# Patient Record
Sex: MALE | Race: WHITE | NOT HISPANIC OR LATINO
[De-identification: names, ages, dates, MRNs, and addresses within clinical notes are randomized per-mention and may not be internally consistent; named-entity substitution may affect disease eponyms.]

---

## 2017-01-05 ENCOUNTER — TRANSCRIPTION ENCOUNTER (OUTPATIENT)
Age: 57
End: 2017-01-05

## 2017-01-06 ENCOUNTER — TRANSCRIPTION ENCOUNTER (OUTPATIENT)
Age: 57
End: 2017-01-06

## 2017-01-16 ENCOUNTER — TRANSCRIPTION ENCOUNTER (OUTPATIENT)
Age: 57
End: 2017-01-16

## 2017-01-18 ENCOUNTER — INPATIENT (INPATIENT)
Facility: HOSPITAL | Age: 57
LOS: 2 days | Discharge: HOME | DRG: 603 | End: 2017-01-21
Attending: HOSPITALIST | Admitting: HOSPITALIST
Payer: COMMERCIAL

## 2017-01-18 VITALS
SYSTOLIC BLOOD PRESSURE: 118 MMHG | DIASTOLIC BLOOD PRESSURE: 87 MMHG | WEIGHT: 235.01 LBS | RESPIRATION RATE: 14 BRPM | OXYGEN SATURATION: 97 % | TEMPERATURE: 98 F | HEIGHT: 73 IN | HEART RATE: 110 BPM

## 2017-01-18 DIAGNOSIS — Z29.9 ENCOUNTER FOR PROPHYLACTIC MEASURES, UNSPECIFIED: ICD-10-CM

## 2017-01-18 DIAGNOSIS — Z98.89 OTHER SPECIFIED POSTPROCEDURAL STATES: Chronic | ICD-10-CM

## 2017-01-18 DIAGNOSIS — K21.9 GASTRO-ESOPHAGEAL REFLUX DISEASE WITHOUT ESOPHAGITIS: ICD-10-CM

## 2017-01-18 DIAGNOSIS — Z98.890 OTHER SPECIFIED POSTPROCEDURAL STATES: Chronic | ICD-10-CM

## 2017-01-18 DIAGNOSIS — L03.115 CELLULITIS OF RIGHT LOWER LIMB: ICD-10-CM

## 2017-01-18 DIAGNOSIS — M48.02 SPINAL STENOSIS, CERVICAL REGION: ICD-10-CM

## 2017-01-18 LAB
ALBUMIN SERPL ELPH-MCNC: 3.9 G/DL — SIGNIFICANT CHANGE UP (ref 3.3–5)
ALP SERPL-CCNC: 97 U/L — SIGNIFICANT CHANGE UP (ref 40–120)
ALT FLD-CCNC: 49 U/L — SIGNIFICANT CHANGE UP (ref 12–78)
ANION GAP SERPL CALC-SCNC: 9 MMOL/L — SIGNIFICANT CHANGE UP (ref 5–17)
APTT BLD: 28.1 SEC — SIGNIFICANT CHANGE UP (ref 27.5–37.4)
AST SERPL-CCNC: 38 U/L — HIGH (ref 15–37)
BASOPHILS # BLD AUTO: 0.1 K/UL — SIGNIFICANT CHANGE UP (ref 0–0.2)
BASOPHILS NFR BLD AUTO: 1.6 % — SIGNIFICANT CHANGE UP (ref 0–2)
BILIRUB SERPL-MCNC: 0.5 MG/DL — SIGNIFICANT CHANGE UP (ref 0.2–1.2)
BUN SERPL-MCNC: 15 MG/DL — SIGNIFICANT CHANGE UP (ref 7–23)
CALCIUM SERPL-MCNC: 9.4 MG/DL — SIGNIFICANT CHANGE UP (ref 8.5–10.1)
CHLORIDE SERPL-SCNC: 104 MMOL/L — SIGNIFICANT CHANGE UP (ref 96–108)
CO2 SERPL-SCNC: 27 MMOL/L — SIGNIFICANT CHANGE UP (ref 22–31)
CREAT SERPL-MCNC: 1.2 MG/DL — SIGNIFICANT CHANGE UP (ref 0.5–1.3)
EOSINOPHIL # BLD AUTO: 0 K/UL — SIGNIFICANT CHANGE UP (ref 0–0.5)
EOSINOPHIL NFR BLD AUTO: 0.6 % — SIGNIFICANT CHANGE UP (ref 0–6)
GLUCOSE SERPL-MCNC: 89 MG/DL — SIGNIFICANT CHANGE UP (ref 70–99)
HCT VFR BLD CALC: 42.9 % — SIGNIFICANT CHANGE UP (ref 39–50)
HGB BLD-MCNC: 14 G/DL — SIGNIFICANT CHANGE UP (ref 13–17)
INR BLD: 1.03 RATIO — SIGNIFICANT CHANGE UP (ref 0.88–1.16)
LACTATE SERPL-SCNC: 0.8 MMOL/L — SIGNIFICANT CHANGE UP (ref 0.7–2)
LYMPHOCYTES # BLD AUTO: 2.6 K/UL — SIGNIFICANT CHANGE UP (ref 1–3.3)
LYMPHOCYTES # BLD AUTO: 38 % — SIGNIFICANT CHANGE UP (ref 13–44)
MCHC RBC-ENTMCNC: 27.5 PG — SIGNIFICANT CHANGE UP (ref 27–34)
MCHC RBC-ENTMCNC: 32.6 GM/DL — SIGNIFICANT CHANGE UP (ref 32–36)
MCV RBC AUTO: 84.3 FL — SIGNIFICANT CHANGE UP (ref 80–100)
MONOCYTES # BLD AUTO: 0.7 K/UL — SIGNIFICANT CHANGE UP (ref 0–0.9)
MONOCYTES NFR BLD AUTO: 10.1 % — HIGH (ref 1–9)
NEUTROPHILS # BLD AUTO: 3.4 K/UL — SIGNIFICANT CHANGE UP (ref 1.8–7.4)
NEUTROPHILS NFR BLD AUTO: 49.7 % — SIGNIFICANT CHANGE UP (ref 43–77)
PLATELET # BLD AUTO: 266 K/UL — SIGNIFICANT CHANGE UP (ref 150–400)
POTASSIUM SERPL-MCNC: 3.9 MMOL/L — SIGNIFICANT CHANGE UP (ref 3.5–5.3)
POTASSIUM SERPL-SCNC: 3.9 MMOL/L — SIGNIFICANT CHANGE UP (ref 3.5–5.3)
PROT SERPL-MCNC: 7 G/DL — SIGNIFICANT CHANGE UP (ref 6–8.3)
PROTHROM AB SERPL-ACNC: 11.4 SEC — SIGNIFICANT CHANGE UP (ref 10–13.1)
RBC # BLD: 5.09 M/UL — SIGNIFICANT CHANGE UP (ref 4.2–5.8)
RBC # FLD: 13.9 % — SIGNIFICANT CHANGE UP (ref 10.3–14.5)
SODIUM SERPL-SCNC: 140 MMOL/L — SIGNIFICANT CHANGE UP (ref 135–145)
WBC # BLD: 6.9 K/UL — SIGNIFICANT CHANGE UP (ref 3.8–10.5)
WBC # FLD AUTO: 6.9 K/UL — SIGNIFICANT CHANGE UP (ref 3.8–10.5)

## 2017-01-18 PROCEDURE — 99223 1ST HOSP IP/OBS HIGH 75: CPT | Mod: AI,GC

## 2017-01-18 PROCEDURE — 99285 EMERGENCY DEPT VISIT HI MDM: CPT

## 2017-01-18 PROCEDURE — 93010 ELECTROCARDIOGRAM REPORT: CPT

## 2017-01-18 PROCEDURE — 71010: CPT | Mod: 26

## 2017-01-18 PROCEDURE — 93971 EXTREMITY STUDY: CPT | Mod: 26,RT

## 2017-01-18 RX ORDER — CYCLOBENZAPRINE HYDROCHLORIDE 10 MG/1
10 TABLET, FILM COATED ORAL AT BEDTIME
Qty: 0 | Refills: 0 | Status: DISCONTINUED | OUTPATIENT
Start: 2017-01-18 | End: 2017-01-21

## 2017-01-18 RX ORDER — VANCOMYCIN HCL 1 G
1000 VIAL (EA) INTRAVENOUS EVERY 12 HOURS
Qty: 0 | Refills: 0 | Status: DISCONTINUED | OUTPATIENT
Start: 2017-01-18 | End: 2017-01-18

## 2017-01-18 RX ORDER — ACETAMINOPHEN 500 MG
650 TABLET ORAL EVERY 6 HOURS
Qty: 0 | Refills: 0 | Status: DISCONTINUED | OUTPATIENT
Start: 2017-01-18 | End: 2017-01-21

## 2017-01-18 RX ORDER — VANCOMYCIN HCL 1 G
1250 VIAL (EA) INTRAVENOUS EVERY 12 HOURS
Qty: 0 | Refills: 0 | Status: DISCONTINUED | OUTPATIENT
Start: 2017-01-19 | End: 2017-01-20

## 2017-01-18 RX ORDER — ENOXAPARIN SODIUM 100 MG/ML
40 INJECTION SUBCUTANEOUS EVERY 24 HOURS
Qty: 0 | Refills: 0 | Status: DISCONTINUED | OUTPATIENT
Start: 2017-01-18 | End: 2017-01-21

## 2017-01-18 RX ORDER — GABAPENTIN 400 MG/1
600 CAPSULE ORAL THREE TIMES A DAY
Qty: 0 | Refills: 0 | Status: DISCONTINUED | OUTPATIENT
Start: 2017-01-18 | End: 2017-01-21

## 2017-01-18 RX ORDER — SODIUM CHLORIDE 9 MG/ML
1000 INJECTION INTRAMUSCULAR; INTRAVENOUS; SUBCUTANEOUS ONCE
Qty: 0 | Refills: 0 | Status: COMPLETED | OUTPATIENT
Start: 2017-01-18 | End: 2017-01-18

## 2017-01-18 RX ORDER — NORTRIPTYLINE HYDROCHLORIDE 10 MG/1
25 CAPSULE ORAL DAILY
Qty: 0 | Refills: 0 | Status: DISCONTINUED | OUTPATIENT
Start: 2017-01-18 | End: 2017-01-21

## 2017-01-18 RX ORDER — PANTOPRAZOLE SODIUM 20 MG/1
40 TABLET, DELAYED RELEASE ORAL
Qty: 0 | Refills: 0 | Status: DISCONTINUED | OUTPATIENT
Start: 2017-01-18 | End: 2017-01-21

## 2017-01-18 RX ORDER — VANCOMYCIN HCL 1 G
1000 VIAL (EA) INTRAVENOUS ONCE
Qty: 0 | Refills: 0 | Status: COMPLETED | OUTPATIENT
Start: 2017-01-18 | End: 2017-01-18

## 2017-01-18 RX ADMIN — Medication 250 MILLIGRAM(S): at 20:56

## 2017-01-18 RX ADMIN — GABAPENTIN 600 MILLIGRAM(S): 400 CAPSULE ORAL at 23:19

## 2017-01-18 RX ADMIN — NORTRIPTYLINE HYDROCHLORIDE 25 MILLIGRAM(S): 10 CAPSULE ORAL at 23:19

## 2017-01-18 RX ADMIN — SODIUM CHLORIDE 1000 MILLILITER(S): 9 INJECTION INTRAMUSCULAR; INTRAVENOUS; SUBCUTANEOUS at 20:25

## 2017-01-18 RX ADMIN — ENOXAPARIN SODIUM 40 MILLIGRAM(S): 100 INJECTION SUBCUTANEOUS at 23:20

## 2017-01-18 RX ADMIN — CYCLOBENZAPRINE HYDROCHLORIDE 10 MILLIGRAM(S): 10 TABLET, FILM COATED ORAL at 23:19

## 2017-01-18 NOTE — H&P ADULT. - HISTORY OF PRESENT ILLNESS
57 yo male s/p right lower calf tattoo 3 weeks ago, got infected/cellulitis,  was on 1 week on clindamycin, no fever/chills, still not getting any better.  PMD Dr. Louis.    In ED, VS: 56y M with PMHx GERD presents with cellulitis of RLE. On 12/23 pt got tatoo on right lower calf in [place] tatoo [facility] with his family. Pt states leg was initially days after, however pt attributed this to tatoo procedure. Over following days pt had increased erythema and pain over tatoo area. Pt went to [facility] urgent care on 1/02, was given 7-day course of clindamycin and topical antibiotic ointment. Pt states he noticed mild improvement, with slight decrease in erythema however erythema never cleared. Pt came into ED today on recommendation of neighbor who is a nurse. Currently denies fever, chills, chest pain, sob, abd pain, n/v/d/c, numbness/tingling.    In ED, VS: T97.3, HR 81, /81, RR 19 SpO2 96% RA  ED labs wnl  US Duplex (-)  CXR: Linear density at the right lung base likely disc atelectasis

## 2017-01-18 NOTE — ED ADULT NURSE NOTE - PSH
History of renal stent  prior insertion in Oct 2014 and exchanged in   right side - Jan 17th , 2015  S/P cystoscopy  Oct 2014  Status post spinal disc removal  C-5 C-6 removal

## 2017-01-18 NOTE — ED ADULT NURSE NOTE - OBJECTIVE STATEMENT
Pt to ED c/c infection to tattoo on right lower leg. Pt states he completed course of clindamycin and redness improved bur persists. Notd with erythema inferior to tattoo site, skin temp is normal Pt s/o soreness at site

## 2017-01-18 NOTE — ED PROVIDER NOTE - SKIN, MLM
+erythema, +warmth on right calf over [place] [employer] Tattoo with streaking down to the ankle, no fluctuance

## 2017-01-18 NOTE — H&P ADULT. - PROBLEM SELECTOR PLAN 1
IV abx: vancomycin 1250mg q12  wound care consult  f/u AM labs IV abx: vancomycin 1250mg q12  wound care consult for possible wound culture  f/u AM labs

## 2017-01-18 NOTE — ED ADULT NURSE NOTE - PMH
GERD (gastroesophageal reflux disease)    Kidney stone  multiple  Marijuana abuse    Spinal stenosis in cervical region

## 2017-01-19 ENCOUNTER — TRANSCRIPTION ENCOUNTER (OUTPATIENT)
Age: 57
End: 2017-01-19

## 2017-01-19 LAB
ANION GAP SERPL CALC-SCNC: 8 MMOL/L — SIGNIFICANT CHANGE UP (ref 5–17)
APPEARANCE UR: CLEAR — SIGNIFICANT CHANGE UP
BASOPHILS # BLD AUTO: 0.1 K/UL — SIGNIFICANT CHANGE UP (ref 0–0.2)
BASOPHILS NFR BLD AUTO: 1.5 % — SIGNIFICANT CHANGE UP (ref 0–2)
BILIRUB UR-MCNC: NEGATIVE — SIGNIFICANT CHANGE UP
BUN SERPL-MCNC: 16 MG/DL — SIGNIFICANT CHANGE UP (ref 7–23)
CALCIUM SERPL-MCNC: 8.6 MG/DL — SIGNIFICANT CHANGE UP (ref 8.5–10.1)
CHLORIDE SERPL-SCNC: 106 MMOL/L — SIGNIFICANT CHANGE UP (ref 96–108)
CO2 SERPL-SCNC: 26 MMOL/L — SIGNIFICANT CHANGE UP (ref 22–31)
COLOR SPEC: YELLOW — SIGNIFICANT CHANGE UP
CREAT SERPL-MCNC: 1.2 MG/DL — SIGNIFICANT CHANGE UP (ref 0.5–1.3)
DIFF PNL FLD: NEGATIVE — SIGNIFICANT CHANGE UP
EOSINOPHIL # BLD AUTO: 0.1 K/UL — SIGNIFICANT CHANGE UP (ref 0–0.5)
EOSINOPHIL NFR BLD AUTO: 1.4 % — SIGNIFICANT CHANGE UP (ref 0–6)
GLUCOSE SERPL-MCNC: 106 MG/DL — HIGH (ref 70–99)
GLUCOSE UR QL: NEGATIVE — SIGNIFICANT CHANGE UP
HAV IGM SER-ACNC: SIGNIFICANT CHANGE UP
HBV CORE IGM SER-ACNC: SIGNIFICANT CHANGE UP
HBV SURFACE AG SER-ACNC: SIGNIFICANT CHANGE UP
HCT VFR BLD CALC: 43.4 % — SIGNIFICANT CHANGE UP (ref 39–50)
HCV AB S/CO SERPL IA: 0.08 S/CO — SIGNIFICANT CHANGE UP
HCV AB SERPL-IMP: SIGNIFICANT CHANGE UP
HGB BLD-MCNC: 13.9 G/DL — SIGNIFICANT CHANGE UP (ref 13–17)
HIV 1+2 AB+HIV1 P24 AG SERPL QL IA: SIGNIFICANT CHANGE UP
KETONES UR-MCNC: NEGATIVE — SIGNIFICANT CHANGE UP
LEUKOCYTE ESTERASE UR-ACNC: NEGATIVE — SIGNIFICANT CHANGE UP
LYMPHOCYTES # BLD AUTO: 1.9 K/UL — SIGNIFICANT CHANGE UP (ref 1–3.3)
LYMPHOCYTES # BLD AUTO: 32.4 % — SIGNIFICANT CHANGE UP (ref 13–44)
MCHC RBC-ENTMCNC: 27.1 PG — SIGNIFICANT CHANGE UP (ref 27–34)
MCHC RBC-ENTMCNC: 32.1 GM/DL — SIGNIFICANT CHANGE UP (ref 32–36)
MCV RBC AUTO: 84.6 FL — SIGNIFICANT CHANGE UP (ref 80–100)
MONOCYTES # BLD AUTO: 0.5 K/UL — SIGNIFICANT CHANGE UP (ref 0–0.9)
MONOCYTES NFR BLD AUTO: 8.8 % — SIGNIFICANT CHANGE UP (ref 1–9)
NEUTROPHILS # BLD AUTO: 3.3 K/UL — SIGNIFICANT CHANGE UP (ref 1.8–7.4)
NEUTROPHILS NFR BLD AUTO: 55.8 % — SIGNIFICANT CHANGE UP (ref 43–77)
NITRITE UR-MCNC: NEGATIVE — SIGNIFICANT CHANGE UP
PH UR: 6.5 — SIGNIFICANT CHANGE UP (ref 4.8–8)
PLATELET # BLD AUTO: 245 K/UL — SIGNIFICANT CHANGE UP (ref 150–400)
POTASSIUM SERPL-MCNC: 4 MMOL/L — SIGNIFICANT CHANGE UP (ref 3.5–5.3)
POTASSIUM SERPL-SCNC: 4 MMOL/L — SIGNIFICANT CHANGE UP (ref 3.5–5.3)
PROT UR-MCNC: NEGATIVE — SIGNIFICANT CHANGE UP
RBC # BLD: 5.13 M/UL — SIGNIFICANT CHANGE UP (ref 4.2–5.8)
RBC # FLD: 14 % — SIGNIFICANT CHANGE UP (ref 10.3–14.5)
SODIUM SERPL-SCNC: 140 MMOL/L — SIGNIFICANT CHANGE UP (ref 135–145)
SP GR SPEC: 1.01 — SIGNIFICANT CHANGE UP (ref 1.01–1.02)
UROBILINOGEN FLD QL: NEGATIVE — SIGNIFICANT CHANGE UP
WBC # BLD: 5.9 K/UL — SIGNIFICANT CHANGE UP (ref 3.8–10.5)
WBC # FLD AUTO: 5.9 K/UL — SIGNIFICANT CHANGE UP (ref 3.8–10.5)

## 2017-01-19 PROCEDURE — 99233 SBSQ HOSP IP/OBS HIGH 50: CPT

## 2017-01-19 RX ADMIN — Medication 166.67 MILLIGRAM(S): at 21:03

## 2017-01-19 RX ADMIN — Medication 166.67 MILLIGRAM(S): at 08:22

## 2017-01-19 RX ADMIN — PANTOPRAZOLE SODIUM 40 MILLIGRAM(S): 20 TABLET, DELAYED RELEASE ORAL at 06:31

## 2017-01-19 RX ADMIN — ENOXAPARIN SODIUM 40 MILLIGRAM(S): 100 INJECTION SUBCUTANEOUS at 22:43

## 2017-01-19 RX ADMIN — GABAPENTIN 600 MILLIGRAM(S): 400 CAPSULE ORAL at 22:43

## 2017-01-19 RX ADMIN — CYCLOBENZAPRINE HYDROCHLORIDE 10 MILLIGRAM(S): 10 TABLET, FILM COATED ORAL at 22:43

## 2017-01-19 RX ADMIN — NORTRIPTYLINE HYDROCHLORIDE 25 MILLIGRAM(S): 10 CAPSULE ORAL at 13:19

## 2017-01-19 RX ADMIN — GABAPENTIN 600 MILLIGRAM(S): 400 CAPSULE ORAL at 06:31

## 2017-01-19 RX ADMIN — GABAPENTIN 600 MILLIGRAM(S): 400 CAPSULE ORAL at 13:18

## 2017-01-19 NOTE — DISCHARGE NOTE ADULT - PATIENT PORTAL LINK FT
“You can access the [facility] Patient Portal, offered by [facility], by registering with the following website: [email or web address]”

## 2017-01-19 NOTE — DISCHARGE NOTE ADULT - MEDICATION SUMMARY - MEDICATIONS TO TAKE
I will START or STAY ON the medications listed below when I get home from the hospital:    acetaminophen 325 mg oral tablet  -- 2 tab(s) by mouth every 6 hours, As needed, Mild Pain (1 - 3)  -- Indication: For Cellulitis of right leg    aluminum hydroxide-magnesium hydroxide 200 mg-200 mg/5 mL oral suspension  -- 30 milliliter(s) by mouth every 6 hours, As needed, Dyspepsia  -- Indication: For GERD (gastroesophageal reflux disease)    gabapentin 600 mg oral tablet  -- 1 tab(s) by mouth 3 times a day  -- Indication: For Spinal stenosis in cervical region    nortriptyline 25 mg oral capsule  --  by mouth once a day  -- Indication: For Spinal stenosis in cervical region    clindamycin 300 mg oral capsule  -- 1 cap(s) by mouth 3 times a day  -- Indication: For Cellulitis of right lower extremity    cyclobenzaprine 10 mg oral tablet  -- 1 tab(s) by mouth once a day (at bedtime)  -- Indication: For Spinal stenosis in cervical region    Acidophilus oral capsule  -- 1 cap(s) by mouth 3 times a day  -- Indication: For Cellulitis of right lower extremity    Prevacid 15 mg oral delayed release capsule  -- 1 cap(s) by mouth once a day  -- Indication: For GERD (gastroesophageal reflux disease)

## 2017-01-19 NOTE — DISCHARGE NOTE ADULT - ADDITIONAL INSTRUCTIONS
Follow-up with primary care provider upon discharge. Follow-up with primary care provider upon discharge.  F/U with renal functions

## 2017-01-19 NOTE — DISCHARGE NOTE ADULT - CARE PROVIDER_API CALL
Chris Louis (DO), Family Medicine  [address]  [place] [address]  Phone: [phone]  Fax: [phone]    Nick Walker), Infectious Disease; Internal Medicine  [address] [place] [id number]  Phone: [phone]  Fax: [phone]

## 2017-01-19 NOTE — DISCHARGE NOTE ADULT - HOSPITAL COURSE
56M PMHx GERD presented with cellulitis of RLE. On 12/23 pt [facility] [place] [facility]. Pt states leg was initially days after, however pt attributed this to tatoo procedure. Over following days pt had increased erythema and pain over tatoo area. Pt went to [facility] urgent care on 1/02, was given 7-day course of clindamycin and topical antibiotic ointment. Pt states he noticed mild improvement, with slight decrease in erythema however erythema never cleared. Pt came into ED today on recommendation of neighbor who is a nurse. Currently denies fever, chills, chest pain, sob, abd pain, n/v/d/c, numbness/tingling.    In ED, VS: T97.3, HR 81, /81, RR 19 SpO2 96% RA. ED labs wnl. US Duplex (-). CXR: Linear density at the right lung base likely disc atelectasis    Patient given IV Vanco, ID consulted (Dr. Walker), wound care consulted to obtain wound culture. 56M PMHx GERD presented with cellulitis of RLE. On 12/23 pt [place] [facility] [place] [facility] [place] [facility] Pt states leg was initially days after, however pt attributed this to tatoo procedure. Over following days pt had increased erythema and pain over tatoo area. Pt went to [facility] urgent care on 1/02, was given 7-day course of clindamycin and topical antibiotic ointment. Pt states he noticed mild improvement, with slight decrease in erythema however erythema never cleared. Pt came into ED today on recommendation of neighbor who is a nurse. Currently denies fever, chills, chest pain, sob, abd pain, n/v/d/c, numbness/tingling.    In ED, VS: T97.3, HR 81, /81, RR 19 SpO2 96% RA. ED labs wnl. US Duplex (-). CXR: Linear density at the right lung base likely disc atelectasis    Patient given IV Vanco, ID consulted (Dr. Walker), wound care consulted, recommended antibiotics and warm compresses. Wound clinically improved, switched to oral Clinda. Developed acute kidney injury with elevated creatinine, switched off Vanco, encouraged increased fluid intake. 56M PMHx GERD presented with cellulitis of RLE. On 12/23 pt [facility] [place] [facility] Pt states leg was initially days after, however pt attributed this to tatoo procedure. Over following days pt had increased erythema and pain over tatoo area. Pt went to [facility] urgent care on 1/02, was given 7-day course of clindamycin and topical antibiotic ointment. Pt states he noticed mild improvement, with slight decrease in erythema however erythema never cleared. Pt came into ED today on recommendation of neighbor who is a nurse. Currently denies fever, chills, chest pain, sob, abd pain, n/v/d/c, numbness/tingling.    In ED, VS: T97.3, HR 81, /81, RR 19 SpO2 96% RA. ED labs wnl. US Duplex (-). CXR: Linear density at the right lung base likely disc atelectasis    Patient given IV Vanco, ID consulted (Dr. Walker), wound care consulted, recommended antibiotics and warm compresses. Wound clinically improved, switched to oral Clinda. Developed acute kidney injury with elevated creatinine, switched off Vanco, encouraged increased fluid intake. With IVF Cr is trending down.

## 2017-01-19 NOTE — DISCHARGE NOTE ADULT - CARE PLAN
Principal Discharge DX:	Cellulitis of right leg Principal Discharge DX:	Cellulitis of right leg  Goal:	Resolution of infection  Instructions for follow-up, activity and diet:	Continue oral Clinda, start Bacid. Follow-up with PCP.  Secondary Diagnosis:	ROGELIO (acute kidney injury)  Instructions for follow-up, activity and diet:	Encourage increased fluid intake  Secondary Diagnosis:	Spinal stenosis in cervical region  Instructions for follow-up, activity and diet:	Continue Neurontin, flexeril and nortriptyline Principal Discharge DX:	Cellulitis of right leg  Goal:	Resolution of infection  Instructions for follow-up, activity and diet:	Continue oral Clinda, and Bacid for total 7 days. Follow-up with PCP.  Secondary Diagnosis:	ROGELIO (acute kidney injury)  Instructions for follow-up, activity and diet:	Encourage increased fluid intake  Secondary Diagnosis:	Spinal stenosis in cervical region  Instructions for follow-up, activity and diet:	Continue Neurontin, flexeril and nortriptyline

## 2017-01-19 NOTE — DISCHARGE NOTE ADULT - PLAN OF CARE
Resolution of infection Continue oral Clinda, start Bacid. Follow-up with PCP. Encourage increased fluid intake Continue Neurontin, flexeril and nortriptyline Continue oral Clinda, and Bacid for total 7 days. Follow-up with PCP.

## 2017-01-20 LAB
ANION GAP SERPL CALC-SCNC: 7 MMOL/L — SIGNIFICANT CHANGE UP (ref 5–17)
BUN SERPL-MCNC: 12 MG/DL — SIGNIFICANT CHANGE UP (ref 7–23)
CALCIUM SERPL-MCNC: 8.7 MG/DL — SIGNIFICANT CHANGE UP (ref 8.5–10.1)
CHLORIDE SERPL-SCNC: 104 MMOL/L — SIGNIFICANT CHANGE UP (ref 96–108)
CO2 SERPL-SCNC: 29 MMOL/L — SIGNIFICANT CHANGE UP (ref 22–31)
CREAT SERPL-MCNC: 1.4 MG/DL — HIGH (ref 0.5–1.3)
CULTURE RESULTS: NO GROWTH — SIGNIFICANT CHANGE UP
EOSINOPHIL NFR URNS MANUAL: NEGATIVE — SIGNIFICANT CHANGE UP
GLUCOSE SERPL-MCNC: 105 MG/DL — HIGH (ref 70–99)
HCT VFR BLD CALC: 43.3 % — SIGNIFICANT CHANGE UP (ref 39–50)
HGB BLD-MCNC: 14.1 G/DL — SIGNIFICANT CHANGE UP (ref 13–17)
MCHC RBC-ENTMCNC: 27.7 PG — SIGNIFICANT CHANGE UP (ref 27–34)
MCHC RBC-ENTMCNC: 32.6 GM/DL — SIGNIFICANT CHANGE UP (ref 32–36)
MCV RBC AUTO: 85.1 FL — SIGNIFICANT CHANGE UP (ref 80–100)
PLATELET # BLD AUTO: 237 K/UL — SIGNIFICANT CHANGE UP (ref 150–400)
POTASSIUM SERPL-MCNC: 4.3 MMOL/L — SIGNIFICANT CHANGE UP (ref 3.5–5.3)
POTASSIUM SERPL-SCNC: 4.3 MMOL/L — SIGNIFICANT CHANGE UP (ref 3.5–5.3)
RBC # BLD: 5.09 M/UL — SIGNIFICANT CHANGE UP (ref 4.2–5.8)
RBC # FLD: 14.2 % — SIGNIFICANT CHANGE UP (ref 10.3–14.5)
SODIUM SERPL-SCNC: 140 MMOL/L — SIGNIFICANT CHANGE UP (ref 135–145)
SPECIMEN SOURCE: SIGNIFICANT CHANGE UP
VANCOMYCIN TROUGH SERPL-MCNC: 11.5 UG/ML — SIGNIFICANT CHANGE UP (ref 10–20)
WBC # BLD: 6.4 K/UL — SIGNIFICANT CHANGE UP (ref 3.8–10.5)
WBC # FLD AUTO: 6.4 K/UL — SIGNIFICANT CHANGE UP (ref 3.8–10.5)

## 2017-01-20 PROCEDURE — 99233 SBSQ HOSP IP/OBS HIGH 50: CPT

## 2017-01-20 RX ORDER — LACTOBACILLUS ACIDOPHILUS 100MM CELL
1 CAPSULE ORAL
Qty: 0 | Refills: 0 | Status: DISCONTINUED | OUTPATIENT
Start: 2017-01-20 | End: 2017-01-21

## 2017-01-20 RX ADMIN — GABAPENTIN 600 MILLIGRAM(S): 400 CAPSULE ORAL at 06:20

## 2017-01-20 RX ADMIN — Medication 1 TABLET(S): at 17:23

## 2017-01-20 RX ADMIN — GABAPENTIN 600 MILLIGRAM(S): 400 CAPSULE ORAL at 22:40

## 2017-01-20 RX ADMIN — PANTOPRAZOLE SODIUM 40 MILLIGRAM(S): 20 TABLET, DELAYED RELEASE ORAL at 06:20

## 2017-01-20 RX ADMIN — Medication 1 TABLET(S): at 11:03

## 2017-01-20 RX ADMIN — CYCLOBENZAPRINE HYDROCHLORIDE 10 MILLIGRAM(S): 10 TABLET, FILM COATED ORAL at 22:40

## 2017-01-20 RX ADMIN — Medication 300 MILLIGRAM(S): at 13:26

## 2017-01-20 RX ADMIN — ENOXAPARIN SODIUM 40 MILLIGRAM(S): 100 INJECTION SUBCUTANEOUS at 22:39

## 2017-01-20 RX ADMIN — Medication 300 MILLIGRAM(S): at 22:40

## 2017-01-20 RX ADMIN — NORTRIPTYLINE HYDROCHLORIDE 25 MILLIGRAM(S): 10 CAPSULE ORAL at 13:26

## 2017-01-20 RX ADMIN — GABAPENTIN 600 MILLIGRAM(S): 400 CAPSULE ORAL at 13:26

## 2017-01-21 VITALS
OXYGEN SATURATION: 94 % | SYSTOLIC BLOOD PRESSURE: 114 MMHG | RESPIRATION RATE: 18 BRPM | HEART RATE: 81 BPM | TEMPERATURE: 97 F | DIASTOLIC BLOOD PRESSURE: 80 MMHG

## 2017-01-21 LAB
ANION GAP SERPL CALC-SCNC: 6 MMOL/L — SIGNIFICANT CHANGE UP (ref 5–17)
BUN SERPL-MCNC: 10 MG/DL — SIGNIFICANT CHANGE UP (ref 7–23)
CALCIUM SERPL-MCNC: 8.8 MG/DL — SIGNIFICANT CHANGE UP (ref 8.5–10.1)
CHLORIDE SERPL-SCNC: 103 MMOL/L — SIGNIFICANT CHANGE UP (ref 96–108)
CO2 SERPL-SCNC: 30 MMOL/L — SIGNIFICANT CHANGE UP (ref 22–31)
CREAT ?TM UR-MCNC: 25 MG/DL — SIGNIFICANT CHANGE UP
CREAT SERPL-MCNC: 1.4 MG/DL — HIGH (ref 0.5–1.3)
GLUCOSE SERPL-MCNC: 98 MG/DL — SIGNIFICANT CHANGE UP (ref 70–99)
HCT VFR BLD CALC: 44.2 % — SIGNIFICANT CHANGE UP (ref 39–50)
HGB BLD-MCNC: 14.5 G/DL — SIGNIFICANT CHANGE UP (ref 13–17)
MCHC RBC-ENTMCNC: 27.3 PG — SIGNIFICANT CHANGE UP (ref 27–34)
MCHC RBC-ENTMCNC: 32.9 GM/DL — SIGNIFICANT CHANGE UP (ref 32–36)
MCV RBC AUTO: 83 FL — SIGNIFICANT CHANGE UP (ref 80–100)
OSMOLALITY UR: 160 MOS/KG — SIGNIFICANT CHANGE UP (ref 50–1200)
PLATELET # BLD AUTO: 246 K/UL — SIGNIFICANT CHANGE UP (ref 150–400)
POTASSIUM SERPL-MCNC: 4.2 MMOL/L — SIGNIFICANT CHANGE UP (ref 3.5–5.3)
POTASSIUM SERPL-SCNC: 4.2 MMOL/L — SIGNIFICANT CHANGE UP (ref 3.5–5.3)
RBC # BLD: 5.32 M/UL — SIGNIFICANT CHANGE UP (ref 4.2–5.8)
RBC # FLD: 13.6 % — SIGNIFICANT CHANGE UP (ref 10.3–14.5)
SODIUM SERPL-SCNC: 139 MMOL/L — SIGNIFICANT CHANGE UP (ref 135–145)
SODIUM UR-SCNC: 51 MMOL/L — SIGNIFICANT CHANGE UP
WBC # BLD: 5.9 K/UL — SIGNIFICANT CHANGE UP (ref 3.8–10.5)
WBC # FLD AUTO: 5.9 K/UL — SIGNIFICANT CHANGE UP (ref 3.8–10.5)

## 2017-01-21 PROCEDURE — 85730 THROMBOPLASTIN TIME PARTIAL: CPT

## 2017-01-21 PROCEDURE — 81003 URINALYSIS AUTO W/O SCOPE: CPT

## 2017-01-21 PROCEDURE — 99285 EMERGENCY DEPT VISIT HI MDM: CPT | Mod: 25

## 2017-01-21 PROCEDURE — 93971 EXTREMITY STUDY: CPT

## 2017-01-21 PROCEDURE — 80048 BASIC METABOLIC PNL TOTAL CA: CPT

## 2017-01-21 PROCEDURE — 80074 ACUTE HEPATITIS PANEL: CPT

## 2017-01-21 PROCEDURE — G0378: CPT

## 2017-01-21 PROCEDURE — 80053 COMPREHEN METABOLIC PANEL: CPT

## 2017-01-21 PROCEDURE — 87389 HIV-1 AG W/HIV-1&-2 AB AG IA: CPT

## 2017-01-21 PROCEDURE — 96365 THER/PROPH/DIAG IV INF INIT: CPT

## 2017-01-21 PROCEDURE — 80202 ASSAY OF VANCOMYCIN: CPT

## 2017-01-21 PROCEDURE — 85027 COMPLETE CBC AUTOMATED: CPT

## 2017-01-21 PROCEDURE — 83935 ASSAY OF URINE OSMOLALITY: CPT

## 2017-01-21 PROCEDURE — 82570 ASSAY OF URINE CREATININE: CPT

## 2017-01-21 PROCEDURE — 84300 ASSAY OF URINE SODIUM: CPT

## 2017-01-21 PROCEDURE — 96367 TX/PROPH/DG ADDL SEQ IV INF: CPT

## 2017-01-21 PROCEDURE — 85610 PROTHROMBIN TIME: CPT

## 2017-01-21 PROCEDURE — 83605 ASSAY OF LACTIC ACID: CPT

## 2017-01-21 PROCEDURE — 99239 HOSP IP/OBS DSCHRG MGMT >30: CPT

## 2017-01-21 PROCEDURE — 87205 SMEAR GRAM STAIN: CPT

## 2017-01-21 PROCEDURE — 71045 X-RAY EXAM CHEST 1 VIEW: CPT

## 2017-01-21 PROCEDURE — 93005 ELECTROCARDIOGRAM TRACING: CPT

## 2017-01-21 PROCEDURE — 87040 BLOOD CULTURE FOR BACTERIA: CPT

## 2017-01-21 PROCEDURE — 87086 URINE CULTURE/COLONY COUNT: CPT

## 2017-01-21 RX ORDER — ACETAMINOPHEN 500 MG
2 TABLET ORAL
Qty: 0 | Refills: 0 | COMMUNITY
Start: 2017-01-21

## 2017-01-21 RX ORDER — LACTOBACILLUS ACIDOPHILUS 100MM CELL
1 CAPSULE ORAL
Qty: 21 | Refills: 0 | OUTPATIENT
Start: 2017-01-21 | End: 2017-01-28

## 2017-01-21 RX ADMIN — Medication 300 MILLIGRAM(S): at 13:08

## 2017-01-21 RX ADMIN — PANTOPRAZOLE SODIUM 40 MILLIGRAM(S): 20 TABLET, DELAYED RELEASE ORAL at 06:56

## 2017-01-21 RX ADMIN — Medication 1 TABLET(S): at 07:51

## 2017-01-21 RX ADMIN — Medication 1 TABLET(S): at 11:18

## 2017-01-21 RX ADMIN — NORTRIPTYLINE HYDROCHLORIDE 25 MILLIGRAM(S): 10 CAPSULE ORAL at 11:18

## 2017-01-21 RX ADMIN — Medication 300 MILLIGRAM(S): at 06:56

## 2017-01-21 RX ADMIN — GABAPENTIN 600 MILLIGRAM(S): 400 CAPSULE ORAL at 13:08

## 2017-01-21 RX ADMIN — GABAPENTIN 600 MILLIGRAM(S): 400 CAPSULE ORAL at 06:56

## 2017-01-21 RX ADMIN — Medication 30 MILLILITER(S): at 09:34

## 2017-01-24 DIAGNOSIS — K21.9 GASTRO-ESOPHAGEAL REFLUX DISEASE WITHOUT ESOPHAGITIS: ICD-10-CM

## 2017-01-24 DIAGNOSIS — Z87.442 PERSONAL HISTORY OF URINARY CALCULI: ICD-10-CM

## 2017-01-24 DIAGNOSIS — Z28.21 IMMUNIZATION NOT CARRIED OUT BECAUSE OF PATIENT REFUSAL: ICD-10-CM

## 2017-01-24 DIAGNOSIS — M48.02 SPINAL STENOSIS, CERVICAL REGION: ICD-10-CM

## 2017-01-24 DIAGNOSIS — N17.9 ACUTE KIDNEY FAILURE, UNSPECIFIED: ICD-10-CM

## 2017-01-24 DIAGNOSIS — J45.909 UNSPECIFIED ASTHMA, UNCOMPLICATED: ICD-10-CM

## 2017-01-24 DIAGNOSIS — L03.115 CELLULITIS OF RIGHT LOWER LIMB: ICD-10-CM

## 2017-01-24 DIAGNOSIS — Z91.02 FOOD ADDITIVES ALLERGY STATUS: ICD-10-CM

## 2017-01-24 DIAGNOSIS — F12.10 CANNABIS ABUSE, UNCOMPLICATED: ICD-10-CM

## 2017-01-24 DIAGNOSIS — J98.11 ATELECTASIS: ICD-10-CM

## 2017-01-24 LAB
CULTURE RESULTS: SIGNIFICANT CHANGE UP
CULTURE RESULTS: SIGNIFICANT CHANGE UP
SPECIMEN SOURCE: SIGNIFICANT CHANGE UP
SPECIMEN SOURCE: SIGNIFICANT CHANGE UP

## 2017-03-06 ENCOUNTER — APPOINTMENT (OUTPATIENT)
Dept: ORTHOPEDIC SURGERY | Facility: CLINIC | Age: 57
End: 2017-03-06

## 2017-03-06 RX ORDER — MUPIROCIN 20 MG/G
2 OINTMENT TOPICAL
Qty: 22 | Refills: 0 | Status: ACTIVE | COMMUNITY
Start: 2017-01-05

## 2017-05-20 ENCOUNTER — EMERGENCY (EMERGENCY)
Facility: HOSPITAL | Age: 57
LOS: 1 days | Discharge: HOME | End: 2017-05-20
Attending: EMERGENCY MEDICINE | Admitting: EMERGENCY MEDICINE
Payer: COMMERCIAL

## 2017-05-20 VITALS — WEIGHT: 229.94 LBS | HEIGHT: 73 IN

## 2017-05-20 VITALS
HEART RATE: 67 BPM | TEMPERATURE: 98 F | SYSTOLIC BLOOD PRESSURE: 135 MMHG | RESPIRATION RATE: 16 BRPM | DIASTOLIC BLOOD PRESSURE: 76 MMHG | OXYGEN SATURATION: 100 %

## 2017-05-20 DIAGNOSIS — R68.84 JAW PAIN: ICD-10-CM

## 2017-05-20 DIAGNOSIS — R06.00 DYSPNEA, UNSPECIFIED: ICD-10-CM

## 2017-05-20 DIAGNOSIS — Z91.018 ALLERGY TO OTHER FOODS: ICD-10-CM

## 2017-05-20 DIAGNOSIS — Z98.89 OTHER SPECIFIED POSTPROCEDURAL STATES: Chronic | ICD-10-CM

## 2017-05-20 DIAGNOSIS — Z98.890 OTHER SPECIFIED POSTPROCEDURAL STATES: Chronic | ICD-10-CM

## 2017-05-20 DIAGNOSIS — Z98.890 OTHER SPECIFIED POSTPROCEDURAL STATES: ICD-10-CM

## 2017-05-20 LAB
ALBUMIN SERPL ELPH-MCNC: 4.3 G/DL — SIGNIFICANT CHANGE UP (ref 3.3–5)
ALP SERPL-CCNC: 103 U/L — SIGNIFICANT CHANGE UP (ref 40–120)
ALT FLD-CCNC: 47 U/L — SIGNIFICANT CHANGE UP (ref 12–78)
ANION GAP SERPL CALC-SCNC: 13 MMOL/L — SIGNIFICANT CHANGE UP (ref 5–17)
AST SERPL-CCNC: 42 U/L — HIGH (ref 15–37)
BASOPHILS # BLD AUTO: 0.1 K/UL — SIGNIFICANT CHANGE UP (ref 0–0.2)
BASOPHILS NFR BLD AUTO: 0.9 % — SIGNIFICANT CHANGE UP (ref 0–2)
BILIRUB SERPL-MCNC: 1.1 MG/DL — SIGNIFICANT CHANGE UP (ref 0.2–1.2)
BUN SERPL-MCNC: 27 MG/DL — HIGH (ref 7–23)
CALCIUM SERPL-MCNC: 10 MG/DL — SIGNIFICANT CHANGE UP (ref 8.5–10.1)
CHLORIDE SERPL-SCNC: 102 MMOL/L — SIGNIFICANT CHANGE UP (ref 96–108)
CK MB BLD-MCNC: 1.3 % — SIGNIFICANT CHANGE UP (ref 0–3.5)
CK MB CFR SERPL CALC: 4.6 NG/ML — HIGH (ref 0–3.6)
CK SERPL-CCNC: 360 U/L — HIGH (ref 26–308)
CO2 SERPL-SCNC: 23 MMOL/L — SIGNIFICANT CHANGE UP (ref 22–31)
CREAT SERPL-MCNC: 1.7 MG/DL — HIGH (ref 0.5–1.3)
D DIMER BLD IA.RAPID-MCNC: <150 NG/ML DDU — SIGNIFICANT CHANGE UP
EOSINOPHIL # BLD AUTO: 0 K/UL — SIGNIFICANT CHANGE UP (ref 0–0.5)
EOSINOPHIL NFR BLD AUTO: 0.3 % — SIGNIFICANT CHANGE UP (ref 0–6)
GLUCOSE SERPL-MCNC: 81 MG/DL — SIGNIFICANT CHANGE UP (ref 70–99)
HCT VFR BLD CALC: 50.6 % — HIGH (ref 39–50)
HGB BLD-MCNC: 16.8 G/DL — SIGNIFICANT CHANGE UP (ref 13–17)
LYMPHOCYTES # BLD AUTO: 2.5 K/UL — SIGNIFICANT CHANGE UP (ref 1–3.3)
LYMPHOCYTES # BLD AUTO: 20 % — SIGNIFICANT CHANGE UP (ref 13–44)
MCHC RBC-ENTMCNC: 27.4 PG — SIGNIFICANT CHANGE UP (ref 27–34)
MCHC RBC-ENTMCNC: 33.2 GM/DL — SIGNIFICANT CHANGE UP (ref 32–36)
MCV RBC AUTO: 82.7 FL — SIGNIFICANT CHANGE UP (ref 80–100)
MONOCYTES # BLD AUTO: 1.1 K/UL — HIGH (ref 0–0.9)
MONOCYTES NFR BLD AUTO: 8.4 % — SIGNIFICANT CHANGE UP (ref 1–9)
NEUTROPHILS # BLD AUTO: 8.8 K/UL — HIGH (ref 1.8–7.4)
NEUTROPHILS NFR BLD AUTO: 70.3 % — SIGNIFICANT CHANGE UP (ref 43–77)
NT-PROBNP SERPL-SCNC: 8 PG/ML — SIGNIFICANT CHANGE UP (ref 0–125)
PLATELET # BLD AUTO: 332 K/UL — SIGNIFICANT CHANGE UP (ref 150–400)
POTASSIUM SERPL-MCNC: 4.8 MMOL/L — SIGNIFICANT CHANGE UP (ref 3.5–5.3)
POTASSIUM SERPL-SCNC: 4.8 MMOL/L — SIGNIFICANT CHANGE UP (ref 3.5–5.3)
PROT SERPL-MCNC: 7.6 G/DL — SIGNIFICANT CHANGE UP (ref 6–8.3)
RBC # BLD: 6.12 M/UL — HIGH (ref 4.2–5.8)
RBC # FLD: 14 % — SIGNIFICANT CHANGE UP (ref 10.3–14.5)
SODIUM SERPL-SCNC: 138 MMOL/L — SIGNIFICANT CHANGE UP (ref 135–145)
TROPONIN I SERPL-MCNC: <.015 NG/ML — SIGNIFICANT CHANGE UP (ref 0.01–0.04)
TROPONIN I SERPL-MCNC: <.015 NG/ML — SIGNIFICANT CHANGE UP (ref 0.01–0.04)
WBC # BLD: 12.5 K/UL — HIGH (ref 3.8–10.5)
WBC # FLD AUTO: 12.5 K/UL — HIGH (ref 3.8–10.5)

## 2017-05-20 PROCEDURE — 96360 HYDRATION IV INFUSION INIT: CPT

## 2017-05-20 PROCEDURE — 85379 FIBRIN DEGRADATION QUANT: CPT

## 2017-05-20 PROCEDURE — 80053 COMPREHEN METABOLIC PANEL: CPT

## 2017-05-20 PROCEDURE — 99284 EMERGENCY DEPT VISIT MOD MDM: CPT | Mod: 25

## 2017-05-20 PROCEDURE — 71010: CPT | Mod: 26

## 2017-05-20 PROCEDURE — 99284 EMERGENCY DEPT VISIT MOD MDM: CPT

## 2017-05-20 PROCEDURE — 84484 ASSAY OF TROPONIN QUANT: CPT

## 2017-05-20 PROCEDURE — 85027 COMPLETE CBC AUTOMATED: CPT

## 2017-05-20 PROCEDURE — 82550 ASSAY OF CK (CPK): CPT

## 2017-05-20 PROCEDURE — 83880 ASSAY OF NATRIURETIC PEPTIDE: CPT

## 2017-05-20 PROCEDURE — 93005 ELECTROCARDIOGRAM TRACING: CPT

## 2017-05-20 PROCEDURE — 71045 X-RAY EXAM CHEST 1 VIEW: CPT

## 2017-05-20 PROCEDURE — 82553 CREATINE MB FRACTION: CPT

## 2017-05-20 PROCEDURE — 94640 AIRWAY INHALATION TREATMENT: CPT

## 2017-05-20 RX ORDER — IPRATROPIUM/ALBUTEROL SULFATE 18-103MCG
3 AEROSOL WITH ADAPTER (GRAM) INHALATION ONCE
Qty: 0 | Refills: 0 | Status: COMPLETED | OUTPATIENT
Start: 2017-05-20 | End: 2017-05-20

## 2017-05-20 RX ORDER — ALPRAZOLAM 0.25 MG
0.25 TABLET ORAL ONCE
Qty: 0 | Refills: 0 | Status: DISCONTINUED | OUTPATIENT
Start: 2017-05-20 | End: 2017-05-20

## 2017-05-20 RX ORDER — SODIUM CHLORIDE 9 MG/ML
1000 INJECTION INTRAMUSCULAR; INTRAVENOUS; SUBCUTANEOUS ONCE
Qty: 0 | Refills: 0 | Status: COMPLETED | OUTPATIENT
Start: 2017-05-20 | End: 2017-05-20

## 2017-05-20 RX ORDER — SODIUM CHLORIDE 9 MG/ML
3 INJECTION INTRAMUSCULAR; INTRAVENOUS; SUBCUTANEOUS ONCE
Qty: 0 | Refills: 0 | Status: COMPLETED | OUTPATIENT
Start: 2017-05-20 | End: 2017-05-20

## 2017-05-20 RX ADMIN — Medication 0.25 MILLIGRAM(S): at 14:41

## 2017-05-20 RX ADMIN — Medication 3 MILLILITER(S): at 14:41

## 2017-05-20 RX ADMIN — SODIUM CHLORIDE 2000 MILLILITER(S): 9 INJECTION INTRAMUSCULAR; INTRAVENOUS; SUBCUTANEOUS at 14:55

## 2017-05-20 RX ADMIN — SODIUM CHLORIDE 3 MILLILITER(S): 9 INJECTION INTRAMUSCULAR; INTRAVENOUS; SUBCUTANEOUS at 14:23

## 2017-05-20 NOTE — ED ADULT NURSE NOTE - OBJECTIVE STATEMENT
Pt arrived c/o SOB and jaw pain after moving furniture today. Pt denies chest pain at this time. Pt tachypnic. Pt placed on cardiac monitor, EKG done, ST. O2 100% on room air, pt placed on 2lnc for comfort. Dr Francois at bedside. VSS at this time. IV started labs drawn and sent pt tolerated well. Xray at bedside. Close monitoring ongoing.

## 2017-05-20 NOTE — ED ADULT NURSE REASSESSMENT NOTE - NS ED NURSE REASSESS COMMENT FT1
Pt resting on stretcher, remains on cardiac monitor, care cont.
Pt tolerating meal well, 99% O2 on room air pt breathing with ease, no distress.
Pt updated on care awaiting MD frias-DEJON bejarano at this time.
57 year old male received from [facility] Perera. Pt presented to the ED with c/o sob and jaw pain. Pt was moving furniture when he suddenly became SOB. Pt A+O x 4. At this time Pt denies headache, chest pain, sob, back pain, jaw pain, or abdominal pain. S1/S2. NSR on the monitor. Respirations even and unlabored. Lung sounds clear bilaterally. No edema/ swelling noted. skin warm, dry, intact and free of pallor/ cyanosis. Cap refill < 2 secs. Awaiting CE # 2 draw and results. VSS.

## 2017-05-20 NOTE — ED ADULT NURSE REASSESSMENT NOTE - COMFORT CARE
plan of care explained/meal provided/po fluids offered/wait time explained/side rails up/warm blanket provided/darkened lights/repositioned

## 2017-06-15 ENCOUNTER — APPOINTMENT (OUTPATIENT)
Dept: RHEUMATOLOGY | Facility: CLINIC | Age: 57
End: 2017-06-15

## 2017-06-15 VITALS
DIASTOLIC BLOOD PRESSURE: 84 MMHG | TEMPERATURE: 98.3 F | WEIGHT: 223 LBS | BODY MASS INDEX: 30.2 KG/M2 | SYSTOLIC BLOOD PRESSURE: 124 MMHG | HEART RATE: 111 BPM | OXYGEN SATURATION: 97 % | HEIGHT: 72 IN

## 2017-06-15 DIAGNOSIS — Z86.59 PERSONAL HISTORY OF OTHER MENTAL AND BEHAVIORAL DISORDERS: ICD-10-CM

## 2017-06-15 DIAGNOSIS — M06.4 INFLAMMATORY POLYARTHROPATHY: ICD-10-CM

## 2017-06-15 DIAGNOSIS — E83.50 UNSPECIFIED DISORDER OF CALCIUM METABOLISM: ICD-10-CM

## 2017-06-15 RX ORDER — CLINDAMYCIN HYDROCHLORIDE 300 MG/1
300 CAPSULE ORAL
Qty: 28 | Refills: 0 | Status: DISCONTINUED | COMMUNITY
Start: 2017-01-02 | End: 2017-06-15

## 2017-06-17 PROBLEM — Z86.59 HISTORY OF HEROIN USE: Status: ACTIVE | Noted: 2017-06-17

## 2017-06-20 LAB
25(OH)D3 SERPL-MCNC: 21.1 NG/ML
ALBUMIN MFR SERPL ELPH: 61.5 %
ALBUMIN SERPL ELPH-MCNC: 4.4 G/DL
ALBUMIN SERPL-MCNC: 4.2 G/DL
ALBUMIN/GLOB SERPL: 1.6 RATIO
ALP BLD-CCNC: 76 U/L
ALPHA1 GLOB MFR SERPL ELPH: 4.1 %
ALPHA1 GLOB SERPL ELPH-MCNC: 0.3 G/DL
ALPHA2 GLOB MFR SERPL ELPH: 9.6 %
ALPHA2 GLOB SERPL ELPH-MCNC: 0.7 G/DL
ALT SERPL-CCNC: 26 U/L
ANION GAP SERPL CALC-SCNC: 14 MMOL/L
APPEARANCE: CLEAR
AST SERPL-CCNC: 20 U/L
B-GLOBULIN MFR SERPL ELPH: 12.3 %
B-GLOBULIN SERPL ELPH-MCNC: 0.8 G/DL
BASOPHILS # BLD AUTO: 0.06 K/UL
BASOPHILS NFR BLD AUTO: 0.9 %
BILIRUB SERPL-MCNC: 0.3 MG/DL
BILIRUBIN URINE: NEGATIVE
BLOOD URINE: NEGATIVE
BUN SERPL-MCNC: 17 MG/DL
CALCIUM SERPL-MCNC: 10.4 MG/DL
CALCIUM SERPL-MCNC: 10.4 MG/DL
CHLORIDE SERPL-SCNC: 102 MMOL/L
CK SERPL-CCNC: 301 U/L
CO2 SERPL-SCNC: 26 MMOL/L
COLOR: YELLOW
CREAT SERPL-MCNC: 1.42 MG/DL
CRP SERPL-MCNC: 0.2 MG/DL
EOSINOPHIL # BLD AUTO: 0.2 K/UL
EOSINOPHIL NFR BLD AUTO: 3.1 %
ERYTHROCYTE [SEDIMENTATION RATE] IN BLOOD BY WESTERGREN METHOD: 7 MM/HR
GAMMA GLOB FLD ELPH-MCNC: 0.9 G/DL
GAMMA GLOB MFR SERPL ELPH: 12.5 %
GLUCOSE QUALITATIVE U: NORMAL MG/DL
GLUCOSE SERPL-MCNC: 77 MG/DL
HBV CORE IGG+IGM SER QL: NONREACTIVE
HBV SURFACE AB SER QL: NONREACTIVE
HBV SURFACE AG SER QL: NONREACTIVE
HCT VFR BLD CALC: 41.3 %
HCV AB SER QL: NONREACTIVE
HCV S/CO RATIO: 0.07 S/CO
HGB BLD-MCNC: 12.8 G/DL
HIV1+2 AB SPEC QL IA.RAPID: NONREACTIVE
IMM GRANULOCYTES NFR BLD AUTO: 0.2 %
INTERPRETATION SERPL IEP-IMP: NORMAL
KETONES URINE: NEGATIVE
LEUKOCYTE ESTERASE URINE: NEGATIVE
LYMPHOCYTES # BLD AUTO: 2.06 K/UL
LYMPHOCYTES NFR BLD AUTO: 31.9 %
MAN DIFF?: NORMAL
MCHC RBC-ENTMCNC: 26.7 PG
MCHC RBC-ENTMCNC: 31 GM/DL
MCV RBC AUTO: 86 FL
MONOCYTES # BLD AUTO: 0.46 K/UL
MONOCYTES NFR BLD AUTO: 7.1 %
NEUTROPHILS # BLD AUTO: 3.67 K/UL
NEUTROPHILS NFR BLD AUTO: 56.8 %
NITRITE URINE: NEGATIVE
PARATHYROID HORMONE INTACT: 19 PG/ML
PH URINE: 7.5
PLATELET # BLD AUTO: 285 K/UL
POTASSIUM SERPL-SCNC: 4.4 MMOL/L
PROT SERPL-MCNC: 6.9 G/DL
PROTEIN URINE: NEGATIVE MG/DL
RBC # BLD: 4.8 M/UL
RBC # FLD: 15.8 %
RHEUMATOID FACT SER QL: 11 IU/ML
SODIUM SERPL-SCNC: 142 MMOL/L
SPECIFIC GRAVITY URINE: 1.02
THYROGLOB AB SERPL-ACNC: <20 IU/ML
THYROPEROXIDASE AB SERPL IA-ACNC: <10 IU/ML
TSH SERPL-ACNC: 1.46 UIU/ML
UROBILINOGEN URINE: NORMAL MG/DL
WBC # FLD AUTO: 6.46 K/UL

## 2017-06-30 LAB
ADJUSTED MITOGEN: >10 IU/ML
ADJUSTED TB AG: 0.01 IU/ML
M TB IFN-G BLD-IMP: NEGATIVE
QUANTIFERON GOLD NIL: 0.02 IU/ML

## 2017-07-06 ENCOUNTER — RX RENEWAL (OUTPATIENT)
Age: 57
End: 2017-07-06

## 2017-07-10 ENCOUNTER — APPOINTMENT (OUTPATIENT)
Dept: ORTHOPEDIC SURGERY | Facility: CLINIC | Age: 57
End: 2017-07-10

## 2017-07-10 DIAGNOSIS — S46.812D STRAIN OF OTHER MUSCLES, FASCIA AND TENDONS AT SHOULDER AND UPPER ARM LEVEL, LEFT ARM, SUBSEQUENT ENCOUNTER: ICD-10-CM

## 2017-07-24 ENCOUNTER — APPOINTMENT (OUTPATIENT)
Dept: ORTHOPEDIC SURGERY | Facility: CLINIC | Age: 57
End: 2017-07-24

## 2017-07-24 DIAGNOSIS — M25.511 PAIN IN RIGHT SHOULDER: ICD-10-CM

## 2017-07-24 DIAGNOSIS — M25.512 PAIN IN RIGHT SHOULDER: ICD-10-CM

## 2017-07-31 ENCOUNTER — APPOINTMENT (OUTPATIENT)
Dept: ORTHOPEDIC SURGERY | Facility: CLINIC | Age: 57
End: 2017-07-31
Payer: MEDICAID

## 2017-07-31 DIAGNOSIS — M17.0 BILATERAL PRIMARY OSTEOARTHRITIS OF KNEE: ICD-10-CM

## 2017-07-31 PROCEDURE — 99213 OFFICE O/P EST LOW 20 MIN: CPT

## 2017-07-31 PROCEDURE — 73562 X-RAY EXAM OF KNEE 3: CPT | Mod: 50

## 2017-07-31 RX ORDER — TERBINAFINE HYDROCHLORIDE 250 MG/1
250 TABLET ORAL
Qty: 30 | Refills: 0 | Status: ACTIVE | COMMUNITY
Start: 2017-07-13

## 2017-08-01 ENCOUNTER — APPOINTMENT (OUTPATIENT)
Dept: RHEUMATOLOGY | Facility: CLINIC | Age: 57
End: 2017-08-01
Payer: MEDICAID

## 2017-08-01 VITALS
TEMPERATURE: 97.6 F | HEART RATE: 81 BPM | SYSTOLIC BLOOD PRESSURE: 144 MMHG | WEIGHT: 228 LBS | HEIGHT: 72 IN | BODY MASS INDEX: 30.88 KG/M2 | DIASTOLIC BLOOD PRESSURE: 94 MMHG | OXYGEN SATURATION: 98 %

## 2017-08-01 DIAGNOSIS — M25.50 PAIN IN UNSPECIFIED JOINT: ICD-10-CM

## 2017-08-01 DIAGNOSIS — N20.0 CALCULUS OF KIDNEY: ICD-10-CM

## 2017-08-01 DIAGNOSIS — R53.81 OTHER MALAISE: ICD-10-CM

## 2017-08-01 DIAGNOSIS — M79.7 FIBROMYALGIA: ICD-10-CM

## 2017-08-01 DIAGNOSIS — M54.2 CERVICALGIA: ICD-10-CM

## 2017-08-01 PROCEDURE — 99214 OFFICE O/P EST MOD 30 MIN: CPT

## 2017-08-13 PROBLEM — M79.7 FIBROMYALGIA, SECONDARY: Status: ACTIVE | Noted: 2017-08-13

## 2017-08-13 PROBLEM — R53.81 PHYSICAL DECONDITIONING: Status: ACTIVE | Noted: 2017-08-13

## 2017-08-13 PROBLEM — M25.50 POLYARTHRALGIA: Status: ACTIVE | Noted: 2017-06-15

## 2017-08-21 ENCOUNTER — APPOINTMENT (OUTPATIENT)
Dept: ORTHOPEDIC SURGERY | Facility: CLINIC | Age: 57
End: 2017-08-21
Payer: MEDICAID

## 2017-08-21 DIAGNOSIS — M67.912 UNSPECIFIED DISORDER OF SYNOVIUM AND TENDON, LEFT SHOULDER: ICD-10-CM

## 2017-08-21 PROCEDURE — 99213 OFFICE O/P EST LOW 20 MIN: CPT

## 2017-10-16 ENCOUNTER — APPOINTMENT (OUTPATIENT)
Dept: ORTHOPEDIC SURGERY | Facility: CLINIC | Age: 57
End: 2017-10-16

## 2017-10-30 ENCOUNTER — RX RENEWAL (OUTPATIENT)
Age: 57
End: 2017-10-30

## 2017-12-18 ENCOUNTER — APPOINTMENT (OUTPATIENT)
Dept: ORTHOPEDIC SURGERY | Facility: CLINIC | Age: 57
End: 2017-12-18
Payer: MEDICAID

## 2017-12-18 PROCEDURE — 72040 X-RAY EXAM NECK SPINE 2-3 VW: CPT

## 2017-12-18 PROCEDURE — 99214 OFFICE O/P EST MOD 30 MIN: CPT

## 2017-12-18 PROCEDURE — 72100 X-RAY EXAM L-S SPINE 2/3 VWS: CPT

## 2018-01-16 ENCOUNTER — RX RENEWAL (OUTPATIENT)
Age: 58
End: 2018-01-16

## 2018-02-06 ENCOUNTER — APPOINTMENT (OUTPATIENT)
Dept: ORTHOPEDIC SURGERY | Facility: CLINIC | Age: 58
End: 2018-02-06
Payer: MEDICAID

## 2018-02-06 PROCEDURE — 20526 THER INJECTION CARP TUNNEL: CPT | Mod: LT

## 2018-02-06 PROCEDURE — 99214 OFFICE O/P EST MOD 30 MIN: CPT | Mod: 25

## 2018-04-06 ENCOUNTER — RX RENEWAL (OUTPATIENT)
Age: 58
End: 2018-04-06

## 2018-04-12 ENCOUNTER — RX RENEWAL (OUTPATIENT)
Age: 58
End: 2018-04-12

## 2018-05-01 ENCOUNTER — APPOINTMENT (OUTPATIENT)
Dept: ORTHOPEDIC SURGERY | Facility: CLINIC | Age: 58
End: 2018-05-01
Payer: MEDICAID

## 2018-05-01 PROCEDURE — 99214 OFFICE O/P EST MOD 30 MIN: CPT

## 2018-05-11 ENCOUNTER — OUTPATIENT (OUTPATIENT)
Dept: OUTPATIENT SERVICES | Facility: HOSPITAL | Age: 58
LOS: 1 days | End: 2018-05-11
Payer: MEDICAID

## 2018-05-11 VITALS
WEIGHT: 242.07 LBS | TEMPERATURE: 98 F | DIASTOLIC BLOOD PRESSURE: 92 MMHG | OXYGEN SATURATION: 96 % | RESPIRATION RATE: 16 BRPM | SYSTOLIC BLOOD PRESSURE: 133 MMHG | HEART RATE: 95 BPM | HEIGHT: 71 IN

## 2018-05-11 DIAGNOSIS — Z98.89 OTHER SPECIFIED POSTPROCEDURAL STATES: Chronic | ICD-10-CM

## 2018-05-11 DIAGNOSIS — Z01.818 ENCOUNTER FOR OTHER PREPROCEDURAL EXAMINATION: ICD-10-CM

## 2018-05-11 DIAGNOSIS — Z98.890 OTHER SPECIFIED POSTPROCEDURAL STATES: Chronic | ICD-10-CM

## 2018-05-11 DIAGNOSIS — G56.02 CARPAL TUNNEL SYNDROME, LEFT UPPER LIMB: ICD-10-CM

## 2018-05-11 DIAGNOSIS — Z98.1 ARTHRODESIS STATUS: Chronic | ICD-10-CM

## 2018-05-11 LAB
ANION GAP SERPL CALC-SCNC: 12 MMOL/L — SIGNIFICANT CHANGE UP (ref 5–17)
BUN SERPL-MCNC: 14 MG/DL — SIGNIFICANT CHANGE UP (ref 7–23)
CALCIUM SERPL-MCNC: 10.8 MG/DL — HIGH (ref 8.4–10.5)
CHLORIDE SERPL-SCNC: 99 MMOL/L — SIGNIFICANT CHANGE UP (ref 96–108)
CO2 SERPL-SCNC: 27 MMOL/L — SIGNIFICANT CHANGE UP (ref 22–31)
CREAT SERPL-MCNC: 1.17 MG/DL — SIGNIFICANT CHANGE UP (ref 0.5–1.3)
GLUCOSE SERPL-MCNC: 84 MG/DL — SIGNIFICANT CHANGE UP (ref 70–99)
HCT VFR BLD CALC: 41.9 % — SIGNIFICANT CHANGE UP (ref 39–50)
HGB BLD-MCNC: 13.2 G/DL — SIGNIFICANT CHANGE UP (ref 13–17)
MCHC RBC-ENTMCNC: 25.8 PG — LOW (ref 27–34)
MCHC RBC-ENTMCNC: 31.5 GM/DL — LOW (ref 32–36)
MCV RBC AUTO: 81.8 FL — SIGNIFICANT CHANGE UP (ref 80–100)
PLATELET # BLD AUTO: 279 K/UL — SIGNIFICANT CHANGE UP (ref 150–400)
POTASSIUM SERPL-MCNC: 4.4 MMOL/L — SIGNIFICANT CHANGE UP (ref 3.5–5.3)
POTASSIUM SERPL-SCNC: 4.4 MMOL/L — SIGNIFICANT CHANGE UP (ref 3.5–5.3)
RBC # BLD: 5.12 M/UL — SIGNIFICANT CHANGE UP (ref 4.2–5.8)
RBC # FLD: 16 % — HIGH (ref 10.3–14.5)
SODIUM SERPL-SCNC: 138 MMOL/L — SIGNIFICANT CHANGE UP (ref 135–145)
WBC # BLD: 7.55 K/UL — SIGNIFICANT CHANGE UP (ref 3.8–10.5)
WBC # FLD AUTO: 7.55 K/UL — SIGNIFICANT CHANGE UP (ref 3.8–10.5)

## 2018-05-11 PROCEDURE — 80048 BASIC METABOLIC PNL TOTAL CA: CPT

## 2018-05-11 PROCEDURE — 85027 COMPLETE CBC AUTOMATED: CPT

## 2018-05-11 PROCEDURE — G0463: CPT

## 2018-05-11 RX ORDER — LIDOCAINE HCL 20 MG/ML
0.2 VIAL (ML) INJECTION ONCE
Qty: 0 | Refills: 0 | Status: DISCONTINUED | OUTPATIENT
Start: 2018-05-21 | End: 2018-06-05

## 2018-05-11 RX ORDER — CELECOXIB 200 MG/1
200 CAPSULE ORAL ONCE
Qty: 0 | Refills: 0 | Status: COMPLETED | OUTPATIENT
Start: 2018-05-21 | End: 2018-05-21

## 2018-05-11 RX ORDER — LANSOPRAZOLE 15 MG/1
1 CAPSULE, DELAYED RELEASE ORAL
Qty: 0 | Refills: 0 | COMMUNITY

## 2018-05-11 RX ORDER — ACETAMINOPHEN 500 MG
1000 TABLET ORAL ONCE
Qty: 0 | Refills: 0 | Status: COMPLETED | OUTPATIENT
Start: 2018-05-21 | End: 2018-05-21

## 2018-05-11 RX ORDER — SODIUM CHLORIDE 9 MG/ML
3 INJECTION INTRAMUSCULAR; INTRAVENOUS; SUBCUTANEOUS EVERY 8 HOURS
Qty: 0 | Refills: 0 | Status: DISCONTINUED | OUTPATIENT
Start: 2018-05-21 | End: 2018-06-05

## 2018-05-11 NOTE — H&P PST ADULT - PROBLEM SELECTOR PLAN 1
left carpal tunnel release   PST instructions provided, patient verbalized understanding.   CBC, BMP collected and send.

## 2018-05-11 NOTE — H&P PST ADULT - NSANTHOSAYNRD_GEN_A_CORE
No. SOHAIL screening performed.  STOP BANG Legend: 0-2 = LOW Risk; 3-4 = INTERMEDIATE Risk; 5-8 = HIGH Risk/18.5 inches

## 2018-05-11 NOTE — H&P PST ADULT - PSH
H/O spinal fusion  8/16/2017  anterior approach cervical spine  C5-C6  History of lithotripsy  multiple, most recent 2015  S/P cystoscopy  Oct 2014, h/o renal stent

## 2018-05-11 NOTE — H&P PST ADULT - HISTORY OF PRESENT ILLNESS
58 yr old male 58 yr old male with h/o GERD, kidney stones, spinal stenosis, s/p spinal fusion 8/2017, reports left wrist pain and presents to PST for scheduled left carpal tunnel release on  5/21/18.  Denies fever, chills, no acute complaints.

## 2018-05-11 NOTE — H&P PST ADULT - PMH
GERD (gastroesophageal reflux disease)    History of drug abuse  19 years in remission  Kidney stone  multiple  Spinal stenosis in cervical region Carpal tunnel syndrome of left wrist    GERD (gastroesophageal reflux disease)    History of drug abuse  19 years in remission ( heroin )  History of kidney stones  multiple episodes, last in 2015  Spinal stenosis in cervical region  s/p fusion 2017

## 2018-05-20 ENCOUNTER — TRANSCRIPTION ENCOUNTER (OUTPATIENT)
Age: 58
End: 2018-05-20

## 2018-05-21 ENCOUNTER — APPOINTMENT (OUTPATIENT)
Dept: ORTHOPEDIC SURGERY | Facility: HOSPITAL | Age: 58
End: 2018-05-21

## 2018-05-21 ENCOUNTER — OUTPATIENT (OUTPATIENT)
Dept: OUTPATIENT SERVICES | Facility: HOSPITAL | Age: 58
LOS: 1 days | End: 2018-05-21
Payer: MEDICAID

## 2018-05-21 VITALS
DIASTOLIC BLOOD PRESSURE: 87 MMHG | HEIGHT: 71 IN | WEIGHT: 242.07 LBS | TEMPERATURE: 97 F | HEART RATE: 79 BPM | OXYGEN SATURATION: 99 % | RESPIRATION RATE: 16 BRPM | SYSTOLIC BLOOD PRESSURE: 127 MMHG

## 2018-05-21 VITALS
SYSTOLIC BLOOD PRESSURE: 128 MMHG | OXYGEN SATURATION: 99 % | HEART RATE: 74 BPM | RESPIRATION RATE: 20 BRPM | DIASTOLIC BLOOD PRESSURE: 81 MMHG

## 2018-05-21 DIAGNOSIS — G56.02 CARPAL TUNNEL SYNDROME, LEFT UPPER LIMB: ICD-10-CM

## 2018-05-21 DIAGNOSIS — Z98.1 ARTHRODESIS STATUS: Chronic | ICD-10-CM

## 2018-05-21 DIAGNOSIS — Z01.818 ENCOUNTER FOR OTHER PREPROCEDURAL EXAMINATION: ICD-10-CM

## 2018-05-21 DIAGNOSIS — Z98.89 OTHER SPECIFIED POSTPROCEDURAL STATES: Chronic | ICD-10-CM

## 2018-05-21 DIAGNOSIS — Z98.890 OTHER SPECIFIED POSTPROCEDURAL STATES: Chronic | ICD-10-CM

## 2018-05-21 PROCEDURE — 64721 CARPAL TUNNEL SURGERY: CPT | Mod: LT

## 2018-05-21 RX ORDER — ONDANSETRON 8 MG/1
4 TABLET, FILM COATED ORAL ONCE
Qty: 0 | Refills: 0 | Status: DISCONTINUED | OUTPATIENT
Start: 2018-05-21 | End: 2018-06-05

## 2018-05-21 RX ORDER — SODIUM CHLORIDE 9 MG/ML
1000 INJECTION, SOLUTION INTRAVENOUS
Qty: 0 | Refills: 0 | Status: DISCONTINUED | OUTPATIENT
Start: 2018-05-21 | End: 2018-06-05

## 2018-05-21 RX ORDER — OXYCODONE HYDROCHLORIDE 5 MG/1
5 TABLET ORAL ONCE
Qty: 0 | Refills: 0 | Status: DISCONTINUED | OUTPATIENT
Start: 2018-05-21 | End: 2018-05-21

## 2018-05-21 RX ORDER — CELECOXIB 200 MG/1
200 CAPSULE ORAL ONCE
Qty: 0 | Refills: 0 | Status: DISCONTINUED | OUTPATIENT
Start: 2018-05-21 | End: 2018-06-05

## 2018-05-21 RX ADMIN — CELECOXIB 200 MILLIGRAM(S): 200 CAPSULE ORAL at 07:01

## 2018-05-21 RX ADMIN — Medication 1000 MILLIGRAM(S): at 07:01

## 2018-05-21 NOTE — ASU DISCHARGE PLAN (ADULT/PEDIATRIC). - NOTIFY
Fever greater than 101/see instruction sheet Fever greater than 101/Swelling that continues/see instruction sheet

## 2018-05-21 NOTE — ASU DISCHARGE PLAN (ADULT/PEDIATRIC). - MEDICATION SUMMARY - MEDICATIONS TO TAKE
I will START or STAY ON the medications listed below when I get home from the hospital:    meloxicam 15 mg oral tablet  -- 1 tab(s) by mouth once a day  -- Indication: For home med    naproxen 500 mg oral tablet  -- 1 tab(s) by mouth 2 times a day  -- Indication: For home med    gabapentin 600 mg oral tablet  -- 1 tab(s) by mouth 3 times a day  -- Indication: For home med    nortriptyline 25 mg oral capsule  -- orally once a day (at bedtime)  -- Indication: For home med    cyclobenzaprine 10 mg oral tablet  -- 1 tab(s) by mouth once a day (at bedtime)  -- Indication: For home med    Prevacid 15 mg oral delayed release capsule  -- 1 cap(s) by mouth 2 times a day  -- Indication: For home medd

## 2018-05-21 NOTE — BRIEF OPERATIVE NOTE - PROCEDURE
<<-----Click on this checkbox to enter Procedure Carpal tunnel release, left  05/21/2018    Active  PGOLD2

## 2018-05-21 NOTE — PRE-ANESTHESIA EVALUATION ADULT - NSANTHPMHFT_GEN_ALL_CORE
GERD well controlled  PULIDO while walking up stairs, the patient had a cardiology work up and he attributes the SOB to his body habitus  no CP, palpitations or syncope

## 2018-05-21 NOTE — PRE-ANESTHESIA EVALUATION ADULT - NSANTHOSAYNRD_GEN_A_CORE
18.5 inches/No. SOHAIL screening performed.  STOP BANG Legend: 0-2 = LOW Risk; 3-4 = INTERMEDIATE Risk; 5-8 = HIGH Risk

## 2018-05-21 NOTE — ASU PATIENT PROFILE, ADULT - PMH
Carpal tunnel syndrome of left wrist    GERD (gastroesophageal reflux disease)    History of drug abuse  19 years in remission ( heroin )  History of kidney stones  multiple episodes, last in 2015  Spinal stenosis in cervical region  s/p fusion 2017

## 2018-06-05 ENCOUNTER — APPOINTMENT (OUTPATIENT)
Dept: ORTHOPEDIC SURGERY | Facility: CLINIC | Age: 58
End: 2018-06-05
Payer: MEDICAID

## 2018-06-05 PROCEDURE — 99024 POSTOP FOLLOW-UP VISIT: CPT

## 2018-06-11 ENCOUNTER — MEDICATION RENEWAL (OUTPATIENT)
Age: 58
End: 2018-06-11

## 2018-09-11 PROBLEM — Z87.442 PERSONAL HISTORY OF URINARY CALCULI: Chronic | Status: ACTIVE | Noted: 2018-05-11

## 2018-10-08 ENCOUNTER — APPOINTMENT (OUTPATIENT)
Dept: ORTHOPEDIC SURGERY | Facility: CLINIC | Age: 58
End: 2018-10-08
Payer: MEDICAID

## 2018-10-08 PROCEDURE — 99214 OFFICE O/P EST MOD 30 MIN: CPT

## 2018-10-08 PROCEDURE — 72100 X-RAY EXAM L-S SPINE 2/3 VWS: CPT

## 2018-10-08 PROCEDURE — 72040 X-RAY EXAM NECK SPINE 2-3 VW: CPT

## 2018-11-19 ENCOUNTER — APPOINTMENT (OUTPATIENT)
Dept: ORTHOPEDIC SURGERY | Facility: CLINIC | Age: 58
End: 2018-11-19

## 2018-12-26 ENCOUNTER — RX RENEWAL (OUTPATIENT)
Age: 58
End: 2018-12-26

## 2019-01-07 ENCOUNTER — APPOINTMENT (OUTPATIENT)
Dept: ORTHOPEDIC SURGERY | Facility: CLINIC | Age: 59
End: 2019-01-07

## 2019-01-28 ENCOUNTER — APPOINTMENT (OUTPATIENT)
Dept: ORTHOPEDIC SURGERY | Facility: CLINIC | Age: 59
End: 2019-01-28
Payer: MEDICAID

## 2019-01-28 PROCEDURE — 73080 X-RAY EXAM OF ELBOW: CPT | Mod: RT

## 2019-01-28 PROCEDURE — 20551 NJX 1 TENDON ORIGIN/INSJ: CPT | Mod: RT

## 2019-01-28 PROCEDURE — 99214 OFFICE O/P EST MOD 30 MIN: CPT | Mod: 25

## 2019-02-21 ENCOUNTER — RX RENEWAL (OUTPATIENT)
Age: 59
End: 2019-02-21

## 2019-03-11 ENCOUNTER — APPOINTMENT (OUTPATIENT)
Dept: ORTHOPEDIC SURGERY | Facility: CLINIC | Age: 59
End: 2019-03-11

## 2019-05-09 ENCOUNTER — RX RENEWAL (OUTPATIENT)
Age: 59
End: 2019-05-09

## 2019-07-08 ENCOUNTER — APPOINTMENT (OUTPATIENT)
Dept: ORTHOPEDIC SURGERY | Facility: CLINIC | Age: 59
End: 2019-07-08
Payer: MEDICAID

## 2019-07-08 DIAGNOSIS — M77.11 LATERAL EPICONDYLITIS, RIGHT ELBOW: ICD-10-CM

## 2019-07-08 PROCEDURE — 99213 OFFICE O/P EST LOW 20 MIN: CPT | Mod: 25

## 2019-07-08 PROCEDURE — 20551 NJX 1 TENDON ORIGIN/INSJ: CPT

## 2019-07-09 ENCOUNTER — RX RENEWAL (OUTPATIENT)
Age: 59
End: 2019-07-09

## 2019-07-12 ENCOUNTER — RX RENEWAL (OUTPATIENT)
Age: 59
End: 2019-07-12

## 2019-07-15 ENCOUNTER — APPOINTMENT (OUTPATIENT)
Dept: ORTHOPEDIC SURGERY | Facility: CLINIC | Age: 59
End: 2019-07-15

## 2019-08-25 ENCOUNTER — TRANSCRIPTION ENCOUNTER (OUTPATIENT)
Age: 59
End: 2019-08-25

## 2019-09-10 ENCOUNTER — RX RENEWAL (OUTPATIENT)
Age: 59
End: 2019-09-10

## 2019-09-16 ENCOUNTER — APPOINTMENT (OUTPATIENT)
Dept: ORTHOPEDIC SURGERY | Facility: CLINIC | Age: 59
End: 2019-09-16
Payer: MEDICAID

## 2019-09-16 VITALS
SYSTOLIC BLOOD PRESSURE: 126 MMHG | WEIGHT: 248 LBS | HEIGHT: 72 IN | HEART RATE: 98 BPM | DIASTOLIC BLOOD PRESSURE: 84 MMHG | BODY MASS INDEX: 33.59 KG/M2

## 2019-09-16 DIAGNOSIS — G56.21 LESION OF ULNAR NERVE, RIGHT UPPER LIMB: ICD-10-CM

## 2019-09-16 DIAGNOSIS — M77.9 ENTHESOPATHY, UNSPECIFIED: ICD-10-CM

## 2019-09-16 PROCEDURE — 99213 OFFICE O/P EST LOW 20 MIN: CPT

## 2019-09-30 ENCOUNTER — APPOINTMENT (OUTPATIENT)
Dept: ORTHOPEDIC SURGERY | Facility: CLINIC | Age: 59
End: 2019-09-30

## 2019-10-02 ENCOUNTER — RX RENEWAL (OUTPATIENT)
Age: 59
End: 2019-10-02

## 2019-10-07 ENCOUNTER — APPOINTMENT (OUTPATIENT)
Dept: ORTHOPEDIC SURGERY | Facility: CLINIC | Age: 59
End: 2019-10-07
Payer: MEDICAID

## 2019-10-07 PROCEDURE — 99214 OFFICE O/P EST MOD 30 MIN: CPT

## 2019-10-07 PROCEDURE — 72040 X-RAY EXAM NECK SPINE 2-3 VW: CPT

## 2019-10-07 PROCEDURE — 72100 X-RAY EXAM L-S SPINE 2/3 VWS: CPT

## 2019-10-23 ENCOUNTER — CHART COPY (OUTPATIENT)
Age: 59
End: 2019-10-23

## 2019-10-29 ENCOUNTER — APPOINTMENT (OUTPATIENT)
Dept: ORTHOPEDIC SURGERY | Facility: CLINIC | Age: 59
End: 2019-10-29

## 2019-11-11 ENCOUNTER — APPOINTMENT (OUTPATIENT)
Dept: ORTHOPEDIC SURGERY | Facility: CLINIC | Age: 59
End: 2019-11-11

## 2019-11-26 ENCOUNTER — APPOINTMENT (OUTPATIENT)
Dept: ORTHOPEDIC SURGERY | Facility: CLINIC | Age: 59
End: 2019-11-26

## 2019-12-19 NOTE — ED ADULT NURSE NOTE - ISOLATION TYPE:
[facility], [place]    [place] Dr. Blaze Saez and Dr. Susan Hernández, and Attending Dr. Keegan Aguilar.     Maral Monique DO  Jackson [facility] [address] [place] [id number] [phone]  None

## 2019-12-30 ENCOUNTER — EMERGENCY (EMERGENCY)
Facility: HOSPITAL | Age: 59
LOS: 1 days | Discharge: HOME | End: 2019-12-30
Attending: EMERGENCY MEDICINE | Admitting: EMERGENCY MEDICINE
Payer: MEDICAID

## 2019-12-30 VITALS
SYSTOLIC BLOOD PRESSURE: 125 MMHG | OXYGEN SATURATION: 100 % | TEMPERATURE: 98 F | RESPIRATION RATE: 18 BRPM | HEART RATE: 98 BPM | DIASTOLIC BLOOD PRESSURE: 75 MMHG

## 2019-12-30 VITALS
DIASTOLIC BLOOD PRESSURE: 84 MMHG | HEIGHT: 73 IN | TEMPERATURE: 98 F | OXYGEN SATURATION: 97 % | SYSTOLIC BLOOD PRESSURE: 111 MMHG | WEIGHT: 240.08 LBS | RESPIRATION RATE: 15 BRPM | HEART RATE: 112 BPM

## 2019-12-30 DIAGNOSIS — Z98.89 OTHER SPECIFIED POSTPROCEDURAL STATES: Chronic | ICD-10-CM

## 2019-12-30 DIAGNOSIS — Z98.1 ARTHRODESIS STATUS: Chronic | ICD-10-CM

## 2019-12-30 DIAGNOSIS — Z98.890 OTHER SPECIFIED POSTPROCEDURAL STATES: Chronic | ICD-10-CM

## 2019-12-30 LAB
ALBUMIN SERPL ELPH-MCNC: 4.2 G/DL — SIGNIFICANT CHANGE UP (ref 3.3–5)
ALP SERPL-CCNC: 126 U/L — HIGH (ref 40–120)
ALT FLD-CCNC: 58 U/L — SIGNIFICANT CHANGE UP (ref 12–78)
ANION GAP SERPL CALC-SCNC: 7 MMOL/L — SIGNIFICANT CHANGE UP (ref 5–17)
AST SERPL-CCNC: 65 U/L — HIGH (ref 15–37)
BASOPHILS # BLD AUTO: 0.08 K/UL — SIGNIFICANT CHANGE UP (ref 0–0.2)
BASOPHILS NFR BLD AUTO: 0.6 % — SIGNIFICANT CHANGE UP (ref 0–2)
BILIRUB SERPL-MCNC: 1 MG/DL — SIGNIFICANT CHANGE UP (ref 0.2–1.2)
BUN SERPL-MCNC: 22 MG/DL — SIGNIFICANT CHANGE UP (ref 7–23)
CALCIUM SERPL-MCNC: 9.7 MG/DL — SIGNIFICANT CHANGE UP (ref 8.5–10.1)
CHLORIDE SERPL-SCNC: 105 MMOL/L — SIGNIFICANT CHANGE UP (ref 96–108)
CO2 SERPL-SCNC: 28 MMOL/L — SIGNIFICANT CHANGE UP (ref 22–31)
CREAT SERPL-MCNC: 1.2 MG/DL — SIGNIFICANT CHANGE UP (ref 0.5–1.3)
EOSINOPHIL # BLD AUTO: 0.1 K/UL — SIGNIFICANT CHANGE UP (ref 0–0.5)
EOSINOPHIL NFR BLD AUTO: 0.7 % — SIGNIFICANT CHANGE UP (ref 0–6)
GLUCOSE SERPL-MCNC: 102 MG/DL — HIGH (ref 70–99)
HCT VFR BLD CALC: 48.3 % — SIGNIFICANT CHANGE UP (ref 39–50)
HGB BLD-MCNC: 15.9 G/DL — SIGNIFICANT CHANGE UP (ref 13–17)
IMM GRANULOCYTES NFR BLD AUTO: 0.4 % — SIGNIFICANT CHANGE UP (ref 0–1.5)
LYMPHOCYTES # BLD AUTO: 1.35 K/UL — SIGNIFICANT CHANGE UP (ref 1–3.3)
LYMPHOCYTES # BLD AUTO: 9.8 % — LOW (ref 13–44)
MCHC RBC-ENTMCNC: 27.5 PG — SIGNIFICANT CHANGE UP (ref 27–34)
MCHC RBC-ENTMCNC: 32.9 GM/DL — SIGNIFICANT CHANGE UP (ref 32–36)
MCV RBC AUTO: 83.6 FL — SIGNIFICANT CHANGE UP (ref 80–100)
MONOCYTES # BLD AUTO: 1.12 K/UL — HIGH (ref 0–0.9)
MONOCYTES NFR BLD AUTO: 8.1 % — SIGNIFICANT CHANGE UP (ref 2–14)
NEUTROPHILS # BLD AUTO: 11.07 K/UL — HIGH (ref 1.8–7.4)
NEUTROPHILS NFR BLD AUTO: 80.4 % — HIGH (ref 43–77)
NRBC # BLD: 0 /100 WBCS — SIGNIFICANT CHANGE UP (ref 0–0)
PLATELET # BLD AUTO: 231 K/UL — SIGNIFICANT CHANGE UP (ref 150–400)
POTASSIUM SERPL-MCNC: 4.7 MMOL/L — SIGNIFICANT CHANGE UP (ref 3.5–5.3)
POTASSIUM SERPL-SCNC: 4.7 MMOL/L — SIGNIFICANT CHANGE UP (ref 3.5–5.3)
PROT SERPL-MCNC: 8.2 G/DL — SIGNIFICANT CHANGE UP (ref 6–8.3)
RBC # BLD: 5.78 M/UL — SIGNIFICANT CHANGE UP (ref 4.2–5.8)
RBC # FLD: 14.7 % — HIGH (ref 10.3–14.5)
SODIUM SERPL-SCNC: 140 MMOL/L — SIGNIFICANT CHANGE UP (ref 135–145)
WBC # BLD: 13.78 K/UL — HIGH (ref 3.8–10.5)
WBC # FLD AUTO: 13.78 K/UL — HIGH (ref 3.8–10.5)

## 2019-12-30 PROCEDURE — 85027 COMPLETE CBC AUTOMATED: CPT

## 2019-12-30 PROCEDURE — 36415 COLL VENOUS BLD VENIPUNCTURE: CPT

## 2019-12-30 PROCEDURE — 99284 EMERGENCY DEPT VISIT MOD MDM: CPT

## 2019-12-30 PROCEDURE — 83690 ASSAY OF LIPASE: CPT

## 2019-12-30 PROCEDURE — 74177 CT ABD & PELVIS W/CONTRAST: CPT

## 2019-12-30 PROCEDURE — 76700 US EXAM ABDOM COMPLETE: CPT | Mod: 26

## 2019-12-30 PROCEDURE — 74177 CT ABD & PELVIS W/CONTRAST: CPT | Mod: 26

## 2019-12-30 PROCEDURE — 96360 HYDRATION IV INFUSION INIT: CPT

## 2019-12-30 PROCEDURE — 99284 EMERGENCY DEPT VISIT MOD MDM: CPT | Mod: 25

## 2019-12-30 PROCEDURE — 80053 COMPREHEN METABOLIC PANEL: CPT

## 2019-12-30 PROCEDURE — 76700 US EXAM ABDOM COMPLETE: CPT

## 2019-12-30 RX ORDER — LIDOCAINE 4 G/100G
1 CREAM TOPICAL ONCE
Refills: 0 | Status: COMPLETED | OUTPATIENT
Start: 2019-12-30 | End: 2019-12-30

## 2019-12-30 RX ORDER — IBUPROFEN 200 MG
400 TABLET ORAL ONCE
Refills: 0 | Status: COMPLETED | OUTPATIENT
Start: 2019-12-30 | End: 2019-12-30

## 2019-12-30 RX ORDER — SODIUM CHLORIDE 9 MG/ML
1000 INJECTION INTRAMUSCULAR; INTRAVENOUS; SUBCUTANEOUS ONCE
Refills: 0 | Status: COMPLETED | OUTPATIENT
Start: 2019-12-30 | End: 2019-12-30

## 2019-12-30 RX ORDER — MELOXICAM 15 MG/1
1 TABLET ORAL
Qty: 0 | Refills: 0 | DISCHARGE

## 2019-12-30 RX ORDER — ACETAMINOPHEN 500 MG
650 TABLET ORAL ONCE
Refills: 0 | Status: DISCONTINUED | OUTPATIENT
Start: 2019-12-30 | End: 2019-12-30

## 2019-12-30 RX ADMIN — SODIUM CHLORIDE 1000 MILLILITER(S): 9 INJECTION INTRAMUSCULAR; INTRAVENOUS; SUBCUTANEOUS at 13:20

## 2019-12-30 RX ADMIN — SODIUM CHLORIDE 1000 MILLILITER(S): 9 INJECTION INTRAMUSCULAR; INTRAVENOUS; SUBCUTANEOUS at 14:50

## 2019-12-30 RX ADMIN — LIDOCAINE 1 PATCH: 4 CREAM TOPICAL at 17:48

## 2019-12-30 NOTE — ED PROVIDER NOTE - OBJECTIVE STATEMENT
Pt's wife has bronchitis.  pt went to  and c rlq abd tenderness and was referred here.  pt is complaining ruq abd pain x 3 days.  no other complaints.  no prior episodes. pmd- N[facility] Louis. Pt's wife has bronchitis.  pt went to  and c rlq abd tenderness and was referred here.  pt is complaining ruq abd pain x 3 days.  non productive cough. no other complaints.  no prior episodes. pmkeyona Louis.

## 2019-12-30 NOTE — ED PROVIDER NOTE - PATIENT PORTAL LINK FT
You can access the FollowMyHealth Patient Portal offered by [facility] by registering at the following website: [email or web address]. By joining [employer]’s FollowMyHealth portal, you will also be able to view your health information using other applications (apps) compatible with our system.

## 2019-12-30 NOTE — ED ADULT TRIAGE NOTE - CHIEF COMPLAINT QUOTE
sent by urgent care for complains of cough, body aches and chills x 3 days. MD at urgent care found patient to be tender right lower quadrant abdomen and referred to ED for evaluation

## 2019-12-30 NOTE — ED ADULT NURSE NOTE - OBJECTIVE STATEMENT
Present to ER with c/o of flu-like symptoms since thursday. Pt was seen in Urgent care today with c/o of right lower abdominal pain. Pt also c/o of bump on the right upper pain. Denies any chest pain

## 2019-12-30 NOTE — ED PROVIDER NOTE - NSFOLLOWUPINSTRUCTIONS_ED_ALL_ED_FT
1.  Take Motrin 600mg every 6 hours for 5 days with meal.  2.  Take Tylenol 650mg every 5 hours for 5 days.  1.  Apply Lidocaine patch to the affected area as prescribed over the counter for 5 days.    Viral Respiratory Infection  A respiratory infection is an illness that affects part of the respiratory system, such as the lungs, nose, or throat. A respiratory infection that is caused by a virus is called a viral respiratory infection.  Common types of viral respiratory infections include:  A cold.The flu (influenza).A respiratory syncytial virus (RSV) infection.What are the causes?  This condition is caused by a virus.  What are the signs or symptoms?  Symptoms of this condition include:  A stuffy or runny nose.Yellow or green nasal discharge.A cough.Sneezing.Fatigue.Achy muscles.A sore throat.Sweating or chills.A fever.A headache.How is this diagnosed?  This condition may be diagnosed based on:  Your symptoms.A physical exam.Testing of nasal swabs.How is this treated?  This condition may be treated with medicines, such as:  Antiviral medicine. This may shorten the length of time a person has symptoms.Expectorants. These make it easier to cough up mucus.Decongestant nasal sprays.Acetaminophen or NSAIDs to relieve fever and pain.Antibiotic medicines are not prescribed for viral infections. This is because antibiotics are designed to kill bacteria. They are not effective against viruses.  Follow these instructions at home:     Managing pain and congestion     Take over-the-counter and prescription medicines only as told by your health care provider.If you have a sore throat, gargle with a salt-water mixture 3–4 times a day or as needed. To make a salt-water mixture, completely dissolve ½–1 tsp of salt in 1 cup of warm water.Use nose drops made from salt water to ease congestion and soften raw skin around your nose.Drink enough fluid to keep your urine pale yellow. This helps prevent dehydration and helps loosen up mucus.General instructions     Rest as much as possible.Do not drink alcohol.Do not use any products that contain nicotine or tobacco, such as cigarettes and e-cigarettes. If you need help quitting, ask your health care provider.Keep all follow-up visits as told by your health care provider. This is important.How is this prevented?     Get an annual flu shot. You may get the flu shot in late summer, fall, or winter. Ask your health care provider when you should get your flu shot.Avoid exposing others to your respiratory infection.  Stay home from work or school as told by your health care provider.Wash your hands with soap and water often, especially after you cough or sneeze. If soap and water are not available, use alcohol-based hand .Avoid contact with people who are sick during cold and flu season. This is generally fall and winter.Contact a health care provider if:  Your symptoms last for 10 days or longer.Your symptoms get worse over time.You have a fever.You have severe sinus pain in your face or forehead.The glands in your jaw or neck become very swollen.Get help right away if you:  Feel pain or pressure in your chest.Have shortness of breath.Faint or feel like you will faint.Have severe and persistent vomiting.Feel confused or disoriented.Summary  A respiratory infection is an illness that affects part of the respiratory system, such as the lungs, nose, or throat. A respiratory infection that is caused by a virus is called a viral respiratory infection.Common types of viral respiratory infections are a cold, influenza, and respiratory syncytial virus (RSV) infection.Symptoms of this condition include a stuffy or runny nose, cough, sneezing, fatigue, achy muscles, sore throat, and fevers or chills.Antibiotic medicines are not prescribed for viral infections. This is because antibiotics are designed to kill bacteria. They are not effective against viruses.This information is not intended to replace advice given to you by your health care provider. Make sure you discuss any questions you have with your health care provider.    Document Released: 09/27/2006 Document Revised: 01/28/2019 Document Reviewed: 01/28/2019  [employer] Patient Education © 2019 [employer]

## 2020-01-06 ENCOUNTER — APPOINTMENT (OUTPATIENT)
Dept: ORTHOPEDIC SURGERY | Facility: CLINIC | Age: 60
End: 2020-01-06

## 2020-01-14 ENCOUNTER — APPOINTMENT (OUTPATIENT)
Dept: ORTHOPEDIC SURGERY | Facility: CLINIC | Age: 60
End: 2020-01-14

## 2020-01-27 ENCOUNTER — APPOINTMENT (OUTPATIENT)
Dept: ORTHOPEDIC SURGERY | Facility: CLINIC | Age: 60
End: 2020-01-27
Payer: MEDICAID

## 2020-01-27 PROCEDURE — 99214 OFFICE O/P EST MOD 30 MIN: CPT

## 2020-10-05 ENCOUNTER — APPOINTMENT (OUTPATIENT)
Dept: ORTHOPEDIC SURGERY | Facility: CLINIC | Age: 60
End: 2020-10-05
Payer: MEDICAID

## 2020-10-05 DIAGNOSIS — S46.911A STRAIN OF UNSPECIFIED MUSCLE, FASCIA AND TENDON AT SHOULDER AND UPPER ARM LEVEL, RIGHT ARM, INITIAL ENCOUNTER: ICD-10-CM

## 2020-10-05 PROCEDURE — 73030 X-RAY EXAM OF SHOULDER: CPT | Mod: RT

## 2020-10-05 PROCEDURE — 99213 OFFICE O/P EST LOW 20 MIN: CPT

## 2020-11-12 NOTE — H&P PST ADULT - AS BP NONINV METHOD
Stated she developed mid-upper abd discomfort throughout the night.  No n/v or diarrhea.  No chest pain or sob.   
electronic

## 2020-11-16 ENCOUNTER — APPOINTMENT (OUTPATIENT)
Dept: ORTHOPEDIC SURGERY | Facility: CLINIC | Age: 60
End: 2020-11-16
Payer: MEDICAID

## 2020-11-16 PROCEDURE — 99214 OFFICE O/P EST MOD 30 MIN: CPT

## 2020-11-16 PROCEDURE — 72100 X-RAY EXAM L-S SPINE 2/3 VWS: CPT

## 2020-11-16 PROCEDURE — 99072 ADDL SUPL MATRL&STAF TM PHE: CPT

## 2020-11-16 PROCEDURE — 72040 X-RAY EXAM NECK SPINE 2-3 VW: CPT

## 2021-03-01 ENCOUNTER — APPOINTMENT (OUTPATIENT)
Dept: ORTHOPEDIC SURGERY | Facility: CLINIC | Age: 61
End: 2021-03-01
Payer: MEDICAID

## 2021-03-01 PROCEDURE — 72100 X-RAY EXAM L-S SPINE 2/3 VWS: CPT

## 2021-03-01 PROCEDURE — 99072 ADDL SUPL MATRL&STAF TM PHE: CPT

## 2021-03-01 PROCEDURE — 72070 X-RAY EXAM THORAC SPINE 2VWS: CPT

## 2021-03-01 PROCEDURE — 99214 OFFICE O/P EST MOD 30 MIN: CPT

## 2021-03-15 ENCOUNTER — APPOINTMENT (OUTPATIENT)
Dept: ORTHOPEDIC SURGERY | Facility: CLINIC | Age: 61
End: 2021-03-15
Payer: MEDICAID

## 2021-03-15 DIAGNOSIS — M48.07 SPINAL STENOSIS, LUMBOSACRAL REGION: ICD-10-CM

## 2021-03-15 PROCEDURE — 99214 OFFICE O/P EST MOD 30 MIN: CPT | Mod: 95

## 2021-03-27 ENCOUNTER — OUTPATIENT (OUTPATIENT)
Dept: OUTPATIENT SERVICES | Facility: HOSPITAL | Age: 61
LOS: 1 days | End: 2021-03-27
Payer: MEDICAID

## 2021-03-27 DIAGNOSIS — Z98.1 ARTHRODESIS STATUS: Chronic | ICD-10-CM

## 2021-03-27 DIAGNOSIS — Z20.828 CONTACT WITH AND (SUSPECTED) EXPOSURE TO OTHER VIRAL COMMUNICABLE DISEASES: ICD-10-CM

## 2021-03-27 DIAGNOSIS — Z98.89 OTHER SPECIFIED POSTPROCEDURAL STATES: Chronic | ICD-10-CM

## 2021-03-27 DIAGNOSIS — Z98.890 OTHER SPECIFIED POSTPROCEDURAL STATES: Chronic | ICD-10-CM

## 2021-03-27 LAB — SARS-COV-2 RNA SPEC QL NAA+PROBE: SIGNIFICANT CHANGE UP

## 2021-03-27 PROCEDURE — U0003: CPT

## 2021-03-27 PROCEDURE — U0005: CPT

## 2021-03-29 ENCOUNTER — OUTPATIENT (OUTPATIENT)
Dept: OUTPATIENT SERVICES | Facility: HOSPITAL | Age: 61
LOS: 1 days | Discharge: HOME | End: 2021-03-29
Payer: MEDICAID

## 2021-03-29 DIAGNOSIS — Z98.89 OTHER SPECIFIED POSTPROCEDURAL STATES: Chronic | ICD-10-CM

## 2021-03-29 DIAGNOSIS — Z98.1 ARTHRODESIS STATUS: Chronic | ICD-10-CM

## 2021-03-29 DIAGNOSIS — M54.16 RADICULOPATHY, LUMBAR REGION: ICD-10-CM

## 2021-03-29 DIAGNOSIS — Z98.890 OTHER SPECIFIED POSTPROCEDURAL STATES: Chronic | ICD-10-CM

## 2021-03-30 PROCEDURE — 62323 NJX INTERLAMINAR LMBR/SAC: CPT

## 2021-04-10 ENCOUNTER — OUTPATIENT (OUTPATIENT)
Dept: OUTPATIENT SERVICES | Facility: HOSPITAL | Age: 61
LOS: 1 days | End: 2021-04-10
Payer: MEDICAID

## 2021-04-10 DIAGNOSIS — Z98.890 OTHER SPECIFIED POSTPROCEDURAL STATES: Chronic | ICD-10-CM

## 2021-04-10 DIAGNOSIS — Z98.1 ARTHRODESIS STATUS: Chronic | ICD-10-CM

## 2021-04-10 DIAGNOSIS — Z98.89 OTHER SPECIFIED POSTPROCEDURAL STATES: Chronic | ICD-10-CM

## 2021-04-10 DIAGNOSIS — Z20.828 CONTACT WITH AND (SUSPECTED) EXPOSURE TO OTHER VIRAL COMMUNICABLE DISEASES: ICD-10-CM

## 2021-04-10 LAB — SARS-COV-2 RNA SPEC QL NAA+PROBE: SIGNIFICANT CHANGE UP

## 2021-04-10 PROCEDURE — U0005: CPT

## 2021-04-10 PROCEDURE — U0003: CPT

## 2021-04-12 ENCOUNTER — OUTPATIENT (OUTPATIENT)
Dept: OUTPATIENT SERVICES | Facility: HOSPITAL | Age: 61
LOS: 1 days | End: 2021-04-12
Payer: MEDICAID

## 2021-04-12 DIAGNOSIS — Z98.89 OTHER SPECIFIED POSTPROCEDURAL STATES: Chronic | ICD-10-CM

## 2021-04-12 DIAGNOSIS — Z98.890 OTHER SPECIFIED POSTPROCEDURAL STATES: Chronic | ICD-10-CM

## 2021-04-12 DIAGNOSIS — M54.16 RADICULOPATHY, LUMBAR REGION: ICD-10-CM

## 2021-04-12 DIAGNOSIS — Z98.1 ARTHRODESIS STATUS: Chronic | ICD-10-CM

## 2021-04-12 PROCEDURE — 62323 NJX INTERLAMINAR LMBR/SAC: CPT

## 2021-05-19 ENCOUNTER — RX RENEWAL (OUTPATIENT)
Age: 61
End: 2021-05-19

## 2021-06-21 NOTE — H&P PST ADULT - SOURCE OF INFORMATION, PROFILE
Letter by Nikki Stein MD at      Author: Nikki Stein MD Service: -- Author Type: --    Filed:  Encounter Date: 3/1/2021 Status: (Other)         Harryjae INTERIANO Epifanio  [address] [place] [id number]             March 1, 2021         Dear Mr. Godfrey,    Below are the results from your recent visit:    Resulted Orders   Lipid Cascade   Result Value Ref Range    Cholesterol 192 <=199 mg/dL    Triglycerides 104 <=149 mg/dL    HDL Cholesterol 58 >=40 mg/dL    LDL Calculated 113 <=129 mg/dL    Patient Fasting > 8hrs? No    Glycosylated Hemoglobin A1c   Result Value Ref Range    Hemoglobin A1c 5.3 <=5.6 %   Thyroid Stimulating Hormone (TSH)   Result Value Ref Range    TSH 0.94 0.30 - 5.00 uIU/mL   Comprehensive Metabolic Panel   Result Value Ref Range    Sodium 143 136 - 145 mmol/L    Potassium 4.5 3.5 - 5.0 mmol/L    Chloride 106 98 - 107 mmol/L    CO2 28 22 - 31 mmol/L    Anion Gap, Calculation 9 5 - 18 mmol/L    Glucose 66 (L) 70 - 125 mg/dL    BUN 16 8 - 22 mg/dL    Creatinine 1.10 0.70 - 1.30 mg/dL    GFR MDRD Af Amer >60 >60 mL/min/1.73m2    GFR MDRD Non Af Amer >60 >60 mL/min/1.73m2    Bilirubin, Total 0.7 0.0 - 1.0 mg/dL    Calcium 9.8 8.5 - 10.5 mg/dL    Protein, Total 7.3 6.0 - 8.0 g/dL    Albumin 4.7 3.5 - 5.0 g/dL    Alkaline Phosphatase 82 45 - 120 U/L    AST 25 0 - 40 U/L    ALT 31 0 - 45 U/L    Narrative    Fasting Glucose reference range is 70-99 mg/dL per  American Diabetes Association (ADA) guidelines.       All of the lab results came back within normal limits, no major concerns.  Blood sugar was slightly low but could be due to not eating the day of the appointment.     Please call with questions or contact us using [employer].    Sincerely,        Electronically signed by Nikki Stein MD       
cell# [phone]/patient

## 2021-07-07 NOTE — ASU DISCHARGE PLAN (ADULT/PEDIATRIC). - YOU WERE IN THE HOSPITAL FOR:
Final Anesthesia Post-op Assessment    Patient: Abi Gimenez  Procedure(s) Performed: COLONOSCOPY  Anesthesia type: MAC    Vitals Value Taken Time   Temp 36 °C (96.8 °F) 07/07/21 1035   Pulse 66 07/07/21 1050   Resp 12 07/07/21 1050   SpO2 100 % 07/07/21 1050   /83 07/07/21 1050         Patient Location: Phase II  Post-op Vital Signs:stable  Level of Consciousness: awake, alert and participates in exam  Respiratory Status: spontaneous ventilation  Cardiovascular stable and blood pressure returned to baseline  Hydration: euvolemic  Pain Management: adequately controlled and well controlled  Handoff: Handoff to receiving nurse was performed and questions were answered  Vomiting: none  Nausea: None  Airway Patency:patent  Post-op Assessment: awake, alert, appropriately conversant, or baseline, no complications and patient tolerated procedure well with no complications      There were no known complications for this encounter.   
carpal tunnel

## 2021-07-19 ENCOUNTER — APPOINTMENT (OUTPATIENT)
Dept: ORTHOPEDIC SURGERY | Facility: CLINIC | Age: 61
End: 2021-07-19
Payer: MEDICAID

## 2021-08-10 ENCOUNTER — OUTPATIENT (OUTPATIENT)
Dept: OUTPATIENT SERVICES | Facility: HOSPITAL | Age: 61
LOS: 1 days | End: 2021-08-10
Payer: MEDICAID

## 2021-08-10 DIAGNOSIS — Z98.890 OTHER SPECIFIED POSTPROCEDURAL STATES: Chronic | ICD-10-CM

## 2021-08-10 DIAGNOSIS — Z98.89 OTHER SPECIFIED POSTPROCEDURAL STATES: Chronic | ICD-10-CM

## 2021-08-10 DIAGNOSIS — Z20.828 CONTACT WITH AND (SUSPECTED) EXPOSURE TO OTHER VIRAL COMMUNICABLE DISEASES: ICD-10-CM

## 2021-08-10 DIAGNOSIS — Z98.1 ARTHRODESIS STATUS: Chronic | ICD-10-CM

## 2021-08-10 LAB — SARS-COV-2 RNA SPEC QL NAA+PROBE: SIGNIFICANT CHANGE UP

## 2021-08-10 PROCEDURE — U0003: CPT

## 2021-08-10 PROCEDURE — U0005: CPT

## 2021-08-12 ENCOUNTER — OUTPATIENT (OUTPATIENT)
Dept: OUTPATIENT SERVICES | Facility: HOSPITAL | Age: 61
LOS: 1 days | End: 2021-08-12
Payer: MEDICAID

## 2021-08-12 DIAGNOSIS — Z98.1 ARTHRODESIS STATUS: Chronic | ICD-10-CM

## 2021-08-12 DIAGNOSIS — Z98.89 OTHER SPECIFIED POSTPROCEDURAL STATES: Chronic | ICD-10-CM

## 2021-08-12 DIAGNOSIS — Z98.890 OTHER SPECIFIED POSTPROCEDURAL STATES: Chronic | ICD-10-CM

## 2021-08-12 DIAGNOSIS — M54.16 RADICULOPATHY, LUMBAR REGION: ICD-10-CM

## 2021-08-12 PROCEDURE — 62323 NJX INTERLAMINAR LMBR/SAC: CPT

## 2021-08-16 ENCOUNTER — APPOINTMENT (OUTPATIENT)
Dept: ORTHOPEDIC SURGERY | Facility: CLINIC | Age: 61
End: 2021-08-16
Payer: MEDICAID

## 2021-08-16 PROCEDURE — 99214 OFFICE O/P EST MOD 30 MIN: CPT

## 2021-08-16 PROCEDURE — 72040 X-RAY EXAM NECK SPINE 2-3 VW: CPT

## 2021-08-30 ENCOUNTER — APPOINTMENT (OUTPATIENT)
Dept: ORTHOPEDIC SURGERY | Facility: CLINIC | Age: 61
End: 2021-08-30
Payer: MEDICAID

## 2021-08-30 PROCEDURE — 99213 OFFICE O/P EST LOW 20 MIN: CPT

## 2021-09-22 ENCOUNTER — RX RENEWAL (OUTPATIENT)
Age: 61
End: 2021-09-22

## 2021-12-06 ENCOUNTER — APPOINTMENT (OUTPATIENT)
Dept: ORTHOPEDIC SURGERY | Facility: CLINIC | Age: 61
End: 2021-12-06

## 2021-12-23 ENCOUNTER — APPOINTMENT (OUTPATIENT)
Dept: ORTHOPEDIC SURGERY | Facility: CLINIC | Age: 61
End: 2021-12-23
Payer: MEDICAID

## 2021-12-23 VITALS
WEIGHT: 198 LBS | DIASTOLIC BLOOD PRESSURE: 84 MMHG | BODY MASS INDEX: 26.82 KG/M2 | HEART RATE: 93 BPM | SYSTOLIC BLOOD PRESSURE: 144 MMHG | HEIGHT: 72 IN

## 2021-12-23 DIAGNOSIS — S42.254D NONDISPLACED FRACTURE OF GREATER TUBEROSITY OF RIGHT HUMERUS, SUBSEQUENT ENCOUNTER FOR FRACTURE WITH ROUTINE HEALING: ICD-10-CM

## 2021-12-23 DIAGNOSIS — S42.254G: ICD-10-CM

## 2021-12-23 PROCEDURE — 99213 OFFICE O/P EST LOW 20 MIN: CPT

## 2022-02-07 ENCOUNTER — APPOINTMENT (OUTPATIENT)
Dept: ORTHOPEDIC SURGERY | Facility: CLINIC | Age: 62
End: 2022-02-07

## 2022-04-14 NOTE — ASU PATIENT PROFILE, ADULT - HARM RISK FACTORS
Orthopaedic Clinic Note: Knee New Patient    Chief Complaint   Patient presents with   • Left Knee - Pain        HPI    Lupillo Deleon is a 68 y.o. female who presents with left knee pain for 5 month(s). Onset mechanical fall. Pain is localized to the entire knee (globally) and is a 6/10 on the pain scale. Pain is described as aching. Associated symptoms include pain, popping and stiffness. The pain is worse with walking, sitting, climbing stairs and rising from seated position; resting make it better. Previous treatments have included: NSAIDS since symptom onset. Although some transient relief was reported with these interventions, these conservative measures have failed and symptoms have persisted. The patient is limited in daily activities and has had a significant decrease in quality of life as a result. She denies fevers, chills, or constitutional symptoms.    I have reviewed the following portions of the patient's history:History of Present Illness    Past Medical History:   Diagnosis Date   • Abnormal weight gain    • Cellulitis of ankle    • Fracture of ankle    • Hypokalemia    • Rash       Past Surgical History:   Procedure Laterality Date   • HYSTERECTOMY     • OOPHORECTOMY     • SHOULDER SURGERY        History reviewed. No pertinent family history.  Social History     Socioeconomic History   • Marital status:    Tobacco Use   • Smoking status: Current Every Day Smoker     Packs/day: 1.50     Years: 2.00     Pack years: 3.00     Types: Cigarettes   • Smokeless tobacco: Never Used   Vaping Use   • Vaping Use: Never used   Substance and Sexual Activity   • Alcohol use: Yes     Comment: very rarely   • Drug use: Never   • Sexual activity: Defer      Current Outpatient Medications on File Prior to Visit   Medication Sig Dispense Refill   • DULoxetine (CYMBALTA) 60 MG capsule Take 1 capsule by mouth Daily. 90 capsule 0   • famotidine (Pepcid) 40 MG tablet Take 1 tablet by mouth Daily Before Supper. 30  "tablet 2   • pantoprazole (Protonix) 40 MG EC tablet Take 1 tablet by mouth Daily. 30 tablet 2   • rOPINIRole (Requip) 4 MG tablet Take 1 tablet by mouth Every Night. 90 tablet 1   • sucralfate (Carafate) 1 g tablet 1 Po QAC And  tablet 1   • vitamin D (ERGOCALCIFEROL) 1.25 MG (79135 UT) capsule capsule Take 1 capsule by mouth 1 (One) Time Per Week. 15 capsule 3     No current facility-administered medications on file prior to visit.      No Known Allergies     Review of Systems   Constitutional: Positive for fatigue.   Eyes: Negative.    Respiratory: Negative.    Cardiovascular: Negative.    Gastrointestinal: Positive for abdominal pain.   Endocrine: Negative.    Genitourinary: Negative.    Musculoskeletal: Positive for arthralgias and myalgias.   Skin: Negative.    Allergic/Immunologic: Negative.    Neurological: Positive for headaches.   Hematological: Negative.    Psychiatric/Behavioral: Positive for sleep disturbance.        The patient's Review of Systems was personally reviewed and confirmed as accurate.    The following portions of the patient's history were reviewed and updated as appropriate: allergies, current medications, past family history, past medical history, past social history, past surgical history and problem list.    Physical Exam  Blood pressure (!) 160/104, height 165.1 cm (65\"), weight 97.5 kg (214 lb 15.2 oz), not currently breastfeeding.    Body mass index is 35.77 kg/m².    GENERAL APPEARANCE: awake, alert & oriented x 3, in no acute distress and well developed, well nourished  PSYCH: normal affect  LUNGS:  breathing nonlabored  EYES: sclera anicteric  CARDIOVASCULAR: palpable dorsalis pedis, palpable posterior tibial bilaterally. Capillary refill less than 2 seconds  EXTREMITIES: no clubbing, cyanosis  GAIT:  Antalgic            Right Lower Extremity Exam:   ----------  Hip Exam  ----------  FLEXION CONTRACTURE: None  FLEXION: 110 degrees  INTERNAL ROTATION: 20 degrees at 90 " degrees of flexion   EXTERNAL ROTATION: 40 degrees at 90 degrees of flexion    PAIN WITH HIP MOTION: no  ----------  Knee Exam  ----------  ALIGNMENT: neutral, no varus or valgus deformity     RANGE OF MOTION:  Normal (0-120 degrees) with no extensor lag or flexion contracture  LIGAMENTOUS STABILITY:   stable to varus and valgus stress at terminal extension and 30 degrees without any evidence of laxity     STRENGTH:  5/5 knee flexion, extension. 5/5 ankle dorsiflexion and plantarflexion.     PAIN WITH PALPATION: denies tenderness to palpation about the knee, denies medial or lateral joint line pain  KNEE EFFUSION: no  PAIN WITH KNEE ROM: no  PATELLAR CREPITUS: no  SPECIAL EXAM FINDINGS:  Negative patellar compression    REFLEXES:  PATELLAR 2+/4  ACHILLES 2+/4    CLONUS: negative  STRAIGHT LEG TEST:   negative    SENSATION TO LIGHT TOUCH:  DEEP PERONEAL/SUPERFICIAL PERONEAL/SURAL/SAPHENOUS/TIBIAL:   intact    EDEMA:  no  ERYTHEMA:  no  WOUNDS/INCISIONS: no overlying skin problems.      Left Lower Extremity Exam:   ----------  Hip Exam  ----------  FLEXION CONTRACTURE: None  FLEXION: 110 degrees  INTERNAL ROTATION: 20 degrees at 90 degrees of flexion   EXTERNAL ROTATION: 40 degrees at 90 degrees of flexion    PAIN WITH HIP MOTION: no  ----------  Knee Exam  ----------  ALIGNMENT: moderate varus, correctible to neutral    RANGE OF MOTION:  Decreased (5 - 115 degrees) with no extensor lag  LIGAMENTOUS STABILITY:   stable to varus and valgus stress at terminal extension and 30 degrees; retensioning of the MCL is appreciated with valgus stress at 30 degrees consistent with medial compartment degeneration     STRENGTH:  4/5 knee flexion, extension. 5/5 ankle dorsiflexion and plantarflexion.     PAIN WITH PALPATION: global  KNEE EFFUSION: yes, mild effusion  PAIN WITH KNEE ROM: yes, global  PATELLAR CREPITUS: yes, painful and symptomatic  SPECIAL EXAM FINDINGS:  none    REFLEXES:  PATELLAR 2+/4  ACHILLES 2+/4    CLONUS:  no  STRAIGHT LEG TEST:   negative    SENSATION TO LIGHT TOUCH:  DEEP PERONEAL/SUPERFICIAL PERONEAL/SURAL/SAPHENOUS/TIBIAL:   intact    EDEMA:  no  ERYTHEMA:  no  WOUNDS/INCISIONS:  no      ______________________________________________________________________  ______________________________________________________________________    RADIOGRAPHIC FINDINGS:   Left knee radiographs from 2/18/2020 reversely interpreted.  Radiographs demonstrate moderate severe tricompartmental osteoarthritis with genu varum alignment and significant narrowing of the medial compartment.  No acute bony injury or fracture.    Assessment/Plan:   Diagnosis Plan   1. Primary osteoarthritis of left knee  meloxicam (MOBIC) 15 MG tablet     Patient is experiencing an arthritic flareup of the left knee.  I discussed treatment options.  She is interested in pursuing prescription anti-inflammatory.  Meloxicam was prescribed.  She will follow-up in 3 weeks for repeat assessment.    Jamil Meredith MD  04/14/22  13:29 EDT         no

## 2022-05-04 NOTE — ED PROVIDER NOTE - TIMING
CANCEL SURGERY-    Surgery Date:   May 3, 2022    Location:  [facility])    Surgical Procedure:  RIGHT Total Hip Arthroplasty - Direct Anterior    Reason: not cleared     [facility] apt cancelled:  Yes    Take off of [place]  Yes    Message to PCP - pre op apt - No      Surgery contacted?:       Pre auth Referral messaged:  Yes  5.3.22 cancel - [facility] - [facility]  Received: Today  Eufemia [facility]ADDEN [facility] Preauth-Spine/Ortho/Podiatry; Eufemia Jones  Cc: P Ortho Preauth Sched [facility]  Surgery/Procedure CANCELLATION:     Orig Date:  5.3.22     Reason for Cancellation:  Not cleared     Thanks!     KINEX notified :   No    Staffed messaged PA/ NP - Notifying of cancellation: No      Staff message Pre hab/ PT apts : Yes  Called to cancel apt s                       intermittent

## 2022-11-13 NOTE — ASU PATIENT PROFILE, ADULT - FALLEN IN THE PAST
BLE dressings changed. Pt tolerated fairly well.  Electronically signed by Cecilio Galvez RN on 11/13/2022 at 1:50 PM no

## 2023-01-23 ENCOUNTER — APPOINTMENT (OUTPATIENT)
Dept: ORTHOPEDIC SURGERY | Facility: CLINIC | Age: 63
End: 2023-01-23
Payer: MEDICAID

## 2023-01-23 VITALS — WEIGHT: 198 LBS | HEIGHT: 72 IN | BODY MASS INDEX: 26.82 KG/M2

## 2023-01-23 DIAGNOSIS — G56.02 CARPAL TUNNEL SYNDROME, LEFT UPPER LIMB: ICD-10-CM

## 2023-01-23 PROCEDURE — 72100 X-RAY EXAM L-S SPINE 2/3 VWS: CPT

## 2023-01-23 PROCEDURE — 99215 OFFICE O/P EST HI 40 MIN: CPT

## 2023-01-23 PROCEDURE — 72040 X-RAY EXAM NECK SPINE 2-3 VW: CPT

## 2023-01-27 ENCOUNTER — NON-APPOINTMENT (OUTPATIENT)
Age: 63
End: 2023-01-27

## 2023-01-27 DIAGNOSIS — M25.50 PAIN IN UNSPECIFIED JOINT: ICD-10-CM

## 2023-01-27 DIAGNOSIS — G62.9 POLYNEUROPATHY, UNSPECIFIED: ICD-10-CM

## 2023-01-27 DIAGNOSIS — G89.4 CHRONIC PAIN SYNDROME: ICD-10-CM

## 2023-01-27 DIAGNOSIS — R52 PAIN, UNSPECIFIED: ICD-10-CM

## 2023-01-27 DIAGNOSIS — M54.50 LOW BACK PAIN, UNSPECIFIED: ICD-10-CM

## 2023-01-27 DIAGNOSIS — Z86.69 PERSONAL HISTORY OF OTHER DISEASES OF THE NERVOUS SYSTEM AND SENSE ORGANS: ICD-10-CM

## 2023-01-27 DIAGNOSIS — M47.817 SPONDYLOSIS W/OUT MYELOPATHY OR RADICULOPATHY, LUMBOSACRAL REGION: ICD-10-CM

## 2023-01-27 DIAGNOSIS — Z86.59 PERSONAL HISTORY OF OTHER MENTAL AND BEHAVIORAL DISORDERS: ICD-10-CM

## 2023-01-27 DIAGNOSIS — Z87.39 PERSONAL HISTORY OF OTHER DISEASES OF THE MUSCULOSKELETAL SYSTEM AND CONNECTIVE TISSUE: ICD-10-CM

## 2023-02-01 ENCOUNTER — APPOINTMENT (OUTPATIENT)
Dept: PAIN MANAGEMENT | Facility: CLINIC | Age: 63
End: 2023-02-01
Payer: MEDICAID

## 2023-02-01 VITALS — HEIGHT: 73 IN | BODY MASS INDEX: 28.1 KG/M2 | WEIGHT: 212 LBS

## 2023-02-01 PROCEDURE — 99213 OFFICE O/P EST LOW 20 MIN: CPT | Mod: 95

## 2023-02-01 RX ORDER — NAPROXEN 500 MG/1
500 TABLET ORAL
Qty: 20 | Refills: 0 | Status: DISCONTINUED | COMMUNITY
Start: 2018-06-11 | End: 2023-02-01

## 2023-02-01 RX ORDER — CYCLOBENZAPRINE HYDROCHLORIDE 5 MG/1
5 TABLET, FILM COATED ORAL 3 TIMES DAILY
Qty: 90 | Refills: 3 | Status: DISCONTINUED | COMMUNITY
Start: 2017-12-18 | End: 2023-02-01

## 2023-02-01 NOTE — HISTORY OF PRESENT ILLNESS
[Lower back] : lower back [9] : 9 [Burning] : burning [Sharp] : sharp [Shooting] : shooting [Constant] : constant [Rest] : rest [Sitting] : sitting [Home] : at home, [unfilled] , at the time of the visit. [Medical Office: ([facility])___] : at the medical office located in  [Verbal consent obtained from patient] : the patient, [unfilled] [] : no [de-identified] : LIFTING

## 2023-02-01 NOTE — ASSESSMENT
[FreeTextEntry1] : Interim history\par he patient returns with pain that has been treated adequately in the past with epidural steroid injections.  The patient's complaints are consistent with radiculopathy. Patient's ADL's increase with prior BENNY.   The last injection gave greater than 60% reduction of the pain but now there is a return of symptoms. The patient is requesting a subsequent epidural steroid injection to alleviate pain and improve functional ability and quality of life.  Patient is a candidate.\par Objective findings\par Since the last visit, there have been no new imaging studies. THe patient has no new symptoms except the return of the their pain complaints. Motor and sensory function is unchanged.\par Plan\par Prior to his next injection patient obtain MRI lumbar spine.  Patient has not had an MRI of the lumbar spine or 3 years.  Spoke to patient about epidural steroid injections. Explained risks, benefits and alternatives including but not limited to the risk of infection, bleeding, headache, syncopal episode, failure to resolve issues, allergic reaction, symptom recurrence, allergic reaction, nerve injury, and increased pain. The patient understands the risks. All questions were answered. The patient is willing to proceed.\par

## 2023-02-07 ENCOUNTER — APPOINTMENT (OUTPATIENT)
Dept: ORTHOPEDIC SURGERY | Facility: CLINIC | Age: 63
End: 2023-02-07
Payer: MEDICAID

## 2023-02-07 VITALS — BODY MASS INDEX: 28.1 KG/M2 | HEIGHT: 73 IN | WEIGHT: 212 LBS

## 2023-02-07 PROCEDURE — 99213 OFFICE O/P EST LOW 20 MIN: CPT

## 2023-02-07 PROCEDURE — 99203 OFFICE O/P NEW LOW 30 MIN: CPT

## 2023-02-22 NOTE — ASSESSMENT
[FreeTextEntry1] : 62 year-old M previously s/p OCTR on the L also with symptoms of R CTR\par \par Plan:\par EMG to assess for residual L carpal tunnel s/p release and R carpal tunnel syndrome\par F/u after EMG

## 2023-02-22 NOTE — HISTORY OF PRESENT ILLNESS
[FreeTextEntry1] : He reports intermittent numbness and tingling in the radial digits. The right hand is equal to the left hand but the L hand is previously s/p open release . The pain awakens him at night. Denies any clicking, locking, or triggering of digits. Denies any weakness. Denies any recent trauma. He has worn braces at night in the past, but these do not help the current symptoms. A recent EMG has not been performed and the patient has been referred by his neurologist for further discussion.\par

## 2023-02-22 NOTE — PHYSICAL EXAM
[de-identified] : Patient is alert, oriented and in no acute distress. Affect and general appearance are normal and patient is able to answer questions appropriately. A focused exam of the bilateral upper extremities reveals full motion of the elbow, wrist and fingers. Patient is able to make a tight fist today.  strength is excellent.   Today he has bilateral positive Phalen’s test, positive Durkan carpal compression test and negative Tinel’s test over the median over at the carpal tunnel. Thenar muscular strength and bulk normal. 5 mm two-point discrimination in all fingertips.    Neurologic: Median, ulnar, and radial motor and sensory are intact.  Skin: No cyanosis, clubbing, edema or rashes. Vascular: Radial pulses intact. Lymphatic: No streaking or epitrochlear adenopathy.\par

## 2023-02-24 ENCOUNTER — APPOINTMENT (OUTPATIENT)
Dept: ULTRASOUND IMAGING | Facility: CLINIC | Age: 63
End: 2023-02-24
Payer: MEDICAID

## 2023-02-24 PROCEDURE — 76882 US LMTD JT/FCL EVL NVASC XTR: CPT | Mod: LT

## 2023-02-24 RX ORDER — METHYLPREDNISOLONE 4 MG/1
4 TABLET ORAL
Qty: 1 | Refills: 0 | Status: ACTIVE | COMMUNITY
Start: 2023-02-24 | End: 1900-01-01

## 2023-02-27 ENCOUNTER — APPOINTMENT (OUTPATIENT)
Dept: ORTHOPEDIC SURGERY | Facility: CLINIC | Age: 63
End: 2023-02-27
Payer: MEDICAID

## 2023-02-27 VITALS — HEIGHT: 73 IN | BODY MASS INDEX: 28.1 KG/M2 | WEIGHT: 212 LBS

## 2023-02-27 DIAGNOSIS — M51.36 OTHER INTERVERTEBRAL DISC DEGENERATION, LUMBAR REGION: ICD-10-CM

## 2023-02-27 PROCEDURE — 99214 OFFICE O/P EST MOD 30 MIN: CPT

## 2023-03-07 ENCOUNTER — APPOINTMENT (OUTPATIENT)
Dept: ORTHOPEDIC SURGERY | Facility: CLINIC | Age: 63
End: 2023-03-07
Payer: MEDICAID

## 2023-03-07 PROCEDURE — 99213 OFFICE O/P EST LOW 20 MIN: CPT

## 2023-03-08 NOTE — HISTORY OF PRESENT ILLNESS
[FreeTextEntry1] : Patient returns for review of his U/S and EMG.  Discussed that U/S shows no residual compression.  He is concerned about his bilateral symptoms.  Discussed that this may be coming from his neck- at which point he mentioned that his neurologist wants to get an MRI of his brain to assess for MS.  His EMG was not available for review today.

## 2023-03-08 NOTE — PHYSICAL EXAM
[de-identified] : Patient is alert, oriented and in no acute distress. Affect and general appearance are normal and patient is able to answer questions appropriately. A focused exam of the bilateral upper extremities reveals full motion of the elbow, wrist and fingers. Patient is able to make a tight fist today.  strength is excellent.   Today he has bilateral positive Phalen’s test, positive Durkan carpal compression test and negative Tinel’s test over the median over at the carpal tunnel. Thenar muscular strength and bulk normal. 5 mm two-point discrimination in all fingertips.    Neurologic: Median, ulnar, and radial motor and sensory are intact.  Skin: No cyanosis, clubbing, edema or rashes. Vascular: Radial pulses intact. Lymphatic: No streaking or epitrochlear adenopathy.\par

## 2023-03-08 NOTE — ASSESSMENT
[FreeTextEntry1] : 62 year-old M previously s/p OCTR on the L also with symptoms of R CTR w/ U/S negative for residual compression\par \par Plan:\par Will call patient when EMG results available\par F/u after EMG

## 2023-03-14 ENCOUNTER — APPOINTMENT (OUTPATIENT)
Dept: ORTHOPEDIC SURGERY | Facility: CLINIC | Age: 63
End: 2023-03-14
Payer: MEDICAID

## 2023-03-14 PROCEDURE — G2012 BRIEF CHECK IN BY MD/QHP: CPT

## 2023-03-17 ENCOUNTER — APPOINTMENT (OUTPATIENT)
Dept: ORTHOPEDIC SURGERY | Facility: CLINIC | Age: 63
End: 2023-03-17
Payer: MEDICAID

## 2023-03-17 PROCEDURE — 20526 THER INJECTION CARP TUNNEL: CPT | Mod: 50

## 2023-03-17 PROCEDURE — 99214 OFFICE O/P EST MOD 30 MIN: CPT | Mod: 25

## 2023-03-18 NOTE — HISTORY OF PRESENT ILLNESS
[FreeTextEntry1] : Patient returns after EMG review over telephone with me previously.  He would like to trial a CSI at the R carpal tunnel today to determine how much resolution this will provide his pain.  He understands the diagnostic role for this in determining percentage of pain emanating from the carpal tunnel as opposed to the neck.  We reviewed that the left-sided symptoms are emanating from the neck per recent EMG.

## 2023-03-18 NOTE — ASSESSMENT
[FreeTextEntry1] : 62 year-old male with L sided C6 radiculopathy s/p L OCTR with another provider and R cervical radiculopathy and moderate carpal tunnel syndrome now s/p CSI\par \par Plan:\par AAT\par F/u in 2 weeks to review change in symptoms from injection

## 2023-03-18 NOTE — PHYSICAL EXAM
[de-identified] : Patient is alert, oriented and in no acute distress. Affect and general appearance are normal and patient is able to answer questions appropriately. A focused exam of the bilateral upper extremities reveals full motion of the elbow, wrist and fingers. Patient is able to make a tight fist today.  strength is excellent.   Today he has right positive Phalen’s test, positive Durkan carpal compression test and negative Tinel’s test over the median over at the carpal tunnel. Thenar muscular strength and bulk normal. 5 mm two-point discrimination in all fingertips.    Neurologic: Median, ulnar, and radial motor and sensory are intact.  Skin: No cyanosis, clubbing, edema or rashes. Vascular: Radial pulses intact. Lymphatic: No streaking or epitrochlear adenopathy.\par

## 2023-03-27 ENCOUNTER — APPOINTMENT (OUTPATIENT)
Dept: ORTHOPEDIC SURGERY | Facility: CLINIC | Age: 63
End: 2023-03-27
Payer: MEDICAID

## 2023-03-27 DIAGNOSIS — M47.816 SPONDYLOSIS W/OUT MYELOPATHY OR RADICULOPATHY, CERVICAL REGION: ICD-10-CM

## 2023-03-27 DIAGNOSIS — M47.812 SPONDYLOSIS W/OUT MYELOPATHY OR RADICULOPATHY, CERVICAL REGION: ICD-10-CM

## 2023-03-27 PROCEDURE — 99442: CPT

## 2023-04-04 ENCOUNTER — APPOINTMENT (OUTPATIENT)
Dept: PAIN MANAGEMENT | Facility: CLINIC | Age: 63
End: 2023-04-04
Payer: MEDICAID

## 2023-04-04 VITALS — WEIGHT: 212 LBS | BODY MASS INDEX: 28.1 KG/M2 | HEIGHT: 73 IN

## 2023-04-04 PROCEDURE — 99213 OFFICE O/P EST LOW 20 MIN: CPT

## 2023-04-04 NOTE — ASSESSMENT
[FreeTextEntry1] : Interim history\par he patient returns with pain that has been treated adequately in the past with epidural steroid injections.  The patient's complaints are consistent with radiculopathy. Patient's ADL's increase with prior BENNY.   The last injection gave greater than 60% reduction of the pain but now there is a return of symptoms. The patient is requesting a subsequent epidural steroid injection to alleviate pain and improve functional ability and quality of life.  Patient is a candidate.\par Objective findings\par MRI of the lumbar spine is consistent with the patient's pain complaint. THe patient has no new symptoms except the return of the their pain complaints. Motor and sensory function is unchanged.\par Plan\par Spoke to patient about epidural steroid injections. Explained risks, benefits and alternatives including but not limited to the risk of infection, bleeding, headache, syncopal episode, failure to resolve issues, allergic reaction, symptom recurrence, allergic reaction, nerve injury, and increased pain. The patient understands the risks. All questions were answered. The patient is willing to proceed.\par The patient is also actively under the care of a surgeon for new and worsening cervical radiculopathy.  In discussion with the patient I stated that we will hold off on any injection until we have clearance from the surgeon who is working with the cervical radiculopathy.\par

## 2023-04-04 NOTE — HISTORY OF PRESENT ILLNESS
[Lower back] : lower back [9] : 9 [Burning] : burning [Sharp] : sharp [Shooting] : shooting [Constant] : constant [Rest] : rest [Sitting] : sitting [Household chores] : household chores [Sleep] : sleep [de-identified] : 04/04/2023- PATIENT STATES CURRENTLY IS DOING  PHYSICAL THERAPY  3X A WEEK  AND REPORT'S MILD  IMPROVEMENT WITH PAIN.\par PT STATES CURRENTLY DO HOME STRETCHING PROGRAM AT HOME 4X A WEEK  AND REPORT'S MILD IMPROVEMENT WITH PAIN.  \par  [] : no [FreeTextEntry7] : RT HAND TO FINGER  [de-identified] : LIFTING  [de-identified] : 03/08/2023

## 2023-04-04 NOTE — REASON FOR VISIT
[FreeTextEntry2] : PT IS BEING SEEN FOR A FOLLOW-UP IN OFFICE  PAIN MANAGEMENT VISIT FOR MED REFILL AND EVALUATION FOR BENNY

## 2023-04-10 ENCOUNTER — APPOINTMENT (OUTPATIENT)
Dept: ORTHOPEDIC SURGERY | Facility: CLINIC | Age: 63
End: 2023-04-10
Payer: MEDICAID

## 2023-04-10 VITALS — HEIGHT: 73 IN | BODY MASS INDEX: 28.1 KG/M2 | WEIGHT: 212 LBS

## 2023-04-10 PROCEDURE — 99214 OFFICE O/P EST MOD 30 MIN: CPT

## 2023-04-13 ENCOUNTER — APPOINTMENT (OUTPATIENT)
Dept: ORTHOPEDIC SURGERY | Facility: CLINIC | Age: 63
End: 2023-04-13

## 2023-04-14 ENCOUNTER — TRANSCRIPTION ENCOUNTER (OUTPATIENT)
Age: 63
End: 2023-04-14

## 2023-04-14 NOTE — ASU PATIENT PROFILE, ADULT - NSALCOHOLAMT_GEN_A_CORE_SD
CLINICAL NUTRITION SERVICES - REASSESSMENT NOTE     Nutrition Prescription    RECOMMENDATIONS FOR MDs/PROVIDERS TO ORDER:  None at this time.     Malnutrition Status:    Patient does not meet two of the established criteria necessary for diagnosing malnutrition    Recommendations already ordered by Registered Dietitian (RD):  None at this time.  Continuing Ensure Max Protein PRN.     Future/Additional Recommendations:  Continue to monitor weights, labs, and PO intake.      EVALUATION OF THE PROGRESS TOWARD GOALS   Diet: Regular  Nutrition Support: Ensure Max Protein PRN.   Intake: Good        NEW FINDINGS   Spoke with pt during rounds today - no nutritional concerns from RD or patient at this time. PO intake is good, weights are steady, and no nutrition related labs of concern. Pt states she is eating from outside of the hospital with a little less frequency at this time d/t visitor restrictions, which may lead to decreased intake over time d/t menu fatigue.     MALNUTRITION  % Intake: No decreased intake noted  % Weight Loss: None noted  Subcutaneous Fat Loss: None observed  Muscle Loss: None observed  Fluid Accumulation/Edema: Does not meet criteria  Malnutrition Diagnosis: Patient does not meet two of the established criteria necessary for diagnosing malnutrition    Previous Goals   Patient to consume % of nutritionally adequate meal trays TID, or the equivalent with supplements/snacks  Evaluation: Met    Previous Nutrition Diagnosis  Predicted inadequate nutrient intake (protein-energy) related to LOS/menu fatigue and decreased appetite   Evaluation: Resolved    CURRENT NUTRITION DIAGNOSIS  Predicted inadequate nutrient intake (protein-energy) related to LOS/menu fatigue and decreased appetite     INTERVENTIONS  Implementation  Medical food supplement therapy - Ensure Max Protein PRN.     Goals  Patient to consume % of nutritionally adequate meal trays TID, or the equivalent with  supplements/snacks.    Monitoring/Evaluation  Progress toward goals will be monitored and evaluated per protocol.    Owen Alarcon RD, LD     1-2 drinks

## 2023-04-14 NOTE — ASU PATIENT PROFILE, ADULT - NSICDXPASTSURGICALHX_GEN_ALL_CORE_FT
PAST SURGICAL HISTORY:  H/O spinal fusion 8/16/2017  anterior approach cervical spine  C5-C6    History of lithotripsy multiple, most recent 2015    S/P cystoscopy Oct 2014, h/o renal stent     PAST SURGICAL HISTORY:  H/O spinal fusion 8/16/2017  anterior approach cervical spine  C5-C6    History of lithotripsy multiple, most recent 2015, 2022    S/P cystoscopy Oct 2014, h/o renal stent

## 2023-04-14 NOTE — ASU PATIENT PROFILE, ADULT - NSICDXPASTMEDICALHX_GEN_ALL_CORE_FT
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

## 2023-04-17 ENCOUNTER — APPOINTMENT (OUTPATIENT)
Dept: ORTHOPEDIC SURGERY | Facility: HOSPITAL | Age: 63
End: 2023-04-17
Payer: MEDICAID

## 2023-04-17 ENCOUNTER — OUTPATIENT (OUTPATIENT)
Dept: OUTPATIENT SERVICES | Facility: HOSPITAL | Age: 63
LOS: 1 days | End: 2023-04-17
Payer: MEDICAID

## 2023-04-17 VITALS
DIASTOLIC BLOOD PRESSURE: 69 MMHG | SYSTOLIC BLOOD PRESSURE: 112 MMHG | OXYGEN SATURATION: 96 % | RESPIRATION RATE: 19 BRPM | HEART RATE: 87 BPM

## 2023-04-17 VITALS
WEIGHT: 214.95 LBS | SYSTOLIC BLOOD PRESSURE: 120 MMHG | TEMPERATURE: 98 F | RESPIRATION RATE: 13 BRPM | HEIGHT: 73 IN | OXYGEN SATURATION: 98 % | DIASTOLIC BLOOD PRESSURE: 85 MMHG | HEART RATE: 78 BPM

## 2023-04-17 DIAGNOSIS — Z98.1 ARTHRODESIS STATUS: Chronic | ICD-10-CM

## 2023-04-17 DIAGNOSIS — Z98.89 OTHER SPECIFIED POSTPROCEDURAL STATES: Chronic | ICD-10-CM

## 2023-04-17 DIAGNOSIS — M54.16 RADICULOPATHY, LUMBAR REGION: ICD-10-CM

## 2023-04-17 DIAGNOSIS — Z98.890 OTHER SPECIFIED POSTPROCEDURAL STATES: Chronic | ICD-10-CM

## 2023-04-17 PROCEDURE — 62323 NJX INTERLAMINAR LMBR/SAC: CPT

## 2023-04-17 RX ORDER — SUMATRIPTAN SUCCINATE 4 MG/.5ML
1 INJECTION, SOLUTION SUBCUTANEOUS
Refills: 0 | DISCHARGE

## 2023-04-17 RX ORDER — MELOXICAM 15 MG/1
1 TABLET ORAL
Refills: 0 | DISCHARGE

## 2023-04-17 RX ORDER — LANSOPRAZOLE 15 MG/1
1 CAPSULE, DELAYED RELEASE ORAL
Qty: 0 | Refills: 0 | DISCHARGE

## 2023-04-17 RX ORDER — TERBINAFINE HCL 250 MG
1 TABLET ORAL
Refills: 0 | DISCHARGE

## 2023-04-17 RX ORDER — OXYCODONE HYDROCHLORIDE 5 MG/1
1 TABLET ORAL
Refills: 0 | DISCHARGE

## 2023-04-24 PROBLEM — M81.0 AGE-RELATED OSTEOPOROSIS WITHOUT CURRENT PATHOLOGICAL FRACTURE: Chronic | Status: ACTIVE | Noted: 2023-04-14

## 2023-04-24 PROBLEM — M48.02 SPINAL STENOSIS, CERVICAL REGION: Chronic | Status: ACTIVE | Noted: 2023-04-14

## 2023-04-24 PROBLEM — M19.90 UNSPECIFIED OSTEOARTHRITIS, UNSPECIFIED SITE: Chronic | Status: ACTIVE | Noted: 2023-04-14

## 2023-04-25 ENCOUNTER — APPOINTMENT (OUTPATIENT)
Dept: PAIN MANAGEMENT | Facility: CLINIC | Age: 63
End: 2023-04-25
Payer: MEDICAID

## 2023-04-25 VITALS — WEIGHT: 215 LBS | BODY MASS INDEX: 28.49 KG/M2 | HEIGHT: 73 IN

## 2023-04-25 PROCEDURE — 99213 OFFICE O/P EST LOW 20 MIN: CPT

## 2023-04-25 NOTE — HISTORY OF PRESENT ILLNESS
[9] : 9 [Burning] : burning [Sharp] : sharp [Shooting] : shooting [Constant] : constant [Household chores] : household chores [Sleep] : sleep [Rest] : rest [Sitting] : sitting [Neck] : neck [de-identified] : 04/04/2023- PATIENT STATES 2MNTHAGO -  CURRENTLY IS DOING  PHYSICAL THERAPY  3X A WEEK  AND REPORT'S MILD  IMPROVEMENT WITH PAIN.\par PT STATES CURRENTLY DO HOME STRETCHING PROGRAM AT HOME 4X A WEEK  AND REPORT'S MILD IMPROVEMENT WITH PAIN.  \par  [] : Post Surgical Visit: no [de-identified] : LIFTING  [de-identified] : 04/04/2023 CS

## 2023-04-25 NOTE — ASSESSMENT
[FreeTextEntry1] : Interim history\par he patient returns with pain that has been treated adequately in the past with epidural steroid injections.  The patient's complaints are consistent with radiculopathy. Patient's ADL's increase with prior BENNY.   The last injection gave greater than 60% reduction of the pain but now there is a return of symptoms. The patient is requesting a subsequent epidural steroid injection to alleviate pain and improve functional ability and quality of life.  Patient is a candidate.\par Objective findings\par Since the last visit, there have been no new imaging studies. THe patient has no new symptoms except the return of the their pain complaints. Motor and sensory function is unchanged.\par Plan\par Spoke to patient about epidural steroid injections. Explained risks, benefits and alternatives including but not limited to the risk of infection, bleeding, headache, syncopal episode, failure to resolve issues, allergic reaction, symptom recurrence, allergic reaction, nerve injury, and increased pain. The patient understands the risks. All questions were answered. The patient is willing to proceed.\par

## 2023-04-25 NOTE — REASON FOR VISIT
[FreeTextEntry2] : PT IS BEING SEEN FOR A FOLLOW-UP IN OFFICE  PAIN MANAGEMENT VISIT FOR POST OP DOS 04/17/2023 YVES  50%IMPROVMENT AND DISS BENNY FOR CS THAT IS EXPRIENCE ON CS WITH LIMITNG MOBILITY

## 2023-05-09 ENCOUNTER — TRANSCRIPTION ENCOUNTER (OUTPATIENT)
Age: 63
End: 2023-05-09

## 2023-05-09 NOTE — ASU PATIENT PROFILE, ADULT - NSICDXPASTSURGICALHX_GEN_ALL_CORE_FT
PAST SURGICAL HISTORY:  H/O spinal fusion 8/16/2017  anterior approach cervical spine  C5-C6    History of lithotripsy multiple, most recent 2015, 2022    S/P cystoscopy Oct 2014, h/o renal stent

## 2023-05-09 NOTE — ASU DISCHARGE PLAN (ADULT/PEDIATRIC) - NS MD DC FALL RISK RISK
For information on Fall & Injury Prevention, visit: [email or web address] OR  [email or web address] OR  https://www.cdc.gov/steadi/patient.html

## 2023-05-11 ENCOUNTER — APPOINTMENT (OUTPATIENT)
Dept: ORTHOPEDIC SURGERY | Facility: HOSPITAL | Age: 63
End: 2023-05-11
Payer: MEDICAID

## 2023-05-11 ENCOUNTER — OUTPATIENT (OUTPATIENT)
Dept: OUTPATIENT SERVICES | Facility: HOSPITAL | Age: 63
LOS: 1 days | End: 2023-05-11
Payer: MEDICAID

## 2023-05-11 VITALS
HEART RATE: 88 BPM | OXYGEN SATURATION: 96 % | HEIGHT: 73 IN | RESPIRATION RATE: 19 BRPM | WEIGHT: 212.97 LBS | DIASTOLIC BLOOD PRESSURE: 89 MMHG | SYSTOLIC BLOOD PRESSURE: 128 MMHG | TEMPERATURE: 98 F

## 2023-05-11 VITALS
OXYGEN SATURATION: 95 % | HEART RATE: 86 BPM | RESPIRATION RATE: 17 BRPM | SYSTOLIC BLOOD PRESSURE: 117 MMHG | DIASTOLIC BLOOD PRESSURE: 78 MMHG

## 2023-05-11 DIAGNOSIS — Z98.1 ARTHRODESIS STATUS: Chronic | ICD-10-CM

## 2023-05-11 DIAGNOSIS — Z98.890 OTHER SPECIFIED POSTPROCEDURAL STATES: Chronic | ICD-10-CM

## 2023-05-11 DIAGNOSIS — Z98.89 OTHER SPECIFIED POSTPROCEDURAL STATES: Chronic | ICD-10-CM

## 2023-05-11 DIAGNOSIS — M54.12 RADICULOPATHY, CERVICAL REGION: ICD-10-CM

## 2023-05-11 PROCEDURE — 62321 NJX INTERLAMINAR CRV/THRC: CPT

## 2023-05-15 ENCOUNTER — APPOINTMENT (OUTPATIENT)
Dept: NEUROLOGY | Facility: CLINIC | Age: 63
End: 2023-05-15

## 2023-05-26 ENCOUNTER — APPOINTMENT (OUTPATIENT)
Dept: ORTHOPEDIC SURGERY | Facility: CLINIC | Age: 63
End: 2023-05-26
Payer: MEDICAID

## 2023-05-26 PROCEDURE — 99442: CPT

## 2023-06-07 ENCOUNTER — APPOINTMENT (OUTPATIENT)
Dept: ORTHOPEDIC SURGERY | Facility: CLINIC | Age: 63
End: 2023-06-07
Payer: MEDICAID

## 2023-06-07 PROCEDURE — 99212 OFFICE O/P EST SF 10 MIN: CPT | Mod: 25

## 2023-06-12 ENCOUNTER — APPOINTMENT (OUTPATIENT)
Dept: ORTHOPEDIC SURGERY | Facility: CLINIC | Age: 63
End: 2023-06-12
Payer: MEDICAID

## 2023-06-12 VITALS — WEIGHT: 215 LBS | HEIGHT: 73 IN | BODY MASS INDEX: 28.49 KG/M2

## 2023-06-12 PROCEDURE — 99214 OFFICE O/P EST MOD 30 MIN: CPT

## 2023-06-12 PROCEDURE — 72040 X-RAY EXAM NECK SPINE 2-3 VW: CPT

## 2023-06-13 ENCOUNTER — APPOINTMENT (OUTPATIENT)
Dept: CT IMAGING | Facility: CLINIC | Age: 63
End: 2023-06-13

## 2023-06-13 ENCOUNTER — APPOINTMENT (OUTPATIENT)
Dept: RADIOLOGY | Facility: CLINIC | Age: 63
End: 2023-06-13

## 2023-06-13 LAB
ANION GAP SERPL CALC-SCNC: 15 MMOL/L
APTT BLD: 29.4 SEC
BUN SERPL-MCNC: 17 MG/DL
CALCIUM SERPL-MCNC: 9.9 MG/DL
CHLORIDE SERPL-SCNC: 104 MMOL/L
CO2 SERPL-SCNC: 23 MMOL/L
CREAT SERPL-MCNC: 1.11 MG/DL
EGFR: 76 ML/MIN/1.73M2
GLUCOSE SERPL-MCNC: 122 MG/DL
INR PPP: 0.89 RATIO
POTASSIUM SERPL-SCNC: 4.2 MMOL/L
PT BLD: 10.3 SEC
SODIUM SERPL-SCNC: 142 MMOL/L

## 2023-06-26 ENCOUNTER — APPOINTMENT (OUTPATIENT)
Dept: ORTHOPEDIC SURGERY | Facility: CLINIC | Age: 63
End: 2023-06-26

## 2023-06-28 ENCOUNTER — OUTPATIENT (OUTPATIENT)
Dept: OUTPATIENT SERVICES | Facility: HOSPITAL | Age: 63
LOS: 1 days | End: 2023-06-28
Payer: MEDICAID

## 2023-06-28 ENCOUNTER — APPOINTMENT (OUTPATIENT)
Dept: RADIOLOGY | Facility: HOSPITAL | Age: 63
End: 2023-06-28
Payer: MEDICAID

## 2023-06-28 ENCOUNTER — RESULT REVIEW (OUTPATIENT)
Age: 63
End: 2023-06-28

## 2023-06-28 DIAGNOSIS — Z98.890 OTHER SPECIFIED POSTPROCEDURAL STATES: Chronic | ICD-10-CM

## 2023-06-28 DIAGNOSIS — Z98.1 ARTHRODESIS STATUS: Chronic | ICD-10-CM

## 2023-06-28 DIAGNOSIS — M48.02 SPINAL STENOSIS, CERVICAL REGION: ICD-10-CM

## 2023-06-28 DIAGNOSIS — Z98.89 OTHER SPECIFIED POSTPROCEDURAL STATES: Chronic | ICD-10-CM

## 2023-06-28 PROCEDURE — 72125 CT NECK SPINE W/O DYE: CPT

## 2023-06-28 PROCEDURE — 72125 CT NECK SPINE W/O DYE: CPT | Mod: 26

## 2023-06-28 PROCEDURE — 62302 MYELOGRAPHY LUMBAR INJECTION: CPT

## 2023-06-30 ENCOUNTER — APPOINTMENT (OUTPATIENT)
Dept: ORTHOPEDIC SURGERY | Facility: CLINIC | Age: 63
End: 2023-06-30
Payer: MEDICAID

## 2023-06-30 VITALS — WEIGHT: 215 LBS | BODY MASS INDEX: 28.49 KG/M2 | HEIGHT: 73 IN

## 2023-06-30 DIAGNOSIS — G56.03 CARPAL TUNNEL SYNDROM,BILATERAL UPPER LIMBS: ICD-10-CM

## 2023-06-30 PROCEDURE — 99214 OFFICE O/P EST MOD 30 MIN: CPT

## 2023-07-06 ENCOUNTER — OUTPATIENT (OUTPATIENT)
Dept: OUTPATIENT SERVICES | Facility: HOSPITAL | Age: 63
LOS: 1 days | End: 2023-07-06
Payer: MEDICAID

## 2023-07-06 VITALS
DIASTOLIC BLOOD PRESSURE: 88 MMHG | OXYGEN SATURATION: 96 % | RESPIRATION RATE: 18 BRPM | HEIGHT: 73 IN | TEMPERATURE: 98 F | SYSTOLIC BLOOD PRESSURE: 130 MMHG | HEART RATE: 86 BPM | WEIGHT: 214.73 LBS

## 2023-07-06 DIAGNOSIS — M54.12 RADICULOPATHY, CERVICAL REGION: ICD-10-CM

## 2023-07-06 DIAGNOSIS — Z98.890 OTHER SPECIFIED POSTPROCEDURAL STATES: Chronic | ICD-10-CM

## 2023-07-06 DIAGNOSIS — Z98.1 ARTHRODESIS STATUS: Chronic | ICD-10-CM

## 2023-07-06 DIAGNOSIS — Z29.9 ENCOUNTER FOR PROPHYLACTIC MEASURES, UNSPECIFIED: ICD-10-CM

## 2023-07-06 DIAGNOSIS — M48.02 SPINAL STENOSIS, CERVICAL REGION: ICD-10-CM

## 2023-07-06 DIAGNOSIS — Z98.89 OTHER SPECIFIED POSTPROCEDURAL STATES: Chronic | ICD-10-CM

## 2023-07-06 DIAGNOSIS — M47.812 SPONDYLOSIS WITHOUT MYELOPATHY OR RADICULOPATHY, CERVICAL REGION: ICD-10-CM

## 2023-07-06 DIAGNOSIS — Z01.818 ENCOUNTER FOR OTHER PREPROCEDURAL EXAMINATION: ICD-10-CM

## 2023-07-06 DIAGNOSIS — G56.03 CARPAL TUNNEL SYNDROME, BILATERAL UPPER LIMBS: ICD-10-CM

## 2023-07-06 LAB
ANION GAP SERPL CALC-SCNC: 14 MMOL/L — SIGNIFICANT CHANGE UP (ref 5–17)
BLD GP AB SCN SERPL QL: NEGATIVE — SIGNIFICANT CHANGE UP
BUN SERPL-MCNC: 12 MG/DL — SIGNIFICANT CHANGE UP (ref 7–23)
CALCIUM SERPL-MCNC: 9.7 MG/DL — SIGNIFICANT CHANGE UP (ref 8.4–10.5)
CHLORIDE SERPL-SCNC: 101 MMOL/L — SIGNIFICANT CHANGE UP (ref 96–108)
CO2 SERPL-SCNC: 24 MMOL/L — SIGNIFICANT CHANGE UP (ref 22–31)
CREAT SERPL-MCNC: 0.9 MG/DL — SIGNIFICANT CHANGE UP (ref 0.5–1.3)
EGFR: 97 ML/MIN/1.73M2 — SIGNIFICANT CHANGE UP
GLUCOSE SERPL-MCNC: 108 MG/DL — HIGH (ref 70–99)
HCT VFR BLD CALC: 46.6 % — SIGNIFICANT CHANGE UP (ref 39–50)
HGB BLD-MCNC: 15.8 G/DL — SIGNIFICANT CHANGE UP (ref 13–17)
MCHC RBC-ENTMCNC: 29.4 PG — SIGNIFICANT CHANGE UP (ref 27–34)
MCHC RBC-ENTMCNC: 33.9 GM/DL — SIGNIFICANT CHANGE UP (ref 32–36)
MCV RBC AUTO: 86.8 FL — SIGNIFICANT CHANGE UP (ref 80–100)
MRSA PCR RESULT.: SIGNIFICANT CHANGE UP
NRBC # BLD: 0 /100 WBCS — SIGNIFICANT CHANGE UP (ref 0–0)
PLATELET # BLD AUTO: 233 K/UL — SIGNIFICANT CHANGE UP (ref 150–400)
POTASSIUM SERPL-MCNC: 4 MMOL/L — SIGNIFICANT CHANGE UP (ref 3.5–5.3)
POTASSIUM SERPL-SCNC: 4 MMOL/L — SIGNIFICANT CHANGE UP (ref 3.5–5.3)
RBC # BLD: 5.37 M/UL — SIGNIFICANT CHANGE UP (ref 4.2–5.8)
RBC # FLD: 12.9 % — SIGNIFICANT CHANGE UP (ref 10.3–14.5)
RH IG SCN BLD-IMP: NEGATIVE — SIGNIFICANT CHANGE UP
S AUREUS DNA NOSE QL NAA+PROBE: SIGNIFICANT CHANGE UP
SODIUM SERPL-SCNC: 139 MMOL/L — SIGNIFICANT CHANGE UP (ref 135–145)
WBC # BLD: 7.47 K/UL — SIGNIFICANT CHANGE UP (ref 3.8–10.5)
WBC # FLD AUTO: 7.47 K/UL — SIGNIFICANT CHANGE UP (ref 3.8–10.5)

## 2023-07-06 PROCEDURE — 80048 BASIC METABOLIC PNL TOTAL CA: CPT

## 2023-07-06 PROCEDURE — 86900 BLOOD TYPING SEROLOGIC ABO: CPT

## 2023-07-06 PROCEDURE — G0463: CPT

## 2023-07-06 PROCEDURE — 86850 RBC ANTIBODY SCREEN: CPT

## 2023-07-06 PROCEDURE — 87641 MR-STAPH DNA AMP PROBE: CPT

## 2023-07-06 PROCEDURE — 87640 STAPH A DNA AMP PROBE: CPT

## 2023-07-06 PROCEDURE — 86901 BLOOD TYPING SEROLOGIC RH(D): CPT

## 2023-07-06 PROCEDURE — 85027 COMPLETE CBC AUTOMATED: CPT

## 2023-07-06 NOTE — H&P PST ADULT - MUSCULOSKELETAL
no joint swelling/no joint erythema/no joint warmth/decreased ROM due to pain/strength 5/5 bilateral lower extremities/decreased strength details…

## 2023-07-06 NOTE — H&P PST ADULT - PROBLEM SELECTOR PLAN 1
Scheduled for sx on 7/12/23. Surgical and chlorhexidine instructions reviewed w/ pt. PCP to be seen on 7/10/23 for eval. Pt states will avoid marijuana for at least 24hrs prior to sx.

## 2023-07-06 NOTE — H&P PST ADULT - NSICDXPASTSURGICALHX_GEN_ALL_CORE_FT
PAST SURGICAL HISTORY:  History of carpal tunnel surgery of left wrist     History of colonoscopy     History of endoscopy     History of fusion of cervical spine     History of lithotripsy multiple, most recent 2015, 2022    S/P cystoscopy Oct 2014, h/o renal stent

## 2023-07-06 NOTE — H&P PST ADULT - ASSESSMENT
CAPRINI SCORE [CLOT updated 18]    AGE RELATED RISK FACTORS                                                       MOBILITY RELATED FACTORS  [ ] Age 41-60 years                                            (1 Point)                    [ ] Bed rest                                                        (1 Point)  [ ] Age: 61-74 years                                           (2 Points)                  [ ] Plaster cast                                                   (2 Points)  [ ] Age= 75 years                                              (3 Points)                    [ ] Bed bound for more than 72 hours                 (2 Points)    DISEASE RELATED RISK FACTORS                                               GENDER SPECIFIC FACTORS  [ ] Edema in the lower extremities                       (1 Point)              [ ] Pregnancy                                                     (1 Point)  [ ] Varicose veins                                               (1 Point)                     [ ] Post-partum < 6 weeks                                   (1 Point)             [ ] BMI > 25 Kg/m2                                            (1 Point)                     [ ] Hormonal therapy  or oral contraception          (1 Point)                 [ ] Sepsis (in the previous month)                        (1 Point)               [ ] History of pregnancy complications                 (1 point)  [ ] Pneumonia or serious lung disease                                               [ ] Unexplained or recurrent                     (1 Point)           (in the previous month)                               (1 Point)  [ ] Abnormal pulmonary function test                     (1 Point)                 SURGERY RELATED RISK FACTORS  [ ] Acute myocardial infarction                              (1 Point)               [ ]  Section                                             (1 Point)  [ ] Congestive heart failure (in the previous month)  (1 Point)      [ ] Minor surgery                                                  (1 Point)   [ ] Inflammatory bowel disease                             (1 Point)               [ ] Arthroscopic surgery                                        (2 Points)  [ ] Central venous access                                      (2 Points)                [ ] General surgery lasting more than 45 minutes (2 points)  [ ] Present or previous malignancy                     (2 Points)                [ ] Elective arthroplasty                                         (5 points)    [ ] Stroke (in the previous month)                          (5 Points)                                                                                                                                                           HEMATOLOGY RELATED FACTORS                                                 TRAUMA RELATED RISK FACTORS  [ ] Prior episodes of VTE                                     (3 Points)                [ ] Fracture of the hip, pelvis, or leg                       (5 Points)  [ ] Positive family history for VTE                         (3 Points)             [ ] Acute spinal cord injury (in the previous month)  (5 Points)  [ ] Prothrombin 05589 A                                     (3 Points)               [ ] Paralysis  (less than 1 month)                             (5 Points)  [ ] Factor V Leiden                                             (3 Points)                  [ ] Multiple Trauma within 1 month                        (5 Points)  [ ] Lupus anticoagulants                                     (3 Points)                                                           [ ] Anticardiolipin antibodies                               (3 Points)                                                       [ ] High homocysteine in the blood                      (3 Points)                                             [ ] Other congenital or acquired thrombophilia      (3 Points)                                                [ ] Heparin induced thrombocytopenia                  (3 Points)                                     Total Score [  5  ]    Denies loose/cracked/removable teeth  Mallampati II

## 2023-07-06 NOTE — H&P PST ADULT - NSICDXPASTMEDICALHX_GEN_ALL_CORE_FT
PAST MEDICAL HISTORY:  2019 novel coronavirus disease (COVID-19)     Arthritis     Carpal tunnel syndrome, bilateral upper limbs     Cervical stenosis of spine     GERD (gastroesophageal reflux disease)     History of depression     History of heroin use     History of kidney stones multiple episodes, last in 2015    Medical marijuana use     Migraines     Osteoporosis     Radiculopathy, cervical region     Spinal stenosis in cervical region s/p fusion 2017    Spondylosis without myelopathy or radiculopathy, cervical region

## 2023-07-06 NOTE — H&P PST ADULT - HISTORY OF PRESENT ILLNESS
64 yo male s/p ACDF C5-C7 on 8/2/2016 now p/w acute/chronic cervical radiculopathy from C34, C45 DDD, and foraminal stenosis C3-C7 as well as B/L carpal tunnel syndrome w/ weakness & numbness to hands R>L. Pt is followed by pain mgmt Dr Bourne and is on daily medical marijuana. Has had LESI X3 on 08/13/21 w/ 65%-70% pain relief and YVES X1 on 05/2023 w/ no pain relief. Pt also had PT for C spine, last session 04/2023 but his insurance didn’t approve new sessions and he now presents to PST for a scheduled C4-C5 ACDF on 7/12/2023. PMH of GERD and Kidney stones. Denies recent fevers, chills, cough or chest pain and feels well otherwise. PCP to be seen on 7/10/23 for eval.

## 2023-07-06 NOTE — H&P PST ADULT - HAVE YOU RECEIVED AT LEAST TWO PFIZER AND/OR MODERNA VACCINATIONS (IN ANY COMBINATION) AND/OR ONE JOHNSON & JOHNSON VACCINATION?
Evaluated by Advanced Practice Provider    [facility]  ED    CHIEF COMPLAINT  Emesis (pt seen at PCP, CT results and blood work fine however pt googled some of her results and now she thinks she is in ketoacidosis but is not diabetic)    HISTORY OF PRESENT ILLNESS  Jim Gross is a 29 y.o. female who presents to the ED complaining of vomiting and abdominal pain/bloating. Reports stomach swelling and bloating that started about 3 weeks ago. 5 days ago she started having nausea and diarrhea, no vomiting. She did have some SOB that was slight. She saw her PCP today and had blood work and CT of the abdomen and pelvis. CT was normal, blood work also normal except for a couple things. She has a history of gestational diabetes and family history of diabetes. She reports increased frequency of urination. She is reporting nausea and not eating or drinking due to this. She has had a couple of episodes of blurry vision today. Reports cold chills and sweats but has not checked her temperature. She recently had a chemical pregnancy, test was + on 7/19 and negative by 7/22. She has had this happen 6 times in the past 8 months, she is followed by OB, Dr. Hayley Goins. She did report some abdominal pain earlier but reports it has improved since. The patient is currently rating their pain as 0/10. The patient arrived to the ED via private car. Nursing notes reviewed. History reviewed. No pertinent past medical history. History reviewed. No pertinent surgical history.   Family History   Problem Relation Age of Onset    Cancer Maternal Grandmother     Cancer Maternal Grandfather     Diabetes Paternal Grandmother     Heart Disease Paternal Grandfather      Social History     Socioeconomic History    Marital status:      Spouse name: Not on file    Number of children: Not on file    Years of education: Not on file    Highest education level: Not on file   Occupational History    Not on file   Tobacco Use    Smoking status: Never Smoker    Smokeless tobacco: Never Used   Substance and Sexual Activity    Alcohol use: Not Currently     Comment: occasionally    Drug use: No    Sexual activity: Yes     Partners: Male     Birth control/protection: Other-see comments   Other Topics Concern    Not on file   Social History Narrative    Not on file     Social Determinants of Health     Financial Resource Strain:     Difficulty of Paying Living Expenses:    Food Insecurity:     Worried About Running Out of Food in the Last Year:     [address] in the Last Year:    Transportation Needs:     Lack of Transportation (Medical):  Lack of Transportation (Non-Medical):    Physical Activity:     Days of Exercise per Week:     Minutes of Exercise per Session:    Stress:     Feeling of Stress :    Social Connections:     Frequency of Communication with Friends and Family:     Frequency of Social Gatherings with Friends and Family:     Attends Anabaptism Services:     Active Member of Clubs or Organizations:     Attends Club or Organization Meetings:     Marital Status:    Intimate Partner Violence:     Fear of Current or Ex-Partner:     Emotionally Abused:     Physically Abused:     Sexually Abused:      No current facility-administered medications for this encounter. Current Outpatient Medications   Medication Sig Dispense Refill    triamcinolone (NASACORT) 55 MCG/ACT nasal inhaler 2 sprays by Each Nostril route daily 1 Inhaler 1     No Known Allergies    REVIEW OF SYSTEMS  10 systems reviewed, pertinent positives per HPI otherwise noted to be negative    PHYSICAL EXAM  /83   Pulse 100   Temp 97.9 °F (36.6 °C) (Temporal)   Resp 16   Ht 5' 1\" (1.549 m)   Wt 115 lb (52.2 kg)   LMP 07/01/2021   SpO2 99%   BMI 21.73 kg/m²     GENERAL APPEARANCE: Well developed, well nourished. Awake and alert. Cooperative. Observed resting in bed. No acute distress. HEAD: Normocephalic. Atraumatic. EYES: Sclera is non-icteric. Conjunctiva normal.  ENT: External ears are normal. Mucous membranes are moist.   NECK: Supple. Normal ROM. Trachea mid-line. Cardiac: Regular rate and rhythm. LUNGS: Breathing is unlabored. Speaking comfortably in full sentences. Equal and symmetric chest rise. Abdomen: Non-distended. Musculoskeletal: Normal ROM. No gross deformities or trauma noted. Moving all extremities equally and appropriately. NEUROLOGICAL: Alert and oriented x3. Strength is normal. No focal motor or sensory deficits. SKIN: Warm and dry. Skin is with good color. Skin is intact. Psychiatric: Mood and affect appropriate. Speech is clear and articulate. PROCEDURES  None    RADIOLOGY  None      LABS  No results found for this visit on 08/07/21. ED COURSE/MDM  Patient seen and evaluated. Old records reviewed. I have evaluated this patient. My supervising physician was available for consultation. Олег Parr presented to the ED today with above noted complaints. She is presenting due to concerns about the possibility of diabetic ketoacidosis. She apparently googled her symptoms and this was what she felt she had. I did explain to the patient that based on the blood work that was obtained earlier today she is not a concern for diabetic ketoacidosis, patient does not have diabetes and her blood sugar is 84. I reviewed her blood work and her CT scan through care everywhere as this was done at a different facility today. She does have ketones in her urine but I explained to her that ketones can be present because of dehydration which sounds like this is a strong possibility as she is not been drinking a lot of fluids due to her nausea. She has also been reporting some diarrhea which could contribute. Patient's blood work was unremarkable except for her CO2 was slightly elevated at 20 and her anion gap was slightly elevated at 17.   I explained that yes these are elevated but they are only elevated by one-point and it is not unreasonable to expect some slight variations such as this. I advised the patient that dehydration can cause her to not feel well, can cause her to be nauseated. I told her that it was quite possible that she had may be of viral GI bug that caused her symptoms but became dehydrated and now is having more of the symptoms from the dehydration but is over the GI bug.  I do not feel the patient needs to have blood work or imaging done at this time. I did offer to start an IV and give her some IV fluids and IV medication to help with the nausea. Through shared decision making the patient decided that she could do some oral rehydration at home. I did prescribe Zofran for her at home however as there are no 24-hour pharmacies open I had the nursing staff give her a dose of Zofran prior to her leaving. I strongly advised her to follow-up with her primary care provider on Monday to go over her continued symptoms and the results of her testing. She has not been able to talk to her primary care since the testing resulted. I did give her strict ED return precautions. Patient was in agreement with this plan. At this point I do not feel the patient requires further work up and it is reasonable to discharge the patient. Please refer to AVS for further details regarding discharge instructions. A discussion was had with the patient regarding diagnosis, treatment/ plan of care, and follow up. All questions were answered. Patient will follow up as directed for further evaluation/treatment. The patient was given strict return precautions as we discussed symptoms that would necessitate return to the ED. Patient will return to ED for new/worsening symptoms. The patient verbalized their understanding and agreement with the above plan. No results found for this visit on 08/07/21.       I estimate there is LOW risk for ACUTE APPENDICITIS, BOWEL OBSTRUCTION, CHOLECYSTITIS, DIVERTICULITIS, INCARCERATED HERNIA, PANCREATITIS, or PERFORATED BOWEL or ULCER, thus I consider the discharge disposition reasonable. Also, there is no evidence or peritonitis, sepsis, or toxicity. Cathryn Zavala and I have discussed the diagnosis and risks, and we agree with discharging home to follow-up with their primary doctor. We also discussed returning to the Emergency Department immediately if new or worsening symptoms occur. We have discussed the symptoms which are most concerning (e.g., bloody stool, fever, changing or worsening pain, vomiting) that necessitate immediate return. FINAL Impression    1. Abdominal bloating    2. Nausea    3. Diarrhea, unspecified type        Blood pressure 125/83, pulse 100, temperature 97.9 °F (36.6 °C), temperature source Temporal, resp. rate 16, height 5' 1\" (1.549 m), weight 115 lb (52.2 kg), last menstrual period 07/01/2021, SpO2 99 %, not currently breastfeeding. Patient was sent home with a prescription for below medication/s. I did [place] patient on appropriate use of these medication. Discharge Medication List as of 8/7/2021  3:50 AM          FOLLOW UP  ABEBE Mejia CNP  [facility] [address]  [phone]    Schedule an appointment as soon as possible for a visit   As needed      DISPOSITION  Patient was discharged to home in good condition. Comment: Please note this report has been produced using speech recognition software and may contain errors related to that system including errors in grammar, punctuation, and spelling, as well as words and phrases that may be inappropriate. If there are any questions or concerns please feel free to contact the dictating provider for clarification.         ABEBE Lawton CNP  08/08/21 1730 Yes

## 2023-07-06 NOTE — H&P PST ADULT - PROBLEM SELECTOR PROBLEM 1
POST-OP DIAGNOSIS:  Lipoma of torso 03-Sep-2019 09:49:11  Elie Retana  Incarcerated ventral hernia 03-Sep-2019 09:44:50  Elie Retana  Inguinal hernia 03-Sep-2019 09:44:25  Elie Retana
Radiculopathy, cervical region

## 2023-07-11 ENCOUNTER — TRANSCRIPTION ENCOUNTER (OUTPATIENT)
Age: 63
End: 2023-07-11

## 2023-07-12 ENCOUNTER — TRANSCRIPTION ENCOUNTER (OUTPATIENT)
Age: 63
End: 2023-07-12

## 2023-07-12 ENCOUNTER — APPOINTMENT (OUTPATIENT)
Dept: ORTHOPEDIC SURGERY | Facility: HOSPITAL | Age: 63
End: 2023-07-12

## 2023-07-12 ENCOUNTER — RESULT REVIEW (OUTPATIENT)
Age: 63
End: 2023-07-12

## 2023-07-12 ENCOUNTER — INPATIENT (INPATIENT)
Facility: HOSPITAL | Age: 63
LOS: 1 days | Discharge: HOME HEALTH | DRG: 472 | End: 2023-07-14
Attending: ORTHOPAEDIC SURGERY | Admitting: ORTHOPAEDIC SURGERY
Payer: MEDICAID

## 2023-07-12 VITALS
HEART RATE: 87 BPM | DIASTOLIC BLOOD PRESSURE: 82 MMHG | HEIGHT: 73 IN | OXYGEN SATURATION: 97 % | SYSTOLIC BLOOD PRESSURE: 124 MMHG | WEIGHT: 214.73 LBS | TEMPERATURE: 98 F | RESPIRATION RATE: 18 BRPM

## 2023-07-12 DIAGNOSIS — Z98.890 OTHER SPECIFIED POSTPROCEDURAL STATES: Chronic | ICD-10-CM

## 2023-07-12 DIAGNOSIS — Z98.89 OTHER SPECIFIED POSTPROCEDURAL STATES: Chronic | ICD-10-CM

## 2023-07-12 DIAGNOSIS — Z98.1 ARTHRODESIS STATUS: Chronic | ICD-10-CM

## 2023-07-12 DIAGNOSIS — M47.812 SPONDYLOSIS WITHOUT MYELOPATHY OR RADICULOPATHY, CERVICAL REGION: ICD-10-CM

## 2023-07-12 DIAGNOSIS — M54.12 RADICULOPATHY, CERVICAL REGION: ICD-10-CM

## 2023-07-12 LAB
ANION GAP SERPL CALC-SCNC: 11 MMOL/L — SIGNIFICANT CHANGE UP (ref 5–17)
BUN SERPL-MCNC: 20 MG/DL — SIGNIFICANT CHANGE UP (ref 7–23)
CALCIUM SERPL-MCNC: 9.5 MG/DL — SIGNIFICANT CHANGE UP (ref 8.4–10.5)
CHLORIDE SERPL-SCNC: 107 MMOL/L — SIGNIFICANT CHANGE UP (ref 96–108)
CO2 SERPL-SCNC: 22 MMOL/L — SIGNIFICANT CHANGE UP (ref 22–31)
CREAT SERPL-MCNC: 1.07 MG/DL — SIGNIFICANT CHANGE UP (ref 0.5–1.3)
EGFR: 79 ML/MIN/1.73M2 — SIGNIFICANT CHANGE UP
GLUCOSE SERPL-MCNC: 160 MG/DL — HIGH (ref 70–99)
HCT VFR BLD CALC: 41.3 % — SIGNIFICANT CHANGE UP (ref 39–50)
HGB BLD-MCNC: 13.9 G/DL — SIGNIFICANT CHANGE UP (ref 13–17)
MCHC RBC-ENTMCNC: 29.6 PG — SIGNIFICANT CHANGE UP (ref 27–34)
MCHC RBC-ENTMCNC: 33.7 GM/DL — SIGNIFICANT CHANGE UP (ref 32–36)
MCV RBC AUTO: 88.1 FL — SIGNIFICANT CHANGE UP (ref 80–100)
NRBC # BLD: 0 /100 WBCS — SIGNIFICANT CHANGE UP (ref 0–0)
PLATELET # BLD AUTO: 198 K/UL — SIGNIFICANT CHANGE UP (ref 150–400)
POTASSIUM SERPL-MCNC: 4.4 MMOL/L — SIGNIFICANT CHANGE UP (ref 3.5–5.3)
POTASSIUM SERPL-SCNC: 4.4 MMOL/L — SIGNIFICANT CHANGE UP (ref 3.5–5.3)
RBC # BLD: 4.69 M/UL — SIGNIFICANT CHANGE UP (ref 4.2–5.8)
RBC # FLD: 12.8 % — SIGNIFICANT CHANGE UP (ref 10.3–14.5)
SODIUM SERPL-SCNC: 140 MMOL/L — SIGNIFICANT CHANGE UP (ref 135–145)
WBC # BLD: 8.03 K/UL — SIGNIFICANT CHANGE UP (ref 3.8–10.5)
WBC # FLD AUTO: 8.03 K/UL — SIGNIFICANT CHANGE UP (ref 3.8–10.5)

## 2023-07-12 PROCEDURE — 88304 TISSUE EXAM BY PATHOLOGIST: CPT | Mod: 26

## 2023-07-12 PROCEDURE — 22853 INSJ BIOMECHANICAL DEVICE: CPT

## 2023-07-12 PROCEDURE — 22551 ARTHRD ANT NTRBDY CERVICAL: CPT

## 2023-07-12 PROCEDURE — 22845 INSERT SPINE FIXATION DEVICE: CPT | Mod: 59

## 2023-07-12 DEVICE — KIT A-LINE 1LUM 20G X 12CM SAFE KIT: Type: IMPLANTABLE DEVICE | Status: FUNCTIONAL

## 2023-07-12 DEVICE — SURGIFLO MATRIX WITH THROMBIN KIT: Type: IMPLANTABLE DEVICE | Status: FUNCTIONAL

## 2023-07-12 DEVICE — SCREW CERV STAND ALONE VAR ANG 3.5X14MM: Type: IMPLANTABLE DEVICE | Status: FUNCTIONAL

## 2023-07-12 DEVICE — DISTRACTION PIN 14MM (YELLOW): Type: IMPLANTABLE DEVICE | Status: FUNCTIONAL

## 2023-07-12 DEVICE — BONE WAX 2.5GM: Type: IMPLANTABLE DEVICE | Status: FUNCTIONAL

## 2023-07-12 DEVICE — SURGIFOAM PAD 8CM X 12.5CM X 10MM (100): Type: IMPLANTABLE DEVICE | Status: FUNCTIONAL

## 2023-07-12 DEVICE — CAGE CERV IFD 7 DEG 15X12X6MM: Type: IMPLANTABLE DEVICE | Status: FUNCTIONAL

## 2023-07-12 RX ORDER — SODIUM CHLORIDE 9 MG/ML
500 INJECTION INTRAMUSCULAR; INTRAVENOUS; SUBCUTANEOUS ONCE
Refills: 0 | Status: COMPLETED | OUTPATIENT
Start: 2023-07-13 | End: 2023-07-13

## 2023-07-12 RX ORDER — OXYCODONE HYDROCHLORIDE 5 MG/1
1 TABLET ORAL
Qty: 42 | Refills: 0
Start: 2023-07-12 | End: 2023-07-18

## 2023-07-12 RX ORDER — CYCLOBENZAPRINE HYDROCHLORIDE 10 MG/1
5 TABLET, FILM COATED ORAL THREE TIMES A DAY
Refills: 0 | Status: DISCONTINUED | OUTPATIENT
Start: 2023-07-12 | End: 2023-07-14

## 2023-07-12 RX ORDER — ACETAMINOPHEN 500 MG
3 TABLET ORAL
Qty: 0 | Refills: 0 | DISCHARGE
Start: 2023-07-12

## 2023-07-12 RX ORDER — SODIUM CHLORIDE 9 MG/ML
1000 INJECTION, SOLUTION INTRAVENOUS
Refills: 0 | Status: DISCONTINUED | OUTPATIENT
Start: 2023-07-12 | End: 2023-07-14

## 2023-07-12 RX ORDER — DEXAMETHASONE 0.5 MG/5ML
4 ELIXIR ORAL EVERY 6 HOURS
Refills: 0 | Status: DISCONTINUED | OUTPATIENT
Start: 2023-07-12 | End: 2023-07-12

## 2023-07-12 RX ORDER — ONDANSETRON 8 MG/1
4 TABLET, FILM COATED ORAL ONCE
Refills: 0 | Status: DISCONTINUED | OUTPATIENT
Start: 2023-07-12 | End: 2023-07-12

## 2023-07-12 RX ORDER — OXYCODONE HYDROCHLORIDE 5 MG/1
5 TABLET ORAL EVERY 6 HOURS
Refills: 0 | Status: DISCONTINUED | OUTPATIENT
Start: 2023-07-12 | End: 2023-07-14

## 2023-07-12 RX ORDER — CEFAZOLIN SODIUM 1 G
2000 VIAL (EA) INJECTION EVERY 8 HOURS
Refills: 0 | Status: COMPLETED | OUTPATIENT
Start: 2023-07-12 | End: 2023-07-13

## 2023-07-12 RX ORDER — CHLORHEXIDINE GLUCONATE 213 G/1000ML
1 SOLUTION TOPICAL ONCE
Refills: 0 | Status: DISCONTINUED | OUTPATIENT
Start: 2023-07-12 | End: 2023-07-12

## 2023-07-12 RX ORDER — ACETAMINOPHEN 500 MG
975 TABLET ORAL EVERY 8 HOURS
Refills: 0 | Status: DISCONTINUED | OUTPATIENT
Start: 2023-07-13 | End: 2023-07-14

## 2023-07-12 RX ORDER — SODIUM CHLORIDE 9 MG/ML
1000 INJECTION, SOLUTION INTRAVENOUS
Refills: 0 | Status: DISCONTINUED | OUTPATIENT
Start: 2023-07-12 | End: 2023-07-12

## 2023-07-12 RX ORDER — IBUPROFEN 200 MG
2 TABLET ORAL
Refills: 0 | DISCHARGE

## 2023-07-12 RX ORDER — SODIUM CHLORIDE 9 MG/ML
3 INJECTION INTRAMUSCULAR; INTRAVENOUS; SUBCUTANEOUS EVERY 8 HOURS
Refills: 0 | Status: DISCONTINUED | OUTPATIENT
Start: 2023-07-12 | End: 2023-07-12

## 2023-07-12 RX ORDER — ONDANSETRON 8 MG/1
4 TABLET, FILM COATED ORAL EVERY 6 HOURS
Refills: 0 | Status: DISCONTINUED | OUTPATIENT
Start: 2023-07-12 | End: 2023-07-14

## 2023-07-12 RX ORDER — DEXAMETHASONE 0.5 MG/5ML
5 ELIXIR ORAL EVERY 6 HOURS
Refills: 0 | Status: COMPLETED | OUTPATIENT
Start: 2023-07-12 | End: 2023-07-13

## 2023-07-12 RX ORDER — NORTRIPTYLINE HYDROCHLORIDE 10 MG/1
25 CAPSULE ORAL AT BEDTIME
Refills: 0 | Status: DISCONTINUED | OUTPATIENT
Start: 2023-07-12 | End: 2023-07-14

## 2023-07-12 RX ORDER — ACETAMINOPHEN 500 MG
1000 TABLET ORAL ONCE
Refills: 0 | Status: COMPLETED | OUTPATIENT
Start: 2023-07-12 | End: 2023-07-12

## 2023-07-12 RX ORDER — SENNA PLUS 8.6 MG/1
2 TABLET ORAL AT BEDTIME
Refills: 0 | Status: DISCONTINUED | OUTPATIENT
Start: 2023-07-12 | End: 2023-07-14

## 2023-07-12 RX ORDER — HYDROMORPHONE HYDROCHLORIDE 2 MG/ML
0.5 INJECTION INTRAMUSCULAR; INTRAVENOUS; SUBCUTANEOUS
Refills: 0 | Status: DISCONTINUED | OUTPATIENT
Start: 2023-07-12 | End: 2023-07-12

## 2023-07-12 RX ORDER — ASCORBIC ACID 60 MG
500 TABLET,CHEWABLE ORAL
Refills: 0 | Status: DISCONTINUED | OUTPATIENT
Start: 2023-07-12 | End: 2023-07-14

## 2023-07-12 RX ORDER — CEFAZOLIN SODIUM 1 G
2000 VIAL (EA) INJECTION ONCE
Refills: 0 | Status: COMPLETED | OUTPATIENT
Start: 2023-07-12 | End: 2023-07-12

## 2023-07-12 RX ORDER — LIDOCAINE HCL 20 MG/ML
0.2 VIAL (ML) INJECTION ONCE
Refills: 0 | Status: DISCONTINUED | OUTPATIENT
Start: 2023-07-12 | End: 2023-07-12

## 2023-07-12 RX ORDER — GABAPENTIN 400 MG/1
600 CAPSULE ORAL THREE TIMES A DAY
Refills: 0 | Status: DISCONTINUED | OUTPATIENT
Start: 2023-07-12 | End: 2023-07-14

## 2023-07-12 RX ORDER — ACETAMINOPHEN 500 MG
975 TABLET ORAL EVERY 8 HOURS
Refills: 0 | Status: DISCONTINUED | OUTPATIENT
Start: 2023-07-12 | End: 2023-07-12

## 2023-07-12 RX ORDER — MAGNESIUM HYDROXIDE 400 MG/1
30 TABLET, CHEWABLE ORAL EVERY 12 HOURS
Refills: 0 | Status: DISCONTINUED | OUTPATIENT
Start: 2023-07-12 | End: 2023-07-14

## 2023-07-12 RX ORDER — OXYCODONE HYDROCHLORIDE 5 MG/1
10 TABLET ORAL EVERY 4 HOURS
Refills: 0 | Status: DISCONTINUED | OUTPATIENT
Start: 2023-07-12 | End: 2023-07-14

## 2023-07-12 RX ORDER — POLYETHYLENE GLYCOL 3350 17 G/17G
17 POWDER, FOR SOLUTION ORAL DAILY
Refills: 0 | Status: DISCONTINUED | OUTPATIENT
Start: 2023-07-12 | End: 2023-07-14

## 2023-07-12 RX ORDER — FENTANYL CITRATE 50 UG/ML
50 INJECTION INTRAVENOUS
Refills: 0 | Status: DISCONTINUED | OUTPATIENT
Start: 2023-07-12 | End: 2023-07-12

## 2023-07-12 RX ORDER — PANTOPRAZOLE SODIUM 20 MG/1
40 TABLET, DELAYED RELEASE ORAL
Refills: 0 | Status: DISCONTINUED | OUTPATIENT
Start: 2023-07-12 | End: 2023-07-14

## 2023-07-12 RX ADMIN — OXYCODONE HYDROCHLORIDE 5 MILLIGRAM(S): 5 TABLET ORAL at 20:52

## 2023-07-12 RX ADMIN — Medication 100 MILLIGRAM(S): at 15:40

## 2023-07-12 RX ADMIN — Medication 1 TABLET(S): at 22:03

## 2023-07-12 RX ADMIN — Medication 975 MILLIGRAM(S): at 15:40

## 2023-07-12 RX ADMIN — GABAPENTIN 600 MILLIGRAM(S): 400 CAPSULE ORAL at 14:16

## 2023-07-12 RX ADMIN — SENNA PLUS 2 TABLET(S): 8.6 TABLET ORAL at 22:01

## 2023-07-12 RX ADMIN — NORTRIPTYLINE HYDROCHLORIDE 25 MILLIGRAM(S): 10 CAPSULE ORAL at 22:02

## 2023-07-12 RX ADMIN — Medication 5 MILLIGRAM(S): at 19:50

## 2023-07-12 RX ADMIN — OXYCODONE HYDROCHLORIDE 5 MILLIGRAM(S): 5 TABLET ORAL at 21:52

## 2023-07-12 RX ADMIN — Medication 5 MILLIGRAM(S): at 14:15

## 2023-07-12 RX ADMIN — GABAPENTIN 600 MILLIGRAM(S): 400 CAPSULE ORAL at 22:01

## 2023-07-12 RX ADMIN — HYDROMORPHONE HYDROCHLORIDE 0.5 MILLIGRAM(S): 2 INJECTION INTRAMUSCULAR; INTRAVENOUS; SUBCUTANEOUS at 11:38

## 2023-07-12 RX ADMIN — HYDROMORPHONE HYDROCHLORIDE 0.5 MILLIGRAM(S): 2 INJECTION INTRAMUSCULAR; INTRAVENOUS; SUBCUTANEOUS at 14:30

## 2023-07-12 RX ADMIN — Medication 500 MILLIGRAM(S): at 22:03

## 2023-07-12 RX ADMIN — Medication 975 MILLIGRAM(S): at 16:00

## 2023-07-12 RX ADMIN — HYDROMORPHONE HYDROCHLORIDE 0.5 MILLIGRAM(S): 2 INJECTION INTRAMUSCULAR; INTRAVENOUS; SUBCUTANEOUS at 14:16

## 2023-07-12 RX ADMIN — HYDROMORPHONE HYDROCHLORIDE 0.5 MILLIGRAM(S): 2 INJECTION INTRAMUSCULAR; INTRAVENOUS; SUBCUTANEOUS at 12:00

## 2023-07-12 NOTE — PHYSICAL THERAPY INITIAL EVALUATION ADULT - ADDITIONAL COMMENTS
Pt lives in a house with wife. Has 5 stairs to enter (-HR) and first floor setup. Reports being independent with functional mobility without AD. Owns RW, cane, shower chair. Reports he mainly has difficulty using BUE 2/2 weakness/impaired motor control.

## 2023-07-12 NOTE — DISCHARGE NOTE PROVIDER - NSDCFUSCHEDAPPT_GEN_ALL_CORE_FT
Mj Whalen  [facility] Physician [facility]  ORTHOSURG [address]  Scheduled Appointment: 07/31/2023

## 2023-07-12 NOTE — DISCHARGE NOTE PROVIDER - HOSPITAL COURSE
Reason for Admission	Radiculopathy cervical region  GOC:  "to have less pain and strength back to my hands"     History of Present Illness:  History of Present Illness	  62 yo male s/p ACDF C5-C7 on 8/2/2016 now p/w acute/chronic cervical radiculopathy from C34, C45 DDD, and foraminal stenosis C3-C7 as well as B/L carpal tunnel syndrome w/ weakness & numbness to hands R>L. Pt is followed by pain mgmt Dr Bourne and is on daily medical marijuana. Has had LESI X3 on 08/13/21 w/ 65%-70% pain relief and YVES X1 on 05/2023 w/ no pain relief. Pt also had PT for C spine, last session 04/2023 but his insurance didn’t approve new sessions and he now presents to PST for a scheduled C4-C5 ACDF on 7/12/2023. PMH of GERD and Kidney stones. Denies recent fevers, chills, cough or chest pain and feels well otherwise. PCP to be seen on 7/10/23 for eval.       PAST MEDICAL HISTORY:  2019 novel coronavirus disease (COVID-19)   Arthritis   Carpal tunnel syndrome, bilateral upper limbs   Cervical stenosis of spine   GERD (gastroesophageal reflux disease)   History of depression   History of heroin use   History of kidney stones multiple episodes, last in 2015  Medical marijuana use   Migraines   Osteoporosis   Radiculopathy, cervical region   Spinal stenosis in cervical region s/p fusion 2017  Spondylosis without myelopathy or radiculopathy, cervical region.     PAST SURGICAL HISTORY:  History of carpal tunnel surgery of left wrist   History of colonoscopy   History of endoscopy   History of fusion of cervical spine   History of lithotripsy multiple, most recent 2015, 2022  S/P cystoscopy Oct 2014, h/o renal stent.        Home Medications:   * Patient Currently Takes Medications as of 06-Jul-2023 07:31 documented in Structured Notes  · 	omeprazole 20 mg oral delayed release capsule: Last Dose Taken:  , 1 cap(s) orally once a day (at bedtime)  · 	medical Marijuana: Last Dose Taken:    · 	nortriptyline 25 mg oral capsule: Last Dose Taken:  , orally once a day (at bedtime)  · 	gabapentin 600 mg oral tablet: Last Dose Taken:  , 1 tab(s) orally 3 times a day  · 	cyclobenzaprine 10 mg oral tablet: Last Dose Taken:  , 1 tab(s) orally once a day (at bedtime)    Hospital Course:   7/12: Patient admitted and underwent  ACDF C4-5 w/ Dr. Quinones. Post-operatively. a SNEHAL drain was inserted and steroid taper initiated. Eval by PT: DHIRAJ who recommended the following:  [id number]  7/13:          Reason for Admission: Radiculopathy cervical region  GOC:  "to have less pain and strength back to my hands"    History of Present Illness	  64 yo male s/p ACDF C5-C7 on 8/2/2016 now p/w acute/chronic cervical radiculopathy from C34, C45 DDD, and foraminal stenosis C3-C7 as well as B/L carpal tunnel syndrome w/ weakness & numbness to hands R>L. Pt is followed by pain mgmt Dr Bourne and is on daily medical marijuana. Has had LESI X3 on 08/13/21 w/ 65%-70% pain relief and YVES X1 on 05/2023 w/ no pain relief. Pt also had PT for C spine, last session 04/2023 but his insurance didn’t approve new sessions and he now presents to PST for a scheduled C4-C5 ACDF on 7/12/2023. PMH of GERD and Kidney stones. Denies recent fevers, chills, cough or chest pain and feels well otherwise. PCP to be seen on 7/10/23 for eval.       PAST MEDICAL HISTORY:  2019 novel coronavirus disease (COVID-19)   Arthritis   Carpal tunnel syndrome, bilateral upper limbs   Cervical stenosis of spine   GERD (gastroesophageal reflux disease)   History of depression   History of heroin use   History of kidney stones multiple episodes, last in 2015  Medical marijuana use   Migraines   Osteoporosis   Radiculopathy, cervical region   Spinal stenosis in cervical region s/p fusion 2017  Spondylosis without myelopathy or radiculopathy, cervical region.     PAST SURGICAL HISTORY:  History of carpal tunnel surgery of left wrist   History of colonoscopy   History of endoscopy   History of fusion of cervical spine   History of lithotripsy multiple, most recent 2015, 2022  S/P cystoscopy Oct 2014, h/o renal stent.        Home Medications:   * Patient Currently Takes Medications as of 06-Jul-2023 07:31 documented in Structured Notes  · 	omeprazole 20 mg oral delayed release capsule: Last Dose Taken:  , 1 cap(s) orally once a day (at bedtime)  · 	medical Marijuana: Last Dose Taken:    · 	nortriptyline 25 mg oral capsule: Last Dose Taken:  , orally once a day (at bedtime)  · 	gabapentin 600 mg oral tablet: Last Dose Taken:  , 1 tab(s) orally 3 times a day  · 	cyclobenzaprine 10 mg oral tablet: Last Dose Taken:  , 1 tab(s) orally once a day (at bedtime)    Hospital Course:   7/12: Patient admitted and underwent ACDF C4-5 w/ Dr. Quinones. Patient tolerated the procedure well and was transferred to the recovery room in stable condition, with a stable neuro / vascular exam of the operated extremity. SNEHAL drain was placed intraop, and outputs monitored. SNEHAL drain removed successfully on 7/13/23.     Patient was made weight bearing as tolerated in a C Collar.     Patient evaluated by PT/OT and recommended for disposition for home with home PT.     Chronic pain recommendations made during admission:   Discharge recs: discharge with neurontin, pamelor, flexeril and oxy IR 5 mg Q 4 hours PRN x 5 days.  Discharge with a narcan rescue kit (naloxone 4 mg/ 0.1 mg nasal spray- 1 spray Q 2-3 minutes alternating between nostrils).  Follow up with Dr Vijay Bourne for continued pain management after discharge.    Discharge and Orthopedic Care instructions were delineated in the Discharge Plan and reviewed with the patient. All medications were delineated in the medication reconciliation tool and key points were reviewed with the patient. They were deemed stable from an Orthopedic & medical standpoint for discharge on 7/14/23. Patient discharged from [facility] to home with home PT, with no new neuro deficits.

## 2023-07-12 NOTE — DISCHARGE NOTE PROVIDER - CARE PROVIDER_API CALL
Eliud Quinones  Orthopaedic Surgery  [address], [address]  [place] [address]  Phone: [phone]  Fax: [phone]  Follow Up Time:    Eliud Quinones  Orthopaedic Surgery  [address], [address]  [place] [address]  Phone: [phone]  Fax: [phone]  Follow Up Time: 1 week

## 2023-07-12 NOTE — PHYSICAL THERAPY INITIAL EVALUATION ADULT - GENERAL OBSERVATIONS, REHAB EVAL
Pt rec'd semisupine in bed, in NAD, +IV, +a-line, +cervical collar, SNEHAL drain, +PACU monitoring. Agreeable to PT.

## 2023-07-12 NOTE — PHYSICAL THERAPY INITIAL EVALUATION ADULT - PERTINENT HX OF CURRENT PROBLEM, REHAB EVAL
62 yo male s/p ACDF C5-C7 on 8/2/2016 now p/w acute/chronic cervical radiculopathy from C34, C45 DDD, and foraminal stenosis C3-C7 as well as B/L carpal tunnel syndrome w/ weakness & numbness to hands R>L. Pt is followed by pain mgmt Dr Bourne and is on daily medical marijuana. Has had LESI X3 on 08/13/21 w/ 65%-70% pain relief and YVES X1 on 05/2023 w/ no pain relief. Pt also had PT for C spine, last session 04/2023 but his insurance didn’t approve new sessions and he now presents to PST for a scheduled C4-C5 ACDF on 7/12/2023. PMH of GERD and Kidney stones. Denies recent fevers, chills, cough or chest pain and feels well otherwise. PCP to be seen on 7/10/23 for eval.

## 2023-07-12 NOTE — DISCHARGE NOTE PROVIDER - NSDCFUADDINST_GEN_ALL_CORE_FT
1. Collar when out of bed  2. Keep dressing/incision clean and dry   3. You may be weight bearing as tolerated  4. Continue STEROID TAPER as prescribed  5. Please see ATTACHED Instruction Sheet   6. Follow up with Dr Quinones in 10-14 days for dressing REMOVAL, wound check, and staple/suture removal (if applicable)  1. Collar when out of bed  2. Keep dressing/incision clean and dry   3. You may be weight bearing as tolerated  4. Continue STEROID TAPER as prescribed  5. Please see ATTACHED Instruction Sheet   6. Follow up with Dr Quinones in 7-10 days for dressing REMOVAL, wound check, and staple/suture removal (if applicable)

## 2023-07-12 NOTE — CHART NOTE - NSCHARTNOTEFT_GEN_A_CORE
POC  Patient Resting without complaints.  Became dizzy while working with PT x 2    No Chest Pain, SOB, N/V.    Pre op s/sx :RUE weakness & LUE paresthesias    ; Post op, patient reports: no significant improvement @ this time    Exam:   Alert/Oriented, No Acute Distress  Cards: +S1/S2, RRR  Pulm: CTAB  Neck         Dressing: [x ] clean/dry/intact  [ ] Other:           Drains: :.SNEHAL x 1 (12cc)         Sensation: [ x] decreased L hand         Motor exam: [ x ]          [ ] Upper extremity                      Bi          Tri           Delt                                                    R         3+/5      3+/5        4/5                                               L          5/5        5/5        5/5         B/L  strength: POOR ( with rhythmic oscillating tremors noted)                  [x ] Lower extremity                    PF          DF         EHL       FHL                                                                                            R        5/5        5/5        5/5       5/5                                                        L         5/5        5/5        5/5       5/5                                                                  Calves Soft/Non-tender bilaterally           [ x] warm well perfused; capillary refill <3 seconds              LABS:                        13.9   8.03  )-----------( 198      ( 12 Jul 2023 11:47 )             41.3     07-12    140  |  107  |  20  ----------------------------<  160<H>  4.4   |  22  |  1.07        Caprini [5]      A/P :  63y Male s/p Anterior cervical discectomy with fusion C4-5  -    Pain control  -    Taper  -    Con't collar   -    Con't SNEHAL  -    DVT ppx: SCDs       -    Physical Therapy  -    Weight bearing status: WBAT [x ]        PWB    [ ]     TTWB  [ ]      NWB  [ ]  -    Dispo: Home [ ]     Rehab [ ]      IRAM [ ]      To be determined [x ]      ***See Above  Rodrigo CAO  Orthopedics  B: 140/4501 POC  Patient Resting without complaints.  Became dizzy while working with PT x 2    No Chest Pain, SOB, N/V.    Pre op s/sx :RUE weakness & LUE paresthesias    ; Post op, patient reports: no significant improvement @ this time    Exam:   Alert/Oriented, No Acute Distress  Cards: +S1/S2, RRR  Pulm: CTAB  Neck         Dressing: [x ] clean/dry/intact  [ ] Other:           Drains: :.SNEHAL x 1 (12cc)         Sensation: [ x] decreased L hand         Motor exam: [ x ]          [ x] Upper extremity   {may still be limited by pain}                                                            Bi          Tri           Delt                                                    R         3+/5      3+/5        4/5                                               L          4/5        4/5          4/5         B/L  strength: POOR ( with rhythmic oscillating tremors noted)                  [x ] Lower extremity                    PF          DF         EHL       FHL                                                                                            R        5/5        5/5        5/5       5/5                                                        L         5/5        5/5        5/5       5/5                                                                  Calves Soft/Non-tender bilaterally           [ x] warm well perfused; capillary refill <3 seconds              LABS:                        13.9   8.03  )-----------( 198      ( 12 Jul 2023 11:47 )             41.3     07-12    140  |  107  |  20  ----------------------------<  160<H>  4.4   |  22  |  1.07        Caprini [5]      A/P :  63y Male s/p Anterior cervical discectomy with fusion C4-5  -    Pain control  -    Taper  -    Con't collar   -    Con't SNEHAL  -    DVT ppx: SCDs       -    Physical Therapy  -    Weight bearing status: WBAT [x ]        PWB    [ ]     TTWB  [ ]      NWB  [ ]  -    Dispo: Home [ ]     Rehab [ ]      IRAM [ ]      To be determined [x ]      ***See Above  Rodrigo CAO  Orthopedics  B: 0410/6138

## 2023-07-12 NOTE — OCCUPATIONAL THERAPY INITIAL EVALUATION ADULT - PERTINENT HX OF CURRENT PROBLEM, REHAB EVAL
62 yo male s/p ACDF C5-C7 on 8/2/2016 now p/w acute/chronic cervical radiculopathy from C34, C45 DDD, and foraminal stenosis C3-C7 as well as B/L carpal tunnel syndrome w/ weakness & numbness to hands R>L. Pt is followed by pain mgmt Dr Bourne and is on daily medical marijuana. Has had LESI X3 on 08/13/21 w/ 65%-70% pain relief and YVES X1 on 05/2023 w/ no pain relief. Pt also had PT for C spine, last session 04/2023 but his insurance didn’t approve new sessions and he is now s/p scheduled C4-C5 ACDF on 7/12/2023. PMH of GERD and Kidney stones. Denies recent fevers, chills, cough or chest pain and feels well otherwise.

## 2023-07-12 NOTE — PRE-ANESTHESIA EVALUATION ADULT - SPO2 (%)
97

## 2023-07-12 NOTE — DISCHARGE NOTE PROVIDER - NSDCMRMEDTOKEN_GEN_ALL_CORE_FT
acetaminophen 325 mg oral tablet: 3 tab(s) orally every 8 hours x ONE (1) WEEK, then as needed  cyclobenzaprine 10 mg oral tablet: 1 tab(s) orally once a day (at bedtime)  gabapentin 600 mg oral tablet: 1 tab(s) orally 3 times a day  nortriptyline 25 mg oral capsule: orally once a day (at bedtime)  omeprazole 20 mg oral delayed release capsule: 1 cap(s) orally once a day (at bedtime)  oxyCODONE 5 mg oral tablet: 1 tab(s) orally every 4 hours MDD: 6 Tabs   cyclobenzaprine 10 mg oral tablet: 1 tab(s) orally once a day (at bedtime) - (continue home medication)  gabapentin 600 mg oral tablet: 1 tab(s) orally 3 times a day continue on home medication  nortriptyline 25 mg oral capsule: orally once a day (at bedtime)  omeprazole 20 mg oral delayed release capsule: 1 cap(s) orally once a day (at bedtime)  oxyCODONE 5 mg oral tablet: 1 tab(s) orally every 4 hours MDD: 6 Tabs  predniSONE 10 mg oral tablet: 4 tab(s) orally once a day FOLLOW TAPER AS BELOW:  4 tabs orally once daily from 7/15 - 7/16  3 tabs orally once daily from 7/17 - 7/19  2 tabs orally once daily from 7/20 - 7/22  1 tab orally once daily from 7/23 - 7/25. Then stop  Senna 8.6 mg oral tablet: 2 tab(s) orally once a day (at bedtime) as needed for  constipation

## 2023-07-12 NOTE — CHART NOTE - NSCHARTNOTEFT_GEN_A_CORE
Orthopaedics to evaluate patient post operatively, to determine if patient able to be discharged home this evening. If deemed OK for home, will remove SNEHAL drain and discharge. Will update care team

## 2023-07-12 NOTE — DISCHARGE NOTE PROVIDER - NSDCFUADDAPPT_GEN_ALL_CORE_FT
Follow up with your private internist / PMD in 4-6 weeks re: general checkup (and possible medication adjustment)   Please call for an appointment

## 2023-07-12 NOTE — ASU DISCHARGE PLAN (ADULT/PEDIATRIC) - CARE PROVIDER_API CALL
Eliud Quinones  Orthopaedic Surgery  [address], [address]  [place] [address]  Phone: [phone]  Fax: [phone]  Follow Up Time:

## 2023-07-12 NOTE — DISCHARGE NOTE PROVIDER - NSDCCPCAREPLAN_GEN_ALL_CORE_FT
PRINCIPAL DISCHARGE DIAGNOSIS  Diagnosis: Radiculopathy, cervical region  Assessment and Plan of Treatment:

## 2023-07-12 NOTE — ASU DISCHARGE PLAN (ADULT/PEDIATRIC) - ASU DC SPECIAL INSTRUCTIONSFT
DISCHARGE INSTRUCTIONS: C4-5 ACDF    A. Pain Expectations:  After cervical (neck) surgery, you will have muscle pain for up to 6 weeks  post-operatively. A sore throat often occurs after intubation for  anesthesia. Throat discomfort and swallowing discomfort is normal after  cervical fusions and should get better in 2-4 weeks. We recommend  liquids and/or soft foods.  -If you develop worsening ability to breath or increasing trouble swallowing, please see medical attention as this can be a sign of something more serious   B. ACTIVITY LEVEL  1. Fatigue is common following surgery. Listen to your body and rest if you feel  tired.  2. You should get up and walk around every hour during the daytime to keep  your blood circulating.  3. No bending, lifting or twisting for the first 4-8 weeks, but walking is  recommended. Do not lift anything heavy over 5 lbs with your arms. No physical  therapy without discussing with Dr. Quinones's office first.  4. Wear your collar as directed at all times except for hygiene and eating.  5. No driving on pain medications.  6. Take pain medications as directed, they are sent to your pharmacy.  7. Bandage may be changed every other day with gauze and tape as needed. Can shower from the waist down.  8. We recommend not smoking, as this can interfere with bone healing. Please discuss with Dr. Quinones at your follow up appointment.

## 2023-07-12 NOTE — BRIEF OPERATIVE NOTE - NSICDXBRIEFPOSTOP_GEN_ALL_CORE_FT
POST-OP DIAGNOSIS:  Cervical spinal stenosis due to adjacent segment disease after fusion procedure 12-Jul-2023 11:27:34  Kris Bedolla

## 2023-07-12 NOTE — OCCUPATIONAL THERAPY INITIAL EVALUATION ADULT - GENERAL OBSERVATIONS, REHAB EVAL
Pt received semi-supine, +PACU monitoring, +a-line, +SNEHAL Pt received semi-supine, +c-collar, +PACU monitoring, +a-line, +SNEHAL

## 2023-07-12 NOTE — BRIEF OPERATIVE NOTE - NSICDXBRIEFPREOP_GEN_ALL_CORE_FT
PRE-OP DIAGNOSIS:  Cervical spinal stenosis due to adjacent segment disease after fusion procedure 12-Jul-2023 11:27:29  Kris Bedolla

## 2023-07-12 NOTE — PHYSICAL THERAPY INITIAL EVALUATION ADULT - ACTIVE RANGE OF MOTION EXAMINATION, REHAB EVAL
limited b/l  strength (AAROM WFL)/bilateral upper extremity Active ROM was WFL (within functional limits)/bilateral  lower extremity Active ROM was WFL (within functional limits)

## 2023-07-12 NOTE — ASU DISCHARGE PLAN (ADULT/PEDIATRIC) - NS MD DC FALL RISK RISK
For information on Fall & Injury Prevention, visit: [email or web address] OR  [email or web address] OR  https://www.cdc.gov/steadi/patient.html

## 2023-07-13 LAB
ANION GAP SERPL CALC-SCNC: 13 MMOL/L — SIGNIFICANT CHANGE UP (ref 5–17)
BASOPHILS # BLD AUTO: 0.01 K/UL — SIGNIFICANT CHANGE UP (ref 0–0.2)
BASOPHILS NFR BLD AUTO: 0.1 % — SIGNIFICANT CHANGE UP (ref 0–2)
BUN SERPL-MCNC: 17 MG/DL — SIGNIFICANT CHANGE UP (ref 7–23)
CALCIUM SERPL-MCNC: 9.8 MG/DL — SIGNIFICANT CHANGE UP (ref 8.4–10.5)
CHLORIDE SERPL-SCNC: 105 MMOL/L — SIGNIFICANT CHANGE UP (ref 96–108)
CO2 SERPL-SCNC: 22 MMOL/L — SIGNIFICANT CHANGE UP (ref 22–31)
CREAT SERPL-MCNC: 0.87 MG/DL — SIGNIFICANT CHANGE UP (ref 0.5–1.3)
EGFR: 98 ML/MIN/1.73M2 — SIGNIFICANT CHANGE UP
EOSINOPHIL # BLD AUTO: 0 K/UL — SIGNIFICANT CHANGE UP (ref 0–0.5)
EOSINOPHIL NFR BLD AUTO: 0 % — SIGNIFICANT CHANGE UP (ref 0–6)
GLUCOSE SERPL-MCNC: 139 MG/DL — HIGH (ref 70–99)
HCT VFR BLD CALC: 41.5 % — SIGNIFICANT CHANGE UP (ref 39–50)
HGB BLD-MCNC: 14.2 G/DL — SIGNIFICANT CHANGE UP (ref 13–17)
IMM GRANULOCYTES NFR BLD AUTO: 0.7 % — SIGNIFICANT CHANGE UP (ref 0–0.9)
LYMPHOCYTES # BLD AUTO: 1.01 K/UL — SIGNIFICANT CHANGE UP (ref 1–3.3)
LYMPHOCYTES # BLD AUTO: 7.8 % — LOW (ref 13–44)
MCHC RBC-ENTMCNC: 29.9 PG — SIGNIFICANT CHANGE UP (ref 27–34)
MCHC RBC-ENTMCNC: 34.2 GM/DL — SIGNIFICANT CHANGE UP (ref 32–36)
MCV RBC AUTO: 87.4 FL — SIGNIFICANT CHANGE UP (ref 80–100)
MONOCYTES # BLD AUTO: 0.59 K/UL — SIGNIFICANT CHANGE UP (ref 0–0.9)
MONOCYTES NFR BLD AUTO: 4.5 % — SIGNIFICANT CHANGE UP (ref 2–14)
NEUTROPHILS # BLD AUTO: 11.3 K/UL — HIGH (ref 1.8–7.4)
NEUTROPHILS NFR BLD AUTO: 86.9 % — HIGH (ref 43–77)
NRBC # BLD: 0 /100 WBCS — SIGNIFICANT CHANGE UP (ref 0–0)
PLATELET # BLD AUTO: 227 K/UL — SIGNIFICANT CHANGE UP (ref 150–400)
POTASSIUM SERPL-MCNC: 3.9 MMOL/L — SIGNIFICANT CHANGE UP (ref 3.5–5.3)
POTASSIUM SERPL-SCNC: 3.9 MMOL/L — SIGNIFICANT CHANGE UP (ref 3.5–5.3)
RBC # BLD: 4.75 M/UL — SIGNIFICANT CHANGE UP (ref 4.2–5.8)
RBC # FLD: 12.9 % — SIGNIFICANT CHANGE UP (ref 10.3–14.5)
SODIUM SERPL-SCNC: 140 MMOL/L — SIGNIFICANT CHANGE UP (ref 135–145)
WBC # BLD: 13 K/UL — HIGH (ref 3.8–10.5)
WBC # FLD AUTO: 13 K/UL — HIGH (ref 3.8–10.5)

## 2023-07-13 RX ORDER — TRAMADOL HYDROCHLORIDE 50 MG/1
50 TABLET ORAL ONCE
Refills: 0 | Status: DISCONTINUED | OUTPATIENT
Start: 2023-07-13 | End: 2023-07-13

## 2023-07-13 RX ORDER — TRAMADOL HYDROCHLORIDE 50 MG/1
50 TABLET ORAL EVERY 6 HOURS
Refills: 0 | Status: DISCONTINUED | OUTPATIENT
Start: 2023-07-13 | End: 2023-07-14

## 2023-07-13 RX ADMIN — SENNA PLUS 2 TABLET(S): 8.6 TABLET ORAL at 22:17

## 2023-07-13 RX ADMIN — Medication 5 MILLIGRAM(S): at 10:40

## 2023-07-13 RX ADMIN — GABAPENTIN 600 MILLIGRAM(S): 400 CAPSULE ORAL at 13:21

## 2023-07-13 RX ADMIN — Medication 500 MILLIGRAM(S): at 22:17

## 2023-07-13 RX ADMIN — Medication 5 MILLIGRAM(S): at 04:57

## 2023-07-13 RX ADMIN — Medication 975 MILLIGRAM(S): at 17:50

## 2023-07-13 RX ADMIN — Medication 400 MILLIGRAM(S): at 00:23

## 2023-07-13 RX ADMIN — OXYCODONE HYDROCHLORIDE 5 MILLIGRAM(S): 5 TABLET ORAL at 23:17

## 2023-07-13 RX ADMIN — Medication 100 MILLIGRAM(S): at 01:00

## 2023-07-13 RX ADMIN — GABAPENTIN 600 MILLIGRAM(S): 400 CAPSULE ORAL at 22:17

## 2023-07-13 RX ADMIN — TRAMADOL HYDROCHLORIDE 50 MILLIGRAM(S): 50 TABLET ORAL at 13:50

## 2023-07-13 RX ADMIN — Medication 975 MILLIGRAM(S): at 09:10

## 2023-07-13 RX ADMIN — GABAPENTIN 600 MILLIGRAM(S): 400 CAPSULE ORAL at 05:53

## 2023-07-13 RX ADMIN — Medication 975 MILLIGRAM(S): at 16:54

## 2023-07-13 RX ADMIN — PANTOPRAZOLE SODIUM 40 MILLIGRAM(S): 20 TABLET, DELAYED RELEASE ORAL at 05:53

## 2023-07-13 RX ADMIN — TRAMADOL HYDROCHLORIDE 50 MILLIGRAM(S): 50 TABLET ORAL at 12:53

## 2023-07-13 RX ADMIN — NORTRIPTYLINE HYDROCHLORIDE 25 MILLIGRAM(S): 10 CAPSULE ORAL at 22:17

## 2023-07-13 RX ADMIN — OXYCODONE HYDROCHLORIDE 5 MILLIGRAM(S): 5 TABLET ORAL at 22:17

## 2023-07-13 RX ADMIN — SODIUM CHLORIDE 1000 MILLILITER(S): 9 INJECTION INTRAMUSCULAR; INTRAVENOUS; SUBCUTANEOUS at 08:18

## 2023-07-13 RX ADMIN — Medication 975 MILLIGRAM(S): at 08:19

## 2023-07-13 RX ADMIN — CYCLOBENZAPRINE HYDROCHLORIDE 5 MILLIGRAM(S): 10 TABLET, FILM COATED ORAL at 10:41

## 2023-07-13 RX ADMIN — Medication 500 MILLIGRAM(S): at 10:46

## 2023-07-13 RX ADMIN — Medication 1 TABLET(S): at 13:21

## 2023-07-13 NOTE — CHART NOTE - NSCHARTNOTESELECT_GEN_ALL_CORE
Caprini Score/Event Note
Ortho POC/Event Note
Orthopaedic Surgery/Event Note
chronic pain service/Event Note
Drain Pull/Event Note

## 2023-07-13 NOTE — CHART NOTE - NSCHARTNOTEFT_GEN_A_CORE
Patient visited for Jd drain pull. Sutures d/c'd, Purse string surgical knotted,  Hemostasis achieved, patient tolerated well, tip intact. 4x4 Dsg/tegaderm placed, Aspen Collar re-applied.    Gilmar Heller PA-C  Orthopedic Surgery Team  Team Pager: [phone]

## 2023-07-13 NOTE — CHART NOTE - NSCHARTNOTEFT_GEN_A_CORE
62 yo male, PMH of GERD and Kidney stones, heroin abuse 25 years ago, s/p ACDF C5-C7 on 8/2/2016 now p/w acute/chronic cervical radiculopathy from C3-4, C4-5 DDD, and foraminal stenosis C3-C7 as well as B/L carpal tunnel syndrome w/ weakness & numbness to hands R>L. Pt is followed by pain mgmt Dr Bourne.  Has had YVES X3 on 08/13/21 w/ 65%-70% pain relief and YVES X1 on 05/2023 w/ no pain relief. Pt also had PT for C spine, last session 04/2023 but his insurance didn’t approve new sessions.  S/P C4-5 ACDF 7/12.    Outpatient chronic pain management regimen: neurontin 600 mg TID; pamelor 25 mg QHS; flexeril 10 mg QHS  Outpatient chronic pain management provider: Vijay Bourne MD  [facility] reference #[id number]    Tobacco Use: quit smoking cigarettes 1980  Alcohol Use: social  Recreational Marijuana Use: yes  Illicit Drug Use: quit heroin abuse 1998    ORT/ OUD score: high    Pt with acute on chronic cervical pain.  Pain scores 10/10 down to 0/10.  Preparing for discharge today or tomorrow.    Continue Oxy IR 5 mg Q 4 hours PRN moderate pain and 10 mg Q 4 hours PRN severe pain.  Continue neurontin 600 mg TID (home dose)- current CrCl is 122.4.  Continue pamelor 25 mg QHS (home dose).  Would increase flexeril to 10 mg Q 8 hours PRN.    Monitor for sedation and respiratory depression.  Bowel regimen,  Incentive spirometer.  PT/ OT, OOB per Orthopedics.    Would discharge with neurontin, pamelor, flexeril and oxy IR 5 mg Q 4 hours PRN x 5 days.  Discharge with a narcan rescue kit (naloxone 4 mg/ 0.1 mg nasal spray- 1 spray Q 2-3 minutes alternating between nostrils).  Follow up with Dr Vijay Bourne for continued pain management after discharge.    Spoke with Orthopedic team, will hold chronic pain consult.      Chronic Pain Service  [phone]

## 2023-07-14 ENCOUNTER — TRANSCRIPTION ENCOUNTER (OUTPATIENT)
Age: 63
End: 2023-07-14

## 2023-07-14 VITALS
TEMPERATURE: 97 F | OXYGEN SATURATION: 97 % | SYSTOLIC BLOOD PRESSURE: 138 MMHG | RESPIRATION RATE: 18 BRPM | HEART RATE: 86 BPM | DIASTOLIC BLOOD PRESSURE: 78 MMHG

## 2023-07-14 PROBLEM — G56.03 CARPAL TUNNEL SYNDROME, BILATERAL UPPER LIMBS: Chronic | Status: ACTIVE | Noted: 2023-07-06

## 2023-07-14 PROBLEM — Z79.899 OTHER LONG TERM (CURRENT) DRUG THERAPY: Chronic | Status: ACTIVE | Noted: 2023-07-06

## 2023-07-14 PROBLEM — M54.12 RADICULOPATHY, CERVICAL REGION: Chronic | Status: ACTIVE | Noted: 2023-07-06

## 2023-07-14 PROBLEM — M47.812 SPONDYLOSIS WITHOUT MYELOPATHY OR RADICULOPATHY, CERVICAL REGION: Chronic | Status: ACTIVE | Noted: 2023-07-06

## 2023-07-14 PROBLEM — U07.1 COVID-19: Chronic | Status: ACTIVE | Noted: 2023-07-06

## 2023-07-14 PROBLEM — F11.91 OPIOID USE, UNSPECIFIED, IN REMISSION: Chronic | Status: ACTIVE | Noted: 2023-07-06

## 2023-07-14 PROBLEM — Z86.59 PERSONAL HISTORY OF OTHER MENTAL AND BEHAVIORAL DISORDERS: Chronic | Status: ACTIVE | Noted: 2023-07-06

## 2023-07-14 PROCEDURE — 85027 COMPLETE CBC AUTOMATED: CPT

## 2023-07-14 PROCEDURE — 80048 BASIC METABOLIC PNL TOTAL CA: CPT

## 2023-07-14 PROCEDURE — 97116 GAIT TRAINING THERAPY: CPT

## 2023-07-14 PROCEDURE — 97110 THERAPEUTIC EXERCISES: CPT

## 2023-07-14 PROCEDURE — 36415 COLL VENOUS BLD VENIPUNCTURE: CPT

## 2023-07-14 PROCEDURE — C1769: CPT

## 2023-07-14 PROCEDURE — 76000 FLUOROSCOPY <1 HR PHYS/QHP: CPT

## 2023-07-14 PROCEDURE — 97161 PT EVAL LOW COMPLEX 20 MIN: CPT

## 2023-07-14 PROCEDURE — 88304 TISSUE EXAM BY PATHOLOGIST: CPT

## 2023-07-14 PROCEDURE — 85025 COMPLETE CBC W/AUTO DIFF WBC: CPT

## 2023-07-14 PROCEDURE — C1889: CPT

## 2023-07-14 PROCEDURE — 97165 OT EVAL LOW COMPLEX 30 MIN: CPT

## 2023-07-14 PROCEDURE — C1713: CPT

## 2023-07-14 PROCEDURE — 97530 THERAPEUTIC ACTIVITIES: CPT

## 2023-07-14 RX ORDER — ACETAMINOPHEN 500 MG
3 TABLET ORAL
Qty: 126 | Refills: 0
Start: 2023-07-14 | End: 2023-07-27

## 2023-07-14 RX ORDER — NALOXONE HYDROCHLORIDE 4 MG/.1ML
4 SPRAY NASAL
Qty: 1 | Refills: 0
Start: 2023-07-14

## 2023-07-14 RX ORDER — SENNA PLUS 8.6 MG/1
2 TABLET ORAL
Qty: 14 | Refills: 0
Start: 2023-07-14 | End: 2023-07-20

## 2023-07-14 RX ADMIN — Medication 975 MILLIGRAM(S): at 09:00

## 2023-07-14 RX ADMIN — OXYCODONE HYDROCHLORIDE 10 MILLIGRAM(S): 5 TABLET ORAL at 17:02

## 2023-07-14 RX ADMIN — Medication 500 MILLIGRAM(S): at 05:53

## 2023-07-14 RX ADMIN — OXYCODONE HYDROCHLORIDE 5 MILLIGRAM(S): 5 TABLET ORAL at 09:30

## 2023-07-14 RX ADMIN — Medication 975 MILLIGRAM(S): at 17:02

## 2023-07-14 RX ADMIN — OXYCODONE HYDROCHLORIDE 5 MILLIGRAM(S): 5 TABLET ORAL at 09:00

## 2023-07-14 RX ADMIN — Medication 40 MILLIGRAM(S): at 05:53

## 2023-07-14 RX ADMIN — GABAPENTIN 600 MILLIGRAM(S): 400 CAPSULE ORAL at 13:11

## 2023-07-14 RX ADMIN — PANTOPRAZOLE SODIUM 40 MILLIGRAM(S): 20 TABLET, DELAYED RELEASE ORAL at 05:53

## 2023-07-14 RX ADMIN — OXYCODONE HYDROCHLORIDE 10 MILLIGRAM(S): 5 TABLET ORAL at 16:32

## 2023-07-14 RX ADMIN — Medication 975 MILLIGRAM(S): at 09:30

## 2023-07-14 RX ADMIN — GABAPENTIN 600 MILLIGRAM(S): 400 CAPSULE ORAL at 05:53

## 2023-07-14 RX ADMIN — Medication 975 MILLIGRAM(S): at 16:32

## 2023-07-14 RX ADMIN — Medication 1 TABLET(S): at 11:49

## 2023-07-14 NOTE — PROGRESS NOTE ADULT - ASSESSMENT
63y Male s/p C4-5 ACDF, now POD1    - Pain control  - FU labs  - c-spine collar  - WBAT  - Monitor drain output  - PT/OT/OOB  - I/S  - SCDs  - Dispo planning
63y Male s/p C4-5 ACDF, now POD2    - Pain control  - FU labs  - c-spine collar  - WBAT  - PT/OT/OOB  - I/S  - SCDs  - Dispo planning

## 2023-07-14 NOTE — DISCHARGE NOTE NURSING/CASE MANAGEMENT/SOCIAL WORK - PATIENT PORTAL LINK FT
You can access the FollowMyHealth Patient Portal offered by [facility] by registering at the following website: [email or web address]. By joining [facility]’s FollowMyHealth portal, you will also be able to view your health information using other applications (apps) compatible with our system.

## 2023-07-14 NOTE — CONSULT NOTE ADULT - SUBJECTIVE AND OBJECTIVE BOX
Patient is a 63y old  Male who presents with a chief complaint of Cervical Radiculopathy    s/p ACDF C4-5 (12 Jul 2023 19:48)      HPI:  62 yo male s/p ACDF C5-C7 on 8/2/2016 now p/w acute/chronic cervical radiculopathy from C34, C45 DDD, and foraminal stenosis C3-C7 as well as B/L carpal tunnel syndrome w/ weakness & numbness to hands R>L. Pt is followed by pain mgmt Dr Bourne and is on daily medical marijuana. Has had LESI X3 on 08/13/21 w/ 65%-70% pain relief and YVES X1 on 05/2023 w/ no pain relief. Pt also had PT for C spine, last session 04/2023 but his insurance didn’t approve new sessions and he now presents to PST for a scheduled C4-C5 ACDF on 7/12/2023. PMH of GERD and Kidney stones. Denies recent fevers, chills, cough or chest pain and feels well otherwise. PCP to be seen on 7/10/23 for eval.  (06 Jul 2023 07:21)      PAST MEDICAL & SURGICAL HISTORY:  Spinal stenosis in cervical region  s/p fusion 2017      GERD (gastroesophageal reflux disease)      History of kidney stones  multiple episodes, last in 2015      Arthritis      Migraines      Osteoporosis      Cervical stenosis of spine      Radiculopathy, cervical region      Carpal tunnel syndrome, bilateral upper limbs      Spondylosis without myelopathy or radiculopathy, cervical region      2019 novel coronavirus disease (COVID-19)      Medical marijuana use      History of heroin use      History of depression      S/P cystoscopy  Oct 2014, h/o renal stent      History of lithotripsy  multiple, most recent 2015, 2022      History of fusion of cervical spine      History of carpal tunnel surgery of left wrist      History of colonoscopy      History of endoscopy          Review of Systems:   CONSTITUTIONAL: No fever,  or fatigue  NECK: No pain or stiffness  RESPIRATORY: No cough,  No shortness of breath  CARDIOVASCULAR: No chest pain, palpitations, dizziness, or leg swelling  GASTROINTESTINAL: No abdominal  pain. No nausea, vomiting.  NEUROLOGICAL: No headaches,           Allergies    No Known Drug Allergies  Pt has allergy to mustard (Anaphylaxis)    Intolerances        Social History:     FAMILY HISTORY:  No significant family history        MEDICATIONS  (STANDING):  acetaminophen     Tablet .. 975 milliGRAM(s) Oral every 8 hours  ascorbic acid 500 milliGRAM(s) Oral two times a day  gabapentin 600 milliGRAM(s) Oral three times a day  lactated ringers. 1000 milliLiter(s) (100 mL/Hr) IV Continuous <Continuous>  multivitamin 1 Tablet(s) Oral daily  nortriptyline 25 milliGRAM(s) Oral at bedtime  pantoprazole    Tablet 40 milliGRAM(s) Oral before breakfast  predniSONE   Tablet 40 milliGRAM(s) Oral daily  senna 2 Tablet(s) Oral at bedtime    MEDICATIONS  (PRN):  bisacodyl 5 milliGRAM(s) Oral every 12 hours PRN Constipation  cyclobenzaprine 5 milliGRAM(s) Oral three times a day PRN Muscle Spasm  magnesium hydroxide Suspension 30 milliLiter(s) Oral every 12 hours PRN Constipation  ondansetron Injectable 4 milliGRAM(s) IV Push every 6 hours PRN Nausea and/or Vomiting  oxyCODONE    IR 10 milliGRAM(s) Oral every 4 hours PRN Severe Pain (7 - 10)  oxyCODONE    IR 5 milliGRAM(s) Oral every 6 hours PRN Moderate Pain (4 - 6)  polyethylene glycol 3350 17 Gram(s) Oral daily PRN Constipation  traMADol 50 milliGRAM(s) Oral every 6 hours PRN Mild Pain (1 - 3)      CAPILLARY BLOOD GLUCOSE        I&O's Summary    13 Jul 2023 07:01  -  14 Jul 2023 07:00  --------------------------------------------------------  IN: 620 mL / OUT: 400 mL / NET: 220 mL        T(C): 36.3 (07-14-23 @ 16:25), Max: 36.8 (07-13-23 @ 20:30)  HR: 86 (07-14-23 @ 16:25) (79 - 98)  BP: 138/78 (07-14-23 @ 16:25) (109/64 - 138/88)  RR: 18 (07-14-23 @ 16:25) (18 - 18)  SpO2: 97% (07-14-23 @ 16:25) (94% - 97%)    PHYSICAL EXAM:    GENERAL: NAD  NECK: Supple, No JVD  CHEST/LUNG: Clear to auscultation bilaterally; No wheezing.  HEART: Regular rate and rhythm; No murmurs, rubs, or gallops  ABDOMEN: Soft, Nontender, Nondistended; Bowel sounds present  EXTREMITIES:  2+ Peripheral Pulses, No edema  NEUROLOGY: AAOx 3      LABS:                        14.2   13.00 )-----------( 227      ( 13 Jul 2023 07:04 )             41.5     07-13    140  |  105  |  17  ----------------------------<  139<H>  3.9   |  22  |  0.87    Ca    9.8      13 Jul 2023 07:15            Urinalysis Basic - ( 13 Jul 2023 07:15 )    Color: x / Appearance: x / SG: x / pH: x  Gluc: 139 mg/dL / Ketone: x  / Bili: x / Urobili: x   Blood: x / Protein: x / Nitrite: x   Leuk Esterase: x / RBC: x / WBC x   Sq Epi: x / Non Sq Epi: x / Bacteria: x      CAPILLARY BLOOD GLUCOSE            RADIOLOGY & ADDITIONAL TESTS:    Imaging Personally Reviewed:    Consultant(s) Notes Reviewed:      Care Discussed with Consultants/Other Providers:    Thanks for consult. Will follow.

## 2023-07-14 NOTE — DISCHARGE NOTE NURSING/CASE MANAGEMENT/SOCIAL WORK - NSDCPEFALRISK_GEN_ALL_CORE
For information on Fall & Injury Prevention, visit: [email or web address] OR  [email or web address] OR  https://www.cdc.gov/steadi/patient.html 74

## 2023-07-14 NOTE — CONSULT NOTE ADULT - ASSESSMENT
Patient is a 63y old  Male who presents with a chief complaint of Cervical Radiculopathy  s/p ACDF C4-5.    S/p ACDF C4-5:    Ortho spine f/up noted  Po Prednisone  Pain control with Oxy IR/Tramadol

## 2023-07-14 NOTE — PROGRESS NOTE ADULT - SUBJECTIVE AND OBJECTIVE BOX
Orthopaedic Surgery Progress Note    Subjective:   Patient seen and examined  No acute events overnight  Was able to walk with PT yesterday    Objective:  T(C): 36.7 (07-14-23 @ 04:55), Max: 36.8 (07-13-23 @ 20:30)  HR: 79 (07-14-23 @ 04:55) (79 - 100)  BP: 138/88 (07-14-23 @ 04:55) (119/71 - 151/90)  RR: 18 (07-14-23 @ 04:55) (18 - 18)  SpO2: 95% (07-14-23 @ 04:55) (93% - 98%)  Wt(kg): --    07-12 @ 07:01  -  07-13 @ 07:00  --------------------------------------------------------  IN: 300 mL / OUT: 4323 mL / NET: -4023 mL    07-13 @ 07:01  -  07-14 @ 06:34  --------------------------------------------------------  IN: 620 mL / OUT: 400 mL / NET: 220 mL        PE    NAD  Neck:   dressing C/D/I, c-spine collar in place  Neuro:  RUE Delt 4/5 Bi 3+/5 Tri 3+/5 Wrist ext 4/5  3/5  LUE Delt 3/5 Bi 3/5 Tri 3/5 Wrist ext 3/5  3/5  Sensation intact in bilateral upper extremities, with sensation decreased in LUE compared to RUE  WWP BUE                              14.2   13.00 )-----------( 227      ( 13 Jul 2023 07:04 )             41.5     07-13    140  |  105  |  17  ----------------------------<  139<H>  3.9   |  22  |  0.87    Ca    9.8      13 Jul 2023 07:15        Urinalysis Basic - ( 13 Jul 2023 07:15 )    Color: x / Appearance: x / SG: x / pH: x  Gluc: 139 mg/dL / Ketone: x  / Bili: x / Urobili: x   Blood: x / Protein: x / Nitrite: x   Leuk Esterase: x / RBC: x / WBC x   Sq Epi: x / Non Sq Epi: x / Bacteria: x      
Orthopaedic Surgery Progress Note    Subjective:   Patient seen and examined  No acute events overnight    Objective:  T(C): 36.4 (07-13-23 @ 04:26), Max: 36.6 (07-12-23 @ 07:20)  HR: 98 (07-13-23 @ 04:26) (76 - 101)  BP: 115/66 (07-13-23 @ 04:26) (106/75 - 149/88)  RR: 18 (07-13-23 @ 04:26) (16 - 18)  SpO2: 95% (07-13-23 @ 04:26) (93% - 100%)  Wt(kg): --    07-12 @ 07:01  -  07-13 @ 06:24  --------------------------------------------------------  IN: 300 mL / OUT: 3523 mL / NET: -3223 mL        PE    NAD  Neck:   dressing C/D/I, c-spine collar in place, drain intact and functioning w/ serosanguinous output  Neuro:  RUE Delt 4/5 Bi 3+/5 Tri 3+/5 Wrist ext 4/5  3/5  LUE Delt 3/5 Bi 3/5 Tri 3/5 Wrist ext 3/5  3/5  Sensation intact in bilateral upper extremities, with sensation decreased in RUE compared to LUE  WWP BUE                            13.9   8.03  )-----------( 198      ( 12 Jul 2023 11:47 )             41.3     07-12    140  |  107  |  20  ----------------------------<  160<H>  4.4   |  22  |  1.07    Ca    9.5      12 Jul 2023 11:48        Urinalysis Basic - ( 12 Jul 2023 11:48 )    Color: x / Appearance: x / SG: x / pH: x  Gluc: 160 mg/dL / Ketone: x  / Bili: x / Urobili: x   Blood: x / Protein: x / Nitrite: x   Leuk Esterase: x / RBC: x / WBC x   Sq Epi: x / Non Sq Epi: x / Bacteria: x

## 2023-07-20 LAB — SURGICAL PATHOLOGY STUDY: SIGNIFICANT CHANGE UP

## 2023-07-24 ENCOUNTER — APPOINTMENT (OUTPATIENT)
Dept: ORTHOPEDIC SURGERY | Facility: CLINIC | Age: 63
End: 2023-07-24
Payer: MEDICAID

## 2023-07-24 VITALS — HEIGHT: 73 IN | WEIGHT: 215 LBS | BODY MASS INDEX: 28.49 KG/M2

## 2023-07-24 PROCEDURE — 72040 X-RAY EXAM NECK SPINE 2-3 VW: CPT

## 2023-07-24 PROCEDURE — 99024 POSTOP FOLLOW-UP VISIT: CPT

## 2023-07-31 ENCOUNTER — APPOINTMENT (OUTPATIENT)
Dept: ORTHOPEDIC SURGERY | Facility: CLINIC | Age: 63
End: 2023-07-31
Payer: MEDICAID

## 2023-07-31 VITALS
WEIGHT: 217 LBS | SYSTOLIC BLOOD PRESSURE: 153 MMHG | HEART RATE: 116 BPM | BODY MASS INDEX: 28.76 KG/M2 | DIASTOLIC BLOOD PRESSURE: 96 MMHG | HEIGHT: 73 IN

## 2023-07-31 DIAGNOSIS — M25.511 PAIN IN RIGHT SHOULDER: ICD-10-CM

## 2023-07-31 PROCEDURE — 20610 DRAIN/INJ JOINT/BURSA W/O US: CPT | Mod: RT

## 2023-07-31 PROCEDURE — 99213 OFFICE O/P EST LOW 20 MIN: CPT | Mod: 25

## 2023-07-31 NOTE — HISTORY OF PRESENT ILLNESS
[Worsening] : worsening [10] : a maximum pain level of 10/10 [Constant] : ~He/She~ states the symptoms seem to be constant [Lifting] : worsened by lifting [de-identified] : 61-year-old right-hand-dominant male presents today with complaints of chronic worsening right shoulder pain, seen last in December 2021. Prior evaluation with MRI and treated with PT. Recently underwent additional level of cervical spine fusion with Dr Quinones after noting loss of dexterity in his hands and worsening pain. He states his shoulder pain is constant and he is unable to raise his arm up to shoulder height. In PT he is able to passively extend his arm overhead but lacks AROM of his shoulder. He reports poor sleep quality.

## 2023-07-31 NOTE — PHYSICAL EXAM
[UE] : Sensory: Intact in bilateral upper extremities [Rad] : radial 2+ and symmetric bilaterally [Normal RUE] : Right Upper Extremity: No scars, rashes, lesions, ulcers, skin intact [Normal LUE] : Left Upper Extremity: No scars, rashes, lesions, ulcers, skin intact [Normal] : Oriented to person, place, and time, insight and judgement were intact and the affect was normal [Shoulder Muscle Weakness Supraspinatus Left Only] : negative Drop Arm test [Pain Left Shoulder Active Forw Flexion Against Resistance] : negative Empty Can test [Shoulder Pain During Willett-Koby Impingement Test Left] : negative Willett test [Shoulder Motion On Internal Rotation] : negative Lift Off test [Active Abduction Left Shoulder Decreased] : negative Painful Arc [de-identified] : Right shoulder: Skin intact. No bursa warmth or redness.  Painful active motion right shoulder.   He can slowly lift the arm 90 degrees in the forward plane. Abduction 70. Internal rotation hand to lower lumbar region.  Marked weakness of the patient's right shoulder active forward elevation, abduction, external rotation and internal rotation.  Strength 3/5.  Weakness right elbow active flexion/extension strength 3/5.  Weakness right wrist extension, flexion.  Muscle wasting right hand intrinsics. [de-identified] : X-rays right shoulder AP, scapular Y and axillary views taken in the office today: Humeral head is centered.  No significant arthritic changes.  No fractures

## 2023-07-31 NOTE — PROCEDURE
[de-identified] : The right shoulder was cleansed with alcohol and ChloraPrep swabs posteriorly. The bursa was injected with 8 cc mixture of 1% lidocaine and 40 mg of Depo-Medrol. Procedure was well tolerated. Patient was instructed on ice application to this site for postinjection soreness if needed.

## 2023-07-31 NOTE — DISCUSSION/SUMMARY
[de-identified] : Diffuse weakness right upper extremity.  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  For pain relief offered a bursal injection today.  Injection given and well-tolerated.  Recommend follow-up with Dr. Quinones for further postop care and evaluation of ongoing upper extremity weakness.

## 2023-08-03 NOTE — DISCHARGE NOTE PROVIDER - NSDCACTIVITY_GEN_ALL_CORE
Problem: Adult Inpatient Plan of Care  Goal: Plan of Care Review  Outcome: Ongoing, Progressing  Flowsheets  Taken 8/3/2023 1021  Progress: no change  Taken 8/2/2023 1042  Plan of Care Reviewed With: patient  Goal: Patient-Specific Goal (Individualized)  Outcome: Ongoing, Progressing  Goal: Absence of Hospital-Acquired Illness or Injury  Outcome: Ongoing, Progressing  Intervention: Identify and Manage Fall Risk  Recent Flowsheet Documentation  Taken 8/3/2023 1000 by Betty Lemus RN  Safety Promotion/Fall Prevention:   safety round/check completed   room organization consistent  Intervention: Prevent Skin Injury  Recent Flowsheet Documentation  Taken 8/3/2023 1000 by Betty Lemus RN  Body Position: sitting up in bed  Taken 8/3/2023 0800 by Betty Lemus RN  Body Position: sitting up in bed  Intervention: Prevent and Manage VTE (Venous Thromboembolism) Risk  Recent Flowsheet Documentation  Taken 8/3/2023 1000 by Betty Lemus RN  Activity Management: activity encouraged  Goal: Optimal Comfort and Wellbeing  Outcome: Ongoing, Progressing  Goal: Readiness for Transition of Care  Outcome: Ongoing, Progressing     Problem: Fall Injury Risk  Goal: Absence of Fall and Fall-Related Injury  Outcome: Ongoing, Progressing  Intervention: Promote Injury-Free Environment  Recent Flowsheet Documentation  Taken 8/3/2023 1000 by Betty Lemus RN  Safety Promotion/Fall Prevention:   safety round/check completed   room organization consistent  Goal: Absence of Fall and Fall-Related Injury  Outcome: Ongoing, Progressing  Intervention: Promote Injury-Free Environment  Recent Flowsheet Documentation  Taken 8/3/2023 1000 by Betty Lemus RN  Safety Promotion/Fall Prevention:   safety round/check completed   room organization consistent     Problem: Confusion Acute  Goal: Optimal Cognitive Function  Outcome: Ongoing, Progressing     Problem: Skin Injury Risk Increased  Goal: Skin Health and Integrity  Outcome: Ongoing,  Progressing  Intervention: Optimize Skin Protection  Recent Flowsheet Documentation  Taken 8/3/2023 1000 by Betty Lemus, RN  Head of Bed (HOB) Positioning: HOB at 30-45 degrees  Taken 8/3/2023 0800 by Betty Lemus, RN  Head of Bed (HOB) Positioning: HOB at 30-45 degrees   Goal Outcome Evaluation:  Plan of Care Reviewed With: patient        Progress: no change             Do not drive or operate machinery/Walking - Indoors allowed/No heavy lifting/straining/Walking - Outdoors allowed

## 2023-08-25 ENCOUNTER — APPOINTMENT (OUTPATIENT)
Dept: PAIN MANAGEMENT | Facility: CLINIC | Age: 63
End: 2023-08-25
Payer: MEDICAID

## 2023-08-25 PROCEDURE — 99213 OFFICE O/P EST LOW 20 MIN: CPT | Mod: 95

## 2023-08-25 RX ORDER — CYCLOBENZAPRINE HYDROCHLORIDE 10 MG/1
10 TABLET, FILM COATED ORAL 3 TIMES DAILY
Qty: 90 | Refills: 1 | Status: DISCONTINUED | COMMUNITY
Start: 2023-01-23 | End: 2023-08-25

## 2023-08-25 RX ORDER — IBUPROFEN 800 MG/1
800 TABLET, FILM COATED ORAL 3 TIMES DAILY
Qty: 90 | Refills: 5 | Status: DISCONTINUED | COMMUNITY
Start: 2021-10-22 | End: 2023-08-25

## 2023-08-25 RX ORDER — GABAPENTIN 300 MG/1
300 CAPSULE ORAL 3 TIMES DAILY
Qty: 90 | Refills: 0 | Status: DISCONTINUED | COMMUNITY
Start: 2023-02-24 | End: 2023-08-25

## 2023-08-25 RX ORDER — MELOXICAM 15 MG/1
15 TABLET ORAL
Qty: 30 | Refills: 1 | Status: DISCONTINUED | COMMUNITY
Start: 2017-12-18 | End: 2023-08-25

## 2023-08-25 RX ORDER — HYDROCODONE BITARTRATE AND ACETAMINOPHEN 5; 325 MG/1; MG/1
5-325 TABLET ORAL
Qty: 5 | Refills: 0 | Status: DISCONTINUED | COMMUNITY
Start: 2018-05-17 | End: 2023-08-25

## 2023-08-25 RX ORDER — IBUPROFEN 800 MG/1
800 TABLET, FILM COATED ORAL
Qty: 90 | Refills: 0 | Status: DISCONTINUED | COMMUNITY
Start: 2018-10-08 | End: 2023-08-25

## 2023-08-25 NOTE — HISTORY OF PRESENT ILLNESS
[Neck] : neck [9] : 9 [Burning] : burning [Sharp] : sharp [Shooting] : shooting [Constant] : constant [Household chores] : household chores [Sleep] : sleep [Rest] : rest [Sitting] : sitting [Home] : at home, [unfilled] , at the time of the visit. [Medical Office: ([facility])___] : at the medical office located in  [Verbal consent obtained from patient] : the patient, [unfilled] [] : Post Surgical Visit: no [de-identified] : LIFTING  [de-identified] : 04/04/2023 CS

## 2023-09-07 ENCOUNTER — APPOINTMENT (OUTPATIENT)
Dept: ORTHOPEDIC SURGERY | Facility: CLINIC | Age: 63
End: 2023-09-07
Payer: MEDICAID

## 2023-09-07 VITALS — HEIGHT: 73 IN | BODY MASS INDEX: 28.76 KG/M2 | WEIGHT: 217 LBS

## 2023-09-07 PROCEDURE — 99024 POSTOP FOLLOW-UP VISIT: CPT

## 2023-09-07 PROCEDURE — 72040 X-RAY EXAM NECK SPINE 2-3 VW: CPT

## 2023-09-29 ENCOUNTER — APPOINTMENT (OUTPATIENT)
Dept: PAIN MANAGEMENT | Facility: CLINIC | Age: 63
End: 2023-09-29
Payer: MEDICAID

## 2023-09-29 VITALS — HEIGHT: 73 IN | BODY MASS INDEX: 28.76 KG/M2 | WEIGHT: 217 LBS

## 2023-09-29 VITALS — BODY MASS INDEX: 29.16 KG/M2 | WEIGHT: 220 LBS | HEIGHT: 73 IN

## 2023-09-29 PROCEDURE — 99213 OFFICE O/P EST LOW 20 MIN: CPT

## 2023-09-29 RX ORDER — OXYCODONE 5 MG/1
5 TABLET ORAL EVERY 8 HOURS
Qty: 21 | Refills: 0 | Status: DISCONTINUED | COMMUNITY
Start: 2021-03-01 | End: 2023-09-29

## 2023-09-29 RX ORDER — NALOXONE HYDROCHLORIDE 4 MG/.1ML
4 SPRAY NASAL
Qty: 1 | Refills: 0 | Status: ACTIVE | COMMUNITY
Start: 2023-09-29 | End: 1900-01-01

## 2023-10-01 PROBLEM — G56.03 CARPAL TUNNEL SYNDROME, BILATERAL UPPER LIMBS: Status: ACTIVE | Noted: 2023-02-07

## 2023-10-06 ENCOUNTER — APPOINTMENT (OUTPATIENT)
Dept: PAIN MANAGEMENT | Facility: CLINIC | Age: 63
End: 2023-10-06
Payer: MEDICAID

## 2023-10-06 PROCEDURE — 99213 OFFICE O/P EST LOW 20 MIN: CPT | Mod: 95

## 2023-10-13 ENCOUNTER — APPOINTMENT (OUTPATIENT)
Dept: ORTHOPEDIC SURGERY | Facility: CLINIC | Age: 63
End: 2023-10-13
Payer: MEDICAID

## 2023-10-13 VITALS — HEIGHT: 73 IN | WEIGHT: 220 LBS | BODY MASS INDEX: 29.16 KG/M2

## 2023-10-13 DIAGNOSIS — M47.812 SPONDYLOSIS W/OUT MYELOPATHY OR RADICULOPATHY, CERVICAL REGION: ICD-10-CM

## 2023-10-13 PROCEDURE — 72040 X-RAY EXAM NECK SPINE 2-3 VW: CPT

## 2023-10-13 PROCEDURE — 99214 OFFICE O/P EST MOD 30 MIN: CPT

## 2023-11-03 ENCOUNTER — OUTPATIENT (OUTPATIENT)
Dept: OUTPATIENT SERVICES | Facility: HOSPITAL | Age: 63
LOS: 1 days | End: 2023-11-03
Payer: MEDICAID

## 2023-11-03 ENCOUNTER — RESULT REVIEW (OUTPATIENT)
Age: 63
End: 2023-11-03

## 2023-11-03 ENCOUNTER — APPOINTMENT (OUTPATIENT)
Dept: PAIN MANAGEMENT | Facility: CLINIC | Age: 63
End: 2023-11-03
Payer: MEDICAID

## 2023-11-03 ENCOUNTER — APPOINTMENT (OUTPATIENT)
Dept: MRI IMAGING | Facility: CLINIC | Age: 63
End: 2023-11-03

## 2023-11-03 ENCOUNTER — APPOINTMENT (OUTPATIENT)
Dept: CT IMAGING | Facility: CLINIC | Age: 63
End: 2023-11-03
Payer: MEDICAID

## 2023-11-03 VITALS — WEIGHT: 220 LBS | HEIGHT: 73 IN | BODY MASS INDEX: 29.16 KG/M2

## 2023-11-03 DIAGNOSIS — Z98.890 OTHER SPECIFIED POSTPROCEDURAL STATES: Chronic | ICD-10-CM

## 2023-11-03 DIAGNOSIS — Z98.1 ARTHRODESIS STATUS: Chronic | ICD-10-CM

## 2023-11-03 DIAGNOSIS — Z00.8 ENCOUNTER FOR OTHER GENERAL EXAMINATION: ICD-10-CM

## 2023-11-03 DIAGNOSIS — M48.02 SPINAL STENOSIS, CERVICAL REGION: ICD-10-CM

## 2023-11-03 DIAGNOSIS — Z98.89 OTHER SPECIFIED POSTPROCEDURAL STATES: Chronic | ICD-10-CM

## 2023-11-03 PROCEDURE — 76376 3D RENDER W/INTRP POSTPROCES: CPT | Mod: 26

## 2023-11-03 PROCEDURE — 72141 MRI NECK SPINE W/O DYE: CPT | Mod: 26

## 2023-11-03 PROCEDURE — 99213 OFFICE O/P EST LOW 20 MIN: CPT | Mod: 95

## 2023-11-03 PROCEDURE — 72125 CT NECK SPINE W/O DYE: CPT | Mod: 26

## 2023-11-03 PROCEDURE — 72125 CT NECK SPINE W/O DYE: CPT

## 2023-11-03 PROCEDURE — 72141 MRI NECK SPINE W/O DYE: CPT

## 2023-11-03 PROCEDURE — 76376 3D RENDER W/INTRP POSTPROCES: CPT

## 2023-11-06 ENCOUNTER — APPOINTMENT (OUTPATIENT)
Dept: ORTHOPEDIC SURGERY | Facility: CLINIC | Age: 63
End: 2023-11-06
Payer: MEDICAID

## 2023-11-06 PROCEDURE — 99215 OFFICE O/P EST HI 40 MIN: CPT

## 2023-11-16 ENCOUNTER — OUTPATIENT (OUTPATIENT)
Dept: OUTPATIENT SERVICES | Facility: HOSPITAL | Age: 63
LOS: 1 days | End: 2023-11-16
Payer: MEDICAID

## 2023-11-16 VITALS
DIASTOLIC BLOOD PRESSURE: 77 MMHG | WEIGHT: 210.98 LBS | TEMPERATURE: 98 F | RESPIRATION RATE: 18 BRPM | OXYGEN SATURATION: 98 % | HEIGHT: 73 IN | HEART RATE: 101 BPM | SYSTOLIC BLOOD PRESSURE: 121 MMHG

## 2023-11-16 DIAGNOSIS — M48.02 SPINAL STENOSIS, CERVICAL REGION: ICD-10-CM

## 2023-11-16 DIAGNOSIS — M54.12 RADICULOPATHY, CERVICAL REGION: ICD-10-CM

## 2023-11-16 DIAGNOSIS — Z98.1 ARTHRODESIS STATUS: Chronic | ICD-10-CM

## 2023-11-16 DIAGNOSIS — Z98.890 OTHER SPECIFIED POSTPROCEDURAL STATES: Chronic | ICD-10-CM

## 2023-11-16 DIAGNOSIS — Z98.89 OTHER SPECIFIED POSTPROCEDURAL STATES: Chronic | ICD-10-CM

## 2023-11-16 DIAGNOSIS — G56.03 CARPAL TUNNEL SYNDROME, BILATERAL UPPER LIMBS: ICD-10-CM

## 2023-11-16 DIAGNOSIS — M47.10 OTHER SPONDYLOSIS WITH MYELOPATHY, SITE UNSPECIFIED: ICD-10-CM

## 2023-11-16 DIAGNOSIS — Z01.818 ENCOUNTER FOR OTHER PREPROCEDURAL EXAMINATION: ICD-10-CM

## 2023-11-16 DIAGNOSIS — M47.812 SPONDYLOSIS WITHOUT MYELOPATHY OR RADICULOPATHY, CERVICAL REGION: ICD-10-CM

## 2023-11-16 LAB
A1C WITH ESTIMATED AVERAGE GLUCOSE RESULT: 5.6 % — SIGNIFICANT CHANGE UP (ref 4–5.6)
A1C WITH ESTIMATED AVERAGE GLUCOSE RESULT: 5.6 % — SIGNIFICANT CHANGE UP (ref 4–5.6)
ANION GAP SERPL CALC-SCNC: 12 MMOL/L — SIGNIFICANT CHANGE UP (ref 5–17)
ANION GAP SERPL CALC-SCNC: 12 MMOL/L — SIGNIFICANT CHANGE UP (ref 5–17)
BLD GP AB SCN SERPL QL: NEGATIVE — SIGNIFICANT CHANGE UP
BLD GP AB SCN SERPL QL: NEGATIVE — SIGNIFICANT CHANGE UP
BUN SERPL-MCNC: 16 MG/DL — SIGNIFICANT CHANGE UP (ref 7–23)
BUN SERPL-MCNC: 16 MG/DL — SIGNIFICANT CHANGE UP (ref 7–23)
CALCIUM SERPL-MCNC: 10.1 MG/DL — SIGNIFICANT CHANGE UP (ref 8.4–10.5)
CALCIUM SERPL-MCNC: 10.1 MG/DL — SIGNIFICANT CHANGE UP (ref 8.4–10.5)
CHLORIDE SERPL-SCNC: 103 MMOL/L — SIGNIFICANT CHANGE UP (ref 96–108)
CHLORIDE SERPL-SCNC: 103 MMOL/L — SIGNIFICANT CHANGE UP (ref 96–108)
CO2 SERPL-SCNC: 23 MMOL/L — SIGNIFICANT CHANGE UP (ref 22–31)
CO2 SERPL-SCNC: 23 MMOL/L — SIGNIFICANT CHANGE UP (ref 22–31)
CREAT SERPL-MCNC: 0.96 MG/DL — SIGNIFICANT CHANGE UP (ref 0.5–1.3)
CREAT SERPL-MCNC: 0.96 MG/DL — SIGNIFICANT CHANGE UP (ref 0.5–1.3)
EGFR: 90 ML/MIN/1.73M2 — SIGNIFICANT CHANGE UP
EGFR: 90 ML/MIN/1.73M2 — SIGNIFICANT CHANGE UP
ESTIMATED AVERAGE GLUCOSE: 114 MG/DL — SIGNIFICANT CHANGE UP (ref 68–114)
ESTIMATED AVERAGE GLUCOSE: 114 MG/DL — SIGNIFICANT CHANGE UP (ref 68–114)
GLUCOSE SERPL-MCNC: 99 MG/DL — SIGNIFICANT CHANGE UP (ref 70–99)
GLUCOSE SERPL-MCNC: 99 MG/DL — SIGNIFICANT CHANGE UP (ref 70–99)
HCT VFR BLD CALC: 46.9 % — SIGNIFICANT CHANGE UP (ref 39–50)
HCT VFR BLD CALC: 46.9 % — SIGNIFICANT CHANGE UP (ref 39–50)
HGB BLD-MCNC: 15.9 G/DL — SIGNIFICANT CHANGE UP (ref 13–17)
HGB BLD-MCNC: 15.9 G/DL — SIGNIFICANT CHANGE UP (ref 13–17)
MCHC RBC-ENTMCNC: 29.3 PG — SIGNIFICANT CHANGE UP (ref 27–34)
MCHC RBC-ENTMCNC: 29.3 PG — SIGNIFICANT CHANGE UP (ref 27–34)
MCHC RBC-ENTMCNC: 33.9 GM/DL — SIGNIFICANT CHANGE UP (ref 32–36)
MCHC RBC-ENTMCNC: 33.9 GM/DL — SIGNIFICANT CHANGE UP (ref 32–36)
MCV RBC AUTO: 86.4 FL — SIGNIFICANT CHANGE UP (ref 80–100)
MCV RBC AUTO: 86.4 FL — SIGNIFICANT CHANGE UP (ref 80–100)
NRBC # BLD: 0 /100 WBCS — SIGNIFICANT CHANGE UP (ref 0–0)
NRBC # BLD: 0 /100 WBCS — SIGNIFICANT CHANGE UP (ref 0–0)
PLATELET # BLD AUTO: 236 K/UL — SIGNIFICANT CHANGE UP (ref 150–400)
PLATELET # BLD AUTO: 236 K/UL — SIGNIFICANT CHANGE UP (ref 150–400)
POTASSIUM SERPL-MCNC: 4.2 MMOL/L — SIGNIFICANT CHANGE UP (ref 3.5–5.3)
POTASSIUM SERPL-MCNC: 4.2 MMOL/L — SIGNIFICANT CHANGE UP (ref 3.5–5.3)
POTASSIUM SERPL-SCNC: 4.2 MMOL/L — SIGNIFICANT CHANGE UP (ref 3.5–5.3)
POTASSIUM SERPL-SCNC: 4.2 MMOL/L — SIGNIFICANT CHANGE UP (ref 3.5–5.3)
RBC # BLD: 5.43 M/UL — SIGNIFICANT CHANGE UP (ref 4.2–5.8)
RBC # BLD: 5.43 M/UL — SIGNIFICANT CHANGE UP (ref 4.2–5.8)
RBC # FLD: 12.2 % — SIGNIFICANT CHANGE UP (ref 10.3–14.5)
RBC # FLD: 12.2 % — SIGNIFICANT CHANGE UP (ref 10.3–14.5)
RH IG SCN BLD-IMP: NEGATIVE — SIGNIFICANT CHANGE UP
RH IG SCN BLD-IMP: NEGATIVE — SIGNIFICANT CHANGE UP
SODIUM SERPL-SCNC: 138 MMOL/L — SIGNIFICANT CHANGE UP (ref 135–145)
SODIUM SERPL-SCNC: 138 MMOL/L — SIGNIFICANT CHANGE UP (ref 135–145)
WBC # BLD: 6.32 K/UL — SIGNIFICANT CHANGE UP (ref 3.8–10.5)
WBC # BLD: 6.32 K/UL — SIGNIFICANT CHANGE UP (ref 3.8–10.5)
WBC # FLD AUTO: 6.32 K/UL — SIGNIFICANT CHANGE UP (ref 3.8–10.5)
WBC # FLD AUTO: 6.32 K/UL — SIGNIFICANT CHANGE UP (ref 3.8–10.5)

## 2023-11-16 PROCEDURE — 83036 HEMOGLOBIN GLYCOSYLATED A1C: CPT

## 2023-11-16 PROCEDURE — 85027 COMPLETE CBC AUTOMATED: CPT

## 2023-11-16 PROCEDURE — 86850 RBC ANTIBODY SCREEN: CPT

## 2023-11-16 PROCEDURE — 86901 BLOOD TYPING SEROLOGIC RH(D): CPT

## 2023-11-16 PROCEDURE — G0463: CPT

## 2023-11-16 PROCEDURE — 86900 BLOOD TYPING SEROLOGIC ABO: CPT

## 2023-11-16 PROCEDURE — 80048 BASIC METABOLIC PNL TOTAL CA: CPT

## 2023-11-16 PROCEDURE — 87641 MR-STAPH DNA AMP PROBE: CPT

## 2023-11-16 PROCEDURE — 87640 STAPH A DNA AMP PROBE: CPT

## 2023-11-16 RX ORDER — ACETAMINOPHEN 500 MG
1000 TABLET ORAL ONCE
Refills: 0 | Status: COMPLETED | OUTPATIENT
Start: 2023-11-29 | End: 2023-11-29

## 2023-11-16 RX ORDER — LIDOCAINE HCL 20 MG/ML
0.2 VIAL (ML) INJECTION ONCE
Refills: 0 | Status: DISCONTINUED | OUTPATIENT
Start: 2023-11-29 | End: 2023-12-05

## 2023-11-16 RX ORDER — CEFAZOLIN SODIUM 1 G
2000 VIAL (EA) INJECTION ONCE
Refills: 0 | Status: COMPLETED | OUTPATIENT
Start: 2023-11-29 | End: 2023-11-29

## 2023-11-16 RX ORDER — CHLORHEXIDINE GLUCONATE 213 G/1000ML
1 SOLUTION TOPICAL ONCE
Refills: 0 | Status: DISCONTINUED | OUTPATIENT
Start: 2023-11-29 | End: 2023-12-05

## 2023-11-16 RX ORDER — SODIUM CHLORIDE 9 MG/ML
3 INJECTION INTRAMUSCULAR; INTRAVENOUS; SUBCUTANEOUS EVERY 8 HOURS
Refills: 0 | Status: DISCONTINUED | OUTPATIENT
Start: 2023-11-29 | End: 2023-12-05

## 2023-11-16 RX ORDER — APREPITANT 80 MG/1
40 CAPSULE ORAL ONCE
Refills: 0 | Status: COMPLETED | OUTPATIENT
Start: 2023-11-29 | End: 2023-11-29

## 2023-11-16 NOTE — H&P PST ADULT - ATTENDING COMMENTS
64 yo male with C45 acdf 7/12/2023 and C5-V27DSTC 8/2016.  He has RUE has regressed in function and strength, and increased pain(2-3/5 strenght) , CT reveals pseudarthrosis  C45 with subsidence of graft, C34 HNP with stenosis, foraminal stenosis C3-C7, recommend ACDF C34 with posterior cervical laminectomy foraminotomy and spinal fusion C3-C7.     I reviewed all major risks, benefits and complications associated with Anterior Cervical Discectomy and Spinal Fusion and Posterior Cervical Laminectomy and Fusion, including but not limited to bleeding, infection, neurological injury, CSF leak, paralysis, hematoma, implant failure, implant migration, pseudarthrosis, adjacent segment degeneration, chronic pain, worsening of pain, dysphagia, dysphonia, horners syndrome, vascular injury, anesthetic risk, chronic pain and disability, medical complications (GI, , DVT, PE, cardiac, pulmonary, etc) and risk of mortality. 64 yo male with C45 acdf 7/12/2023 and C5-S01AVAA 8/2016.  He has RUE has regressed in function and strength, and increased pain(2-3/5 strenght) , CT reveals pseudarthrosis  C45 with subsidence of graft, C34 HNP with stenosis, foraminal stenosis C3-C7.  He needs surgical intervention , but I will nor proceed with surgery due to new complaints and PE findings. He presents in [facility] admission with same complaints during our last office visits, but now endorses new onset frequent falls, weakness new onset RLE (4/5) spasticity and clonus LLE.  He has bruising over knees.  Unclear of etiology of these new findings.  At this point will hold off on surgery and evaluate for any new pathology. Recommend admit to my service, MRI of C, T, and L Spine, Neurology consult , assess for necessity of CT of Head and Brain MRI. Medical consult Reyna James.

## 2023-11-16 NOTE — H&P PST ADULT - NSICDXPASTSURGICALHX_GEN_ALL_CORE_FT
PAST SURGICAL HISTORY:  History of carpal tunnel surgery of left wrist     History of colonoscopy     History of endoscopy     History of fusion of cervical spine     History of lithotripsy multiple, most recent 2015, 2022    S/P cervical discectomy     S/P cystoscopy Oct 2014, h/o renal stent

## 2023-11-16 NOTE — H&P PST ADULT - PROBLEM SELECTOR PLAN 1
C3-C4 Anterior Cervical Discectomy and Fusion C3-C7 posterior cervical laminectomy and spinal fusion on 11/29/23  -DOS take gabapentin  -Appt on 11/20/23 with Dr. Chris Louis for clearance.

## 2023-11-16 NOTE — H&P PST ADULT - ASSESSMENT
DASI score: 7.68  DASI activity: Walks around his yard and climbs stairs in his home; Does laundry and most of the housework; denies cardiopulmonary symptoms.   Loose teeth or denture:  MP: 1

## 2023-11-16 NOTE — H&P PST ADULT - HISTORY OF PRESENT ILLNESS
63 year old male with a history of cervical stenosis s/p ACDF C4-5 on 07/12/23 and C4-C6 ACDF 8/2016. Since his last surgery his RUE has regressed in function and strength, and he has increased pain. Pt had cortisone injection on right shoulder on 07/2023 this provided some pain relief for 2 months. Pt states he still has numbness on RUE and tingling on LUE and weakness on B/L hands. Pt ambulates without assistance. Pt participates with PT/OT X2 -3 /week. Pt denies fever, chills, weight changes, loss of bladder control, bowel incontinence or urinary retention or saddle anesthesia. He presents to Presurgical Testing for anesthesia evaluation prior to C3-C4 Anterior Cervical Discectomy and Fusion C3-C7 posterior cervical laminectomy and spinal fusion.

## 2023-11-17 LAB
MRSA PCR RESULT.: SIGNIFICANT CHANGE UP
MRSA PCR RESULT.: SIGNIFICANT CHANGE UP
S AUREUS DNA NOSE QL NAA+PROBE: SIGNIFICANT CHANGE UP
S AUREUS DNA NOSE QL NAA+PROBE: SIGNIFICANT CHANGE UP

## 2023-11-29 ENCOUNTER — INPATIENT (INPATIENT)
Facility: HOSPITAL | Age: 63
LOS: 5 days | Discharge: HOME | DRG: 552 | End: 2023-12-05
Attending: ORTHOPAEDIC SURGERY | Admitting: ORTHOPAEDIC SURGERY
Payer: MEDICAID

## 2023-11-29 VITALS
TEMPERATURE: 98 F | DIASTOLIC BLOOD PRESSURE: 89 MMHG | SYSTOLIC BLOOD PRESSURE: 135 MMHG | RESPIRATION RATE: 13 BRPM | WEIGHT: 210.98 LBS | HEIGHT: 73 IN | HEART RATE: 100 BPM | OXYGEN SATURATION: 96 %

## 2023-11-29 DIAGNOSIS — M48.061 SPINAL STENOSIS, LUMBAR REGION WITHOUT NEUROGENIC CLAUDICATION: ICD-10-CM

## 2023-11-29 DIAGNOSIS — Z98.89 OTHER SPECIFIED POSTPROCEDURAL STATES: Chronic | ICD-10-CM

## 2023-11-29 DIAGNOSIS — M47.10 OTHER SPONDYLOSIS WITH MYELOPATHY, SITE UNSPECIFIED: ICD-10-CM

## 2023-11-29 DIAGNOSIS — Z98.890 OTHER SPECIFIED POSTPROCEDURAL STATES: Chronic | ICD-10-CM

## 2023-11-29 DIAGNOSIS — M48.02 SPINAL STENOSIS, CERVICAL REGION: ICD-10-CM

## 2023-11-29 DIAGNOSIS — Z98.1 ARTHRODESIS STATUS: Chronic | ICD-10-CM

## 2023-11-29 DIAGNOSIS — Z01.818 ENCOUNTER FOR OTHER PREPROCEDURAL EXAMINATION: ICD-10-CM

## 2023-11-29 DIAGNOSIS — M47.812 SPONDYLOSIS WITHOUT MYELOPATHY OR RADICULOPATHY, CERVICAL REGION: ICD-10-CM

## 2023-11-29 LAB
GLUCOSE BLDC GLUCOMTR-MCNC: 129 MG/DL — HIGH (ref 70–99)
GLUCOSE BLDC GLUCOMTR-MCNC: 129 MG/DL — HIGH (ref 70–99)

## 2023-11-29 PROCEDURE — 99222 1ST HOSP IP/OBS MODERATE 55: CPT

## 2023-11-29 PROCEDURE — 72141 MRI NECK SPINE W/O DYE: CPT | Mod: 26

## 2023-11-29 PROCEDURE — 70551 MRI BRAIN STEM W/O DYE: CPT | Mod: 26

## 2023-11-29 RX ORDER — SENNA PLUS 8.6 MG/1
2 TABLET ORAL AT BEDTIME
Refills: 0 | Status: DISCONTINUED | OUTPATIENT
Start: 2023-11-29 | End: 2023-12-05

## 2023-11-29 RX ORDER — ASCORBIC ACID 60 MG
500 TABLET,CHEWABLE ORAL
Refills: 0 | Status: DISCONTINUED | OUTPATIENT
Start: 2023-11-29 | End: 2023-12-05

## 2023-11-29 RX ORDER — ACETAMINOPHEN 500 MG
1000 TABLET ORAL EVERY 8 HOURS
Refills: 0 | Status: DISCONTINUED | OUTPATIENT
Start: 2023-11-29 | End: 2023-12-05

## 2023-11-29 RX ORDER — TRAMADOL HYDROCHLORIDE 50 MG/1
50 TABLET ORAL EVERY 6 HOURS
Refills: 0 | Status: DISCONTINUED | OUTPATIENT
Start: 2023-11-29 | End: 2023-11-30

## 2023-11-29 RX ORDER — NORTRIPTYLINE HYDROCHLORIDE 10 MG/1
25 CAPSULE ORAL AT BEDTIME
Refills: 0 | Status: DISCONTINUED | OUTPATIENT
Start: 2023-11-29 | End: 2023-12-05

## 2023-11-29 RX ORDER — OXYCODONE HYDROCHLORIDE 5 MG/1
5 TABLET ORAL EVERY 4 HOURS
Refills: 0 | Status: DISCONTINUED | OUTPATIENT
Start: 2023-11-29 | End: 2023-11-30

## 2023-11-29 RX ORDER — ONDANSETRON 8 MG/1
4 TABLET, FILM COATED ORAL EVERY 6 HOURS
Refills: 0 | Status: DISCONTINUED | OUTPATIENT
Start: 2023-11-29 | End: 2023-12-05

## 2023-11-29 RX ORDER — GABAPENTIN 400 MG/1
600 CAPSULE ORAL THREE TIMES A DAY
Refills: 0 | Status: DISCONTINUED | OUTPATIENT
Start: 2023-11-29 | End: 2023-12-05

## 2023-11-29 RX ORDER — POLYETHYLENE GLYCOL 3350 17 G/17G
17 POWDER, FOR SOLUTION ORAL AT BEDTIME
Refills: 0 | Status: DISCONTINUED | OUTPATIENT
Start: 2023-11-29 | End: 2023-12-05

## 2023-11-29 RX ORDER — MAGNESIUM HYDROXIDE 400 MG/1
30 TABLET, CHEWABLE ORAL DAILY
Refills: 0 | Status: DISCONTINUED | OUTPATIENT
Start: 2023-11-29 | End: 2023-12-05

## 2023-11-29 RX ORDER — FOLIC ACID 0.8 MG
1 TABLET ORAL DAILY
Refills: 0 | Status: DISCONTINUED | OUTPATIENT
Start: 2023-11-29 | End: 2023-12-05

## 2023-11-29 RX ORDER — OXYCODONE HYDROCHLORIDE 5 MG/1
2.5 TABLET ORAL EVERY 4 HOURS
Refills: 0 | Status: DISCONTINUED | OUTPATIENT
Start: 2023-11-29 | End: 2023-11-30

## 2023-11-29 RX ORDER — PANTOPRAZOLE SODIUM 20 MG/1
40 TABLET, DELAYED RELEASE ORAL
Refills: 0 | Status: DISCONTINUED | OUTPATIENT
Start: 2023-11-29 | End: 2023-12-05

## 2023-11-29 RX ORDER — ACETAMINOPHEN 500 MG
1000 TABLET ORAL ONCE
Refills: 0 | Status: DISCONTINUED | OUTPATIENT
Start: 2023-11-29 | End: 2023-12-05

## 2023-11-29 RX ORDER — CYCLOBENZAPRINE HYDROCHLORIDE 10 MG/1
10 TABLET, FILM COATED ORAL THREE TIMES A DAY
Refills: 0 | Status: DISCONTINUED | OUTPATIENT
Start: 2023-11-29 | End: 2023-11-30

## 2023-11-29 RX ADMIN — NORTRIPTYLINE HYDROCHLORIDE 25 MILLIGRAM(S): 10 CAPSULE ORAL at 21:15

## 2023-11-29 RX ADMIN — Medication 500 MILLIGRAM(S): at 21:17

## 2023-11-29 RX ADMIN — Medication 1000 MILLIGRAM(S): at 22:13

## 2023-11-29 RX ADMIN — Medication 1 TABLET(S): at 21:14

## 2023-11-29 RX ADMIN — Medication 1000 MILLIGRAM(S): at 21:13

## 2023-11-29 RX ADMIN — SODIUM CHLORIDE 3 MILLILITER(S): 9 INJECTION INTRAMUSCULAR; INTRAVENOUS; SUBCUTANEOUS at 17:15

## 2023-11-29 RX ADMIN — Medication 1000 MILLIGRAM(S): at 11:30

## 2023-11-29 RX ADMIN — GABAPENTIN 600 MILLIGRAM(S): 400 CAPSULE ORAL at 21:14

## 2023-11-29 RX ADMIN — SODIUM CHLORIDE 3 MILLILITER(S): 9 INJECTION INTRAMUSCULAR; INTRAVENOUS; SUBCUTANEOUS at 21:53

## 2023-11-29 RX ADMIN — APREPITANT 40 MILLIGRAM(S): 80 CAPSULE ORAL at 11:30

## 2023-11-29 NOTE — CONSULT NOTE ADULT - ASSESSMENT
63-year-old male with history of bilateral carpal tunnel syndrome,  cervical stenosis status post surgery, presents for planned follow-up cervical surgery which is now deferred due to new complaint of lower extremity weakness.  He has several years history of lower back pain, less than 1 year history of bilateral upper extremity weakness worse distally with muscle wasting and fasciculations, 2 to 3 weeks of bilateral lower extremity weakness worse distally, right upper extent extremity sensory disturbance worse distal to the elbow hyperreflexia in lower extremities, absent Babinski.        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 Would also consider myelopathies given cervical history but patient is without evidence of UMN signs.     Recommendations:  [] Agree with MRI cervical thoracic and lumbar spine with and without contrast, can defer MRI of brain for now  []Might also consider muscle MRI, would consider anterior tibialis w/wo given severe progression of intrinsic hand muscles and newer foot drop  [] CBC/CMP, TSH, free T4, creatinine kinase, Myoglobin, Aldolase, ESR, CRP, PRASAD,  urinalysis, SPEP, UPEP, anti-ganglioside's, paraneoplastic panel, B12, Folate, B1, B6, Vitamin E (alpha tocopherol)  [] If abnormal CK would then pursue myositis associated antibody panel (importantly -Jo1, -PL7, -PL12, -OJ for antisynthetase syndrome; TIF1-gamma, - NXP2, -Mi2, -BA, - MDA5 for dermatomyositis, - HMG-CoA, -SRP for immune-mediated necrotizing myopathy)  [] CT CAP w/wo r/o occult malignancy  [] Consideration for EMG/nerve conduction study (might have to be outpatient), genetic testing, muscle biopsy, and potentially LP pending clinical course and results of above       Plan has not been discussed with attending. Patient to be seen and discussed on rounds 11/30. Please await attestation for final recommendations.

## 2023-11-29 NOTE — ASU PREOP CHECKLIST - PATIENT'S PERSONAL PROPERTY GIVEN TO
Physical Therapy Treatment    Patient Name:  Scarlett Reddy   MRN:  [id number]    Recommendations:     Discharge Recommendations:  home health PT, home health OT   Discharge Equipment Recommendations: walker, rolling   Barriers to discharge: Decreased caregiver support at current functional level     Assessment:     Scarlett Reddy is a 71 y.o. female admitted with a medical diagnosis of Liver transplanted.  She presents with the following impairments/functional limitations:  impaired self care skills, impaired functional mobilty, decreased coordination, edema, weakness, gait instability, impaired endurance, impaired balance, decreased lower extremity function. Noted improvement in BLE edema. Pt also with improved gait quality this date and increasing independence with transfers. However, continues to demo impaired endurance and generalized weakness, limiting further progression of mobility. Pt would continue to benefit from skilled acute PT in order to address current deficits and progress functional mobility.     Rehab Prognosis:  good; patient would benefit from acute skilled PT services to address these deficits and reach maximum level of function.      Recent Surgery: Procedure(s) (LRB):  ANGIOGRAM-HEPATIC (Left) 2 Days Post-Op    Plan:     During this hospitalization, patient to be seen 4 x/week to address the above listed problems via gait training, therapeutic activities, therapeutic exercises, neuromuscular re-education  · Plan of Care Expires:  12/15/18   Plan of Care Reviewed with: patient, spouse    Subjective     Communicated with RN prior to session.  Patient found UIC upon PT entry to room, agreeable to treatment.      Chief Complaint: none noted   Patient comments/goals: return home   Pain/Comfort:  · Pain Rating 1: 0/10    Patients cultural, spiritual, Hinduism conflicts given the current situation: none noted     Objective:     Patient found with: telemetry, pulse ox (continuous)     General  Precautions: Standard, fall, contact   Orthopedic Precautions:N/A   Braces: N/A     Functional Mobility:  · Bed Mobility:     · Sit to Supine: maximal assistance  · Transfers:     · Sit to Stand:  minimum assistance and of 2 persons with rolling walker  · Toilet Transfer: minimum assistance with BSC and rolling walker  using  Stand Pivot  · Gait: 130 ft. with RW and CGA  · Decreased giuseppe, decreased step length, impaired weight-shifting ability, decreased toe-floor clearance  · Multiple, intermittent standing rest breaks throughout gait       AM-PAC 6 CLICK MOBILITY  Turning over in bed (including adjusting bedclothes, sheets and blankets)?: 3  Sitting down on and standing up from a chair with arms (e.g., wheelchair, bedside commode, etc.): 2  Moving from lying on back to sitting on the side of the bed?: 2  Moving to and from a bed to a chair (including a wheelchair)?: 3  Need to walk in hospital room?: 3  Climbing 3-5 steps with a railing?: 2  Basic Mobility Total Score: 15       Therapeutic Activities and Exercises:  PT assisted pt to bathroom 2* pt reporting need to void. Transferred to [facility] with [place] and RW. Pt left to complete seated toileting.   Returned to room to complete gait training as described above.   Returned to bed following gait per team request.     Patient left supine with all lines intact, call button in reach, RN notified and pt's  and  present..    GOALS:   Multidisciplinary Problems     Physical Therapy Goals        Problem: Physical Therapy Goal    Goal Priority Disciplines Outcome Goal Variances Interventions   Physical Therapy Goal     PT, PT/OT Ongoing (interventions implemented as appropriate)     Description:  Goals to be met by: 2018    Patient will increase functional independence with mobility by performin. Supine to sit with Contact Guard Assistance - not met  2. Sit to stand transfer with Supervision using LRAD or no AD - not met  3. Bed to chair  transfer with Supervision using LRAD or no AD - not met  4. Gait  x 250 feet with Supervision using LRAD or no AD - not met  5. Stand for 3 minutes with Supervision to increase activity tolerance - not met   6. Lower extremity exercise program x15 reps per handout, with independence - not met                           Time Tracking:     PT Received On: 11/21/18  PT Start Time: 1035     PT Stop Time: 1055  PT Total Time (min): 20 min     Billable Minutes: Therapeutic Activity 20    Treatment Type: Treatment  PT/PTA: PT     PTA Visit Number: 0     Radha Nguyen PT, DPT   11/21/2018     on unit

## 2023-11-29 NOTE — ASU PATIENT PROFILE, ADULT - FALL HARM RISK - RISK INTERVENTIONS
Assistance OOB with selected safe patient handling equipment/Assistance with ambulation/Communicate Fall Risk and Risk Factors to all staff, patient, and family/Discuss with provider need for PT consult/Monitor gait and stability/Reinforce activity limits and safety measures with patient and family/Visual Cue: Yellow wristband/Bed in lowest position, wheels locked, appropriate side rails in place/Call bell, personal items and telephone in reach/Instruct patient to call for assistance before getting out of bed or chair/Non-slip footwear when patient is out of bed/[place] to call system/Physically safe environment - no spills, clutter or unnecessary equipment/Purposeful Proactive Rounding/Room/bathroom lighting operational, light cord in reach

## 2023-11-29 NOTE — CONSULT NOTE ADULT - ASSESSMENT
62yo M w/ PMH of ACDF C5-C7 w/ chronic cervical radiculopathy, DDD, foraminal stenosis C3-C7 and b/l carpal tunnel syndrome initially presented for planned cervical surgery but now deferred d/t new c/o LE weakness in the past 2-3 weeks.    Pt at MRI at time of encounter. Pending return for completion of assessment 62yo M w/ PMH of ACDF C5-C7 w/ chronic cervical radiculopathy, DDD, foraminal stenosis C3-C7 and b/l carpal tunnel syndrome initially presented for planned cervical surgery but now deferred d/t new c/o LE weakness in the past 2-3 weeks.

## 2023-11-29 NOTE — CONSULT NOTE ADULT - SUBJECTIVE AND OBJECTIVE BOX
Neurology - Consult Note    -  Spectra: [phone] ([facility]), [phone] ([facility])  -    HPI: Patient MARLON MADISON is a 63y (1960) man with a PMHx significant for CTS (b/l s/p CT release in 2018), Fusion of Cervical Vertebral Joint with itnerbody fusion device (7/23)    He notes LBP x several years (at least 2). In early 2023 he started developing upper extremity weakness worse distally and worse on the right.  He had issues putting on his shirts, holding things with his hands.  He can no longer make an okay sign or open his right hand. he presented to orthopedics and imaging found cervical stenosis.  In July '23 he had ACDF C4-5.  He believes he had an EMG and nerve conduction study of 6 abnormal alert at that time but is not sure the results and no documents in [facility], he had 4 days of intense right shoulder pain after the surgery which improved after injections.  Around Halloween he noticed worsening gait and had several falls including 1 where he bent over and fell on its face leading to a black eye, he does not think he had any loss of consciousness.  No subsequent nausea/vomiting.    For the last 2 to 3 weeks, prior to scheduled C3-C4 Anterior Cervical Discectomy and Fusion C3-C7 posterior cervical laminectomy and spinal fusion, he has been noticing progression of his lower extremity weakness.  He notes right is worse than the left and believes the weakness is worse distally in his feet and knee as opposed to his hip.  He feels his feet slap while walking, he notes that he can no longer run that his legs will not do what he wants them to do. He has had multiple falls and has bruising and scabs on his knees.    He denies any facial droop, ptosis, double vision, difficulty eating/coughing while eating.     He also endorses sensory changes.  He feels that when he is touched in the right upper extremity and on the right knee it is if he is wearing clothing even if he is not.    He notes 100 pound weight loss in 2020 which was intentional he has regained some of the weight. (280-->185-->110) he denies night sweats, fevers/chills..  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  He is a social drinker (single malt).  Denies family history of neurological conditions, other than stroke in grandfather.      He lives with his wife, 4 dogs, and 6 cats.    He appreciates wasting in his bilateral hands, has also noticed fasciculations there.  He has not noticed that things in his legs or feet.           Review of Systems:    CONSTITUTIONAL: No fevers or chills  EYES AND ENT: No visual changes or no throat pain   NECK: No pain or stiffness  RESPIRATORY: No hemoptysis or shortness of breath  CARDIOVASCULAR: No chest pain or palpitations  GASTROINTESTINAL: No melena or hematochezia  GENITOURINARY: No dysuria or hematuria  NEUROLOGICAL: +As stated in HPI above  SKIN: No itching, burning, rashes, or lesions   All other review of systems is negative unless indicated above.    Allergies:  No Known Drug Allergies  Pt has allergy to mustard (Anaphylaxis)      PMHx/PSHx/Family Hx: As above, otherwise see below   Kidney stone    Marijuana abuse    Spinal stenosis in cervical region    GERD (gastroesophageal reflux disease)    History of drug abuse    History of kidney stones    Carpal tunnel syndrome of left wrist    Arthritis    Major depression    Migraines    Osteoporosis    Stenosis of cervical spine    Cervical stenosis of spine    Radiculopathy, cervical region    Carpal tunnel syndrome, bilateral upper limbs    Spondylosis without myelopathy or radiculopathy, cervical region    2019 novel coronavirus disease (COVID-19)    Medical marijuana use    History of heroin use    History of depression        Social Hx:  +THC, no current tobacco (former 1 pack year) occaisonal alcohol, no current other drug use (remote hx of heroine use disorder)  Lives with wife, 6 dogs, and 4 cats    Home Medications:  cyclobenzaprine 10 mg oral tablet: 1 tab(s) orally once a day (at bedtime) - (continue home medication) (29 Nov 2023 11:19)  gabapentin 600 mg oral tablet: 1 tab(s) orally 3 times a day continue on home medication (29 Nov 2023 11:19)  ibuprofen 800 mg oral tablet: 1 tab(s) orally 3 times a day (29 Nov 2023 11:19)  nortriptyline 25 mg oral capsule: orally once a day (at bedtime) (29 Nov 2023 11:19)  omeprazole 20 mg oral delayed release capsule: 1 cap(s) orally once a day (at bedtime) (29 Nov 2023 11:19)  oxyCODONE 10 mg oral tablet: 1 tab(s) orally 1 to 2 times a day as needed for  severe pain (29 Nov 2023 11:19)      Medications:  MEDICATIONS  (STANDING):  acetaminophen     Tablet .. 1000 milliGRAM(s) Oral every 8 hours  acetaminophen   IVPB .. 1000 milliGRAM(s) IV Intermittent once  ascorbic acid 500 milliGRAM(s) Oral two times a day  calcium carbonate 1250 mG  + Vitamin D (OsCal 500 + D) 1 Tablet(s) Oral three times a day  chlorhexidine 2% Cloths 1 Application(s) Topical once  folic acid 1 milliGRAM(s) Oral daily  gabapentin 600 milliGRAM(s) Oral three times a day  lidocaine 1% Injectable 0.2 milliGRAM(s) Local Injection once  multivitamin 1 Tablet(s) Oral daily  nortriptyline 25 milliGRAM(s) Oral at bedtime  pantoprazole    Tablet 40 milliGRAM(s) Oral before breakfast  polyethylene glycol 3350 17 Gram(s) Oral at bedtime  senna 2 Tablet(s) Oral at bedtime  sodium chloride 0.9% lock flush 3 milliLiter(s) IV Push every 8 hours    MEDICATIONS  (PRN):  aluminum hydroxide/magnesium hydroxide/simethicone Suspension 30 milliLiter(s) Oral four times a day PRN Indigestion  cyclobenzaprine 10 milliGRAM(s) Oral three times a day PRN Muscle Spasm  magnesium hydroxide Suspension 30 milliLiter(s) Oral daily PRN Constipation  ondansetron Injectable 4 milliGRAM(s) IV Push every 6 hours PRN Nausea and/or Vomiting  oxyCODONE    IR 5 milliGRAM(s) Oral every 4 hours PRN Severe Pain (7 - 10)  oxyCODONE    IR 2.5 milliGRAM(s) Oral every 4 hours PRN Moderate Pain (4 - 6)  traMADol 50 milliGRAM(s) Oral every 6 hours PRN Mild Pain (1 - 3)      Vitals:  T(C): 36.6 (11-29-23 @ 20:43), Max: 36.6 (11-29-23 @ 20:43)  HR: 89 (11-29-23 @ 20:43) (89 - 100)  BP: 151/89 (11-29-23 @ 20:43) (132/88 - 156/92)  RR: 18 (11-29-23 @ 20:43) (13 - 18)  SpO2: 95% (11-29-23 @ 20:43) (94% - 96%)    Physical Examination:   General - NAD    Eyes - Fundoscopy not performed due to safety precautions in the setting of the COVID-19 pandemic    Neurologic Exam:  Mental status - Awake, Alert, Oriented to person, place, and time. Speech fluent, conversational. Follows complex commands. Good historian    Cranial nerves - PERRL, VFF, EOMI, face sensation (V1-V3) intact b/l, facial strength intact without asymmetry b/l, hearing intact b/l, palate with symmetric elevation, strength b/l, tongue midline on protrusion with full lateral movement    RIGHT SCM (left head turn) 4/5  LEFT SCM (right head turn) 5/5  Right Trap 4/5  Left trap 5/5      Motor - Normal tone throughout. Right>Left pronator drift; does not hit bed  Muscle wasting in b/l hands    Strength testing            Deltoid      Biceps      Triceps     Wrist Extension    Wrist Flexion     Interossei         R            4                 4               4+                   4                4                        3           4-  L             5                 4+               4+                   4                 4                      3            4              Hip Flexion    Hip Extension    Knee Flexion    Knee Extension    Dorsiflexion    Plantar Flexion  R              5                           5                  4                        4+                4+                        4  L              5                           5                     4+                          5            4+                         4    Limited ability to do sit up but beevor sign is absent    Sensation -   LT Decreased RUE compared to LUE, decreased in right knee  PP decreased RUE (only distal to elbow) and in right knee     LT and PP present in b/l LE (save for right knee), torso and back    DTR's -             Biceps      Triceps     Brachioradialis      Patellar    Ankle    Toes/plantar response  R             0+             2+                  2+                0+         0+                 Down  L              2+             2+                 2+                  0+        0+                 Down    Coordination - Finger to Nose intact b/l. No tremors appreciated    Gait and station - Hunched posture. Able to walk, Overall unsteady. narrow base, short steps. Able to briefly stand on toes and heels but unable to walk like so.     Labs:          CAPILLARY BLOOD GLUCOSE      POCT Blood Glucose.: 129 mg/dL (29 Nov 2023 10:47)          CSF:                  Radiology:  < from: MR Cervical Spine No Cont (11.03.23 @ 18:49) >    EXAM: [id number] - MR SPINE CERVICAL  - ORDERED BY: ABHI CHOUDHARY      PROCEDURE DATE:  11/03/2023           INTERPRETATION:  Clinical indication: Cervical radiculopathy and weakness    MRI of the cervical spine was performed sagittal T1-T2 and STIR   sequences. Axial T1 and T2-weighted sequence was performed    This exam is compared with prior CT myelogram of the cervical spine   performed on June 28, 2023.    Loss of the normal cervical lordosis is seen    Postoperative changes compatible with discectomies are seen involving the   C4-5, C5-C6 and C6-7 levels with spinal fusion seen involving the C5-C7   levels. These findings were present on the prior study.    The vertebral body height and alignment appear normal    Mild disc desiccation is seen involving C2-3, C3-4, C7-T1, T1-T2, T2-3   and T3-4 levels.    C2-3: Mild disc bulge and bilateral hypertrophic facet joint changes   seen. Moderate narrowing of the left neural foramen.    C3-4: Disc osteophyte complex is seen. Hypertrophic change involving both   facet and uncal vertebral joints. Mild to moderate narrowing of the   spinal canal. Moderate to severe narrowing of the right neural foramen   and severe narrowing of the left neural foramen    C4-5: Disc osteophyte complex is seen. Hypertrophic change involving both   facet and uncovertebral joints. Mild to moderate narrowing of the spinal   canal. Moderate narrowing of the left neural foramen and moderate   narrowing of the right neural foramen    C5-6: Disc bulge is seen. Hypertrophic change is seen involving both   facet and uncovertebral joint. Moderate to severe narrowing of the left   neural foramen and severe narrowing of the right neural foramen. Mild   narrowing of the spinal canal.    C6-7: Disc bulge is seen. Hypertrophic change involving both facet and   uncovertebral joints. Mild narrowing of the spinal canal. Moderate   narrowing of the right neural foramen and severe narrowing of the left   neural foramen    C7-T1: Normal    T1-2: Normal    The spinal cord demonstrates normal signal and caliber.    Evaluation of the paraspinal soft tissues appear normal.    IMPRESSION: Postop changes as described above.    Degenerative changes as described above.    --- End of Report ---               LYNN MEANS MD; Attending Radiologist   This document has been electronically signed. Nov 8 2023  5:16PM    < end of copied text >

## 2023-11-29 NOTE — H&P ADULT - HISTORY OF PRESENT ILLNESS
Patient is a 63y Male who presents c/o neck pain and LBP for multiple months. Patient more recently has started making note of weakness and loss of function.  Patient states that for the past few months he's had increasing issues with putting on shirts, opening water bottles, eating, fine motor movements of the hands.   Patient states he had CTS, Bilateral with carpal tunnel releases done 4-5 years ago, which did not seem to improve his weakness.  Patient was seen in Dr. Quinones's office about 4 weeks ago after one recent fall, and he was diagnosed with cervical myelopathy.  Patient was having mild balance issues at that time.    Since that time, patient has started to feel more weakness in BLLE, R>L. Also having some sensation changes to his BL knees.  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  Patient states that he has had gradually worsening weakness in his bilateral legs. Concerning for further compression at the lumbar spine.   Denies HS/LOC. Denies pain/injury elsewhere. Denies bowel/bladder incontinence. Denies saddle anesthesia. Denies fevers/chills. No other complaints at this time.    HEALTH ISSUES - PROBLEM Dx:          MEDICATIONS  (STANDING):  acetaminophen     Tablet .. 1000 milliGRAM(s) Oral every 8 hours  acetaminophen   IVPB .. 1000 milliGRAM(s) IV Intermittent once  ascorbic acid 500 milliGRAM(s) Oral two times a day  calcium carbonate 1250 mG  + Vitamin D (OsCal 500 + D) 1 Tablet(s) Oral three times a day  chlorhexidine 2% Cloths 1 Application(s) Topical once  folic acid 1 milliGRAM(s) Oral daily  lidocaine 1% Injectable 0.2 milliGRAM(s) Local Injection once  multivitamin 1 Tablet(s) Oral daily  pantoprazole    Tablet 40 milliGRAM(s) Oral before breakfast  polyethylene glycol 3350 17 Gram(s) Oral at bedtime  senna 2 Tablet(s) Oral at bedtime  sodium chloride 0.9% lock flush 3 milliLiter(s) IV Push every 8 hours      Allergies    No Known Drug Allergies  Pt has allergy to mustard (Anaphylaxis)    Intolerances        PAST MEDICAL & SURGICAL HISTORY:  Kidney stone  multiple    Marijuana abuse    Spinal stenosis in cervical region  s/p fusion 2017    GERD (gastroesophageal reflux disease)    History of drug abuse  19 years in remission ( heroin )    History of kidney stones  multiple episodes, last in 2015    Carpal tunnel syndrome of left wrist    Arthritis    Major depression    Migraines    Osteoporosis    Stenosis of cervical spine    Cervical stenosis of spine    Radiculopathy, cervical region    Carpal tunnel syndrome, bilateral upper limbs    Spondylosis without myelopathy or radiculopathy, cervical region    2019 novel coronavirus disease (COVID-19)    Medical marijuana use    History of heroin use    History of depression    No significant past surgical history    History of renal stent  prior insertion in Oct 2014 and exchanged in   right side - Jan 17th , 2015    S/P cystoscopy  Oct 2014, h/o renal stent    Status post spinal disc removal  C-5 C-6 removal    H/O spinal fusion  8/16/2017  anterior approach cervical spine  C5-C6    History of lithotripsy  multiple, most recent 2015, 2022    History of fusion of cervical spine    History of carpal tunnel surgery of left wrist    History of colonoscopy    History of endoscopy    S/P cervical discectomy                            Vital Signs Last 24 Hrs  T(C): 36.5 (11-29-23 @ 11:09), Max: 36.5 (11-29-23 @ 11:09)  T(F): 97.7 (11-29-23 @ 11:09), Max: 97.7 (11-29-23 @ 11:09)  HR: 100 (11-29-23 @ 11:09) (100 - 100)  BP: 135/89 (11-29-23 @ 11:09) (135/89 - 135/89)  BP(mean): --  RR: 13 (11-29-23 @ 11:09) (13 - 13)  SpO2: 96% (11-29-23 @ 11:09) (96% - 96%)    Physical Exam:  Gen: NAD  Spine:  Skin intact  No gross deformity  +midline TTP C/L/S spine  No bony step offs  No paraspinal muscle ttp/hypertonicity   Negative Straight leg raise  Negative clonus  Negative babinski  Negative brunson  + rectal tone, active/passive; no perianal anesthesia in 4 quadrants  No saddle anesthesia    Motor:                   C5                C6              C7               C8           T1   R            3/5                3/5            4/5             3/5          1/5  L             4/5               4/5             4/5             4/5          4/5                L2             L3             L4               L5            S1  R         4/5           4/5          5/5             3/5           4/5  L          4/5          4/5           5/5             3/5           4/5    Sensory:            C5         C6         C7      C8       T1        (0=absent, 1=impaired, 2=normal, NT=not testable)  R         1            1           1        1         1  L          1            1           1        1         1    Patient states that R arm feels numb, like he's wearing a long sleeve shirt;  Left arm has mild paresthesias present throuought, has more sensation that RUE               L2          L3         L4      L5       S1         (0=absent, 1=impaired, 2=normal, NT=not testable)  R         1            1            2        2        2  L          1            1           2        2         2    Imaging:   FU MR C/T/L Sp    A/P: 63y Male with Cervical myelopathy, now with significant weakness in BLLE   Plan for cervical procedures currently on hold  Admit for further workup of different etiologies of BLLE weakness;  Obtain new MR C/T/LSp  Neurology consult for further evaluation  Medicine consult for medical optimization  Pain control  WBAT with assistive devices as needed  FU imaging  SCDs/ ambulation

## 2023-11-29 NOTE — CONSULT NOTE ADULT - SUBJECTIVE AND OBJECTIVE BOX
INTERNAL MEDICINE CONSULT  ----------------------------------------  Citlali Salcido MD  Internal Medicine  Available over MS Teams or Pager [phone] between 8PM-8AM    SUBJECTIVE:  Pt is a 64yo M w/ PMH of ACDF C5-C7 w/ chronic cervical radiculopathy, DDD, foraminal stenosis C3-C7 and b/l carpal tunnel syndrome pw neck pain and LBP for multiple months c/b more recent weakness in upper and lower extremities.    Endorses a year of LBP and recent development of UE weakness R > L, and worse distally along w/ loss of function w/ fine motor skills including putting his shirts on or opening water bottles for which he was evaluated by ortho and dx w/ cervical stenosis s/p ACDF C4-C5 in 07/2023. More recently in the past few months he began to notice imbalance and b/l LE weakness w/ R >L. States unable to get out of truck a couple of weeks ago leading to him falling and unable to get up d/t weakness and multiple other falls.     Otherwise denies any HS/LOC, F/C, pain/injury elsewhere, bowel/bladder incontinence or saddle anesthesia.     Pt admitted to orthopedic service, medicine consulted for medical optimization.    ROS:  CONSTITUTIONAL: No fever, weight loss  EYES: No eye pain, visual disturbances, or discharge  ENMT:  No difficulty hearing, tinnitus, vertigo; No sinus or throat pain  RESPIRATORY: No SOB. No cough, wheezing, chills or hemoptysis  CARDIOVASCULAR: No chest pain, palpitations, dizziness, or leg swelling  GASTROINTESTINAL: No abdominal or epigastric pain. No nausea, vomiting, or hematemesis; No diarrhea or constipation. No melena or hematochezia.  GENITOURINARY: No dysuria, frequency, hematuria, or incontinence  NEUROLOGICAL: No headaches, memory loss, loss of strength, numbness, or tremors  SKIN: No itching, burning, rashes, or lesions   LYMPH NODES: No enlarged glands  ENDOCRINE: No heat or cold intolerance; No hair loss  MUSCULOSKELETAL: Weakness  PSYCHIATRIC: No depression, anxiety, mood swings, or difficulty sleeping  HEME/LYMPH: No easy bruising, or bleeding gums    PAST MEDICAL & SURGICAL HISTORY:  - Spinal stenosis in cervical region s/p fusion 2017  - GERD (gastroesophageal reflux disease)  - History of kidney stones multiple episodes, 2015, 2022 s/p lithotripsy  - Arthritis  - Migraines  - Osteoporosis  - Cervical stenosis of spine  - Radiculopathy, cervical region  - Carpal tunnel syndrome, bilateral upper limbs  - Spondylosis without myelopathy or radiculopathy, cervical region  - 2019 novel coronavirus disease (COVID-19)  - Medical marijuana use  - History of heroin use  - History of depression  - S/P cystoscopy  - Oct 2014, h/o renal stent  - History of carpal tunnel surgery of left wrist    FAMILY HISTORY:  - Grandfather- CVA    SOCIAL HISTORY:  - Former tobacco use 1ppd x2yrs, quit 1980s   - Social EtOH consumption  - Former heroin use, 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    ALLERGIES:  - NKDA    HOME MEDICATIONS:  cyclobenzaprine 10 mg oral tablet: 1 tab(s) orally once a day (at bedtime) - (continue home medication) (29 Nov 2023 11:19)  gabapentin 600 mg oral tablet: 1 tab(s) orally 3 times a day continue on home medication (29 Nov 2023 11:19)  ibuprofen 800 mg oral tablet: 1 tab(s) orally 3 times a day (29 Nov 2023 11:19)  nortriptyline 25 mg oral capsule: orally once a day (at bedtime) (29 Nov 2023 11:19)  omeprazole 20 mg oral delayed release capsule: 1 cap(s) orally once a day (at bedtime) (29 Nov 2023 11:19)  oxyCODONE 10 mg oral tablet: 1 tab(s) orally 1 to 2 times a day as needed for  severe pain (29 Nov 2023 11:19)    HOSPITAL MEDICATIONS  (STANDING):  acetaminophen     Tablet .. 1000 milliGRAM(s) Oral every 8 hours  acetaminophen   IVPB .. 1000 milliGRAM(s) IV Intermittent once  ascorbic acid 500 milliGRAM(s) Oral two times a day  calcium carbonate 1250 mG  + Vitamin D (OsCal 500 + D) 1 Tablet(s) Oral three times a day  chlorhexidine 2% Cloths 1 Application(s) Topical once  folic acid 1 milliGRAM(s) Oral daily  lidocaine 1% Injectable 0.2 milliGRAM(s) Local Injection once  multivitamin 1 Tablet(s) Oral daily  pantoprazole    Tablet 40 milliGRAM(s) Oral before breakfast  polyethylene glycol 3350 17 Gram(s) Oral at bedtime  senna 2 Tablet(s) Oral at bedtime  sodium chloride 0.9% lock flush 3 milliLiter(s) IV Push every 8 hours    OBJECTIVE:  Vital Signs Last 24 Hrs  T(C): 36.6 (29 Nov 2023 20:43), Max: 36.6 (29 Nov 2023 20:43)  T(F): 97.9 (29 Nov 2023 20:43), Max: 97.9 (29 Nov 2023 20:43)  HR: 89 (29 Nov 2023 20:43) (89 - 100)  BP: 151/89 (29 Nov 2023 20:43) (132/88 - 156/92)  RR: 18 (29 Nov 2023 20:43) (13 - 18)  SpO2: 95% (29 Nov 2023 20:43) (94% - 96%)    Parameters below as of 29 Nov 2023 20:43  Patient On (Oxygen Delivery Method): room air    PHYSICAL:  CONSTITUTIONAL: Well-groomed, in no apparent distress  EYES: No conjunctival or scleral injection, non-icteric;   ENMT: No external nasal lesions; MMM  NECK: Trachea midline without palpable neck mass; thyroid not enlarged and non-tender  RESPIRATORY: Breathing comfortably; no dullness to percussion; lungs CTA without wheeze/rhonchi/rales  CARDIOVASCULAR: +S1S2, RRR, no M/G/R; pedal pulses full and symmetric; no lower extremity edema  GASTROINTESTINAL: No palpable masses or tenderness, +BS throughout, no rebound/guarding; no hepatosplenomegaly; no hernia palpated  LYMPHATIC: No cervical LAD or tenderness  SKIN: No rashes or ulcers noted  MSK: +midline TTP C/L/S spine  NEUROLOGIC: CN II-XII intact; Negative clonus, babinski, brunson  PSYCHIATRIC: A&Ox3; mood and affect appropriate; appropriate insight and judgment    LABS:  - Labs from 11/16 reviewed by me- non-actionable     IMAGING:  CT 3D Reconstruct w/o Workstation (11.03.23 @ 18:39)  IMPRESSION:  *  Interval C4-C5 anterior interbody fusion. No obvious fusion at C4-C5.  *  Prior C5-C7 ACDF. Ankylosis across C5-C6 and C6-C7.  *  Moderate multilevel cervical spondylosis with multilevel spinal canal and foraminal narrowing.    Consultant(s) Notes Reviewed:  [X] YES  [ ] NO  Care Discussed with Consultants/Other Providers [X] YES  [ ] NO   INTERNAL MEDICINE CONSULT  ----------------------------------------  Citlali Salcido MD  Internal Medicine  Available over MS Teams or Pager [phone] between 8PM-8AM    SUBJECTIVE:  Pt is a 64yo M w/ PMH of ACDF C5-C7 w/ chronic cervical radiculopathy, DDD, foraminal stenosis C3-C7 and b/l carpal tunnel syndrome pw neck pain and LBP for multiple months c/b more recent weakness in upper and lower extremities.    Endorses a year of LBP and recent development of UE weakness R > L, and worse distally along w/ loss of function w/ fine motor skills including putting his shirts on or opening water bottles for which he was evaluated by ortho and dx w/ cervical stenosis s/p ACDF C4-C5 in 07/2023. More recently in the past few months he began to notice imbalance and b/l LE weakness w/ R >L. States unable to get out of truck a couple of weeks ago leading to him falling and unable to get up d/t weakness and multiple other falls.     Otherwise denies any HS/LOC, F/C, pain/injury elsewhere, bowel/bladder incontinence or saddle anesthesia.     Pt admitted to orthopedic service, medicine consulted for medical optimization.    ROS:  CONSTITUTIONAL: No fever, weight loss  EYES: No eye pain, visual disturbances, or discharge  ENMT:  No difficulty hearing, tinnitus, vertigo; No sinus or throat pain  RESPIRATORY: No SOB. No cough, wheezing, chills or hemoptysis  CARDIOVASCULAR: No chest pain, palpitations, dizziness, or leg swelling  GASTROINTESTINAL: No abdominal or epigastric pain. No nausea, vomiting, or hematemesis; No diarrhea or constipation. No melena or hematochezia.  GENITOURINARY: No dysuria, frequency, hematuria, or incontinence  NEUROLOGICAL: No headaches, memory loss, loss of strength, numbness, or tremors  SKIN: No itching, burning, rashes, or lesions   LYMPH NODES: No enlarged glands  ENDOCRINE: No heat or cold intolerance; No hair loss  MUSCULOSKELETAL: Weakness  PSYCHIATRIC: No depression, anxiety, mood swings, or difficulty sleeping  HEME/LYMPH: No easy bruising, or bleeding gums    PAST MEDICAL & SURGICAL HISTORY:  - Spinal stenosis in cervical region s/p fusion 2017  - GERD (gastroesophageal reflux disease)  - History of kidney stones multiple episodes, 2015, 2022 s/p lithotripsy  - Arthritis  - Migraines  - Osteoporosis  - Cervical stenosis of spine  - Radiculopathy, cervical region  - Carpal tunnel syndrome, bilateral upper limbs  - Spondylosis without myelopathy or radiculopathy, cervical region  - 2019 novel coronavirus disease (COVID-19)  - Medical marijuana use  - History of heroin use  - History of depression  - S/P cystoscopy  - Oct 2014, h/o renal stent  - History of carpal tunnel surgery of left wrist    FAMILY HISTORY:  - Grandfather- CVA    SOCIAL HISTORY:  - Former tobacco use 1ppd x2yrs, quit 1980s   - Social EtOH consumption  - Former heroin use, 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    ALLERGIES:  - NKDA    HOME MEDICATIONS:  cyclobenzaprine 10 mg oral tablet: 1 tab(s) orally once a day (at bedtime) - (continue home medication) (29 Nov 2023 11:19)  gabapentin 600 mg oral tablet: 1 tab(s) orally 3 times a day continue on home medication (29 Nov 2023 11:19)  ibuprofen 800 mg oral tablet: 1 tab(s) orally 3 times a day (29 Nov 2023 11:19)  nortriptyline 25 mg oral capsule: orally once a day (at bedtime) (29 Nov 2023 11:19)  omeprazole 20 mg oral delayed release capsule: 1 cap(s) orally once a day (at bedtime) (29 Nov 2023 11:19)  oxyCODONE 10 mg oral tablet: 1 tab(s) orally 1 to 2 times a day as needed for  severe pain (29 Nov 2023 11:19)    HOSPITAL MEDICATIONS  (STANDING):  acetaminophen     Tablet .. 1000 milliGRAM(s) Oral every 8 hours  acetaminophen   IVPB .. 1000 milliGRAM(s) IV Intermittent once  ascorbic acid 500 milliGRAM(s) Oral two times a day  calcium carbonate 1250 mG  + Vitamin D (OsCal 500 + D) 1 Tablet(s) Oral three times a day  chlorhexidine 2% Cloths 1 Application(s) Topical once  folic acid 1 milliGRAM(s) Oral daily  lidocaine 1% Injectable 0.2 milliGRAM(s) Local Injection once  multivitamin 1 Tablet(s) Oral daily  pantoprazole    Tablet 40 milliGRAM(s) Oral before breakfast  polyethylene glycol 3350 17 Gram(s) Oral at bedtime  senna 2 Tablet(s) Oral at bedtime  sodium chloride 0.9% lock flush 3 milliLiter(s) IV Push every 8 hours    OBJECTIVE:  Vital Signs Last 24 Hrs  T(C): 36.6 (29 Nov 2023 20:43), Max: 36.6 (29 Nov 2023 20:43)  T(F): 97.9 (29 Nov 2023 20:43), Max: 97.9 (29 Nov 2023 20:43)  HR: 89 (29 Nov 2023 20:43) (89 - 100)  BP: 151/89 (29 Nov 2023 20:43) (132/88 - 156/92)  RR: 18 (29 Nov 2023 20:43) (13 - 18)  SpO2: 95% (29 Nov 2023 20:43) (94% - 96%)    Parameters below as of 29 Nov 2023 20:43  Patient On (Oxygen Delivery Method): room air    PHYSICAL:  CONSTITUTIONAL: Well-groomed, in no apparent distress  EYES: No conjunctival or scleral injection, non-icteric;   ENMT: No external nasal lesions; MMM  NECK: Trachea midline without palpable neck mass; thyroid not enlarged and non-tender  RESPIRATORY: Breathing comfortably; no dullness to percussion; lungs CTA without wheeze/rhonchi/rales  CARDIOVASCULAR: +S1S2, RRR, no M/G/R; pedal pulses full and symmetric; no lower extremity edema  GASTROINTESTINAL: No palpable masses or tenderness, +BS throughout, no rebound/guarding; no hepatosplenomegaly; no hernia palpated  LYMPHATIC: No cervical LAD or tenderness  SKIN: No rashes or ulcers noted  MSK: +midline TTP C/L/S spine  NEUROLOGIC: CN II-XII intact; Negative clonus, babinski, brunson. 4/5 strength in b/l LE  PSYCHIATRIC: A&Ox3; mood and affect appropriate; appropriate insight and judgment    LABS:  - Labs from 11/16 reviewed by me- non-actionable     IMAGING:  CT 3D Reconstruct w/o Workstation (11.03.23 @ 18:39)  IMPRESSION:  *  Interval C4-C5 anterior interbody fusion. No obvious fusion at C4-C5.  *  Prior C5-C7 ACDF. Ankylosis across C5-C6 and C6-C7.  *  Moderate multilevel cervical spondylosis with multilevel spinal canal and foraminal narrowing.    Consultant(s) Notes Reviewed:  [X] YES  [ ] NO  Care Discussed with Consultants/Other Providers [X] YES  [ ] NO

## 2023-11-29 NOTE — CONSULT NOTE ADULT - ATTENDING COMMENTS
MRI brain reviewed myself. Please see below report from radiology.   1. Unremarkable MR of the brain. No focal brain lesion.  2. CERVICAL SPINE:   Status post anterior stabilization C4-C7 with   routine postoperative appearance. Residual degenerative foraminal   compromise without significant central canal compromise. C1-C2   osteoarthritis. Cervical cord remains intact. No significant interval   change 11/3/2023.  3. THORACIC SPINE:   Low-grade thoracic degenerative disc disease. Intact   thoracic cord.  4. LUMBAR SPINE:   Low-grade lumbar degenerative disc disease.   Intact   conus and cauda equina.    I interviewed the patient, discussed the case with the Resident and agree with the findings and plan as documented in the Resident's note except below.  Etiology of chronic above symptoms are uncertain but may be due to the multiple entrapment neuropathies. Patient will benefit from EMG and NCV to rule out treatable etiologies.      Continue medical management, neuro- check and fall precaution.  GI and DVT prophylaxis.  I discussed the diagnosis and treatment plan with the patient. Risk benefit and alternatives to the treatment were discussed. All questions and concerns were addressed. The patient demonstrated good understanding of the treatment plan.  My cumulative time taking care of this patient is 80 minutes  If you have any further questions, please do not hesitate to contact our consult service.  Thank you for allowing us to participate in this patient care.

## 2023-11-29 NOTE — ASU PREOP CHECKLIST - BOWEL PREP
[address] 
[facility] EMERGENCY DEPT 
[address] [place] [address] [facility] [id number] [phone] Work/School Note Date: 10/5/2020 To Whom It May concern: 
 
Pranav Ogden was seen and treated today in the emergency room by the following provider(s): 
Attending Provider: Bijan Hamilton MD.   
 
Pranav Ogden may return to work on 10/8/2020.  
 
Sincerely, 
 
 
 
 
Carmelo Zaldivar MD 
 
 
 
 n/a

## 2023-11-30 DIAGNOSIS — Z29.9 ENCOUNTER FOR PROPHYLACTIC MEASURES, UNSPECIFIED: ICD-10-CM

## 2023-11-30 DIAGNOSIS — M79.604 PAIN IN RIGHT LEG: ICD-10-CM

## 2023-11-30 DIAGNOSIS — M48.02 SPINAL STENOSIS, CERVICAL REGION: ICD-10-CM

## 2023-11-30 LAB
ANION GAP SERPL CALC-SCNC: 13 MMOL/L — SIGNIFICANT CHANGE UP (ref 5–17)
ANION GAP SERPL CALC-SCNC: 13 MMOL/L — SIGNIFICANT CHANGE UP (ref 5–17)
BUN SERPL-MCNC: 16 MG/DL — SIGNIFICANT CHANGE UP (ref 7–23)
BUN SERPL-MCNC: 16 MG/DL — SIGNIFICANT CHANGE UP (ref 7–23)
CALCIUM SERPL-MCNC: 10.1 MG/DL — SIGNIFICANT CHANGE UP (ref 8.4–10.5)
CALCIUM SERPL-MCNC: 10.1 MG/DL — SIGNIFICANT CHANGE UP (ref 8.4–10.5)
CHLORIDE SERPL-SCNC: 104 MMOL/L — SIGNIFICANT CHANGE UP (ref 96–108)
CHLORIDE SERPL-SCNC: 104 MMOL/L — SIGNIFICANT CHANGE UP (ref 96–108)
CO2 SERPL-SCNC: 24 MMOL/L — SIGNIFICANT CHANGE UP (ref 22–31)
CO2 SERPL-SCNC: 24 MMOL/L — SIGNIFICANT CHANGE UP (ref 22–31)
CREAT SERPL-MCNC: 0.88 MG/DL — SIGNIFICANT CHANGE UP (ref 0.5–1.3)
CREAT SERPL-MCNC: 0.88 MG/DL — SIGNIFICANT CHANGE UP (ref 0.5–1.3)
EGFR: 98 ML/MIN/1.73M2 — SIGNIFICANT CHANGE UP
EGFR: 98 ML/MIN/1.73M2 — SIGNIFICANT CHANGE UP
GLUCOSE SERPL-MCNC: 96 MG/DL — SIGNIFICANT CHANGE UP (ref 70–99)
GLUCOSE SERPL-MCNC: 96 MG/DL — SIGNIFICANT CHANGE UP (ref 70–99)
HCT VFR BLD CALC: 42.9 % — SIGNIFICANT CHANGE UP (ref 39–50)
HCT VFR BLD CALC: 42.9 % — SIGNIFICANT CHANGE UP (ref 39–50)
HCV AB S/CO SERPL IA: 0.04 S/CO — SIGNIFICANT CHANGE UP (ref 0–0.99)
HCV AB S/CO SERPL IA: 0.04 S/CO — SIGNIFICANT CHANGE UP (ref 0–0.99)
HCV AB SERPL-IMP: SIGNIFICANT CHANGE UP
HCV AB SERPL-IMP: SIGNIFICANT CHANGE UP
HGB BLD-MCNC: 14.5 G/DL — SIGNIFICANT CHANGE UP (ref 13–17)
HGB BLD-MCNC: 14.5 G/DL — SIGNIFICANT CHANGE UP (ref 13–17)
MCHC RBC-ENTMCNC: 29.2 PG — SIGNIFICANT CHANGE UP (ref 27–34)
MCHC RBC-ENTMCNC: 29.2 PG — SIGNIFICANT CHANGE UP (ref 27–34)
MCHC RBC-ENTMCNC: 33.8 GM/DL — SIGNIFICANT CHANGE UP (ref 32–36)
MCHC RBC-ENTMCNC: 33.8 GM/DL — SIGNIFICANT CHANGE UP (ref 32–36)
MCV RBC AUTO: 86.3 FL — SIGNIFICANT CHANGE UP (ref 80–100)
MCV RBC AUTO: 86.3 FL — SIGNIFICANT CHANGE UP (ref 80–100)
NRBC # BLD: 0 /100 WBCS — SIGNIFICANT CHANGE UP (ref 0–0)
NRBC # BLD: 0 /100 WBCS — SIGNIFICANT CHANGE UP (ref 0–0)
PLATELET # BLD AUTO: 212 K/UL — SIGNIFICANT CHANGE UP (ref 150–400)
PLATELET # BLD AUTO: 212 K/UL — SIGNIFICANT CHANGE UP (ref 150–400)
POTASSIUM SERPL-MCNC: 3.8 MMOL/L — SIGNIFICANT CHANGE UP (ref 3.5–5.3)
POTASSIUM SERPL-MCNC: 3.8 MMOL/L — SIGNIFICANT CHANGE UP (ref 3.5–5.3)
POTASSIUM SERPL-SCNC: 3.8 MMOL/L — SIGNIFICANT CHANGE UP (ref 3.5–5.3)
POTASSIUM SERPL-SCNC: 3.8 MMOL/L — SIGNIFICANT CHANGE UP (ref 3.5–5.3)
RBC # BLD: 4.97 M/UL — SIGNIFICANT CHANGE UP (ref 4.2–5.8)
RBC # BLD: 4.97 M/UL — SIGNIFICANT CHANGE UP (ref 4.2–5.8)
RBC # FLD: 12.4 % — SIGNIFICANT CHANGE UP (ref 10.3–14.5)
RBC # FLD: 12.4 % — SIGNIFICANT CHANGE UP (ref 10.3–14.5)
SODIUM SERPL-SCNC: 141 MMOL/L — SIGNIFICANT CHANGE UP (ref 135–145)
SODIUM SERPL-SCNC: 141 MMOL/L — SIGNIFICANT CHANGE UP (ref 135–145)
WBC # BLD: 6.49 K/UL — SIGNIFICANT CHANGE UP (ref 3.8–10.5)
WBC # BLD: 6.49 K/UL — SIGNIFICANT CHANGE UP (ref 3.8–10.5)
WBC # FLD AUTO: 6.49 K/UL — SIGNIFICANT CHANGE UP (ref 3.8–10.5)
WBC # FLD AUTO: 6.49 K/UL — SIGNIFICANT CHANGE UP (ref 3.8–10.5)

## 2023-11-30 PROCEDURE — 99223 1ST HOSP IP/OBS HIGH 75: CPT | Mod: GC

## 2023-11-30 PROCEDURE — 99222 1ST HOSP IP/OBS MODERATE 55: CPT

## 2023-11-30 PROCEDURE — 72148 MRI LUMBAR SPINE W/O DYE: CPT | Mod: 26

## 2023-11-30 PROCEDURE — 72146 MRI CHEST SPINE W/O DYE: CPT | Mod: 26

## 2023-11-30 PROCEDURE — 99232 SBSQ HOSP IP/OBS MODERATE 35: CPT

## 2023-11-30 RX ORDER — CYCLOBENZAPRINE HYDROCHLORIDE 10 MG/1
5 TABLET, FILM COATED ORAL THREE TIMES A DAY
Refills: 0 | Status: DISCONTINUED | OUTPATIENT
Start: 2023-11-30 | End: 2023-12-05

## 2023-11-30 RX ORDER — OXYCODONE HYDROCHLORIDE 5 MG/1
10 TABLET ORAL EVERY 6 HOURS
Refills: 0 | Status: DISCONTINUED | OUTPATIENT
Start: 2023-11-30 | End: 2023-12-04

## 2023-11-30 RX ORDER — OXYCODONE HYDROCHLORIDE 5 MG/1
5 TABLET ORAL EVERY 6 HOURS
Refills: 0 | Status: DISCONTINUED | OUTPATIENT
Start: 2023-11-30 | End: 2023-12-04

## 2023-11-30 RX ORDER — METHOCARBAMOL 500 MG/1
750 TABLET, FILM COATED ORAL EVERY 8 HOURS
Refills: 0 | Status: DISCONTINUED | OUTPATIENT
Start: 2023-11-30 | End: 2023-12-05

## 2023-11-30 RX ADMIN — SODIUM CHLORIDE 3 MILLILITER(S): 9 INJECTION INTRAMUSCULAR; INTRAVENOUS; SUBCUTANEOUS at 22:07

## 2023-11-30 RX ADMIN — POLYETHYLENE GLYCOL 3350 17 GRAM(S): 17 POWDER, FOR SOLUTION ORAL at 21:28

## 2023-11-30 RX ADMIN — Medication 1000 MILLIGRAM(S): at 12:05

## 2023-11-30 RX ADMIN — Medication 1000 MILLIGRAM(S): at 06:38

## 2023-11-30 RX ADMIN — Medication 1 TABLET(S): at 12:05

## 2023-11-30 RX ADMIN — CYCLOBENZAPRINE HYDROCHLORIDE 5 MILLIGRAM(S): 10 TABLET, FILM COATED ORAL at 22:55

## 2023-11-30 RX ADMIN — SODIUM CHLORIDE 3 MILLILITER(S): 9 INJECTION INTRAMUSCULAR; INTRAVENOUS; SUBCUTANEOUS at 13:26

## 2023-11-30 RX ADMIN — GABAPENTIN 600 MILLIGRAM(S): 400 CAPSULE ORAL at 12:05

## 2023-11-30 RX ADMIN — TRAMADOL HYDROCHLORIDE 50 MILLIGRAM(S): 50 TABLET ORAL at 08:17

## 2023-11-30 RX ADMIN — SENNA PLUS 2 TABLET(S): 8.6 TABLET ORAL at 21:28

## 2023-11-30 RX ADMIN — Medication 1000 MILLIGRAM(S): at 21:57

## 2023-11-30 RX ADMIN — NORTRIPTYLINE HYDROCHLORIDE 25 MILLIGRAM(S): 10 CAPSULE ORAL at 21:27

## 2023-11-30 RX ADMIN — GABAPENTIN 600 MILLIGRAM(S): 400 CAPSULE ORAL at 21:28

## 2023-11-30 RX ADMIN — SODIUM CHLORIDE 3 MILLILITER(S): 9 INJECTION INTRAMUSCULAR; INTRAVENOUS; SUBCUTANEOUS at 06:26

## 2023-11-30 RX ADMIN — GABAPENTIN 600 MILLIGRAM(S): 400 CAPSULE ORAL at 05:38

## 2023-11-30 RX ADMIN — Medication 1 TABLET(S): at 05:38

## 2023-11-30 RX ADMIN — Medication 1 MILLIGRAM(S): at 12:05

## 2023-11-30 RX ADMIN — Medication 500 MILLIGRAM(S): at 05:38

## 2023-11-30 RX ADMIN — Medication 500 MILLIGRAM(S): at 17:06

## 2023-11-30 RX ADMIN — MAGNESIUM HYDROXIDE 30 MILLILITER(S): 400 TABLET, CHEWABLE ORAL at 18:20

## 2023-11-30 RX ADMIN — Medication 1 TABLET(S): at 21:27

## 2023-11-30 RX ADMIN — Medication 1000 MILLIGRAM(S): at 12:35

## 2023-11-30 RX ADMIN — Medication 1000 MILLIGRAM(S): at 05:38

## 2023-11-30 RX ADMIN — Medication 1000 MILLIGRAM(S): at 21:27

## 2023-11-30 RX ADMIN — PANTOPRAZOLE SODIUM 40 MILLIGRAM(S): 20 TABLET, DELAYED RELEASE ORAL at 06:34

## 2023-11-30 NOTE — CONSULT NOTE ADULT - ASSESSMENT
Patient is a 63y Male who presents c/o neck pain and LBP for multiple months, more recently has started making note of weakness and loss of function.  States that for the past few months he's had increasing issues with putting on shirts, opening water bottles, eating, fine motor movements of the hands. Had CTS, Bilateral with carpal tunnel releases done 4-5 years ago, which did not seem to improve his weakness.  Was seen in Dr. Quinones's office about 4 weeks ago after one recent fall, and he was diagnosed with cervical myelopathy.  Patient was having mild balance issues at that time and has now started to feel more weakness in BL LE, R>L. Also having some sensation changes to his B/L knees.  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  States that he has had gradually worsening weakness in his bilateral legs. Concerning for further compression at the lumbar spine.  Denies HS/LOC. Denies pain/injury elsewhere. Denies bowel/bladder incontinence. Denies saddle anesthesia.      Current out- patient pain regimen: Oxy IR 10 mg BID PRN; neurontin 600 mg TID  Out Patient Pain Management provider: Angeline Saenz NP/ Vijay Bourne MD    Pt with chronic neck and back pain, with cervical myelopathy and worsening neurological symptoms.    Change Oxy IR to 5 mg Q 6 hours PRN moderate pain and 10 mg Q 6 hours PRN severe pain.  Discontinue tramadol.  Discontinue flexeril and start robaxin 750 mg Q 8 hours PRN spasm.  Continue neurontin 600 mg Q 8 hours- Cr 0.88.  Continue pamelor 25 mg QHS.    Monitor for sedation and respiratory depression.  Bowel regimen PRN.  Incentive spirometer.  OOB/ PT and OT per Orthopedics.    Will follow.      Minutes spent on total encounter:      Chronic Pain Service  [phone]

## 2023-11-30 NOTE — PROGRESS NOTE ADULT - SUBJECTIVE AND OBJECTIVE BOX
ORTHOPEDIC PROGRESS NOTE    Overnight events: None    SUBJECTIVE: Pt seen and examined at bedside. Pain is controlled with medication. Patient states lower extremity weakness is similar to yesterday.      OBJECTIVE:  Vital Signs Last 24 Hrs  T(C): 36.4 (30 Nov 2023 04:15), Max: 36.6 (29 Nov 2023 20:43)  T(F): 97.5 (30 Nov 2023 04:15), Max: 97.9 (29 Nov 2023 20:43)  HR: 79 (30 Nov 2023 04:15) (79 - 100)  BP: 136/88 (30 Nov 2023 04:15) (119/87 - 156/92)  BP(mean): --  RR: 18 (30 Nov 2023 04:15) (13 - 18)  SpO2: 96% (30 Nov 2023 04:15) (94% - 96%)    Parameters below as of 30 Nov 2023 04:15  Patient On (Oxygen Delivery Method): room air            Physical Examination:  GEN: NAD, resting quietly  PULM: symmetric chest rise bilaterally, no increased WOB  ABD: nondistended  EXTR:   Spine:  Motor:                   C5                C6              C7               C8           T1   R            3/5                3/5            4/5             3/5          1/5  L             4/5               4/5             4/5             4/5          4/5                L2             L3             L4               L5            S1  R         4/5           4/5          4/5              3/5           4/5  L          4-/5         4-/5         4-/5             3/5           4/5    Sensory:            C5         C6         C7      C8       T1        (0=absent, 1=impaired, 2=normal, NT=not testable)  R         1            1           1        1         1  L          1            1           1        1         1    Patient states that R arm feels numb, like he's wearing a long sleeve shirt;  Left arm has mild paresthesias present throuought, has more sensation that RUE               L2          L3         L4      L5       S1         (0=absent, 1=impaired, 2=normal, NT=not testable)  R         1            1            2        2        2  L          1            1           2        2         2      LABS:                 ORTHOPEDIC PROGRESS NOTE    Overnight events: None    SUBJECTIVE: Pt seen and examined at bedside. Pain is controlled with medication. Patient states lower extremity weakness is similar to yesterday.      OBJECTIVE:  Vital Signs Last 24 Hrs  T(C): 36.4 (30 Nov 2023 04:15), Max: 36.6 (29 Nov 2023 20:43)  T(F): 97.5 (30 Nov 2023 04:15), Max: 97.9 (29 Nov 2023 20:43)  HR: 79 (30 Nov 2023 04:15) (79 - 100)  BP: 136/88 (30 Nov 2023 04:15) (119/87 - 156/92)  BP(mean): --  RR: 18 (30 Nov 2023 04:15) (13 - 18)  SpO2: 96% (30 Nov 2023 04:15) (94% - 96%)    Parameters below as of 30 Nov 2023 04:15  Patient On (Oxygen Delivery Method): room air            Physical Examination:  GEN: NAD, resting quietly  PULM: symmetric chest rise bilaterally, no increased WOB  ABD: nondistended  EXTR:   Spine:  Motor:                   C5                C6              C7               C8           T1   R            3/5                3/5            4/5             3/5          1/5  L             4/5               4/5             4/5             4/5          4/5                L2             L3             L4               L5            S1  R         4-/5          4-/5          4-/5              3/5         4-/5  L          4/5           4/5           4/5             3/5            4/5    Sensory:            C5         C6         C7      C8       T1        (0=absent, 1=impaired, 2=normal, NT=not testable)  R         1            1           1        1         1  L          1            1           1        1         1    Patient states that R arm feels numb, like he's wearing a long sleeve shirt;  Left arm has mild paresthesias present throuought, has more sensation that RUE               L2          L3         L4      L5       S1         (0=absent, 1=impaired, 2=normal, NT=not testable)  R         1            1            2        2        2  L          1            1           2        2         2      LABS:

## 2023-11-30 NOTE — CONSULT NOTE ADULT - SUBJECTIVE AND OBJECTIVE BOX
Chief Complaint:  Patient is a 63y old  Male who presents with a chief complaint of Cervical Myelopathy with worsening neurologic symptoms (30 Nov 2023 06:31)    HPI:  Patient is a 63y Male who presents c/o neck pain and LBP for multiple months. Patient more recently has started making note of weakness and loss of function.  Patient states that for the past few months he's had increasing issues with putting on shirts, opening water bottles, eating, fine motor movements of the hands.   Patient states he had CTS, Bilateral with carpal tunnel releases done 4-5 years ago, which did not seem to improve his weakness.  Patient was seen in Dr. Quinones's office about 4 weeks ago after one recent fall, and he was diagnosed with cervical myelopathy.  Patient was having mild balance issues at that time.    Since that time, patient has started to feel more weakness in BLLE, R>L. Also having some sensation changes to his BL knees.  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  Patient states that he has had gradually worsening weakness in his bilateral legs. Concerning for further compression at the lumbar spine.  Denies HS/LOC. Denies pain/injury elsewhere. Denies bowel/bladder incontinence. Denies saddle anesthesia.        Current Pain Score: 5/10    Current out- patient pain regimen: Oxy IR 10 mg BID PRN; neurontin 600 mg TID    Out Patient Pain Management provider: Angeline Saenz NP/ Vijay Bourne MD    [facility] Prescription Monitoring Program: Reference #[id number]    PAST MEDICAL & SURGICAL HISTORY:  Spinal stenosis in cervical region  s/p fusion 2017      GERD (gastroesophageal reflux disease)      History of kidney stones  multiple episodes, last in 2015      Arthritis      Migraines      Osteoporosis      Cervical stenosis of spine      Radiculopathy, cervical region      Carpal tunnel syndrome, bilateral upper limbs      Spondylosis without myelopathy or radiculopathy, cervical region      2019 novel coronavirus disease (COVID-19)      Medical marijuana use      History of heroin use      History of depression      S/P cystoscopy  Oct 2014, h/o renal stent      History of lithotripsy  multiple, most recent 2015, 2022      History of fusion of cervical spine      History of carpal tunnel surgery of left wrist      History of colonoscopy      History of endoscopy      S/P cervical discectomy      SOCIAL HISTORY:  Tobacco Use: quit smoking cigarettes 1980  Alcohol Use: social  Recreational Marijuana Use: yes  Illicit Drug Use: quit heroin abuse 1998    ORT/ OUD score: high      Allergies    No Known Drug Allergies  Pt has allergy to mustard (Anaphylaxis)    Intolerances        REVIEW OF SYSTEMS:  CONSTITUTIONAL: No fever, weight loss, fatigue; +  falls  NEURO: No headaches, memory loss, tremors, dizziness or blurred vision  RESP: No shortness of breath, cough  CV: No chest pain, palpitations  GI: No abdominal pain, nausea, vomiting, diarrhea, constipation  : No urinary incontinence/retention, dysuria  MSK: No joint pain; + neck and back pain; + progressive upper or lower motor strength weakness; no saddle anesthesia   SKIN: No itching, burning, rashes   PSYCHIATRIC: No depression, anxiety, mood swings, or difficulty sleeping      MEDICATIONS  (STANDING):  acetaminophen     Tablet .. 1000 milliGRAM(s) Oral every 8 hours  acetaminophen   IVPB .. 1000 milliGRAM(s) IV Intermittent once  ascorbic acid 500 milliGRAM(s) Oral two times a day  calcium carbonate 1250 mG  + Vitamin D (OsCal 500 + D) 1 Tablet(s) Oral three times a day  chlorhexidine 2% Cloths 1 Application(s) Topical once  folic acid 1 milliGRAM(s) Oral daily  gabapentin 600 milliGRAM(s) Oral three times a day  lidocaine 1% Injectable 0.2 milliGRAM(s) Local Injection once  multivitamin 1 Tablet(s) Oral daily  nortriptyline 25 milliGRAM(s) Oral at bedtime  pantoprazole    Tablet 40 milliGRAM(s) Oral before breakfast  polyethylene glycol 3350 17 Gram(s) Oral at bedtime  senna 2 Tablet(s) Oral at bedtime  sodium chloride 0.9% lock flush 3 milliLiter(s) IV Push every 8 hours    MEDICATIONS  (PRN):  aluminum hydroxide/magnesium hydroxide/simethicone Suspension 30 milliLiter(s) Oral four times a day PRN Indigestion  magnesium hydroxide Suspension 30 milliLiter(s) Oral daily PRN Constipation  methocarbamol 750 milliGRAM(s) Oral every 8 hours PRN Muscle Spasm  ondansetron Injectable 4 milliGRAM(s) IV Push every 6 hours PRN Nausea and/or Vomiting  oxyCODONE    IR 10 milliGRAM(s) Oral every 6 hours PRN Severe Pain (7 - 10)  oxyCODONE    IR 5 milliGRAM(s) Oral every 6 hours PRN Moderate Pain (4 - 6)      PHYSICAL EXAM  GENERAL: seen at bedside; NAD; well developed, disheveled; no signs of toxicity  HEENT: head atraumatic, normocephalic; anicteric; speech clear and fluent  NEURO: A + O X 3; good concentration; Cranial Nerves II- XII intact; sensory exam altered B/L distal arms and B/L knees; proprioception not intact  GI: abdomen soft, non distended; + bowel sounds; + BM; appetite fair  : no CVA tenderness;  voiding  EXTREMITIES/ PV: FREITAS; 2+ peripheral pulses; no cyanosis, edema or clubbing; + capillary refill  MUSCULOSKELETAL: motor strength 3/5 B/L hand grasps, 4/5 B/L DF, PF and HF; + R hand fingers contracted with loss of dexterity B/L; ambulates with cane at baseline (which is relatively new)  SKIN: no rashes, lesions; + B/L knee abrasions, scabbed over, from previous falls  PSYCH: affect normal, mood anxious about condition; good eye contact; no signs of depression or anxiety  Vital Signs:  T(C): 36.3 (11-30-23 @ 12:00)  HR: 88 (11-30-23 @ 12:00)  BP: 127/88 (11-30-23 @ 12:00)  RR: 17 (11-30-23 @ 12:00)  SpO2: 94% (11-30-23 @ 12:00)    Pertinent labs/radiology:                          14.5   6.49  )-----------( 212      ( 30 Nov 2023 06:21 )             42.9   11-30    141  |  104  |  16  ----------------------------<  96  3.8   |  24  |  0.88    Ca    10.1      30 Nov 2023 06:19    < from: MR Cervical Spine No Cont (11.29.23 @ 23:21) >    IMPRESSION:    1. BRAIN:   Unremarkable MR of the brain. No focal brain lesion.    2. CERVICAL SPINE:   Status post anterior stabilization C4-C7 with   routine postoperative appearance. Residual degenerative foraminal   compromise without significant central canal compromise. C1-C2   osteoarthritis. Cervical cord remains intact. No significant interval   change 11/3/2023.    3. THORACIC SPINE:   Low-grade thoracic degenerative disc disease. Intact   thoracic cord.    4. LUMBAR SPINE:   Low-grade lumbar degenerative disc disease.   Intact   conus and cauda equina.

## 2023-12-01 ENCOUNTER — APPOINTMENT (OUTPATIENT)
Dept: PAIN MANAGEMENT | Facility: CLINIC | Age: 63
End: 2023-12-01

## 2023-12-01 LAB
ANION GAP SERPL CALC-SCNC: 10 MMOL/L — SIGNIFICANT CHANGE UP (ref 5–17)
ANION GAP SERPL CALC-SCNC: 10 MMOL/L — SIGNIFICANT CHANGE UP (ref 5–17)
BUN SERPL-MCNC: 14 MG/DL — SIGNIFICANT CHANGE UP (ref 7–23)
BUN SERPL-MCNC: 14 MG/DL — SIGNIFICANT CHANGE UP (ref 7–23)
CALCIUM SERPL-MCNC: 10.2 MG/DL — SIGNIFICANT CHANGE UP (ref 8.4–10.5)
CALCIUM SERPL-MCNC: 10.2 MG/DL — SIGNIFICANT CHANGE UP (ref 8.4–10.5)
CHLORIDE SERPL-SCNC: 102 MMOL/L — SIGNIFICANT CHANGE UP (ref 96–108)
CHLORIDE SERPL-SCNC: 102 MMOL/L — SIGNIFICANT CHANGE UP (ref 96–108)
CO2 SERPL-SCNC: 24 MMOL/L — SIGNIFICANT CHANGE UP (ref 22–31)
CO2 SERPL-SCNC: 24 MMOL/L — SIGNIFICANT CHANGE UP (ref 22–31)
CREAT SERPL-MCNC: 0.92 MG/DL — SIGNIFICANT CHANGE UP (ref 0.5–1.3)
CREAT SERPL-MCNC: 0.92 MG/DL — SIGNIFICANT CHANGE UP (ref 0.5–1.3)
EGFR: 95 ML/MIN/1.73M2 — SIGNIFICANT CHANGE UP
EGFR: 95 ML/MIN/1.73M2 — SIGNIFICANT CHANGE UP
GLUCOSE SERPL-MCNC: 99 MG/DL — SIGNIFICANT CHANGE UP (ref 70–99)
GLUCOSE SERPL-MCNC: 99 MG/DL — SIGNIFICANT CHANGE UP (ref 70–99)
HCT VFR BLD CALC: 46.9 % — SIGNIFICANT CHANGE UP (ref 39–50)
HCT VFR BLD CALC: 46.9 % — SIGNIFICANT CHANGE UP (ref 39–50)
HGB BLD-MCNC: 15.9 G/DL — SIGNIFICANT CHANGE UP (ref 13–17)
HGB BLD-MCNC: 15.9 G/DL — SIGNIFICANT CHANGE UP (ref 13–17)
MCHC RBC-ENTMCNC: 29 PG — SIGNIFICANT CHANGE UP (ref 27–34)
MCHC RBC-ENTMCNC: 29 PG — SIGNIFICANT CHANGE UP (ref 27–34)
MCHC RBC-ENTMCNC: 33.9 GM/DL — SIGNIFICANT CHANGE UP (ref 32–36)
MCHC RBC-ENTMCNC: 33.9 GM/DL — SIGNIFICANT CHANGE UP (ref 32–36)
MCV RBC AUTO: 85.6 FL — SIGNIFICANT CHANGE UP (ref 80–100)
MCV RBC AUTO: 85.6 FL — SIGNIFICANT CHANGE UP (ref 80–100)
NRBC # BLD: 0 /100 WBCS — SIGNIFICANT CHANGE UP (ref 0–0)
NRBC # BLD: 0 /100 WBCS — SIGNIFICANT CHANGE UP (ref 0–0)
PLATELET # BLD AUTO: 227 K/UL — SIGNIFICANT CHANGE UP (ref 150–400)
PLATELET # BLD AUTO: 227 K/UL — SIGNIFICANT CHANGE UP (ref 150–400)
POTASSIUM SERPL-MCNC: 4 MMOL/L — SIGNIFICANT CHANGE UP (ref 3.5–5.3)
POTASSIUM SERPL-MCNC: 4 MMOL/L — SIGNIFICANT CHANGE UP (ref 3.5–5.3)
POTASSIUM SERPL-SCNC: 4 MMOL/L — SIGNIFICANT CHANGE UP (ref 3.5–5.3)
POTASSIUM SERPL-SCNC: 4 MMOL/L — SIGNIFICANT CHANGE UP (ref 3.5–5.3)
RBC # BLD: 5.48 M/UL — SIGNIFICANT CHANGE UP (ref 4.2–5.8)
RBC # BLD: 5.48 M/UL — SIGNIFICANT CHANGE UP (ref 4.2–5.8)
RBC # FLD: 12.2 % — SIGNIFICANT CHANGE UP (ref 10.3–14.5)
RBC # FLD: 12.2 % — SIGNIFICANT CHANGE UP (ref 10.3–14.5)
SODIUM SERPL-SCNC: 136 MMOL/L — SIGNIFICANT CHANGE UP (ref 135–145)
SODIUM SERPL-SCNC: 136 MMOL/L — SIGNIFICANT CHANGE UP (ref 135–145)
WBC # BLD: 6.94 K/UL — SIGNIFICANT CHANGE UP (ref 3.8–10.5)
WBC # BLD: 6.94 K/UL — SIGNIFICANT CHANGE UP (ref 3.8–10.5)
WBC # FLD AUTO: 6.94 K/UL — SIGNIFICANT CHANGE UP (ref 3.8–10.5)
WBC # FLD AUTO: 6.94 K/UL — SIGNIFICANT CHANGE UP (ref 3.8–10.5)

## 2023-12-01 PROCEDURE — 99232 SBSQ HOSP IP/OBS MODERATE 35: CPT

## 2023-12-01 RX ADMIN — Medication 1000 MILLIGRAM(S): at 13:50

## 2023-12-01 RX ADMIN — NORTRIPTYLINE HYDROCHLORIDE 25 MILLIGRAM(S): 10 CAPSULE ORAL at 21:13

## 2023-12-01 RX ADMIN — Medication 1 TABLET(S): at 11:20

## 2023-12-01 RX ADMIN — Medication 1000 MILLIGRAM(S): at 06:02

## 2023-12-01 RX ADMIN — Medication 1000 MILLIGRAM(S): at 06:32

## 2023-12-01 RX ADMIN — Medication 1000 MILLIGRAM(S): at 21:14

## 2023-12-01 RX ADMIN — SODIUM CHLORIDE 3 MILLILITER(S): 9 INJECTION INTRAMUSCULAR; INTRAVENOUS; SUBCUTANEOUS at 12:32

## 2023-12-01 RX ADMIN — Medication 1 TABLET(S): at 12:30

## 2023-12-01 RX ADMIN — GABAPENTIN 600 MILLIGRAM(S): 400 CAPSULE ORAL at 12:29

## 2023-12-01 RX ADMIN — Medication 1 MILLIGRAM(S): at 12:28

## 2023-12-01 RX ADMIN — GABAPENTIN 600 MILLIGRAM(S): 400 CAPSULE ORAL at 06:02

## 2023-12-01 RX ADMIN — Medication 500 MILLIGRAM(S): at 16:26

## 2023-12-01 RX ADMIN — SODIUM CHLORIDE 3 MILLILITER(S): 9 INJECTION INTRAMUSCULAR; INTRAVENOUS; SUBCUTANEOUS at 20:28

## 2023-12-01 RX ADMIN — GABAPENTIN 600 MILLIGRAM(S): 400 CAPSULE ORAL at 21:13

## 2023-12-01 RX ADMIN — Medication 500 MILLIGRAM(S): at 06:02

## 2023-12-01 RX ADMIN — Medication 1000 MILLIGRAM(S): at 22:14

## 2023-12-01 RX ADMIN — Medication 1000 MILLIGRAM(S): at 12:29

## 2023-12-01 RX ADMIN — Medication 1 TABLET(S): at 21:13

## 2023-12-01 RX ADMIN — PANTOPRAZOLE SODIUM 40 MILLIGRAM(S): 20 TABLET, DELAYED RELEASE ORAL at 06:02

## 2023-12-01 RX ADMIN — Medication 1 TABLET(S): at 06:02

## 2023-12-01 RX ADMIN — SODIUM CHLORIDE 3 MILLILITER(S): 9 INJECTION INTRAMUSCULAR; INTRAVENOUS; SUBCUTANEOUS at 06:06

## 2023-12-01 NOTE — PHYSICAL THERAPY INITIAL EVALUATION ADULT - PERTINENT HX OF CURRENT PROBLEM, REHAB EVAL
64yo male who presents c/o neck pain and LBP for multiple months. Patient more recently has started making note of weakness and loss of function. Patient states that for the past few months he's had increasing issues with putting on shirts, opening water bottles, eating, fine motor movements of the hands. Patient states he had CTS, Bilateral with carpal tunnel releases done 4-5 years ago, which did not seem to improve his weakness. Patient was seen in Dr. Quinones's office about 4 weeks ago after one recent fall, and he was diagnosed with cervical myelopathy. Patient was having mild balance issues at that time. Since that time, patient has started to feel more weakness in BLLE, R>L. Also having some sensation changes to his BL knees. 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 Patient states that he has had gradually worsening weakness in his bilateral legs. Concerning for further compression at the lumbar spine. Pt a/w Cervical Myelopathy with worsening neurologic symptoms. Plan for cervical procedures currently on hold. new MR C/T/LSp no compressive lesions. Neurology following, FU recs FU EMG wrkup. Pain control. WBAT with assistive devices as needed. SCDs/ ambulation. Awaiting Neuro Consult for possible EMG studies. 62yo male who presents c/o neck pain and LBP for multiple months. Patient more recently has started making note of weakness and loss of function. Patient states that for the past few months he's had increasing issues with putting on shirts, opening water bottles, eating, fine motor movements of the hands. Patient states he had CTS, Bilateral with carpal tunnel releases done 4-5 years ago, which did not seem to improve his weakness. Patient was seen in Dr. Quinones's office about 4 weeks ago after one recent fall, and he was diagnosed with cervical myelopathy. Patient was having mild balance issues at that time. Since that time, patient has started to feel more weakness in BL LE, R>L. Also having some sensation changes to his BL knees. 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 Patient states that he has had gradually worsening weakness in his bilateral legs. Concerning for further compression at the lumbar spine. Pt a/w Cervical Myelopathy with worsening neurologic symptoms. Plan for cervical procedures currently on hold. new MR C/T/LSp no compressive lesions. Neurology following, FU recs FU EMG wrkup. Pain control. WBAT with assistive devices as needed. SCDs/ ambulation. Awaiting Neuro Consult for possible EMG studies.

## 2023-12-01 NOTE — PHYSICAL THERAPY INITIAL EVALUATION ADULT - ADDITIONAL COMMENTS
Pt states he lives in a private home with his wife and 6 dogs and 4 cats. Pt is main caregiver of his wife and pets. Pt has had multiple falls in past few weeks. Pt with limited use of R UE

## 2023-12-01 NOTE — PATIENT PROFILE ADULT - FUNCTIONAL ASSESSMENT - BASIC MOBILITY 6.
3-calculated by average/Not able to assess (calculate score using [facility] averaging method)

## 2023-12-01 NOTE — PROGRESS NOTE ADULT - SUBJECTIVE AND OBJECTIVE BOX
ORTHOPEDIC PROGRESS NOTE    Overnight events: None    SUBJECTIVE: Pt seen and examined at bedside. Pain is controlled with medication. Patient states lower extremity weakness is similar to yesterday.      OBJECTIVE:  Vital Signs Last 24 Hrs  T(C): 36.3 (01 Dec 2023 04:46), Max: 36.6 (30 Nov 2023 16:46)  T(F): 97.4 (01 Dec 2023 04:46), Max: 97.9 (30 Nov 2023 16:46)  HR: 83 (01 Dec 2023 04:46) (83 - 97)  BP: 100/66 (01 Dec 2023 04:46) (100/66 - 138/84)  BP(mean): --  RR: 18 (01 Dec 2023 04:46) (17 - 18)  SpO2: 97% (01 Dec 2023 04:46) (94% - 97%)                        14.5   6.49  )-----------( 212      ( 30 Nov 2023 06:21 )             42.9       11-30    141  |  104  |  16  ----------------------------<  96  3.8   |  24  |  0.88    Ca    10.1      30 Nov 2023 06:19                      Parameters below as of 01 Dec 2023 04:46  Patient On (Oxygen Delivery Method): room air    Physical Examination:  GEN: NAD, resting quietly  PULM: symmetric chest rise bilaterally, no increased WOB  ABD: nondistended  EXTR:   Spine:  Motor:                   C5                C6              C7               C8           T1   R            3/5                3/5            4/5             3/5          1/5  L             4/5               4/5             4/5             4/5          4/5                L2             L3             L4               L5            S1  R         4-/5          4-/5          4-/5              3/5         4-/5  L          4/5           4/5           4/5             3/5            4/5    Sensory:            C5         C6         C7      C8       T1        (0=absent, 1=impaired, 2=normal, NT=not testable)  R         1            1           1        1         1  L          1            1           1        1         1    Patient states that R arm feels numb, like he's wearing a long sleeve shirt;  Left arm has mild paresthesias present throuought, has more sensation that RUE               L2          L3         L4      L5       S1         (0=absent, 1=impaired, 2=normal, NT=not testable)  R         1            1            2        2        2  L          1            1           2        2         2      A/P:  Plan for cervical procedures currently on hold  new MR C/T/LSp no compressive lesions  Neurology following, FU recs FU EMG wrkup  Medicine recs   Pain control  WBAT with assistive devices as needed  SCDs/ ambulation  Will d/w Dr. Quinones

## 2023-12-01 NOTE — PHYSICAL THERAPY INITIAL EVALUATION ADULT - ACTIVE RANGE OF MOTION EXAMINATION, REHAB EVAL
L UE not with functional limit. Pt with approx 40 degrees  shldr ext and abd, fingers contracted in to flexion on R hand, noticeable muscle wasting in b/l hands/Right UE Active ROM was WNL (within normal limits)/bilateral lower extremity Active ROM was WNL (within normal limits)

## 2023-12-01 NOTE — PHYSICAL THERAPY INITIAL EVALUATION ADULT - STANDING BALANCE: DYNAMIC, REHAB EVAL
Detail Level: Detailed Introduction Text (Please End With A Colon): The following procedure was deferred: fair minus

## 2023-12-02 PROCEDURE — 99232 SBSQ HOSP IP/OBS MODERATE 35: CPT

## 2023-12-02 RX ADMIN — SODIUM CHLORIDE 3 MILLILITER(S): 9 INJECTION INTRAMUSCULAR; INTRAVENOUS; SUBCUTANEOUS at 21:23

## 2023-12-02 RX ADMIN — Medication 1000 MILLIGRAM(S): at 21:59

## 2023-12-02 RX ADMIN — OXYCODONE HYDROCHLORIDE 5 MILLIGRAM(S): 5 TABLET ORAL at 00:55

## 2023-12-02 RX ADMIN — Medication 500 MILLIGRAM(S): at 05:21

## 2023-12-02 RX ADMIN — PANTOPRAZOLE SODIUM 40 MILLIGRAM(S): 20 TABLET, DELAYED RELEASE ORAL at 05:21

## 2023-12-02 RX ADMIN — Medication 1000 MILLIGRAM(S): at 05:51

## 2023-12-02 RX ADMIN — SODIUM CHLORIDE 3 MILLILITER(S): 9 INJECTION INTRAMUSCULAR; INTRAVENOUS; SUBCUTANEOUS at 13:14

## 2023-12-02 RX ADMIN — OXYCODONE HYDROCHLORIDE 5 MILLIGRAM(S): 5 TABLET ORAL at 01:25

## 2023-12-02 RX ADMIN — Medication 1000 MILLIGRAM(S): at 13:08

## 2023-12-02 RX ADMIN — Medication 500 MILLIGRAM(S): at 18:21

## 2023-12-02 RX ADMIN — Medication 1 TABLET(S): at 13:08

## 2023-12-02 RX ADMIN — Medication 1000 MILLIGRAM(S): at 13:25

## 2023-12-02 RX ADMIN — OXYCODONE HYDROCHLORIDE 10 MILLIGRAM(S): 5 TABLET ORAL at 10:53

## 2023-12-02 RX ADMIN — Medication 1 TABLET(S): at 07:16

## 2023-12-02 RX ADMIN — Medication 1 MILLIGRAM(S): at 13:08

## 2023-12-02 RX ADMIN — Medication 1 TABLET(S): at 21:25

## 2023-12-02 RX ADMIN — GABAPENTIN 600 MILLIGRAM(S): 400 CAPSULE ORAL at 21:25

## 2023-12-02 RX ADMIN — SODIUM CHLORIDE 3 MILLILITER(S): 9 INJECTION INTRAMUSCULAR; INTRAVENOUS; SUBCUTANEOUS at 05:18

## 2023-12-02 RX ADMIN — Medication 1000 MILLIGRAM(S): at 05:21

## 2023-12-02 RX ADMIN — Medication 1000 MILLIGRAM(S): at 21:25

## 2023-12-02 RX ADMIN — GABAPENTIN 600 MILLIGRAM(S): 400 CAPSULE ORAL at 13:09

## 2023-12-02 RX ADMIN — NORTRIPTYLINE HYDROCHLORIDE 25 MILLIGRAM(S): 10 CAPSULE ORAL at 21:25

## 2023-12-02 RX ADMIN — OXYCODONE HYDROCHLORIDE 10 MILLIGRAM(S): 5 TABLET ORAL at 11:00

## 2023-12-02 RX ADMIN — GABAPENTIN 600 MILLIGRAM(S): 400 CAPSULE ORAL at 05:21

## 2023-12-02 NOTE — OCCUPATIONAL THERAPY INITIAL EVALUATION ADULT - MANUAL MUSCLE TESTING RESULTS, REHAB EVAL
R shoulder 2/5, R elbow 3-/5, R wrist 3-/5, R digits 2/5 extension and 3-/5 flexion. LUE 3/5. BLE 3/5 knee, 3/5 hip, 3-/5 ankle.

## 2023-12-02 NOTE — PROGRESS NOTE ADULT - SUBJECTIVE AND OBJECTIVE BOX
SUBJECTIVE: Pt seen and examined at bedside. Pain is controlled with medication. Patient states lower extremity weakness is similar to yesterday.    LABS:                        15.9   6.94  )-----------( 227      ( 01 Dec 2023 06:29 )             46.9     12-01    136  |  102  |  14  ----------------------------<  99  4.0   |  24  |  0.92    Ca    10.2      01 Dec 2023 06:29        Urinalysis Basic - ( 01 Dec 2023 06:29 )    Color: x / Appearance: x / SG: x / pH: x  Gluc: 99 mg/dL / Ketone: x  / Bili: x / Urobili: x   Blood: x / Protein: x / Nitrite: x   Leuk Esterase: x / RBC: x / WBC x   Sq Epi: x / Non Sq Epi: x / Bacteria: x        VITAL SIGNS:  T(C): 36.4 (12-02-23 @ 04:58), Max: 36.8 (12-01-23 @ 20:47)  HR: 101 (12-02-23 @ 04:58) (87 - 109)  BP: 134/82 (12-02-23 @ 04:58) (122/85 - 156/95)  RR: 18 (12-02-23 @ 04:58) (17 - 18)  SpO2: 98% (12-02-23 @ 04:58) (94% - 98%)    Parameters below as of 01 Dec 2023 04:46  Patient On (Oxygen Delivery Method): room air    Physical Examination:  GEN: NAD, resting quietly  PULM: symmetric chest rise bilaterally, no increased WOB  ABD: nondistended  EXTR:   Spine:  Motor:                   C5                C6              C7               C8           T1   R            3/5                3/5            4/5             3/5          1/5  L             4/5               4/5             4/5             4/5          4/5                L2             L3             L4               L5            S1  R         4/5          4/5          4/5              3/5         4-/5  L          4/5           4/5           4/5             3/5            4/5    Sensory:            C5         C6         C7      C8       T1        (0=absent, 1=impaired, 2=normal, NT=not testable)  R         1            1           1        1         1  L          1            1           1        1         1    Patient states that R arm feels numb, like he's wearing a long sleeve shirt;  Left arm has mild paresthesias present throuought, has more sensation that RUE               L2          L3         L4      L5       S1         (0=absent, 1=impaired, 2=normal, NT=not testable)  R         1            1            2        2        2  L          1            1           2        2         2      A/P:  Plan for cervical procedures currently on hold  Plan for surgical intervention in about 2 weeks to be set up in the outpatient setting  new MR C/T/LSp no compressive lesions  Neurology following, FU recs FU EMG wrkup  Medicine recs   Pain control  WBAT with assistive devices as needed  SCDs/ ambulation  No further workup per orthopaedic service; will inquire about patient transfer to Medicine as we have no further workup or intervention from our standpoint during this admission

## 2023-12-02 NOTE — OCCUPATIONAL THERAPY INITIAL EVALUATION ADULT - PERTINENT HX OF CURRENT PROBLEM, REHAB EVAL
63y Male who presents c/o neck pain and LBP for multiple months. Patient more recently has started making note of weakness and loss of function. Patient states that for the past few months he's had increasing issues with putting on shirts, opening water bottles, eating, fine motor movements of the hands. Patient states he had CTS, Bilateral with carpal tunnel releases done 4-5 years ago, which did not seem to improve his weakness. Patient was seen in Dr. Quinones's office about 4 weeks ago after one recent fall, and he was diagnosed with cervical myelopathy. Since that time, patient has started to feel more weakness in BLLE, R>L. Also having some sensation changes to his BL knees. 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 Patient states that he has had gradually worsening weakness in his bilateral legs. Concerning for further compression at the lumbar spine. Denies HS/LOC. Denies pain/injury elsewhere. Denies bowel/bladder incontinence. Denies saddle anesthesia. Plan for cervical procedures currently on hold  new MR C/T/LSp no compressive lesions

## 2023-12-03 LAB
ANION GAP SERPL CALC-SCNC: 8 MMOL/L — SIGNIFICANT CHANGE UP (ref 5–17)
ANION GAP SERPL CALC-SCNC: 8 MMOL/L — SIGNIFICANT CHANGE UP (ref 5–17)
BUN SERPL-MCNC: 18 MG/DL — SIGNIFICANT CHANGE UP (ref 7–23)
BUN SERPL-MCNC: 18 MG/DL — SIGNIFICANT CHANGE UP (ref 7–23)
CALCIUM SERPL-MCNC: 10 MG/DL — SIGNIFICANT CHANGE UP (ref 8.4–10.5)
CALCIUM SERPL-MCNC: 10 MG/DL — SIGNIFICANT CHANGE UP (ref 8.4–10.5)
CHLORIDE SERPL-SCNC: 102 MMOL/L — SIGNIFICANT CHANGE UP (ref 96–108)
CHLORIDE SERPL-SCNC: 102 MMOL/L — SIGNIFICANT CHANGE UP (ref 96–108)
CO2 SERPL-SCNC: 26 MMOL/L — SIGNIFICANT CHANGE UP (ref 22–31)
CO2 SERPL-SCNC: 26 MMOL/L — SIGNIFICANT CHANGE UP (ref 22–31)
CREAT SERPL-MCNC: 0.94 MG/DL — SIGNIFICANT CHANGE UP (ref 0.5–1.3)
CREAT SERPL-MCNC: 0.94 MG/DL — SIGNIFICANT CHANGE UP (ref 0.5–1.3)
EGFR: 92 ML/MIN/1.73M2 — SIGNIFICANT CHANGE UP
EGFR: 92 ML/MIN/1.73M2 — SIGNIFICANT CHANGE UP
GLUCOSE SERPL-MCNC: 100 MG/DL — HIGH (ref 70–99)
GLUCOSE SERPL-MCNC: 100 MG/DL — HIGH (ref 70–99)
HCT VFR BLD CALC: 48.7 % — SIGNIFICANT CHANGE UP (ref 39–50)
HCT VFR BLD CALC: 48.7 % — SIGNIFICANT CHANGE UP (ref 39–50)
HGB BLD-MCNC: 16.5 G/DL — SIGNIFICANT CHANGE UP (ref 13–17)
HGB BLD-MCNC: 16.5 G/DL — SIGNIFICANT CHANGE UP (ref 13–17)
MCHC RBC-ENTMCNC: 29.4 PG — SIGNIFICANT CHANGE UP (ref 27–34)
MCHC RBC-ENTMCNC: 29.4 PG — SIGNIFICANT CHANGE UP (ref 27–34)
MCHC RBC-ENTMCNC: 33.9 GM/DL — SIGNIFICANT CHANGE UP (ref 32–36)
MCHC RBC-ENTMCNC: 33.9 GM/DL — SIGNIFICANT CHANGE UP (ref 32–36)
MCV RBC AUTO: 86.7 FL — SIGNIFICANT CHANGE UP (ref 80–100)
MCV RBC AUTO: 86.7 FL — SIGNIFICANT CHANGE UP (ref 80–100)
NRBC # BLD: 0 /100 WBCS — SIGNIFICANT CHANGE UP (ref 0–0)
NRBC # BLD: 0 /100 WBCS — SIGNIFICANT CHANGE UP (ref 0–0)
PLATELET # BLD AUTO: 219 K/UL — SIGNIFICANT CHANGE UP (ref 150–400)
PLATELET # BLD AUTO: 219 K/UL — SIGNIFICANT CHANGE UP (ref 150–400)
POTASSIUM SERPL-MCNC: 4 MMOL/L — SIGNIFICANT CHANGE UP (ref 3.5–5.3)
POTASSIUM SERPL-MCNC: 4 MMOL/L — SIGNIFICANT CHANGE UP (ref 3.5–5.3)
POTASSIUM SERPL-SCNC: 4 MMOL/L — SIGNIFICANT CHANGE UP (ref 3.5–5.3)
POTASSIUM SERPL-SCNC: 4 MMOL/L — SIGNIFICANT CHANGE UP (ref 3.5–5.3)
RBC # BLD: 5.62 M/UL — SIGNIFICANT CHANGE UP (ref 4.2–5.8)
RBC # BLD: 5.62 M/UL — SIGNIFICANT CHANGE UP (ref 4.2–5.8)
RBC # FLD: 12.3 % — SIGNIFICANT CHANGE UP (ref 10.3–14.5)
RBC # FLD: 12.3 % — SIGNIFICANT CHANGE UP (ref 10.3–14.5)
SODIUM SERPL-SCNC: 136 MMOL/L — SIGNIFICANT CHANGE UP (ref 135–145)
SODIUM SERPL-SCNC: 136 MMOL/L — SIGNIFICANT CHANGE UP (ref 135–145)
WBC # BLD: 7.15 K/UL — SIGNIFICANT CHANGE UP (ref 3.8–10.5)
WBC # BLD: 7.15 K/UL — SIGNIFICANT CHANGE UP (ref 3.8–10.5)
WBC # FLD AUTO: 7.15 K/UL — SIGNIFICANT CHANGE UP (ref 3.8–10.5)
WBC # FLD AUTO: 7.15 K/UL — SIGNIFICANT CHANGE UP (ref 3.8–10.5)

## 2023-12-03 PROCEDURE — 99232 SBSQ HOSP IP/OBS MODERATE 35: CPT

## 2023-12-03 RX ADMIN — GABAPENTIN 600 MILLIGRAM(S): 400 CAPSULE ORAL at 12:07

## 2023-12-03 RX ADMIN — OXYCODONE HYDROCHLORIDE 10 MILLIGRAM(S): 5 TABLET ORAL at 01:05

## 2023-12-03 RX ADMIN — Medication 1000 MILLIGRAM(S): at 06:06

## 2023-12-03 RX ADMIN — Medication 500 MILLIGRAM(S): at 06:06

## 2023-12-03 RX ADMIN — Medication 1000 MILLIGRAM(S): at 12:07

## 2023-12-03 RX ADMIN — Medication 1 TABLET(S): at 12:07

## 2023-12-03 RX ADMIN — Medication 1 TABLET(S): at 21:35

## 2023-12-03 RX ADMIN — GABAPENTIN 600 MILLIGRAM(S): 400 CAPSULE ORAL at 06:06

## 2023-12-03 RX ADMIN — SODIUM CHLORIDE 3 MILLILITER(S): 9 INJECTION INTRAMUSCULAR; INTRAVENOUS; SUBCUTANEOUS at 05:47

## 2023-12-03 RX ADMIN — Medication 500 MILLIGRAM(S): at 17:42

## 2023-12-03 RX ADMIN — OXYCODONE HYDROCHLORIDE 10 MILLIGRAM(S): 5 TABLET ORAL at 00:05

## 2023-12-03 RX ADMIN — MAGNESIUM HYDROXIDE 30 MILLILITER(S): 400 TABLET, CHEWABLE ORAL at 12:08

## 2023-12-03 RX ADMIN — Medication 1 MILLIGRAM(S): at 12:13

## 2023-12-03 RX ADMIN — Medication 1 TABLET(S): at 06:06

## 2023-12-03 RX ADMIN — NORTRIPTYLINE HYDROCHLORIDE 25 MILLIGRAM(S): 10 CAPSULE ORAL at 21:35

## 2023-12-03 RX ADMIN — GABAPENTIN 600 MILLIGRAM(S): 400 CAPSULE ORAL at 21:36

## 2023-12-03 RX ADMIN — Medication 1000 MILLIGRAM(S): at 12:00

## 2023-12-03 RX ADMIN — Medication 1000 MILLIGRAM(S): at 21:36

## 2023-12-03 RX ADMIN — PANTOPRAZOLE SODIUM 40 MILLIGRAM(S): 20 TABLET, DELAYED RELEASE ORAL at 06:06

## 2023-12-03 RX ADMIN — SODIUM CHLORIDE 3 MILLILITER(S): 9 INJECTION INTRAMUSCULAR; INTRAVENOUS; SUBCUTANEOUS at 22:00

## 2023-12-03 RX ADMIN — Medication 1000 MILLIGRAM(S): at 06:36

## 2023-12-03 RX ADMIN — Medication 1000 MILLIGRAM(S): at 22:06

## 2023-12-03 RX ADMIN — SODIUM CHLORIDE 3 MILLILITER(S): 9 INJECTION INTRAMUSCULAR; INTRAVENOUS; SUBCUTANEOUS at 12:04

## 2023-12-03 NOTE — PROGRESS NOTE ADULT - ASSESSMENT
62yo M w/ PMH of ACDF C5-C7 w/ chronic cervical radiculopathy, DDD, foraminal stenosis C3-C7 and b/l carpal tunnel syndrome initially presented for planned cervical surgery but now deferred d/t new c/o LE weakness in the past 2-3 weeks. 64yo M w/ PMH of ACDF C5-C7 w/ chronic cervical radiculopathy, DDD, foraminal stenosis C3-C7 and b/l carpal tunnel syndrome initially presented for planned cervical surgery but now deferred d/t new c/o LE weakness in the past 2-3 weeks.

## 2023-12-03 NOTE — PROGRESS NOTE ADULT - SUBJECTIVE AND OBJECTIVE BOX
Patient is a 63y old  Male who presents with a chief complaint of Cervical Myelopathy with worsening neurologic symptoms (03 Dec 2023 08:38)      SUBJECTIVE / OVERNIGHT EVENTS: Patient seen and examined at bedside. No acute events overnight. Unchanged leg weakness. Feels well otherwise. Wants to go home (not rehab) and take care of his dog.    MEDICATIONS  (STANDING):  acetaminophen     Tablet .. 1000 milliGRAM(s) Oral every 8 hours  acetaminophen   IVPB .. 1000 milliGRAM(s) IV Intermittent once  ascorbic acid 500 milliGRAM(s) Oral two times a day  calcium carbonate 1250 mG  + Vitamin D (OsCal 500 + D) 1 Tablet(s) Oral three times a day  chlorhexidine 2% Cloths 1 Application(s) Topical once  folic acid 1 milliGRAM(s) Oral daily  gabapentin 600 milliGRAM(s) Oral three times a day  lidocaine 1% Injectable 0.2 milliGRAM(s) Local Injection once  multivitamin 1 Tablet(s) Oral daily  nortriptyline 25 milliGRAM(s) Oral at bedtime  pantoprazole    Tablet 40 milliGRAM(s) Oral before breakfast  polyethylene glycol 3350 17 Gram(s) Oral at bedtime  senna 2 Tablet(s) Oral at bedtime  sodium chloride 0.9% lock flush 3 milliLiter(s) IV Push every 8 hours    MEDICATIONS  (PRN):  aluminum hydroxide/magnesium hydroxide/simethicone Suspension 30 milliLiter(s) Oral four times a day PRN Indigestion  bisacodyl Suppository 10 milliGRAM(s) Rectal once PRN Constipation  cyclobenzaprine 5 milliGRAM(s) Oral three times a day PRN Muscle Spasm  magnesium hydroxide Suspension 30 milliLiter(s) Oral daily PRN Constipation  methocarbamol 750 milliGRAM(s) Oral every 8 hours PRN Muscle Spasm  ondansetron Injectable 4 milliGRAM(s) IV Push every 6 hours PRN Nausea and/or Vomiting  oxyCODONE    IR 10 milliGRAM(s) Oral every 6 hours PRN Severe Pain (7 - 10)  oxyCODONE    IR 5 milliGRAM(s) Oral every 6 hours PRN Moderate Pain (4 - 6)      CAPILLARY BLOOD GLUCOSE        I&O's Summary    02 Dec 2023 07:01  -  03 Dec 2023 07:00  --------------------------------------------------------  IN: 480 mL / OUT: 0 mL / NET: 480 mL        PHYSICAL EXAM:  Vital Signs Last 24 Hrs  T(C): 36.6 (03 Dec 2023 13:30), Max: 36.7 (02 Dec 2023 21:35)  T(F): 97.9 (03 Dec 2023 13:30), Max: 98 (02 Dec 2023 21:35)  HR: 98 (03 Dec 2023 13:30) (93 - 107)  BP: 120/88 (03 Dec 2023 13:30) (109/74 - 141/96)  BP(mean): --  RR: 18 (03 Dec 2023 13:30) (18 - 18)  SpO2: 96% (03 Dec 2023 13:30) (94% - 96%)    Parameters below as of 03 Dec 2023 13:30  Patient On (Oxygen Delivery Method): room air        GEN: male in NAD, appears comfortable, no diaphoresis  EYES: No scleral injection, EOMI  ENTM: neck supple & symmetric without tracheal deviation, moist membranes, no gross hearing impairment, thyroid gland not enlarged  CV: +S1/S2, no m/r/g, no abdominal bruit, no LE edema  RESP: breathing comfortably, no respiratory accessory muscle use, CTAB, no w/r/r  GI: normoactive BS, soft, NTND, no rebounding/guarding, no palpable masses    LABS:                        16.5   7.15  )-----------( 219      ( 03 Dec 2023 07:55 )             48.7     12-03    136  |  102  |  18  ----------------------------<  100<H>  4.0   |  26  |  0.94    Ca    10.0      03 Dec 2023 07:55            Urinalysis Basic - ( 03 Dec 2023 07:55 )    Color: x / Appearance: x / SG: x / pH: x  Gluc: 100 mg/dL / Ketone: x  / Bili: x / Urobili: x   Blood: x / Protein: x / Nitrite: x   Leuk Esterase: x / RBC: x / WBC x   Sq Epi: x / Non Sq Epi: x / Bacteria: x          RADIOLOGY & ADDITIONAL TESTS:  Results Reviewed:   Imaging Personally Reviewed:  Electrocardiogram Personally Reviewed:    COORDINATION OF CARE:  Care Discussed with Consultants/Other Providers [Y/N]:  Prior or Outpatient Records Reviewed [Y/N]:

## 2023-12-03 NOTE — PROGRESS NOTE ADULT - SUBJECTIVE AND OBJECTIVE BOX
SUBJECTIVE: Pt seen and examined at bedside. Pain is controlled with medication. Patient states lower extremity weakness is similar to yesterday.    Vital Signs Last 24 Hrs  T(C): 36.6 (03 Dec 2023 04:55), Max: 36.7 (02 Dec 2023 21:35)  T(F): 97.9 (03 Dec 2023 04:55), Max: 98 (02 Dec 2023 21:35)  HR: 96 (03 Dec 2023 04:55) (93 - 99)  BP: 109/74 (03 Dec 2023 04:55) (109/74 - 141/96)  BP(mean): --  RR: 18 (03 Dec 2023 04:55) (18 - 18)  SpO2: 96% (03 Dec 2023 04:55) (94% - 96%)    Parameters below as of 03 Dec 2023 04:55  Patient On (Oxygen Delivery Method): room air        Physical Examination:  GEN: NAD, resting quietly                        16.5   7.15  )-----------( 219      ( 03 Dec 2023 07:55 )             48.7       12-03    136  |  102  |  18  ----------------------------<  100<H>  4.0   |  26  |  0.94    Ca    10.0      03 Dec 2023 07:55                    PULM: symmetric chest rise bilaterally, no increased WOB  ABD: nondistended  EXTR:   Spine:  Motor:                   C5                C6              C7               C8           T1   R            3/5                3/5            4/5             3/5          1/5  L             4/5               4/5             4/5             4/5          4/5                L2             L3             L4               L5            S1  R         4/5          4/5          4/5              3/5         4-/5  L          4/5           4/5           4/5             3/5            4/5    Sensory:            C5         C6         C7      C8       T1        (0=absent, 1=impaired, 2=normal, NT=not testable)  R         1            1           1        1         1  L          1            1           1        1         1    Patient states that R arm feels numb, like he's wearing a long sleeve shirt;  Left arm has mild paresthesias present throuought, has more sensation that RUE               L2          L3         L4      L5       S1         (0=absent, 1=impaired, 2=normal, NT=not testable)  R         1            1            2        2        2  L          1            1           2        2         2      A/P:  Plan for cervical procedures currently on hold  No plan for surgical intervention during this hospital stay  Plan for surgical intervention in about 2 weeks to be set up in the outpatient setting  new MR C/T/LSp no compressive lesions  Neurology following, FU recs FU EMG wrkup  Medicine recs   Pain control  WBAT with assistive devices as needed  SCDs/ ambulation  No further workup per orthopaedic service; will inquire about patient transfer to Medicine as we have no further workup or intervention from our standpoint during this admission

## 2023-12-03 NOTE — PROGRESS NOTE ADULT - PROBLEM SELECTOR PLAN 1
Discussed extensively with patient at bedside. Neurology consult appreciated. It is unclear what tests can be done as an inpatient. Many of the blood tests don't seem to be orderable. Unclear if an EMG/NCV can be done. MRI without gross structural issues to explain below. Partial work up here would only be fragmented. Patient does have an outpatient neurologist Mallorie Martinez who he can follow up with. He is in agreement that if recommended he will further work this up as an outpatient.    I would follow up with neurology to determine what makes sense to be done here.  His leg weakness doesn't seem life threatening and should be worked up as an outpatient   He is recommended for AR, but he wants to go home

## 2023-12-04 PROBLEM — M48.061 LUMBAR CANAL STENOSIS: Status: ACTIVE | Noted: 2023-12-04

## 2023-12-04 LAB
ANION GAP SERPL CALC-SCNC: 11 MMOL/L — SIGNIFICANT CHANGE UP (ref 5–17)
ANION GAP SERPL CALC-SCNC: 11 MMOL/L — SIGNIFICANT CHANGE UP (ref 5–17)
BUN SERPL-MCNC: 18 MG/DL — SIGNIFICANT CHANGE UP (ref 7–23)
BUN SERPL-MCNC: 18 MG/DL — SIGNIFICANT CHANGE UP (ref 7–23)
CALCIUM SERPL-MCNC: 9.8 MG/DL — SIGNIFICANT CHANGE UP (ref 8.4–10.5)
CALCIUM SERPL-MCNC: 9.8 MG/DL — SIGNIFICANT CHANGE UP (ref 8.4–10.5)
CHLORIDE SERPL-SCNC: 100 MMOL/L — SIGNIFICANT CHANGE UP (ref 96–108)
CHLORIDE SERPL-SCNC: 100 MMOL/L — SIGNIFICANT CHANGE UP (ref 96–108)
CO2 SERPL-SCNC: 23 MMOL/L — SIGNIFICANT CHANGE UP (ref 22–31)
CO2 SERPL-SCNC: 23 MMOL/L — SIGNIFICANT CHANGE UP (ref 22–31)
CREAT SERPL-MCNC: 0.98 MG/DL — SIGNIFICANT CHANGE UP (ref 0.5–1.3)
CREAT SERPL-MCNC: 0.98 MG/DL — SIGNIFICANT CHANGE UP (ref 0.5–1.3)
EGFR: 88 ML/MIN/1.73M2 — SIGNIFICANT CHANGE UP
EGFR: 88 ML/MIN/1.73M2 — SIGNIFICANT CHANGE UP
GLUCOSE SERPL-MCNC: 100 MG/DL — HIGH (ref 70–99)
GLUCOSE SERPL-MCNC: 100 MG/DL — HIGH (ref 70–99)
HCT VFR BLD CALC: 46.7 % — SIGNIFICANT CHANGE UP (ref 39–50)
HCT VFR BLD CALC: 46.7 % — SIGNIFICANT CHANGE UP (ref 39–50)
HGB BLD-MCNC: 15.7 G/DL — SIGNIFICANT CHANGE UP (ref 13–17)
HGB BLD-MCNC: 15.7 G/DL — SIGNIFICANT CHANGE UP (ref 13–17)
MCHC RBC-ENTMCNC: 29.3 PG — SIGNIFICANT CHANGE UP (ref 27–34)
MCHC RBC-ENTMCNC: 29.3 PG — SIGNIFICANT CHANGE UP (ref 27–34)
MCHC RBC-ENTMCNC: 33.6 GM/DL — SIGNIFICANT CHANGE UP (ref 32–36)
MCHC RBC-ENTMCNC: 33.6 GM/DL — SIGNIFICANT CHANGE UP (ref 32–36)
MCV RBC AUTO: 87.1 FL — SIGNIFICANT CHANGE UP (ref 80–100)
MCV RBC AUTO: 87.1 FL — SIGNIFICANT CHANGE UP (ref 80–100)
NRBC # BLD: 0 /100 WBCS — SIGNIFICANT CHANGE UP (ref 0–0)
NRBC # BLD: 0 /100 WBCS — SIGNIFICANT CHANGE UP (ref 0–0)
PLATELET # BLD AUTO: 221 K/UL — SIGNIFICANT CHANGE UP (ref 150–400)
PLATELET # BLD AUTO: 221 K/UL — SIGNIFICANT CHANGE UP (ref 150–400)
POTASSIUM SERPL-MCNC: 3.9 MMOL/L — SIGNIFICANT CHANGE UP (ref 3.5–5.3)
POTASSIUM SERPL-MCNC: 3.9 MMOL/L — SIGNIFICANT CHANGE UP (ref 3.5–5.3)
POTASSIUM SERPL-SCNC: 3.9 MMOL/L — SIGNIFICANT CHANGE UP (ref 3.5–5.3)
POTASSIUM SERPL-SCNC: 3.9 MMOL/L — SIGNIFICANT CHANGE UP (ref 3.5–5.3)
RBC # BLD: 5.36 M/UL — SIGNIFICANT CHANGE UP (ref 4.2–5.8)
RBC # BLD: 5.36 M/UL — SIGNIFICANT CHANGE UP (ref 4.2–5.8)
RBC # FLD: 12.4 % — SIGNIFICANT CHANGE UP (ref 10.3–14.5)
RBC # FLD: 12.4 % — SIGNIFICANT CHANGE UP (ref 10.3–14.5)
SODIUM SERPL-SCNC: 134 MMOL/L — LOW (ref 135–145)
SODIUM SERPL-SCNC: 134 MMOL/L — LOW (ref 135–145)
WBC # BLD: 9.71 K/UL — SIGNIFICANT CHANGE UP (ref 3.8–10.5)
WBC # BLD: 9.71 K/UL — SIGNIFICANT CHANGE UP (ref 3.8–10.5)
WBC # FLD AUTO: 9.71 K/UL — SIGNIFICANT CHANGE UP (ref 3.8–10.5)
WBC # FLD AUTO: 9.71 K/UL — SIGNIFICANT CHANGE UP (ref 3.8–10.5)

## 2023-12-04 PROCEDURE — 95886 MUSC TEST DONE W/N TEST COMP: CPT | Mod: 26,59

## 2023-12-04 PROCEDURE — 95887 MUSC TST DONE W/N TST NONEXT: CPT | Mod: 26

## 2023-12-04 PROCEDURE — 99233 SBSQ HOSP IP/OBS HIGH 50: CPT

## 2023-12-04 PROCEDURE — 95885 MUSC TST DONE W/NERV TST LIM: CPT | Mod: 26,59

## 2023-12-04 PROCEDURE — 95913 NRV CNDJ TEST 13/> STUDIES: CPT | Mod: 26

## 2023-12-04 PROCEDURE — 99222 1ST HOSP IP/OBS MODERATE 55: CPT

## 2023-12-04 RX ORDER — TRAMADOL HYDROCHLORIDE 50 MG/1
50 TABLET ORAL EVERY 4 HOURS
Refills: 0 | Status: DISCONTINUED | OUTPATIENT
Start: 2023-12-04 | End: 2023-12-05

## 2023-12-04 RX ORDER — TRAMADOL HYDROCHLORIDE 50 MG/1
25 TABLET ORAL EVERY 4 HOURS
Refills: 0 | Status: DISCONTINUED | OUTPATIENT
Start: 2023-12-04 | End: 2023-12-05

## 2023-12-04 RX ADMIN — Medication 1 MILLIGRAM(S): at 13:08

## 2023-12-04 RX ADMIN — GABAPENTIN 600 MILLIGRAM(S): 400 CAPSULE ORAL at 06:14

## 2023-12-04 RX ADMIN — Medication 1000 MILLIGRAM(S): at 06:45

## 2023-12-04 RX ADMIN — Medication 1 TABLET(S): at 06:15

## 2023-12-04 RX ADMIN — TRAMADOL HYDROCHLORIDE 25 MILLIGRAM(S): 50 TABLET ORAL at 13:08

## 2023-12-04 RX ADMIN — GABAPENTIN 600 MILLIGRAM(S): 400 CAPSULE ORAL at 13:09

## 2023-12-04 RX ADMIN — GABAPENTIN 600 MILLIGRAM(S): 400 CAPSULE ORAL at 21:47

## 2023-12-04 RX ADMIN — Medication 1 TABLET(S): at 13:09

## 2023-12-04 RX ADMIN — Medication 1000 MILLIGRAM(S): at 06:15

## 2023-12-04 RX ADMIN — Medication 500 MILLIGRAM(S): at 17:25

## 2023-12-04 RX ADMIN — TRAMADOL HYDROCHLORIDE 25 MILLIGRAM(S): 50 TABLET ORAL at 13:40

## 2023-12-04 RX ADMIN — SODIUM CHLORIDE 3 MILLILITER(S): 9 INJECTION INTRAMUSCULAR; INTRAVENOUS; SUBCUTANEOUS at 13:38

## 2023-12-04 RX ADMIN — Medication 500 MILLIGRAM(S): at 06:15

## 2023-12-04 RX ADMIN — Medication 1000 MILLIGRAM(S): at 22:18

## 2023-12-04 RX ADMIN — NORTRIPTYLINE HYDROCHLORIDE 25 MILLIGRAM(S): 10 CAPSULE ORAL at 21:48

## 2023-12-04 RX ADMIN — TRAMADOL HYDROCHLORIDE 25 MILLIGRAM(S): 50 TABLET ORAL at 17:24

## 2023-12-04 RX ADMIN — TRAMADOL HYDROCHLORIDE 25 MILLIGRAM(S): 50 TABLET ORAL at 18:03

## 2023-12-04 RX ADMIN — Medication 1000 MILLIGRAM(S): at 21:48

## 2023-12-04 RX ADMIN — PANTOPRAZOLE SODIUM 40 MILLIGRAM(S): 20 TABLET, DELAYED RELEASE ORAL at 06:15

## 2023-12-04 RX ADMIN — Medication 1 TABLET(S): at 21:47

## 2023-12-04 RX ADMIN — SODIUM CHLORIDE 3 MILLILITER(S): 9 INJECTION INTRAMUSCULAR; INTRAVENOUS; SUBCUTANEOUS at 06:00

## 2023-12-04 NOTE — PROGRESS NOTE ADULT - ASSESSMENT
A/P:  63M s/p ACDF  for cervical myelopathy  in setting of new BLE weakness.     Plan:  - Plan for cervical procedures currently on hold  - Neurology following, plan for EMG today  - MR C/T/LSp no compressive lesions  - Medicine recs   - Pain control  - WBAT with assistive devices as needed  - SCDs/ ambulation    For all questions related to patient care, please reach out via the on-call pager.*     Gaye Francois PGY1  Orthopedic Surgery  [facility]: [phone]  [facility]: [id number]  [facility]: [id number] A/P:  63M s/p ACDF  for cervical myelopathy  in setting of new BLE weakness.     Plan:  - Plan for cervical procedures currently on hold  - Neurology following, plan for EMG today  - MR C/T/LSp no compressive lesions  - Medicine recs   - Pain control  - WBAT with assistive devices as needed  - SCDs/ ambulation    For all questions related to patient care, please reach out via the on-call pager.*     Gaye Francois PGY1  Orthopedic Surgery  [facility]: [phone]  [facility]: [phone]  [facility]: [phone]

## 2023-12-04 NOTE — CONSULT NOTE ADULT - SUBJECTIVE AND OBJECTIVE BOX
HPI:  Patient is a 63y Male who presents c/o neck pain and LBP for multiple months. Patient more recently has started making note of weakness and loss of function.  Patient states that for the past few months he's had increasing issues with putting on shirts, opening water bottles, eating, fine motor movements of the hands.   Patient states he had CTS, Bilateral with carpal tunnel releases done 4-5 years ago, which did not seem to improve his weakness.  Patient was seen in Dr. Quinones's office about 4 weeks ago after one recent fall, and he was diagnosed with cervical myelopathy.  Patient was having mild balance issues at that time.    Since that time, patient has started to feel more weakness in BLLE, R>L. Also having some sensation changes to his BL knees.  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  Patient states that he has had gradually worsening weakness in his bilateral legs. Concerning for further compression at the lumbar spine.   Denies HS/LOC. Denies pain/injury elsewhere. Denies bowel/bladder incontinence. Denies saddle anesthesia. Denies fevers/chills. No other complaints at this time.    Patient was admitted on 11/29, seen today, reports right arm weakness, shoulder pain, frequent falls at home.     REVIEW OF SYSTEMS  Denies chest pain, SOB, N/V, F/C, abdominal pain     VITALS  T(C): 36.6 (12-04-23 @ 08:42), Max: 36.8 (12-04-23 @ 05:56)  HR: 113 (12-04-23 @ 08:42) (98 - 113)  BP: 119/65 (12-04-23 @ 08:42) (119/65 - 128/90)  RR: 18 (12-04-23 @ 08:42) (18 - 18)  SpO2: 98% (12-04-23 @ 08:42) (93% - 98%)  Wt(kg): --    PAST MEDICAL & SURGICAL HISTORY  Kidney stone    Marijuana abuse    Spinal stenosis in cervical region    GERD (gastroesophageal reflux disease)    History of drug abuse    History of kidney stones    Carpal tunnel syndrome of left wrist    Arthritis    Major depression    Migraines    Osteoporosis    Stenosis of cervical spine    Radiculopathy, cervical region    Carpal tunnel syndrome, bilateral upper limbs    Spondylosis without myelopathy or radiculopathy, cervical region    2019 novel coronavirus disease (COVID-19)    Medical marijuana use    History of heroin use    History of depression    History of renal stent    S/P cystoscopy    Status post spinal disc removal    H/O spinal fusion    History of lithotripsy    History of fusion of cervical spine    History of carpal tunnel surgery of left wrist    History of colonoscopy    History of endoscopy    S/P cervical discectomy        FUNCTIONAL HISTORY  Lives with wife  Independent AMB and ADLs PTA, recent use of cane     CURRENT FUNCTIONAL STATUS  PT  12/1  bed mobility independent  transfers supervision  gait CG x 50 feet     OT 12/2  bed mobility supervision  transfers  CG  dressing max assist     RECENT LABS/IMAGING  CBC Full  -  ( 04 Dec 2023 06:49 )  WBC Count : 9.71 K/uL  RBC Count : 5.36 M/uL  Hemoglobin : 15.7 g/dL  Hematocrit : 46.7 %  Platelet Count - Automated : 221 K/uL  Mean Cell Volume : 87.1 fl  Mean Cell Hemoglobin : 29.3 pg  Mean Cell Hemoglobin Concentration : 33.6 gm/dL  Auto Neutrophil # : x  Auto Lymphocyte # : x  Auto Monocyte # : x  Auto Eosinophil # : x  Auto Basophil # : x  Auto Neutrophil % : x  Auto Lymphocyte % : x  Auto Monocyte % : x  Auto Eosinophil % : x  Auto Basophil % : x    12-04    134<L>  |  100  |  18  ----------------------------<  100<H>  3.9   |  23  |  0.98    Ca    9.8      04 Dec 2023 06:48      Urinalysis Basic - ( 04 Dec 2023 06:48 )    Color: x / Appearance: x / SG: x / pH: x  Gluc: 100 mg/dL / Ketone: x  / Bili: x / Urobili: x   Blood: x / Protein: x / Nitrite: x   Leuk Esterase: x / RBC: x / WBC x   Sq Epi: x / Non Sq Epi: x / Bacteria: x    < from: MR Lumbar Spine No Cont (11.30.23 @ 00:16) >    IMPRESSION:    1. BRAIN:   Unremarkable MR of the brain. No focal brain lesion.    2. CERVICAL SPINE:   Status post anterior stabilization C4-C7 with   routine postoperative appearance. Residual degenerative foraminal   compromise without significant central canal compromise. C1-C2   osteoarthritis. Cervical cord remains intact. No significant interval   change 11/3/2023.    3. THORACIC SPINE:   Low-grade thoracic degenerative disc disease. Intact   thoracic cord.    4. LUMBAR SPINE:   Low-grade lumbar degenerative disc disease.   Intact   conus and cauda equina.    5. The need for supplemental gadolinium enhanced evaluation should be   determined based on the clinical circumstances.      < end of copied text >      ALLERGIES  No Known Drug Allergies  Pt has allergy to mustard (Anaphylaxis)      MEDICATIONS   acetaminophen     Tablet .. 1000 milliGRAM(s) Oral every 8 hours  acetaminophen   IVPB .. 1000 milliGRAM(s) IV Intermittent once  aluminum hydroxide/magnesium hydroxide/simethicone Suspension 30 milliLiter(s) Oral four times a day PRN  ascorbic acid 500 milliGRAM(s) Oral two times a day  bisacodyl Suppository 10 milliGRAM(s) Rectal once PRN  calcium carbonate 1250 mG  + Vitamin D (OsCal 500 + D) 1 Tablet(s) Oral three times a day  chlorhexidine 2% Cloths 1 Application(s) Topical once  cyclobenzaprine 5 milliGRAM(s) Oral three times a day PRN  folic acid 1 milliGRAM(s) Oral daily  gabapentin 600 milliGRAM(s) Oral three times a day  lidocaine 1% Injectable 0.2 milliGRAM(s) Local Injection once  magnesium hydroxide Suspension 30 milliLiter(s) Oral daily PRN  methocarbamol 750 milliGRAM(s) Oral every 8 hours PRN  multivitamin 1 Tablet(s) Oral daily  nortriptyline 25 milliGRAM(s) Oral at bedtime  ondansetron Injectable 4 milliGRAM(s) IV Push every 6 hours PRN  oxyCODONE    IR 5 milliGRAM(s) Oral every 6 hours PRN  oxyCODONE    IR 10 milliGRAM(s) Oral every 6 hours PRN  pantoprazole    Tablet 40 milliGRAM(s) Oral before breakfast  polyethylene glycol 3350 17 Gram(s) Oral at bedtime  senna 2 Tablet(s) Oral at bedtime  sodium chloride 0.9% lock flush 3 milliLiter(s) IV Push every 8 hours      ----------------------------------------------------------------------------------------  PHYSICAL EXAM  Constitutional - NAD, Comfortable, laying in bed   Chest - Breathing comfortably  Cardiovascular - S1S2   Abdomen - Soft   Extremities - No C/C/E, No calf tenderness   Neurologic Exam -   follows commands                 Cognitive - Awake, Alert, AAO to self, place, date, year, situation     Communication - Fluent, No dysarthria        Motor -                     LEFT    UE - ShAB 5/5, EF 5/5, EE 5/5, WE 5/5,  5/5                    RIGHT UE - ShAB 3/5, EF 3/5, EE 3/5, WE 3/5,  3/5 finger extension 1/5                    LEFT    LE - HF 4/5, KE 4/5, DF 4/5, PF 5/5                    RIGHT LE - HF 3/5, KE 3/5, DF 3/5, PF 5/5        Sensory - Intact to LT     Psychiatric - Mood stable, Affect WNL  ----------------------------------------------------------------------------------------  ASSESSMENT/PLAN  63yMale h/o cervical fusion, carpal tunnel with functional deficits, right UE weakness, frequent falls  admitted for surgery, now on hold, MRI with no cord compression   awaiting EMG testing   Pain - Tylenol oxycodone, cyclobenzaprine, gabapentin methocarbamol  DVT PPX - SCDs  Rehab - Will continue to follow for ongoing rehab needs and recommendations.    continue bedside therapy   patient prefers discharge home to care for wife/pets  Rehab recommendations are dependent on functional progress and participation with bedside therapy

## 2023-12-04 NOTE — CHART NOTE - NSCHARTNOTEFT_GEN_A_CORE
Patient is a 63y Male who presents c/o neck pain and LBP for multiple months, more recently has started making note of weakness and loss of function.  States that for the past few months he's had increasing issues with putting on shirts, opening water bottles, eating, fine motor movements of the hands. Had CTS, Bilateral with carpal tunnel releases done 4-5 years ago, which did not seem to improve his weakness.  Was seen in Dr. Quinones's office about 4 weeks ago after one recent fall, and he was diagnosed with cervical myelopathy.  Patient was having mild balance issues at that time and has now started to feel more weakness in BL LE, R>L. Also having some sensation changes to his B/L knees.  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  States that he has had gradually worsening weakness in his bilateral legs. Concerning for further compression at the lumbar spine.  Denies HS/LOC. Denies pain/injury elsewhere. Denies bowel/bladder incontinence. Denies saddle anesthesia.      Current out- patient pain regimen: Oxy IR 10 mg BID PRN; neurontin 600 mg TID  Out Patient Pain Management provider: Angeline Saenz NP/ Vijay Bourne MD    Pt with chronic neck and back pain, with cervical myelopathy and worsening neurological symptoms.  EMR reviewed, pain controlled w/ current regimen, pain level decreases to 0/10, has not required any Oxy IR for BTP in >24 hrs.    Discontinue Oxy IR 10 mg   Oxy IR 5 mg Q 6 hours PRN severe pain.  Robaxin 750 mg Q 8 hours PRN spasm.  Neurontin 600 mg Q 8 hours- CrCl = 108.3  Pamelor 25 mg QHS.    Monitor for sedation and respiratory depression.  Bowel regimen PRN.  Incentive spirometer.  OOB/ PT and OT per Orthopedics.    Signing off, reconsult as needed    Chronic Pain Service  [phone] Patient is a 63y Male who presents c/o neck pain and LBP for multiple months, more recently has started making note of weakness and loss of function.  States that for the past few months he's had increasing issues with putting on shirts, opening water bottles, eating, fine motor movements of the hands. Had CTS, Bilateral with carpal tunnel releases done 4-5 years ago, which did not seem to improve his weakness.  Was seen in Dr. Quinones's office about 4 weeks ago after one recent fall, and he was diagnosed with cervical myelopathy.  Patient was having mild balance issues at that time and has now started to feel more weakness in BL LE, R>L. Also having some sensation changes to his B/L knees.  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  States that he has had gradually worsening weakness in his bilateral legs. Concerning for further compression at the lumbar spine.  Denies HS/LOC. Denies pain/injury elsewhere. Denies bowel/bladder incontinence. Denies saddle anesthesia.      Current out- patient pain regimen: Oxy IR 10 mg BID PRN; neurontin 600 mg TID  Out Patient Pain Management provider: Angeline Saenz NP/ Vijay Bourne MD    Pt with chronic neck and back pain, with cervical myelopathy and worsening neurological symptoms.  EMR reviewed, pain controlled w/ current regimen, pain level decreases to 0/10, has not required any Oxy IR for BTP in >24 hrs.    Discontinue Oxy IR 10 mg   Oxy IR 5 mg Q 6 hours PRN severe pain.  Robaxin 750 mg Q 8 hours PRN spasm.  Neurontin 600 mg Q 8 hours- CrCl = 108.3  Pamelor 25 mg QHS.    Monitor for sedation and respiratory depression.  Bowel regimen PRN.  Incentive spirometer.  OOB/ PT and OT per Orthopedics.    Signing off, reconsult as needed    Chronic Pain Service  [phone]

## 2023-12-04 NOTE — CONSULT NOTE ADULT - REASON FOR ADMISSION
Cervical Myelopathy with worsening neurologic symptoms

## 2023-12-04 NOTE — PROGRESS NOTE ADULT - SUBJECTIVE AND OBJECTIVE BOX
SUBJECTIVE:   Pt seen and examined at bedside. Pain is controlled with medication. Patient states lower extremity weakness is similar to yesterday. Reports worsening numbness over right knee.     OBJECTIVE:  Vital Signs Last 24 Hrs  T(C): 36.8 (04 Dec 2023 05:56), Max: 36.8 (04 Dec 2023 05:56)  T(F): 98.3 (04 Dec 2023 05:56), Max: 98.3 (04 Dec 2023 05:56)  HR: 106 (04 Dec 2023 05:56) (98 - 107)  BP: 124/85 (04 Dec 2023 05:56) (113/83 - 128/90)  BP(mean): --  RR: 18 (04 Dec 2023 05:56) (18 - 18)  SpO2: 96% (04 Dec 2023 05:56) (93% - 96%)    Parameters below as of 04 Dec 2023 05:56  Patient On (Oxygen Delivery Method): room air    I&O's Summary    02 Dec 2023 07:01  -  03 Dec 2023 07:00  --------------------------------------------------------  IN: 480 mL / OUT: 0 mL / NET: 480 mL    03 Dec 2023 07:01  -  04 Dec 2023 06:18  --------------------------------------------------------  IN: 0 mL / OUT: 400 mL / NET: -400 mL        Physical Examination:  GEN: NAD, resting quietly  PULM: symmetric chest rise bilaterally, no increased WOB  ABD: nondistended  EXTR:   R hand flexion contracture  Spine:  Motor:                   C5                C6              C7               C8           T1   R            3/5                3/5            4/5             3/5          1/5  L             4/5               4/5             4/5             4/5          4/5                L2             L3             L4               L5            S1  R         3/5          3/5          3/5              3/5         4-/5  L          4/5           4/5           4/5             3/5            4/5    Sensory:            C5         C6         C7      C8       T1        (0=absent, 1=impaired, 2=normal, NT=not testable)  R         1            1           1        1         1  L          1            1           1        1         1    Patient states that R arm feels numb, like he's wearing a long sleeve shirt;  Left arm has mild paresthesias present throughout with significantly more sensation than RUE               L2          L3         L4      L5       S1         (0=absent, 1=impaired, 2=normal, NT=not testable)  R         1            1            1        2        2  L          1            1           2        2         2    States worsening paresthesias and numbness in RLE with no feeling over right kneecap.     LABS:  cret                        16.5   7.15  )-----------( 219      ( 03 Dec 2023 07:55 )             48.7     12-03    136  |  102  |  18  ----------------------------<  100<H>  4.0   |  26  |  0.94    Ca    10.0      03 Dec 2023 07:55

## 2023-12-04 NOTE — PROGRESS NOTE ADULT - SUBJECTIVE AND OBJECTIVE BOX
NEUROLOGY FOLLOW-UP CONSULT NOTE    RFC: B/L LE weakness    Interval history: No acute neurologic events overnight.     Meds:  MEDICATIONS  (STANDING):  acetaminophen     Tablet .. 1000 milliGRAM(s) Oral every 8 hours  acetaminophen   IVPB .. 1000 milliGRAM(s) IV Intermittent once  ascorbic acid 500 milliGRAM(s) Oral two times a day  calcium carbonate 1250 mG  + Vitamin D (OsCal 500 + D) 1 Tablet(s) Oral three times a day  chlorhexidine 2% Cloths 1 Application(s) Topical once  folic acid 1 milliGRAM(s) Oral daily  gabapentin 600 milliGRAM(s) Oral three times a day  lidocaine 1% Injectable 0.2 milliGRAM(s) Local Injection once  multivitamin 1 Tablet(s) Oral daily  nortriptyline 25 milliGRAM(s) Oral at bedtime  pantoprazole    Tablet 40 milliGRAM(s) Oral before breakfast  polyethylene glycol 3350 17 Gram(s) Oral at bedtime  senna 2 Tablet(s) Oral at bedtime  sodium chloride 0.9% lock flush 3 milliLiter(s) IV Push every 8 hours    MEDICATIONS  (PRN):  aluminum hydroxide/magnesium hydroxide/simethicone Suspension 30 milliLiter(s) Oral four times a day PRN Indigestion  bisacodyl Suppository 10 milliGRAM(s) Rectal once PRN Constipation  cyclobenzaprine 5 milliGRAM(s) Oral three times a day PRN Muscle Spasm  magnesium hydroxide Suspension 30 milliLiter(s) Oral daily PRN Constipation  methocarbamol 750 milliGRAM(s) Oral every 8 hours PRN Muscle Spasm  ondansetron Injectable 4 milliGRAM(s) IV Push every 6 hours PRN Nausea and/or Vomiting  traMADol 50 milliGRAM(s) Oral every 4 hours PRN Severe Pain (7 - 10)  traMADol 25 milliGRAM(s) Oral every 4 hours PRN Moderate Pain (4 - 6)      PMHx/PSHx/FHx/SHx:  Spondylosis of cervical region without myelopathy or radiculopathy    Other spondylosis with myelopathy    Spinal stenosis of cervical region    Encounter for other preprocedural examination    Kidney stone    Marijuana abuse    Spinal stenosis in cervical region    GERD (gastroesophageal reflux disease)    History of drug abuse    History of kidney stones    Carpal tunnel syndrome of left wrist    Arthritis    Major depression    Migraines    Osteoporosis    Stenosis of cervical spine    Cervical stenosis of spine    Radiculopathy, cervical region    Carpal tunnel syndrome, bilateral upper limbs    Spondylosis without myelopathy or radiculopathy, cervical region    2019 novel coronavirus disease (COVID-19)    Medical marijuana use    History of heroin use    History of depression    Pain in both lower extremities    Spinal stenosis in cervical region    Prophylactic measure    No significant past surgical history    History of renal stent    S/P cystoscopy    Status post spinal disc removal    H/O spinal fusion    History of lithotripsy    History of fusion of cervical spine    History of carpal tunnel surgery of left wrist    History of colonoscopy    History of endoscopy    S/P cervical discectomy        Allergies:  No Known Drug Allergies  Pt has allergy to mustard (Anaphylaxis)      ROS: All systems negative except as documented in Interval history    O:  T(C): 36.6 (12-04-23 @ 08:42), Max: 36.8 (12-04-23 @ 05:56)  HR: 113 (12-04-23 @ 08:42) (98 - 113)  BP: 119/65 (12-04-23 @ 08:42) (119/65 - 128/90)  RR: 18 (12-04-23 @ 08:42) (18 - 18)  SpO2: 98% (12-04-23 @ 08:42) (93% - 98%)    Focused neurologic exam:  MS - AAO x3, speech fluent, rep/naming intact, follows commands, attn/conc/recent and remote memory/fund of knowledge WNL  CN - PERRLA, EOMI, VFF, face sens/str/hearing WNL b/l, tongue/palate midline, trap 5/5 b/l  Motor - Normal bulk/tone, 5b/l UE 4/5, RLE 4/5, LLE 5/5, Right dorsiflexion/plantarflexion 3/5  Sens - LT intact all  DTR's - 1+ b/l UE, mute b/l LE and downgoing b/l plantar response  Coord - FtN intact b/l  Gait and station - Not assessed due to fall risk    Pertinent labs/studies:    LABS:  cret                        15.7   9.71  )-----------( 221      ( 04 Dec 2023 06:49 )             46.7     12-04    134<L>  |  100  |  18  ----------------------------<  100<H>  3.9   |  23  |  0.98    Ca    9.8      04 Dec 2023 06:48    < from: MR Cervical Spine No Cont (11.03.23 @ 18:49) >  Loss of the normal cervical lordosis is seen    Postoperative changes compatible with discectomies are seen involving the   C4-5, C5-C6 and C6-7 levels with spinal fusion seen involving the C5-C7   levels. These findings were present on the prior study.    The vertebral body height and alignment appear normal    Mild disc desiccation is seen involving C2-3, C3-4, C7-T1, T1-T2, T2-3   and T3-4 levels.    C2-3: Mild disc bulge and bilateral hypertrophic facet joint changes   seen. Moderate narrowing of the left neural foramen.    C3-4: Disc osteophyte complex is seen. Hypertrophic change involving both   facet and uncal vertebral joints. Mild to moderate narrowing of the   spinal canal. Moderate to severe narrowing of the right neural foramen   and severe narrowing of the left neural foramen    C4-5: Disc osteophyte complex is seen. Hypertrophic change involving both   facet and uncovertebral joints. Mild to moderate narrowing of the spinal   canal. Moderate narrowing of the left neural foramen and moderate   narrowing of the right neural foramen    C5-6: Disc bulge is seen. Hypertrophic change is seen involving both   facet and uncovertebral joint. Moderate to severe narrowing of the left   neural foramen and severe narrowing of the right neural foramen. Mild   narrowing of the spinal canal.    C6-7: Disc bulge is seen. Hypertrophic change involving both facet and   uncovertebral joints. Mild narrowing of the spinal canal. Moderate   narrowing of the right neural foramen and severe narrowing of the left   neural foramen    C7-T1: Normal    T1-2: Normal    The spinal cord demonstrates normal signal and caliber.    Evaluation of the paraspinal soft tissues appear normal.    IMPRESSION: Postop changes as described above.    Degenerative changes as described above.    < end of copied text >  < from: MR Head No Cont (11.29.23 @ 23:21) >  IMPRESSION:    1. BRAIN:   Unremarkable MR of the brain. No focal brain lesion.    2. CERVICAL SPINE:   Status post anterior stabilization C4-C7 with   routine postoperative appearance. Residual degenerative foraminal   compromise without significant central canal compromise. C1-C2   osteoarthritis. Cervical cord remains intact. No significant interval   change 11/3/2023.    3. THORACIC SPINE:   Low-grade thoracic degenerative disc disease. Intact   thoracic cord.    4. LUMBAR SPINE:   Low-grade lumbar degenerative disc disease.   Intact   conus and cauda equina.    5. The need for supplemental gadolinium enhanced evaluation should be   determined based on the clinical circumstances.    < end of copied text >     NEUROLOGY FOLLOW-UP CONSULT NOTE    RFC: B/L LE weakness    Interval history: No acute neurologic events overnight.     Meds:  MEDICATIONS  (STANDING):  acetaminophen     Tablet .. 1000 milliGRAM(s) Oral every 8 hours  acetaminophen   IVPB .. 1000 milliGRAM(s) IV Intermittent once  ascorbic acid 500 milliGRAM(s) Oral two times a day  calcium carbonate 1250 mG  + Vitamin D (OsCal 500 + D) 1 Tablet(s) Oral three times a day  chlorhexidine 2% Cloths 1 Application(s) Topical once  folic acid 1 milliGRAM(s) Oral daily  gabapentin 600 milliGRAM(s) Oral three times a day  lidocaine 1% Injectable 0.2 milliGRAM(s) Local Injection once  multivitamin 1 Tablet(s) Oral daily  nortriptyline 25 milliGRAM(s) Oral at bedtime  pantoprazole    Tablet 40 milliGRAM(s) Oral before breakfast  polyethylene glycol 3350 17 Gram(s) Oral at bedtime  senna 2 Tablet(s) Oral at bedtime  sodium chloride 0.9% lock flush 3 milliLiter(s) IV Push every 8 hours    MEDICATIONS  (PRN):  aluminum hydroxide/magnesium hydroxide/simethicone Suspension 30 milliLiter(s) Oral four times a day PRN Indigestion  bisacodyl Suppository 10 milliGRAM(s) Rectal once PRN Constipation  cyclobenzaprine 5 milliGRAM(s) Oral three times a day PRN Muscle Spasm  magnesium hydroxide Suspension 30 milliLiter(s) Oral daily PRN Constipation  methocarbamol 750 milliGRAM(s) Oral every 8 hours PRN Muscle Spasm  ondansetron Injectable 4 milliGRAM(s) IV Push every 6 hours PRN Nausea and/or Vomiting  traMADol 50 milliGRAM(s) Oral every 4 hours PRN Severe Pain (7 - 10)  traMADol 25 milliGRAM(s) Oral every 4 hours PRN Moderate Pain (4 - 6)      PMHx/PSHx/FHx/SHx:  Spondylosis of cervical region without myelopathy or radiculopathy    Other spondylosis with myelopathy    Spinal stenosis of cervical region    Encounter for other preprocedural examination    Kidney stone    Marijuana abuse    Spinal stenosis in cervical region    GERD (gastroesophageal reflux disease)    History of drug abuse    History of kidney stones    Carpal tunnel syndrome of left wrist    Arthritis    Major depression    Migraines    Osteoporosis    Stenosis of cervical spine    Cervical stenosis of spine    Radiculopathy, cervical region    Carpal tunnel syndrome, bilateral upper limbs    Spondylosis without myelopathy or radiculopathy, cervical region    2019 novel coronavirus disease (COVID-19)    Medical marijuana use    History of heroin use    History of depression    Pain in both lower extremities    Spinal stenosis in cervical region    Prophylactic measure    No significant past surgical history    History of renal stent    S/P cystoscopy    Status post spinal disc removal    H/O spinal fusion    History of lithotripsy    History of fusion of cervical spine    History of carpal tunnel surgery of left wrist    History of colonoscopy    History of endoscopy    S/P cervical discectomy        Allergies:  No Known Drug Allergies  Pt has allergy to mustard (Anaphylaxis)      ROS: All systems negative except as documented in Interval history    O:  T(C): 36.6 (12-04-23 @ 08:42), Max: 36.8 (12-04-23 @ 05:56)  HR: 113 (12-04-23 @ 08:42) (98 - 113)  BP: 119/65 (12-04-23 @ 08:42) (119/65 - 128/90)  RR: 18 (12-04-23 @ 08:42) (18 - 18)  SpO2: 98% (12-04-23 @ 08:42) (93% - 98%)    Focused neurologic exam:  MS - AAO x3, speech fluent, rep/naming intact, follows commands, attn/conc/recent and remote memory/fund of knowledge WNL  CN - PERRLA, EOMI, VFF, face sens/str/hearing WNL b/l, tongue/palate midline, trap 5/5 b/l  Motor - Normal tone, 5b/l UE 4/5, RLE 4/5, LLE 5/5, Right dorsiflexion/plantarflexion 3/5. atrophy of muscles of b/l hands, fasciculation of b/l LE and right arm  Sens - LT intact all  DTR's - 1+ b/l UE, mute b/l LE and downgoing b/l plantar response  Coord - FtN intact b/l  Gait and station - Not assessed due to fall risk    Pertinent labs/studies:    LABS:  cret                        15.7   9.71  )-----------( 221      ( 04 Dec 2023 06:49 )             46.7     12-04    134<L>  |  100  |  18  ----------------------------<  100<H>  3.9   |  23  |  0.98    Ca    9.8      04 Dec 2023 06:48    < from: MR Cervical Spine No Cont (11.03.23 @ 18:49) >  Loss of the normal cervical lordosis is seen    Postoperative changes compatible with discectomies are seen involving the   C4-5, C5-C6 and C6-7 levels with spinal fusion seen involving the C5-C7   levels. These findings were present on the prior study.    The vertebral body height and alignment appear normal    Mild disc desiccation is seen involving C2-3, C3-4, C7-T1, T1-T2, T2-3   and T3-4 levels.    C2-3: Mild disc bulge and bilateral hypertrophic facet joint changes   seen. Moderate narrowing of the left neural foramen.    C3-4: Disc osteophyte complex is seen. Hypertrophic change involving both   facet and uncal vertebral joints. Mild to moderate narrowing of the   spinal canal. Moderate to severe narrowing of the right neural foramen   and severe narrowing of the left neural foramen    C4-5: Disc osteophyte complex is seen. Hypertrophic change involving both   facet and uncovertebral joints. Mild to moderate narrowing of the spinal   canal. Moderate narrowing of the left neural foramen and moderate   narrowing of the right neural foramen    C5-6: Disc bulge is seen. Hypertrophic change is seen involving both   facet and uncovertebral joint. Moderate to severe narrowing of the left   neural foramen and severe narrowing of the right neural foramen. Mild   narrowing of the spinal canal.    C6-7: Disc bulge is seen. Hypertrophic change involving both facet and   uncovertebral joints. Mild narrowing of the spinal canal. Moderate   narrowing of the right neural foramen and severe narrowing of the left   neural foramen    C7-T1: Normal    T1-2: Normal    The spinal cord demonstrates normal signal and caliber.    Evaluation of the paraspinal soft tissues appear normal.    IMPRESSION: Postop changes as described above.    Degenerative changes as described above.    < end of copied text >  < from: MR Head No Cont (11.29.23 @ 23:21) >  IMPRESSION:    1. BRAIN:   Unremarkable MR of the brain. No focal brain lesion.    2. CERVICAL SPINE:   Status post anterior stabilization C4-C7 with   routine postoperative appearance. Residual degenerative foraminal   compromise without significant central canal compromise. C1-C2   osteoarthritis. Cervical cord remains intact. No significant interval   change 11/3/2023.    3. THORACIC SPINE:   Low-grade thoracic degenerative disc disease. Intact   thoracic cord.    4. LUMBAR SPINE:   Low-grade lumbar degenerative disc disease.   Intact   conus and cauda equina.    5. The need for supplemental gadolinium enhanced evaluation should be   determined based on the clinical circumstances.    < end of copied text >

## 2023-12-04 NOTE — PROGRESS NOTE ADULT - ASSESSMENT
63-year-old male with history of bilateral carpal tunnel syndrome,  cervical stenosis status post surgery, presents for planned follow-up cervical surgery which is now deferred due to new complaint of lower extremity weakness.  He has several years history of lower back pain, less than 1 year history of bilateral upper extremity weakness worse distally with muscle wasting,, 2 to 3 weeks of bilateral lower extremity weakness worse distally, right upper extent extremity sensory disturbance worse distal    Impression Subacute on chronic diffuse weakness R>L, Upper>Lower, Distal>Proximal c/f myopathy v.s  neuropathy. No cord compression on MR C/T/L spine. No acute intracranial pathology on MR brain.    Recommendations:  [] MR brain, MR C/T/L Spine stable  [] EMG study scheduled today with Dr. Alegria  [] please check labs: CK, TSH, free T4, creatinine kinase, Myoglobin, Aldolase, ESR, CRP, PRASAD,  urinalysis, SPEP, UPEP, anti-ganglioside's, paraneoplastic panel, B12, Folate, B1, B6, Vitamin E (alpha tocopherol)  [] If abnormal CK would then pursue myositis associated antibody panel (importantly -Jo1, -PL7, -PL12, -OJ for antisynthetase syndrome; TIF1-gamma, - NXP2, -Mi2, -BA, - MDA5 for dermatomyositis, - HMG-CoA, -SRP for immune-mediated necrotizing myopathy)  [] rest of care per primary team. Discussed plans with primary medical team  [] will continue to follow up      Plans discussed with neurology attending, Dr. Pisano

## 2023-12-05 ENCOUNTER — TRANSCRIPTION ENCOUNTER (OUTPATIENT)
Age: 63
End: 2023-12-05

## 2023-12-05 VITALS — WEIGHT: 214.95 LBS

## 2023-12-05 LAB
CK SERPL-CCNC: 473 U/L — HIGH (ref 30–200)
CK SERPL-CCNC: 473 U/L — HIGH (ref 30–200)
CRP SERPL-MCNC: 35 MG/L — HIGH (ref 0–4)
CRP SERPL-MCNC: 35 MG/L — HIGH (ref 0–4)
ERYTHROCYTE [SEDIMENTATION RATE] IN BLOOD: 19 MM/HR — SIGNIFICANT CHANGE UP (ref 0–20)
ERYTHROCYTE [SEDIMENTATION RATE] IN BLOOD: 19 MM/HR — SIGNIFICANT CHANGE UP (ref 0–20)
FOLATE SERPL-MCNC: 14.9 NG/ML — SIGNIFICANT CHANGE UP
FOLATE SERPL-MCNC: 14.9 NG/ML — SIGNIFICANT CHANGE UP
MYOGLOBIN SERPL-MCNC: 179 NG/ML — HIGH (ref 28–72)
MYOGLOBIN SERPL-MCNC: 179 NG/ML — HIGH (ref 28–72)
PROT SERPL-MCNC: 6.5 G/DL — SIGNIFICANT CHANGE UP (ref 6–8.3)
PROT SERPL-MCNC: 6.5 G/DL — SIGNIFICANT CHANGE UP (ref 6–8.3)
T4 FREE SERPL-MCNC: 1.1 NG/DL — SIGNIFICANT CHANGE UP (ref 0.9–1.8)
T4 FREE SERPL-MCNC: 1.1 NG/DL — SIGNIFICANT CHANGE UP (ref 0.9–1.8)
TSH SERPL-MCNC: 0.79 UIU/ML — SIGNIFICANT CHANGE UP (ref 0.27–4.2)
TSH SERPL-MCNC: 0.79 UIU/ML — SIGNIFICANT CHANGE UP (ref 0.27–4.2)
VIT B12 SERPL-MCNC: 394 PG/ML — SIGNIFICANT CHANGE UP (ref 232–1245)
VIT B12 SERPL-MCNC: 394 PG/ML — SIGNIFICANT CHANGE UP (ref 232–1245)

## 2023-12-05 PROCEDURE — 85027 COMPLETE CBC AUTOMATED: CPT

## 2023-12-05 PROCEDURE — 97166 OT EVAL MOD COMPLEX 45 MIN: CPT

## 2023-12-05 PROCEDURE — 85652 RBC SED RATE AUTOMATED: CPT

## 2023-12-05 PROCEDURE — 80048 BASIC METABOLIC PNL TOTAL CA: CPT

## 2023-12-05 PROCEDURE — 97116 GAIT TRAINING THERAPY: CPT

## 2023-12-05 PROCEDURE — 97162 PT EVAL MOD COMPLEX 30 MIN: CPT

## 2023-12-05 PROCEDURE — 84443 ASSAY THYROID STIM HORMONE: CPT

## 2023-12-05 PROCEDURE — 72141 MRI NECK SPINE W/O DYE: CPT | Mod: MB

## 2023-12-05 PROCEDURE — 72146 MRI CHEST SPINE W/O DYE: CPT | Mod: MB

## 2023-12-05 PROCEDURE — 84165 PROTEIN E-PHORESIS SERUM: CPT

## 2023-12-05 PROCEDURE — 99233 SBSQ HOSP IP/OBS HIGH 50: CPT | Mod: 57

## 2023-12-05 PROCEDURE — 82962 GLUCOSE BLOOD TEST: CPT

## 2023-12-05 PROCEDURE — 84425 ASSAY OF VITAMIN B-1: CPT

## 2023-12-05 PROCEDURE — 82550 ASSAY OF CK (CPK): CPT

## 2023-12-05 PROCEDURE — 70551 MRI BRAIN STEM W/O DYE: CPT | Mod: MB

## 2023-12-05 PROCEDURE — 86038 ANTINUCLEAR ANTIBODIES: CPT

## 2023-12-05 PROCEDURE — 83520 IMMUNOASSAY QUANT NOS NONAB: CPT

## 2023-12-05 PROCEDURE — 83874 ASSAY OF MYOGLOBIN: CPT

## 2023-12-05 PROCEDURE — 84446 ASSAY OF VITAMIN E: CPT

## 2023-12-05 PROCEDURE — 82085 ASSAY OF ALDOLASE: CPT

## 2023-12-05 PROCEDURE — 82607 VITAMIN B-12: CPT

## 2023-12-05 PROCEDURE — 84155 ASSAY OF PROTEIN SERUM: CPT

## 2023-12-05 PROCEDURE — 72148 MRI LUMBAR SPINE W/O DYE: CPT | Mod: MB

## 2023-12-05 PROCEDURE — 82746 ASSAY OF FOLIC ACID SERUM: CPT

## 2023-12-05 PROCEDURE — 84207 ASSAY OF VITAMIN B-6: CPT

## 2023-12-05 PROCEDURE — 86140 C-REACTIVE PROTEIN: CPT

## 2023-12-05 PROCEDURE — 36415 COLL VENOUS BLD VENIPUNCTURE: CPT

## 2023-12-05 PROCEDURE — 86803 HEPATITIS C AB TEST: CPT

## 2023-12-05 PROCEDURE — 84439 ASSAY OF FREE THYROXINE: CPT

## 2023-12-05 RX ORDER — TRAMADOL HYDROCHLORIDE 50 MG/1
1 TABLET ORAL
Qty: 21 | Refills: 0
Start: 2023-12-05 | End: 2023-12-11

## 2023-12-05 RX ADMIN — SODIUM CHLORIDE 3 MILLILITER(S): 9 INJECTION INTRAMUSCULAR; INTRAVENOUS; SUBCUTANEOUS at 13:36

## 2023-12-05 RX ADMIN — Medication 1000 MILLIGRAM(S): at 13:35

## 2023-12-05 RX ADMIN — PANTOPRAZOLE SODIUM 40 MILLIGRAM(S): 20 TABLET, DELAYED RELEASE ORAL at 05:15

## 2023-12-05 RX ADMIN — Medication 1 TABLET(S): at 13:04

## 2023-12-05 RX ADMIN — Medication 500 MILLIGRAM(S): at 05:16

## 2023-12-05 RX ADMIN — Medication 1000 MILLIGRAM(S): at 13:03

## 2023-12-05 RX ADMIN — GABAPENTIN 600 MILLIGRAM(S): 400 CAPSULE ORAL at 05:15

## 2023-12-05 RX ADMIN — Medication 1000 MILLIGRAM(S): at 05:15

## 2023-12-05 RX ADMIN — Medication 1 MILLIGRAM(S): at 13:04

## 2023-12-05 RX ADMIN — Medication 1 TABLET(S): at 05:16

## 2023-12-05 RX ADMIN — GABAPENTIN 600 MILLIGRAM(S): 400 CAPSULE ORAL at 13:04

## 2023-12-05 RX ADMIN — Medication 500 MILLIGRAM(S): at 17:11

## 2023-12-05 NOTE — DIETITIAN INITIAL EVALUATION ADULT - NSFNSGIIOFT_GEN_A_CORE
Patient reports no nausea/vomiting; no diarrhea or constipation; last BM today per patient; bowel regimen: miralax, senna, PRN magnesium hydroxide

## 2023-12-05 NOTE — DIETITIAN INITIAL EVALUATION ADULT - NSFNSNUTRHOMESUPPLEMENTFT_GEN_A_CORE
Patient reports he takes vitamins at home, unsure of which ones specifically as his wife manages his medication regimen.

## 2023-12-05 NOTE — DISCHARGE NOTE NURSING/CASE MANAGEMENT/SOCIAL WORK - NSDCPEFALRISK_GEN_ALL_CORE
For information on Fall & Injury Prevention, visit: [email or web address] OR  [email or web address] OR  https://www.cdc.gov/steadi/patient.html For information on Fall & Injury Prevention, visit: [email or web address] OR  [email or web address] OR  https://www.cdc.gov/steadi/patient.html

## 2023-12-05 NOTE — DIETITIAN INITIAL EVALUATION ADULT - PERTINENT LABORATORY DATA
12-04    134<L>  |  100  |  18  ----------------------------<  100<H>  3.9   |  23  |  0.98    Ca    9.8      04 Dec 2023 06:48    TPro  6.5  /  Alb  x   /  TBili  x   /  DBili  x   /  AST  x   /  ALT  x   /  AlkPhos  x   12-05  A1C with Estimated Average Glucose Result: 5.6 % (11-16-23 @ 12:21)

## 2023-12-05 NOTE — DISCHARGE NOTE PROVIDER - PROVIDER TOKENS
PROVIDER:[TOKEN:[[id number]],FOLLOWUP:[1 week]] PROVIDER:[TOKEN:[[id number]],FOLLOWUP:[1 week]] PROVIDER:[TOKEN:[[id number]],FOLLOWUP:[1 week]] PROVIDER:[TOKEN:[[id number]],FOLLOWUP:[1 week]]

## 2023-12-05 NOTE — DISCHARGE NOTE PROVIDER - NSDCFUSCHEDAPPT_GEN_ALL_CORE_FT
Eliud Quinones  [facility] Physician [facility]  ORTHOSURG [address]  Scheduled Appointment: 12/06/2023     Eliud Quinones  [facility] Physician [facility]  ORTHOSURG [address]  Scheduled Appointment: 12/06/2023

## 2023-12-05 NOTE — CHART NOTE - NSCHARTNOTEFT_GEN_A_CORE
The patient was met at bedside with the author and Dr. Ranulfo Pisano, Neurology attending. Prior to the meeting, a conversation was had with the Neurologist who conducted the patient's EMG, Jim Alegria. Dr. Alegria mentioned that, through her EMG, that there was concern for motor nerve abnormalities with the patient. The results was conveyed to the patient who expressed acknowledgement. The patient questioned whether he should get his procedure for his hand. We cautioned that the patient's orthopaedic procedure may not treat the underlying cause of the patient's deficits. It is our hope that the patient follows up with Dr. Betancourt in the outpatient setting to further work up his motor nerve abnormalities before considering further invasive procedures with futile benefits. The contact information for Dr. Alegria was provided to the primary team. The patient was met at bedside with the author and Dr. Js Psiano, Neurology attending. Prior to the meeting, a conversation was had with the Neurologist who conducted the patient's EMG, Jim Alegria. Dr. Alegria mentioned that, through her EMG, that there was concern for motor nerve abnormalities with the patient. The results was conveyed to the patient who expressed acknowledgement. The patient questioned whether he should get his procedure for his hand. We cautioned that the patient's orthopaedic procedure may not treat the underlying cause of the patient's deficits. It is our hope that the patient follows up with Dr. Betancourt in the outpatient setting to further work up his motor nerve abnormalities before considering further invasive procedures with futile benefits. The contact information for Dr. Alegria was provided to the primary team. The patient was met at bedside with the author and Dr. Js Pisano, Neurology attending. Prior to the meeting, a conversation was had with the Neurologist who conducted the patient's EMG, Jim Alegria. Dr. Alegria mentioned that, through her EMG, that there was concern for motor nerve abnormalities with the patient. The results was conveyed to the patient who expressed acknowledgement. The patient questioned whether he should get his procedure for his hand. We cautioned that the patient's orthopaedic procedure may not treat the underlying cause of the patient's deficits. It is our hope that the patient follows up with Dr. Betancourt in the outpatient setting to further work up his motor nerve abnormalities before considering further invasive procedures with futile benefits. The contact information for Dr. Alegria was provided to the primary team. The patient was met at bedside with the author and Dr. Js Pisano, Neurology attending. Prior to the meeting, a conversation was had with the Neurologist who conducted the patient's EMG, Jim Alegria. Dr. Alegria mentioned that, through her EMG, that there was concern for motor nerve abnormalities with the patient which may be consistent with motor neuron disease. The results was conveyed to the patient who expressed acknowledgement. The patient questioned whether he should get his procedure for his hand. We stated that although there are no neurologic contraindications to any cervical spine or shoulder surgery it would be in his best interest to delay any upcoming procedures until after he follows up with Dr. Alegria as the patient's orthopaedic procedure may not treat the underlying cause of the patient's deficits (this was also communicated to his orthopedic surgeon, Dr. Quinones, earlier in the day). It is our hope that the patient follows up with Dr. Betancourt in the outpatient setting to further work up his motor nerve abnormalities before considering further invasive procedures with unclear benefits. The contact information for Dr. Alegria was provided to the primary team and placed into patient's discharge summary.

## 2023-12-05 NOTE — DISCHARGE NOTE PROVIDER - HOSPITAL COURSE
Patient is a 63y Male who presents c/o neck pain and LBP for multiple months. Patient more recently has started making note of weakness and loss of function.  Patient states that for the past few months he's had increasing issues with putting on shirts, opening water bottles, eating, fine motor movements of the hands.   Patient states he had CTS, Bilateral with carpal tunnel releases done 4-5 years ago, which did not seem to improve his weakness.  Patient was seen in Dr. Quinones's office about 4 weeks ago after one recent fall, and he was diagnosed with cervical myelopathy.  Patient was having mild balance issues at that time.    Since that time, patient has started to feel more weakness in BLLE, R>L. Also having some sensation changes to his BL knees. 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 Patient stated that he has had gradually worsening weakness in his bilateral legs. Denied pain/injury elsewhere. Denied bowel/bladder incontinence. Denied saddle anesthesia. Denied fevers/chills.     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 A neurology work up was completed and patient was discharged home for previously planned surgery next week with Dr. Quinones.

## 2023-12-05 NOTE — DIETITIAN INITIAL EVALUATION ADULT - REASON INDICATOR FOR ASSESSMENT
RD assessment warranted for: length of stay. Chart reviewed, events noted.  Source: medical record, patient.

## 2023-12-05 NOTE — PROGRESS NOTE ADULT - SUBJECTIVE AND OBJECTIVE BOX
Pt seen/examined. Doing well. Pain controlled. No acute overnight complaints or events.    T(C): 36.4 (23 @ 04:53), Max: 36.7 (23 @ 00:34)  HR: 90 (23 @ 04:53) (90 - 113)  BP: 101/69 (23 @ 04:53) (101/69 - 134/91)  RR: 18 (23 @ 04:53) (18 - 18)  SpO2: 93% (23 @ 04:53) (93% - 98%)  Wt(kg): --  - Gen: NAD    Physical Examination:  GEN: NAD, resting quietly  PULM: symmetric chest rise bilaterally, no increased WOB  ABD: nondistended  EXTR:   R hand flexion contracture  Spine:  Motor:                   C5                C6              C7               C8           T1   R            4/5                4/5            4/5             4/5          1/5  L             4/5               4/5             4/5             4/5          4/5                L2             L3             L4               L5            S1  R         4/5          4/5          4/5              4/5         4-/5  L          4/5           4/5           4/5             4/5            4/5    Sensory:            C5         C6         C7      C8       T1        (0=absent, 1=impaired, 2=normal, NT=not testable)  R         1            1           1        1         1  L          1            1           1        1         1                 L2          L3         L4      L5       S1         (0=absent, 1=impaired, 2=normal, NT=not testable)  R         1            1            1        2        2  L          1            1           2        2         2      A/P:  63M s/p ACDF  for cervical myelopathy  in setting of new BLE weakness.     Plan:  - Tentative plan for OR   - Plan for cervical procedures currently on hold  - Neurology following, plan for EMG today  - MR C/T/LSp no compressive lesions  - Medicine recs   - Pain control  - WBAT with assistive devices as needed  - SCDs/ ambulation

## 2023-12-05 NOTE — PROGRESS NOTE ADULT - REASON FOR ADMISSION
Cervical Myelopathy with worsening neurologic symptoms

## 2023-12-05 NOTE — DISCHARGE NOTE PROVIDER - CARE PROVIDER_API CALL
Tolu Haas [facility]  Neurology  [address] [place] [id number]  Phone: [phone]  Fax: [phone]  Follow Up Time: 1 week   Tolu Haas [facility]  Neurology  [address] [place] [id number]  Phone: [phone]  Fax: [phone]  Follow Up Time: 1 week   Jim Alegria  Neurology  [address], [address]  [place] [address]  Phone: [phone]  Fax: [phone]  Follow Up Time: 1 week   Jim Alegria  Neurology  [address], [address]  [place] [address]  Phone: [phone]  Fax: [phone]  Follow Up Time: 1 week

## 2023-12-05 NOTE — PROGRESS NOTE ADULT - ATTENDING COMMENTS
Awaiting EMG/NCV testing. Then will make decision regarding surgical intervention. Spoke to patient regarding possible ALS diagnosis, he will f/u outpatient with neurology, and possible surgical intervention, next week.. D/C instructions given.

## 2023-12-05 NOTE — DISCHARGE NOTE PROVIDER - NSDCMRMEDTOKEN_GEN_ALL_CORE_FT
cyclobenzaprine 10 mg oral tablet: 1 tab(s) orally once a day (at bedtime) - (continue home medication)  gabapentin 600 mg oral tablet: 1 tab(s) orally 3 times a day continue on home medication  ibuprofen 800 mg oral tablet: 1 tab(s) orally 3 times a day  Narcan 4 mg/0.1 mL nasal spray: 4 milligram(s) intranasally every 2 to 3 minutes alternating nostrils as needed for overdose  nortriptyline 25 mg oral capsule: orally once a day (at bedtime)  omeprazole 20 mg oral delayed release capsule: 1 cap(s) orally once a day (at bedtime)  oxyCODONE 10 mg oral tablet: 1 tab(s) orally 1 to 2 times a day as needed for  severe pain

## 2023-12-05 NOTE — DIETITIAN INITIAL EVALUATION ADULT - ENERGY INTAKE
Patient reports fair appetite and PO intake since admit. 51-75% intake of meals noted per flowsheets. Adequate (%)

## 2023-12-05 NOTE — DISCHARGE NOTE NURSING/CASE MANAGEMENT/SOCIAL WORK - PATIENT PORTAL LINK FT
You can access the FollowMyHealth Patient Portal offered by [facility] by registering at the following website: [email or web address]. By joining [email or web address]’s FollowMyHealth portal, you will also be able to view your health information using other applications (apps) compatible with our system. You can access the FollowMyHealth Patient Portal offered by [facility] by registering at the following website: [email or web address]. By joining [facility]’s FollowMyHealth portal, you will also be able to view your health information using other applications (apps) compatible with our system.

## 2023-12-05 NOTE — DIETITIAN INITIAL EVALUATION ADULT - OTHER INFO
Patient reports "90-something" lb weight loss ~1 year ago - notes this weight loss was intentional, notes his weight was as low as 187lb after weight loss but was not comfortable with that weight so he has been gaining weight back, now weighs 210-212lb UBW.   Dosing weight: 215lb (11/29, stated).  IBW: 184lb, %IBW: % based on dosing weight.  Weight history per [facility]: 210.9lb (11/16/23), 215lb (7/24/23), 215lb (6/12/23), 212.9lb (5/11/23), 214.9lb (4/17/23), 212lb (2/27/23), 198lb (1/23/23), 240lb (12/23/21).  Weight appears consistent with pt's reported weight history. RD to continue to monitor weight trends as available/able.     -Ordered for Multivitamin, folic acid, vitamin C, calcium carbonate + Vitamin D. Patient reports "90-something" lb weight loss ~1 year ago - notes this weight loss was intentional, notes his weight was as low as 187lb after weight loss but was not comfortable with that weight so he has been gaining weight back, now weighs 210-212lb UBW.   Dosing weight: 215lb (11/29, stated).  IBW: 184lb, %IBW: % based on dosing weight.  Weight history per [facility]: 210.9lb (11/16/23), 215lb (7/24/23), 215lb (6/12/23), 212.9lb (5/11/23), 214.9lb (4/17/23), 212lb (2/27/23), 198lb (1/23/23), 240lb (12/23/21).  Weight appears consistent with pt's reported weight history. RD to continue to monitor weight trends as available/able.     -Ordered for Multivitamin, folic acid, vitamin C, calcium carbonate + Vitamin D.

## 2023-12-05 NOTE — DISCHARGE NOTE PROVIDER - NSDCCPCAREPLAN_GEN_ALL_CORE_FT
PRINCIPAL DISCHARGE DIAGNOSIS  Diagnosis: Pain in both lower extremities  Assessment and Plan of Treatment:

## 2023-12-05 NOTE — DIETITIAN INITIAL EVALUATION ADULT - ADD RECOMMEND
-Continue Regular diet to maximize PO intake. RD to add Mighty Shake 2x/day (400 eddie, 14 Gm protein) to assist with meeting protein-energy needs.  -Continue current micronutrient supplementation pending no medical contraindications: Multivitamin, folic acid, vitamin C, calcium carbonate + Vitamin D.  -Monitor PO intake, diet, weight, labs, skin, GI symptoms, and BM regularity.  -RD remains available upon request and will follow up per protocol.

## 2023-12-05 NOTE — DIETITIAN INITIAL EVALUATION ADULT - PERTINENT MEDS FT
MEDICATIONS  (STANDING):  acetaminophen     Tablet .. 1000 milliGRAM(s) Oral every 8 hours  acetaminophen   IVPB .. 1000 milliGRAM(s) IV Intermittent once  ascorbic acid 500 milliGRAM(s) Oral two times a day  calcium carbonate 1250 mG  + Vitamin D (OsCal 500 + D) 1 Tablet(s) Oral three times a day  chlorhexidine 2% Cloths 1 Application(s) Topical once  folic acid 1 milliGRAM(s) Oral daily  gabapentin 600 milliGRAM(s) Oral three times a day  lidocaine 1% Injectable 0.2 milliGRAM(s) Local Injection once  multivitamin 1 Tablet(s) Oral daily  nortriptyline 25 milliGRAM(s) Oral at bedtime  pantoprazole    Tablet 40 milliGRAM(s) Oral before breakfast  polyethylene glycol 3350 17 Gram(s) Oral at bedtime  senna 2 Tablet(s) Oral at bedtime  sodium chloride 0.9% lock flush 3 milliLiter(s) IV Push every 8 hours    MEDICATIONS  (PRN):  aluminum hydroxide/magnesium hydroxide/simethicone Suspension 30 milliLiter(s) Oral four times a day PRN Indigestion  bisacodyl Suppository 10 milliGRAM(s) Rectal once PRN Constipation  cyclobenzaprine 5 milliGRAM(s) Oral three times a day PRN Muscle Spasm  magnesium hydroxide Suspension 30 milliLiter(s) Oral daily PRN Constipation  methocarbamol 750 milliGRAM(s) Oral every 8 hours PRN Muscle Spasm  ondansetron Injectable 4 milliGRAM(s) IV Push every 6 hours PRN Nausea and/or Vomiting  traMADol 50 milliGRAM(s) Oral every 4 hours PRN Severe Pain (7 - 10)  traMADol 25 milliGRAM(s) Oral every 4 hours PRN Moderate Pain (4 - 6)   MEDICATIONS  (STANDING):  ascorbic acid 500 milliGRAM(s) Oral two times a day  calcium carbonate 1250 mG  + Vitamin D (OsCal 500 + D) 1 Tablet(s) Oral three times a day  folic acid 1 milliGRAM(s) Oral daily  gabapentin 600 milliGRAM(s) Oral three times a day  multivitamin 1 Tablet(s) Oral daily  nortriptyline 25 milliGRAM(s) Oral at bedtime  pantoprazole    Tablet 40 milliGRAM(s) Oral before breakfast  polyethylene glycol 3350 17 Gram(s) Oral at bedtime  senna 2 Tablet(s) Oral at bedtime  sodium chloride 0.9% lock flush 3 milliLiter(s) IV Push every 8 hours    MEDICATIONS  (PRN):  aluminum hydroxide/magnesium hydroxide/simethicone Suspension 30 milliLiter(s) Oral four times a day PRN Indigestion  bisacodyl Suppository 10 milliGRAM(s) Rectal once PRN Constipation  magnesium hydroxide Suspension 30 milliLiter(s) Oral daily PRN Constipation  ondansetron Injectable 4 milliGRAM(s) IV Push every 6 hours PRN Nausea and/or Vomiting  traMADol 50 milliGRAM(s) Oral every 4 hours PRN Severe Pain (7 - 10)  traMADol 25 milliGRAM(s) Oral every 4 hours PRN Moderate Pain (4 - 6)

## 2023-12-06 ENCOUNTER — APPOINTMENT (OUTPATIENT)
Dept: ORTHOPEDIC SURGERY | Facility: HOSPITAL | Age: 63
End: 2023-12-06

## 2023-12-06 ENCOUNTER — APPOINTMENT (OUTPATIENT)
Dept: PAIN MANAGEMENT | Facility: CLINIC | Age: 63
End: 2023-12-06
Payer: MEDICAID

## 2023-12-06 DIAGNOSIS — M54.16 RADICULOPATHY, LUMBAR REGION: ICD-10-CM

## 2023-12-06 LAB
% ALBUMIN: 60.8 % — SIGNIFICANT CHANGE UP
% ALBUMIN: 60.8 % — SIGNIFICANT CHANGE UP
% ALPHA 1: 5 % — SIGNIFICANT CHANGE UP
% ALPHA 1: 5 % — SIGNIFICANT CHANGE UP
% ALPHA 2: 12 % — SIGNIFICANT CHANGE UP
% ALPHA 2: 12 % — SIGNIFICANT CHANGE UP
% BETA: 11.2 % — SIGNIFICANT CHANGE UP
% BETA: 11.2 % — SIGNIFICANT CHANGE UP
% GAMMA: 11 % — SIGNIFICANT CHANGE UP
% GAMMA: 11 % — SIGNIFICANT CHANGE UP
ALBUMIN SERPL ELPH-MCNC: 4 G/DL — SIGNIFICANT CHANGE UP (ref 3.6–5.5)
ALBUMIN SERPL ELPH-MCNC: 4 G/DL — SIGNIFICANT CHANGE UP (ref 3.6–5.5)
ALBUMIN/GLOB SERPL ELPH: 1.6 RATIO — SIGNIFICANT CHANGE UP
ALBUMIN/GLOB SERPL ELPH: 1.6 RATIO — SIGNIFICANT CHANGE UP
ALDOLASE SERPL-CCNC: 8.2 U/L — SIGNIFICANT CHANGE UP (ref 3.3–10.3)
ALDOLASE SERPL-CCNC: 8.2 U/L — SIGNIFICANT CHANGE UP (ref 3.3–10.3)
ALPHA1 GLOB SERPL ELPH-MCNC: 0.3 G/DL — SIGNIFICANT CHANGE UP (ref 0.1–0.4)
ALPHA1 GLOB SERPL ELPH-MCNC: 0.3 G/DL — SIGNIFICANT CHANGE UP (ref 0.1–0.4)
ALPHA2 GLOB SERPL ELPH-MCNC: 0.8 G/DL — SIGNIFICANT CHANGE UP (ref 0.5–1)
ALPHA2 GLOB SERPL ELPH-MCNC: 0.8 G/DL — SIGNIFICANT CHANGE UP (ref 0.5–1)
ANA TITR SER: NEGATIVE — SIGNIFICANT CHANGE UP
ANA TITR SER: NEGATIVE — SIGNIFICANT CHANGE UP
B-GLOBULIN SERPL ELPH-MCNC: 0.7 G/DL — SIGNIFICANT CHANGE UP (ref 0.5–1)
B-GLOBULIN SERPL ELPH-MCNC: 0.7 G/DL — SIGNIFICANT CHANGE UP (ref 0.5–1)
GAMMA GLOBULIN: 0.7 G/DL — SIGNIFICANT CHANGE UP (ref 0.6–1.6)
GAMMA GLOBULIN: 0.7 G/DL — SIGNIFICANT CHANGE UP (ref 0.6–1.6)
PROT PATTERN SERPL ELPH-IMP: SIGNIFICANT CHANGE UP
PROT PATTERN SERPL ELPH-IMP: SIGNIFICANT CHANGE UP
PROT SERPL-MCNC: 6.5 G/DL — SIGNIFICANT CHANGE UP (ref 6–8.3)
PROT SERPL-MCNC: 6.5 G/DL — SIGNIFICANT CHANGE UP (ref 6–8.3)

## 2023-12-06 PROCEDURE — 99213 OFFICE O/P EST LOW 20 MIN: CPT | Mod: 95

## 2023-12-06 NOTE — CHART NOTE - NSCHARTNOTEFT_GEN_A_CORE
Patient underwent INPATIENT EMG/NCS with Dr. Jim Alegria on 12/4/2023. Report is uploaded in patient's outpatient chart. Report has been delivered to Medical Records office at [address] [place] [address] for upload into patient's inpatient chart. Patient underwent INPATIENT EMG/NCS with Dr. Jim Alegria on 12/4/2023. Report is uploaded in patient's outpatient chart. Report has been delivered to Medical Records office at [address] [place] [address] for upload into patient's inpatient chart.

## 2023-12-08 LAB
PYRIDOXAL PHOS SERPL-MCNC: 11.9 UG/L — SIGNIFICANT CHANGE UP (ref 3.4–65.2)
PYRIDOXAL PHOS SERPL-MCNC: 11.9 UG/L — SIGNIFICANT CHANGE UP (ref 3.4–65.2)

## 2023-12-09 LAB
VIT B1 SERPL-MCNC: 137.3 NMOL/L — SIGNIFICANT CHANGE UP (ref 66.5–200)
VIT B1 SERPL-MCNC: 137.3 NMOL/L — SIGNIFICANT CHANGE UP (ref 66.5–200)

## 2023-12-10 ENCOUNTER — NON-APPOINTMENT (OUTPATIENT)
Age: 63
End: 2023-12-10

## 2023-12-11 ENCOUNTER — APPOINTMENT (OUTPATIENT)
Dept: ORTHOPEDIC SURGERY | Facility: CLINIC | Age: 63
End: 2023-12-11
Payer: MEDICAID

## 2023-12-11 DIAGNOSIS — M47.812 SPONDYLOSIS W/OUT MYELOPATHY OR RADICULOPATHY, CERVICAL REGION: ICD-10-CM

## 2023-12-11 DIAGNOSIS — M47.816 SPONDYLOSIS W/OUT MYELOPATHY OR RADICULOPATHY, CERVICAL REGION: ICD-10-CM

## 2023-12-11 LAB
A-TOCOPHEROL VIT E SERPL-MCNC: 11.5 MG/L — SIGNIFICANT CHANGE UP (ref 9–29)
A-TOCOPHEROL VIT E SERPL-MCNC: 11.5 MG/L — SIGNIFICANT CHANGE UP (ref 9–29)
BETA+GAMMA TOCOPHEROL SERPL-MCNC: 0.7 MG/L — SIGNIFICANT CHANGE UP (ref 0.5–4.9)
BETA+GAMMA TOCOPHEROL SERPL-MCNC: 0.7 MG/L — SIGNIFICANT CHANGE UP (ref 0.5–4.9)

## 2023-12-11 PROCEDURE — 99443: CPT

## 2023-12-13 LAB
GANGLIOSIDE GQ1B IGG ANTIBODY RESULT: SIGNIFICANT CHANGE UP TITER
GANGLIOSIDE GQ1B IGG ANTIBODY RESULT: SIGNIFICANT CHANGE UP TITER

## 2023-12-14 ENCOUNTER — APPOINTMENT (OUTPATIENT)
Dept: NEUROLOGY | Facility: CLINIC | Age: 63
End: 2023-12-14
Payer: MEDICAID

## 2023-12-14 PROCEDURE — 99215 OFFICE O/P EST HI 40 MIN: CPT

## 2023-12-14 PROCEDURE — 99205 OFFICE O/P NEW HI 60 MIN: CPT

## 2023-12-14 RX ORDER — RILUZOLE 50 MG/1
50 TABLET, FILM COATED ORAL
Qty: 60 | Refills: 3 | Status: ACTIVE | COMMUNITY
Start: 2023-12-14 | End: 1900-01-01

## 2023-12-14 NOTE — END OF VISIT
[] : Resident [FreeTextEntry3] : a-63-yo man presented with progressive painless weakness without sensory involvement. On exam, he has weakness more on the right arm with shoulder abduction, biceps, triceps was stronger, and markedly atrophy on both hands at FDI, and thenar muscles, relatively spare ADM. On left arm, again, more weakness on the hand. Lower extremities 4-/4/5. DTRs is brisk on the right triceps, 2+ biceps. Fasciculations seen throughout his body.  Overall, he has both UMN and LMN on the same extremities, without sensory involvement.  Dx: motor neuron disease. I have a lengthy discussion with him about genetic testing. SOD1, and [id number]. He has no children and is a only child. Only 10% of ALS is genetic ALS. Result of genetic testing can be positive, negative and uncertain. He agreed for genetic testing after our discussion.  There are 3 FDA approved medications for ALS, I will start him on. Regular blood work needed after starting to check side effects. I emphasized that he should remain his weight as stable as he can. Will send him for PFT in case he needs assistive device for his breathing. physical therapy, occupational therapy and  consulted. Fall precaution, he has wheelchair at home and HELP device.  will refer him to ALS clinic. If it is too far out, he will come back to see me at follow up visit in 2-3 months. [Time Spent: ___ minutes] : I have spent [unfilled] minutes of time on the encounter.

## 2023-12-14 NOTE — PHYSICAL EXAM
[General Appearance - Alert] : alert [General Appearance - In No Acute Distress] : in no acute distress [General Appearance - Well Developed] : well developed [Person] : oriented to person [Place] : oriented to place [Time] : oriented to time [Cranial Nerves Optic (II)] : visual acuity intact bilaterally,  visual fields full to confrontation, pupils equal round and reactive to light [Cranial Nerves Oculomotor (III)] : extraocular motion intact [Cranial Nerves Trigeminal (V)] : facial sensation intact symmetrically [Cranial Nerves Facial (VII)] : face symmetrical [Cranial Nerves Vestibulocochlear (VIII)] : hearing was intact bilaterally [CNS Accessory - Diminished Shoulder Elev. - Right Trapezius] : weakness of shoulder elevation was present on the right [CNS Accessory - Diminished Shoulder Elev. - Left Trapezius] : weakness of shoulder elevation was present on the left [Motor Handedness Right-Handed] : the patient is right hand dominant [Motor Strength Upper Extremities Bilaterally] : there was weakness in both upper extremities [Motor Strength Lower Extremities Bilaterally] : there was weakness in both lower extremities [2] : shoulder flexion 2/5 [Motor Strength Hips Right Weakness] : hip weakness was present [Motor Strength Hips Left Weakness] : hip weakness was present [+4] : hip flexion +4/5 [4] : hip abduction 4/5 [5] : knee flexion 5/5 [-4] : ankle plantar flexion -4/5 [3] : great toe extension 3/5 [Sensation Tactile Decrease] : light touch was intact [Proprioception] : proprioception was intact [Vibration Decrease Leg / Foot Right Toes] : decreased at the toes of the right foot [0] : Brachioradialis right 0 [1+] : Brachioradialis left 1+ [3+] : Patella right 3+ [2+] : Patella left 2+ [Motor Strength Knee Right Weakness] : normal knee strength [Motor Strength Knee Left Weakness] : normal knee strength [Vibration Decrease Leg / Foot Left Toes] : normal  at the toes of the left foot [Plantar Reflex Right Only] : normal on the right [Plantar Reflex Left Only] : normal on the left [FreeTextEntry5] : B/l shoulder shrug is weak, R>L [FreeTextEntry6] : Has fasciculations throughout all UE and LE; split hand phenomena seen [FreeTextEntry9] : L knee has recovering laceration from fall so limited reflex exam on L patellar

## 2023-12-14 NOTE — HISTORY OF PRESENT ILLNESS
[FreeTextEntry1] : 63 y.o. M with PMH of s/p ACDF C4-5 on 7/2023 (presented with R shoulder pain, decreased ROM, and weakness  strength) and C4-6 in 8/2016 (initially presented with L shoulder pain and could not raise his arm). Pt follows with Dr. Quinones, referred for EMG to r/o neurologic cause behind the weakness and pain. Reported for many years, has been having lower back pain and b/l UE & LE weakness. Over the last year, has been reporting R hand weakness which progressed to L hand weakness. Also has R shoulder pain. Slight sensation stimulation like leaning to R when sleeping or dogs rubbing upon R arm causes pain sensation and wakes him up from sleeping. He has been walking with "clown gait", causing him to fall. During hospitalization in 7/2023, noted to have muscle spasms on R shoulder & also reported similar muscle spasm episodes 1 month ago while physical therapy. Reported recent falls x2, while walking down the staircase, most recently on past Sun, wife could not fully catch him so he fell backward while going down the staircase. Wife noted that patient has tremors on R hand while sleeping, pt himself also notes that he has tremors throughout extremities.   Lost 200 lbs over 2 years ago, purposefully. No FMH of similar symptoms. Lives with wife and dogs. Uses cane and wheelchair at home

## 2023-12-14 NOTE — DISCUSSION/SUMMARY
[FreeTextEntry1] : 63 y.o. M with PMH of s/p ACDF C4-5 on 7/2023 (presented with R shoulder pain, decreased ROM, and weakness  strength) and C4-6 in 8/2016 (initially presented with L shoulder pain and could not raise his arm). Patient has been having progressive weakness and falls despite having surgery. Had recent hospitalization with orthopedics in 11/2023. Neuro exam significant for fasciculations throughout extremities, atrophy of FDI, but preserved ADM (split hand syndrome), weakness in RUE and RLE more so than L side however exhibits diffuse overall weakness. Given clinical findings of lower motor and upper motor neuron signs, discussed with him the diagnosis of motor neuron disease, particularly ALS.  Plan: [] Discussed in depth regarding disease progression, treatment options, and expectations that the disease is not curable [] Serum tests for neurofilament chains, ALS genetic panel  [] Starting triple therapy Rylivio, Riluzole, Edaravone [] PT/OT []  from recent hospitalization reached out to patient already, going to have discussion of home health aide and other required assistances at home to prevent falling [] Will referral to ALS clinic, likely follow up in March [] Can follow up within 2 months  Case seen and discussed with neuromuscular specialist Dr. Alegria

## 2023-12-21 RX ORDER — IBUPROFEN 800 MG/1
800 TABLET, FILM COATED ORAL 3 TIMES DAILY
Qty: 90 | Refills: 5 | Status: ACTIVE | COMMUNITY
Start: 2023-01-23 | End: 1900-01-01

## 2024-01-05 ENCOUNTER — APPOINTMENT (OUTPATIENT)
Dept: PAIN MANAGEMENT | Facility: CLINIC | Age: 64
End: 2024-01-05

## 2024-01-08 ENCOUNTER — APPOINTMENT (OUTPATIENT)
Dept: ORTHOPEDIC SURGERY | Facility: CLINIC | Age: 64
End: 2024-01-08
Payer: MEDICAID

## 2024-01-08 VITALS — HEIGHT: 73 IN | WEIGHT: 220 LBS | BODY MASS INDEX: 29.16 KG/M2

## 2024-01-08 DIAGNOSIS — M50.30 OTHER CERVICAL DISC DEGENERATION, UNSPECIFIED CERVICAL REGION: ICD-10-CM

## 2024-01-08 PROCEDURE — 99215 OFFICE O/P EST HI 40 MIN: CPT

## 2024-01-22 ENCOUNTER — APPOINTMENT (OUTPATIENT)
Dept: PAIN MANAGEMENT | Facility: CLINIC | Age: 64
End: 2024-01-22
Payer: MEDICAID

## 2024-01-22 PROCEDURE — 99213 OFFICE O/P EST LOW 20 MIN: CPT | Mod: 95

## 2024-01-22 NOTE — HISTORY OF PRESENT ILLNESS
[Neck] : neck [Gradual] : gradual [9] : 9 [Dull/Aching] : dull/aching [Radiating] : radiating [Sharp] : sharp [Constant] : constant [Household chores] : household chores [Work] : work [Sleep] : sleep [Rest] : rest [Meds] : meds [Bending forward] : bending forward [] : yes [FreeTextEntry1] : States his cervical fusion was cancelled as he was experiencing increased falls and had bruises to his knees. Further Neurological workup revealed ALS and states was confirmed by another MD at [facility]. He is attending PT and OT near his home. States his right arm and  shoulder pain. is severe. He is awaiting a response from Dr Quinones about his cervical surgery. as he. is reviewing his recent diagnostic testing. States Oxycodone is effective for his pain but causes increased itching and wishes to continue. Tramadol not as effective for his pain. He admits to taking up to 2400mg Ibuprofen daily. Denies GI or. bleeding. issues.  [FreeTextEntry6] : KAYLEEN HANDS ATROPHY , STIFFNESS  [FreeTextEntry7] : DOWN TO RT SHOULDER  [de-identified] : LIFTING ABOVE HEAD  [de-identified] : 2023-07-12 [de-identified] : 2024-01-18 [de-identified] : CT SCAN 2023-11-03 [de-identified] : PT HAD A FALLEN DOWN THE OUTSIDE STEPS  LAST WEEKEND , FACE FIRST ON CONCRETE  PT GOES TO PHYSICAL THERAPY / OCCUPATIONAL THERAPY 2-3X WEEK

## 2024-01-22 NOTE — DISCUSSION/SUMMARY
[Medication Risks Reviewed] : Medication risks reviewed [de-identified] : Prescriptions renewed. Opioid agreement/obtained on chart [place]  reviewed and appropriate. SOAPP-R completed on chart. The patient's medications are documented to the best of their ability. Quality of life and functional ability improved on medications. The patient is showing no aberrant behavior or evidence of diversion. The patient was advised not to use narcotic medication while operating an automobile or heavy machinery due to potential sedation or dizziness. The patient was educated to the risks associated with potential opioid dependence and addiction. Urine toxicology screens as per office protocol. Use of multimodal analgesia used prn. Encouraged to decrease his daily use of. Ibuprofen.  Follow up one month.

## 2024-01-22 NOTE — REASON FOR VISIT
[Follow-Up Visit] : a follow-up pain management visit [Medical Office: ([facility])___] : at the medical office located in  [Home] : at home, [unfilled] , at the time of the visit. [Patient] : the patient [Self] : self [FreeTextEntry2] : MED REFILL

## 2024-02-01 ENCOUNTER — OUTPATIENT (OUTPATIENT)
Dept: OUTPATIENT SERVICES | Facility: HOSPITAL | Age: 64
LOS: 1 days | End: 2024-02-01
Payer: MEDICAID

## 2024-02-01 VITALS
RESPIRATION RATE: 20 BRPM | HEART RATE: 104 BPM | SYSTOLIC BLOOD PRESSURE: 131 MMHG | TEMPERATURE: 98 F | WEIGHT: 210.98 LBS | DIASTOLIC BLOOD PRESSURE: 90 MMHG | OXYGEN SATURATION: 96 % | HEIGHT: 73 IN

## 2024-02-01 DIAGNOSIS — Z29.9 ENCOUNTER FOR PROPHYLACTIC MEASURES, UNSPECIFIED: ICD-10-CM

## 2024-02-01 DIAGNOSIS — M50.30 OTHER CERVICAL DISC DEGENERATION, UNSPECIFIED CERVICAL REGION: ICD-10-CM

## 2024-02-01 DIAGNOSIS — Z98.890 OTHER SPECIFIED POSTPROCEDURAL STATES: Chronic | ICD-10-CM

## 2024-02-01 DIAGNOSIS — Z98.1 ARTHRODESIS STATUS: Chronic | ICD-10-CM

## 2024-02-01 DIAGNOSIS — M48.02 SPINAL STENOSIS, CERVICAL REGION: ICD-10-CM

## 2024-02-01 DIAGNOSIS — Z98.89 OTHER SPECIFIED POSTPROCEDURAL STATES: Chronic | ICD-10-CM

## 2024-02-01 DIAGNOSIS — Z01.818 ENCOUNTER FOR OTHER PREPROCEDURAL EXAMINATION: ICD-10-CM

## 2024-02-01 DIAGNOSIS — G12.21 AMYOTROPHIC LATERAL SCLEROSIS: ICD-10-CM

## 2024-02-01 LAB
ANION GAP SERPL CALC-SCNC: 13 MMOL/L — SIGNIFICANT CHANGE UP (ref 5–17)
BLD GP AB SCN SERPL QL: NEGATIVE — SIGNIFICANT CHANGE UP
BUN SERPL-MCNC: 15 MG/DL — SIGNIFICANT CHANGE UP (ref 7–23)
CALCIUM SERPL-MCNC: 9.9 MG/DL — SIGNIFICANT CHANGE UP (ref 8.4–10.5)
CHLORIDE SERPL-SCNC: 102 MMOL/L — SIGNIFICANT CHANGE UP (ref 96–108)
CO2 SERPL-SCNC: 23 MMOL/L — SIGNIFICANT CHANGE UP (ref 22–31)
CREAT SERPL-MCNC: 0.78 MG/DL — SIGNIFICANT CHANGE UP (ref 0.5–1.3)
EGFR: 101 ML/MIN/1.73M2 — SIGNIFICANT CHANGE UP
GLUCOSE SERPL-MCNC: 75 MG/DL — SIGNIFICANT CHANGE UP (ref 70–99)
HCT VFR BLD CALC: 47.8 % — SIGNIFICANT CHANGE UP (ref 39–50)
HGB BLD-MCNC: 15.8 G/DL — SIGNIFICANT CHANGE UP (ref 13–17)
MCHC RBC-ENTMCNC: 28.5 PG — SIGNIFICANT CHANGE UP (ref 27–34)
MCHC RBC-ENTMCNC: 33.1 GM/DL — SIGNIFICANT CHANGE UP (ref 32–36)
MCV RBC AUTO: 86.3 FL — SIGNIFICANT CHANGE UP (ref 80–100)
NRBC # BLD: 0 /100 WBCS — SIGNIFICANT CHANGE UP (ref 0–0)
PLATELET # BLD AUTO: 253 K/UL — SIGNIFICANT CHANGE UP (ref 150–400)
POTASSIUM SERPL-MCNC: 4.2 MMOL/L — SIGNIFICANT CHANGE UP (ref 3.5–5.3)
POTASSIUM SERPL-SCNC: 4.2 MMOL/L — SIGNIFICANT CHANGE UP (ref 3.5–5.3)
RBC # BLD: 5.54 M/UL — SIGNIFICANT CHANGE UP (ref 4.2–5.8)
RBC # FLD: 13.1 % — SIGNIFICANT CHANGE UP (ref 10.3–14.5)
RH IG SCN BLD-IMP: NEGATIVE — SIGNIFICANT CHANGE UP
SODIUM SERPL-SCNC: 138 MMOL/L — SIGNIFICANT CHANGE UP (ref 135–145)
WBC # BLD: 8.86 K/UL — SIGNIFICANT CHANGE UP (ref 3.8–10.5)
WBC # FLD AUTO: 8.86 K/UL — SIGNIFICANT CHANGE UP (ref 3.8–10.5)

## 2024-02-01 PROCEDURE — 87640 STAPH A DNA AMP PROBE: CPT

## 2024-02-01 PROCEDURE — G0463: CPT

## 2024-02-01 PROCEDURE — 86850 RBC ANTIBODY SCREEN: CPT

## 2024-02-01 PROCEDURE — 80048 BASIC METABOLIC PNL TOTAL CA: CPT

## 2024-02-01 PROCEDURE — 86901 BLOOD TYPING SEROLOGIC RH(D): CPT

## 2024-02-01 PROCEDURE — 85027 COMPLETE CBC AUTOMATED: CPT

## 2024-02-01 PROCEDURE — 87641 MR-STAPH DNA AMP PROBE: CPT

## 2024-02-01 PROCEDURE — 86900 BLOOD TYPING SEROLOGIC ABO: CPT

## 2024-02-01 PROCEDURE — 83036 HEMOGLOBIN GLYCOSYLATED A1C: CPT

## 2024-02-01 RX ORDER — CHLORHEXIDINE GLUCONATE 213 G/1000ML
1 SOLUTION TOPICAL ONCE
Refills: 0 | Status: COMPLETED | OUTPATIENT
Start: 2024-02-08 | End: 2024-02-08

## 2024-02-01 RX ORDER — LIDOCAINE HCL 20 MG/ML
0.2 VIAL (ML) INJECTION ONCE
Refills: 0 | Status: DISCONTINUED | OUTPATIENT
Start: 2024-02-08 | End: 2024-02-08

## 2024-02-01 RX ORDER — GABAPENTIN 400 MG/1
1 CAPSULE ORAL
Qty: 0 | Refills: 0 | DISCHARGE

## 2024-02-01 RX ORDER — CEFAZOLIN SODIUM 1 G
2000 VIAL (EA) INJECTION ONCE
Refills: 0 | Status: COMPLETED | OUTPATIENT
Start: 2024-02-08 | End: 2024-02-08

## 2024-02-01 NOTE — H&P PST ADULT - NSICDXPASTMEDICALHX_GEN_ALL_CORE_FT
PAST MEDICAL HISTORY:  2019 novel coronavirus disease (COVID-19)     ALS (amyotrophic lateral sclerosis)     Arthritis     Carpal tunnel syndrome, bilateral upper limbs     Cervical stenosis of spine     GERD (gastroesophageal reflux disease)     History of depression     History of heroin use     History of kidney stones multiple episodes, last in 2015    Medical marijuana use     Osteoporosis     Radiculopathy, cervical region     Spinal stenosis in cervical region s/p fusion 2017    Spondylosis without myelopathy or radiculopathy, cervical region      PAST MEDICAL HISTORY:  2019 novel coronavirus disease (COVID-19)     ALS (amyotrophic lateral sclerosis)     Arthritis     Carpal tunnel syndrome, bilateral upper limbs     Cervical stenosis of spine     Chronic pain syndrome     Degenerative joint disease     Fibromyalgia     GERD (gastroesophageal reflux disease)     History of depression     History of heroin use     History of kidney stones multiple episodes, last in 2015    Lumbar radiculopathy     Medical marijuana use     Osteoporosis     Radiculopathy, cervical region     Spinal stenosis in cervical region s/p fusion 2017    Spondylosis without myelopathy or radiculopathy, cervical region

## 2024-02-01 NOTE — H&P PST ADULT - NEGATIVE ENMT SYMPTOMS
no hearing difficulty/no ear pain/no tinnitus/no gum bleeding/no dry mouth/no throat pain/no dysphagia no hearing difficulty/no ear pain/no tinnitus/no sinus symptoms/no nasal congestion/no nasal discharge/no nasal obstruction/no nose bleeds/no recurrent cold sores/no abnormal taste sensation/no gum bleeding/no dry mouth/no throat pain/no dysphagia

## 2024-02-01 NOTE — H&P PST ADULT - PROBLEM SELECTOR PLAN 1
ERP Protocol instructions and soap provided   CBC BMP HA1C T&S MRSA ordered in PST  Pain consult ABO Check fingerstick DOS per ERP

## 2024-02-01 NOTE — H&P PST ADULT - REASON FOR ADMISSION
c3-c4 anterior cervical discectomy and fusion c3-c7 posterior cervical   laminectomy and spinal fusion  Goal: live without pain

## 2024-02-01 NOTE — H&P PST ADULT - ASSESSMENT
Dental: Patient denies loose teeth, dentures     Activity : walking, can go up and down the stairs   dasi mets : 5    CAPRINI VTE 2.0 SCORE [CLOT updated 2019]    AGE RELATED RISK FACTORS                                                       MOBILITY RELATED FACTORS  [ ] Age 41-60 years                                            (1 Point)                    [ ] Bed rest                                                        (1 Point)  [x ] Age: 61-74 years                                           (2 Points)                  [ ] Plaster cast                                                   (2 Points)  [ ] Age= 75 years                                              (3 Points)                    [ ] Bed bound for more than 72 hours                 (2 Points)    DISEASE RELATED RISK FACTORS                                               GENDER SPECIFIC FACTORS  [ ] Edema in the lower extremities                       (1 Point)              [ ] Pregnancy                                                     (1 Point)  [ ] Varicose veins                                               (1 Point)                     [ ] Post-partum < 6 weeks                                   (1 Point)             [x ] BMI > 25 Kg/m2                                            (1 Point)                     [ ] Hormonal therapy  or oral contraception          (1 Point)                 [ ] Sepsis (in the previous month)                        (1 Point)               [ ] History of pregnancy complications                 (1 point)  [ ] Pneumonia or serious lung disease                                               [ ] Unexplained or recurrent                     (1 Point)           (in the previous month)                               (1 Point)  [ ] Abnormal pulmonary function test                     (1 Point)                 SURGERY RELATED RISK FACTORS  [ ] Acute myocardial infarction                              (1 Point)               [ ]  Section                                             (1 Point)  [ ] Congestive heart failure (in the previous month)  (1 Point)      [ ] Minor surgery                                                  (1 Point)   [ ] Inflammatory bowel disease                             (1 Point)               [ ] Arthroscopic surgery                                        (2 Points)  [ ] Central venous access                                      (2 Points)                [ ] General surgery lasting more than 45 minutes (2 points)  [ ] Malignancy- Present or previous                   (2 Points)                [ x] Elective arthroplasty                                         (5 points)    [ ] Stroke (in the previous month)                          (5 Points)                                                                                                                                                           HEMATOLOGY RELATED FACTORS                                                 TRAUMA RELATED RISK FACTORS  [ ] Prior episodes of VTE                                     (3 Points)                [ ] Fracture of the hip, pelvis, or leg                       (5 Points)  [ ] Positive family history for VTE                         (3 Points)             [ ] Acute spinal cord injury (in the previous month)  (5 Points)  [ ] Prothrombin 71483 A                                     (3 Points)               [ ] Paralysis  (less than 1 month)                             (5 Points)  [ ] Factor V Leiden                                             (3 Points)                  [ ] Multiple Trauma within 1 month                        (5 Points)  [ ] Lupus anticoagulants                                     (3 Points)                                                           [ ] Anticardiolipin antibodies                               (3 Points)                                                       [ ] High homocysteine in the blood                      (3 Points)                                             [ ] Other congenital or acquired thrombophilia      (3 Points)                                                [ ] Heparin induced thrombocytopenia                  (3 Points)                                     Total Score [      8    ]

## 2024-02-01 NOTE — H&P PST ADULT - GASTROINTESTINAL
details… soft/nontender/nondistended/normal active bowel sounds soft/nontender/nondistended/normal active bowel sounds/no guarding

## 2024-02-01 NOTE — H&P PST ADULT - HISTORY OF PRESENT ILLNESS
63 year old male presents ambulatory with cane to PST prior to c3-c4 anterior cervical discectomy and fusion c3-c7 posterior cervical laminectomy and spinal fusion procedure on 2.8.24.     PMHx ALS     [facility]  63 year old male presents ambulatory with cane to [facility] prior to c3-c4 anterior cervical discectomy and fusion c3-c7 posterior cervical laminectomy and spinal fusion procedure on 2.8.24 with Dr. Eliud Quinones. Patient has a PMHx including ALS (diagnosed 2023 at [facility]),  ACDF C4-5 on 07/12/23 and C4-C6 ACDF 8/2016. decreased ROM of right shoulder, chronic pain (daily marijuana user) and right  weakness (unable to extend fingers of right hand). Denies recent fevers, chills, chest pain.

## 2024-02-01 NOTE — H&P PST ADULT - PROBLEM SELECTOR PLAN 3
Patient shared Dr. Quinones discussed with ALS [facility] Dr. Mar Garcia prior to surgery   [facility] contacted for last note

## 2024-02-01 NOTE — H&P PST ADULT - NEGATIVE NEUROLOGICAL SYMPTOMS
no paresthesias/no generalized seizures/no focal seizures/no syncope/no tremors/no headache/no loss of consciousness/no hemiparesis/no confusion/no facial palsy

## 2024-02-01 NOTE — H&P PST ADULT - NEGATIVE CARDIOVASCULAR SYMPTOMS
no chest pain/no palpitations/no peripheral edema no chest pain/no palpitations/no orthopnea/no peripheral edema/no claudication

## 2024-02-01 NOTE — H&P PST ADULT - ATTENDING COMMENTS
64 yo male with C45 acdf 7/12/2023 and [id number] 8/2016. Since his last visit his RUE has regressed in function and strength, he was admitted for increasing weakness, surgery was cancelled, and he was diagnosed with possible ALS, recommend surgical intervention for cervical stenosis, once cleared by ALS clinic. On exam he has bilateral hand wasting.  RUE strength worse the LUE. (2-3/5 RUE and 3-4 LUE proximal musculature, distally 1-2 both hands with clawing and wasting).  I spoke to patient and family, that surgery may not help his functionality, and no change may occur with surgery. That being said he did have some response since his last surgery, which likely was when ALS was starting. I discussed this with the patients neurologist and he is in agreement with plan. This gives me some hope that he may improve, however there is no guarantee.  I reviewed all major risks, benefits, and complications with surgery.        ?

## 2024-02-02 LAB
A1C WITH ESTIMATED AVERAGE GLUCOSE RESULT: 5.7 % — HIGH (ref 4–5.6)
ESTIMATED AVERAGE GLUCOSE: 117 MG/DL — HIGH (ref 68–114)
MRSA PCR RESULT.: SIGNIFICANT CHANGE UP
S AUREUS DNA NOSE QL NAA+PROBE: SIGNIFICANT CHANGE UP

## 2024-02-07 ENCOUNTER — TRANSCRIPTION ENCOUNTER (OUTPATIENT)
Age: 64
End: 2024-02-07

## 2024-02-08 ENCOUNTER — APPOINTMENT (OUTPATIENT)
Dept: ORTHOPEDIC SURGERY | Facility: HOSPITAL | Age: 64
End: 2024-02-08

## 2024-02-08 ENCOUNTER — RESULT REVIEW (OUTPATIENT)
Age: 64
End: 2024-02-08

## 2024-02-08 ENCOUNTER — INPATIENT (INPATIENT)
Facility: HOSPITAL | Age: 64
LOS: 6 days | Discharge: NURSING, REHAB OR LONG-TERM CARE | DRG: 454 | End: 2024-02-15
Attending: ORTHOPAEDIC SURGERY | Admitting: ORTHOPAEDIC SURGERY
Payer: MEDICAID

## 2024-02-08 ENCOUNTER — TRANSCRIPTION ENCOUNTER (OUTPATIENT)
Age: 64
End: 2024-02-08

## 2024-02-08 VITALS
DIASTOLIC BLOOD PRESSURE: 89 MMHG | RESPIRATION RATE: 18 BRPM | WEIGHT: 210.98 LBS | HEART RATE: 92 BPM | OXYGEN SATURATION: 96 % | TEMPERATURE: 98 F | SYSTOLIC BLOOD PRESSURE: 132 MMHG | HEIGHT: 72.99 IN

## 2024-02-08 DIAGNOSIS — Z98.890 OTHER SPECIFIED POSTPROCEDURAL STATES: Chronic | ICD-10-CM

## 2024-02-08 DIAGNOSIS — M50.30 OTHER CERVICAL DISC DEGENERATION, UNSPECIFIED CERVICAL REGION: ICD-10-CM

## 2024-02-08 DIAGNOSIS — M48.02 SPINAL STENOSIS, CERVICAL REGION: ICD-10-CM

## 2024-02-08 DIAGNOSIS — Z98.1 ARTHRODESIS STATUS: Chronic | ICD-10-CM

## 2024-02-08 DIAGNOSIS — Z98.89 OTHER SPECIFIED POSTPROCEDURAL STATES: Chronic | ICD-10-CM

## 2024-02-08 LAB
ANION GAP SERPL CALC-SCNC: 12 MMOL/L — SIGNIFICANT CHANGE UP (ref 5–17)
BUN SERPL-MCNC: 19 MG/DL — SIGNIFICANT CHANGE UP (ref 7–23)
CALCIUM SERPL-MCNC: 9 MG/DL — SIGNIFICANT CHANGE UP (ref 8.4–10.5)
CHLORIDE SERPL-SCNC: 108 MMOL/L — SIGNIFICANT CHANGE UP (ref 96–108)
CO2 SERPL-SCNC: 19 MMOL/L — LOW (ref 22–31)
CREAT SERPL-MCNC: 0.82 MG/DL — SIGNIFICANT CHANGE UP (ref 0.5–1.3)
EGFR: 100 ML/MIN/1.73M2 — SIGNIFICANT CHANGE UP
GAS PNL BLDA: SIGNIFICANT CHANGE UP
GLUCOSE BLDC GLUCOMTR-MCNC: 84 MG/DL — SIGNIFICANT CHANGE UP (ref 70–99)
GLUCOSE SERPL-MCNC: 184 MG/DL — HIGH (ref 70–99)
HCT VFR BLD CALC: 39.5 % — SIGNIFICANT CHANGE UP (ref 39–50)
HGB BLD-MCNC: 13.1 G/DL — SIGNIFICANT CHANGE UP (ref 13–17)
MCHC RBC-ENTMCNC: 28.6 PG — SIGNIFICANT CHANGE UP (ref 27–34)
MCHC RBC-ENTMCNC: 33.2 GM/DL — SIGNIFICANT CHANGE UP (ref 32–36)
MCV RBC AUTO: 86.2 FL — SIGNIFICANT CHANGE UP (ref 80–100)
NRBC # BLD: 0 /100 WBCS — SIGNIFICANT CHANGE UP (ref 0–0)
PLATELET # BLD AUTO: 217 K/UL — SIGNIFICANT CHANGE UP (ref 150–400)
POTASSIUM SERPL-MCNC: 4.7 MMOL/L — SIGNIFICANT CHANGE UP (ref 3.5–5.3)
POTASSIUM SERPL-SCNC: 4.7 MMOL/L — SIGNIFICANT CHANGE UP (ref 3.5–5.3)
RBC # BLD: 4.58 M/UL — SIGNIFICANT CHANGE UP (ref 4.2–5.8)
RBC # FLD: 13.2 % — SIGNIFICANT CHANGE UP (ref 10.3–14.5)
SODIUM SERPL-SCNC: 139 MMOL/L — SIGNIFICANT CHANGE UP (ref 135–145)
WBC # BLD: 11.68 K/UL — HIGH (ref 3.8–10.5)
WBC # FLD AUTO: 11.68 K/UL — HIGH (ref 3.8–10.5)

## 2024-02-08 PROCEDURE — 22853 INSJ BIOMECHANICAL DEVICE: CPT

## 2024-02-08 PROCEDURE — 22614 ARTHRD PST TQ 1NTRSPC EA ADD: CPT

## 2024-02-08 PROCEDURE — 22216 INCIS ADDL SPINE SEGMENT: CPT

## 2024-02-08 PROCEDURE — 61783 SCAN PROC SPINAL: CPT

## 2024-02-08 PROCEDURE — 22600 ARTHRD PST TQ 1NTRSPC CRV: CPT

## 2024-02-08 PROCEDURE — 22551 ARTHRD ANT NTRBDY CERVICAL: CPT

## 2024-02-08 PROCEDURE — 88304 TISSUE EXAM BY PATHOLOGIST: CPT | Mod: 26

## 2024-02-08 PROCEDURE — 72125 CT NECK SPINE W/O DYE: CPT | Mod: 26

## 2024-02-08 PROCEDURE — 93970 EXTREMITY STUDY: CPT | Mod: 26

## 2024-02-08 PROCEDURE — 22210 INCIS 1 VERTEBRAL SEG CERV: CPT

## 2024-02-08 PROCEDURE — 20937 SP BONE AGRFT MORSEL ADD-ON: CPT

## 2024-02-08 DEVICE — ROD PREBENT 3.5X60MM: Type: IMPLANTABLE DEVICE | Status: FUNCTIONAL

## 2024-02-08 DEVICE — SURGIFOAM PAD 8CM X 12.5CM X 10MM (100): Type: IMPLANTABLE DEVICE | Status: FUNCTIONAL

## 2024-02-08 DEVICE — KIT A-LINE 1LUM 20G X 12CM SAFE KIT: Type: IMPLANTABLE DEVICE | Status: FUNCTIONAL

## 2024-02-08 DEVICE — IMPLANTABLE DEVICE: Type: IMPLANTABLE DEVICE | Status: FUNCTIONAL

## 2024-02-08 DEVICE — SURGIFLO MATRIX WITH THROMBIN KIT: Type: IMPLANTABLE DEVICE | Status: FUNCTIONAL

## 2024-02-08 DEVICE — SCREW INFINITY MULTI AXIAL 3.5X14MM: Type: IMPLANTABLE DEVICE | Status: FUNCTIONAL

## 2024-02-08 DEVICE — SCREW SET INFINITY M6: Type: IMPLANTABLE DEVICE | Status: FUNCTIONAL

## 2024-02-08 DEVICE — MAYFIELD SKULL PIN ADULT RADIOLUCENT: Type: IMPLANTABLE DEVICE | Status: FUNCTIONAL

## 2024-02-08 DEVICE — BONE WAX 2.5GM: Type: IMPLANTABLE DEVICE | Status: FUNCTIONAL

## 2024-02-08 DEVICE — DISTRACTION PIN 14MM (YELLOW): Type: IMPLANTABLE DEVICE | Status: FUNCTIONAL

## 2024-02-08 DEVICE — SCREW INFINITY MULTI AXIAL 3.5X12MM: Type: IMPLANTABLE DEVICE | Status: FUNCTIONAL

## 2024-02-08 RX ORDER — CYCLOBENZAPRINE HYDROCHLORIDE 10 MG/1
10 TABLET, FILM COATED ORAL THREE TIMES A DAY
Refills: 0 | Status: DISCONTINUED | OUTPATIENT
Start: 2024-02-08 | End: 2024-02-15

## 2024-02-08 RX ORDER — RILUZOLE 50 MG/1
50 TABLET ORAL
Refills: 0 | Status: DISCONTINUED | OUTPATIENT
Start: 2024-02-08 | End: 2024-02-08

## 2024-02-08 RX ORDER — GABAPENTIN 400 MG/1
800 CAPSULE ORAL THREE TIMES A DAY
Refills: 0 | Status: DISCONTINUED | OUTPATIENT
Start: 2024-02-08 | End: 2024-02-15

## 2024-02-08 RX ORDER — DEXAMETHASONE 0.5 MG/5ML
5 ELIXIR ORAL EVERY 6 HOURS
Refills: 0 | Status: COMPLETED | OUTPATIENT
Start: 2024-02-08 | End: 2024-02-09

## 2024-02-08 RX ORDER — HYDROMORPHONE HYDROCHLORIDE 2 MG/ML
0.25 INJECTION INTRAMUSCULAR; INTRAVENOUS; SUBCUTANEOUS
Refills: 0 | Status: DISCONTINUED | OUTPATIENT
Start: 2024-02-08 | End: 2024-02-08

## 2024-02-08 RX ORDER — IPRATROPIUM/ALBUTEROL SULFATE 18-103MCG
3 AEROSOL WITH ADAPTER (GRAM) INHALATION ONCE
Refills: 0 | Status: COMPLETED | OUTPATIENT
Start: 2024-02-08 | End: 2024-02-08

## 2024-02-08 RX ORDER — SIMETHICONE 80 MG/1
80 TABLET, CHEWABLE ORAL ONCE
Refills: 0 | Status: COMPLETED | OUTPATIENT
Start: 2024-02-08 | End: 2024-02-08

## 2024-02-08 RX ORDER — NORTRIPTYLINE HYDROCHLORIDE 10 MG/1
25 CAPSULE ORAL AT BEDTIME
Refills: 0 | Status: DISCONTINUED | OUTPATIENT
Start: 2024-02-08 | End: 2024-02-15

## 2024-02-08 RX ORDER — ACETAMINOPHEN 500 MG
650 TABLET ORAL EVERY 6 HOURS
Refills: 0 | Status: DISCONTINUED | OUTPATIENT
Start: 2024-02-08 | End: 2024-02-08

## 2024-02-08 RX ORDER — SENNA PLUS 8.6 MG/1
2 TABLET ORAL AT BEDTIME
Refills: 0 | Status: DISCONTINUED | OUTPATIENT
Start: 2024-02-08 | End: 2024-02-15

## 2024-02-08 RX ORDER — ACETAMINOPHEN 500 MG
1000 TABLET ORAL ONCE
Refills: 0 | Status: COMPLETED | OUTPATIENT
Start: 2024-02-08 | End: 2024-02-08

## 2024-02-08 RX ORDER — TOBRAMYCIN 0.3 %
1 DROPS OPHTHALMIC (EYE) EVERY 4 HOURS
Refills: 0 | Status: COMPLETED | OUTPATIENT
Start: 2024-02-08 | End: 2024-02-09

## 2024-02-08 RX ORDER — MAGNESIUM HYDROXIDE 400 MG/1
30 TABLET, CHEWABLE ORAL EVERY 12 HOURS
Refills: 0 | Status: DISCONTINUED | OUTPATIENT
Start: 2024-02-08 | End: 2024-02-15

## 2024-02-08 RX ORDER — ONDANSETRON 8 MG/1
4 TABLET, FILM COATED ORAL ONCE
Refills: 0 | Status: DISCONTINUED | OUTPATIENT
Start: 2024-02-08 | End: 2024-02-08

## 2024-02-08 RX ORDER — PANTOPRAZOLE SODIUM 20 MG/1
40 TABLET, DELAYED RELEASE ORAL
Refills: 0 | Status: DISCONTINUED | OUTPATIENT
Start: 2024-02-08 | End: 2024-02-08

## 2024-02-08 RX ORDER — OXYCODONE HYDROCHLORIDE 5 MG/1
10 TABLET ORAL EVERY 4 HOURS
Refills: 0 | Status: DISCONTINUED | OUTPATIENT
Start: 2024-02-08 | End: 2024-02-08

## 2024-02-08 RX ORDER — ASPIRIN/CALCIUM CARB/MAGNESIUM 324 MG
81 TABLET ORAL DAILY
Refills: 0 | Status: DISCONTINUED | OUTPATIENT
Start: 2024-02-09 | End: 2024-02-15

## 2024-02-08 RX ORDER — ONDANSETRON 8 MG/1
4 TABLET, FILM COATED ORAL EVERY 6 HOURS
Refills: 0 | Status: DISCONTINUED | OUTPATIENT
Start: 2024-02-08 | End: 2024-02-12

## 2024-02-08 RX ORDER — SODIUM CHLORIDE 9 MG/ML
4 INJECTION INTRAMUSCULAR; INTRAVENOUS; SUBCUTANEOUS ONCE
Refills: 0 | Status: DISCONTINUED | OUTPATIENT
Start: 2024-02-08 | End: 2024-02-09

## 2024-02-08 RX ORDER — SODIUM CHLORIDE 9 MG/ML
3 INJECTION INTRAMUSCULAR; INTRAVENOUS; SUBCUTANEOUS EVERY 8 HOURS
Refills: 0 | Status: DISCONTINUED | OUTPATIENT
Start: 2024-02-08 | End: 2024-02-08

## 2024-02-08 RX ORDER — HYDROMORPHONE HYDROCHLORIDE 2 MG/ML
0.5 INJECTION INTRAMUSCULAR; INTRAVENOUS; SUBCUTANEOUS
Refills: 0 | Status: DISCONTINUED | OUTPATIENT
Start: 2024-02-08 | End: 2024-02-08

## 2024-02-08 RX ORDER — APREPITANT 80 MG/1
40 CAPSULE ORAL ONCE
Refills: 0 | Status: COMPLETED | OUTPATIENT
Start: 2024-02-08 | End: 2024-02-08

## 2024-02-08 RX ORDER — CEFAZOLIN SODIUM 1 G
2000 VIAL (EA) INJECTION EVERY 8 HOURS
Refills: 0 | Status: COMPLETED | OUTPATIENT
Start: 2024-02-08 | End: 2024-02-09

## 2024-02-08 RX ORDER — NALOXONE HYDROCHLORIDE 4 MG/.1ML
0.1 SPRAY NASAL
Refills: 0 | Status: DISCONTINUED | OUTPATIENT
Start: 2024-02-08 | End: 2024-02-12

## 2024-02-08 RX ORDER — ACETAMINOPHEN 500 MG
975 TABLET ORAL EVERY 8 HOURS
Refills: 0 | Status: DISCONTINUED | OUTPATIENT
Start: 2024-02-08 | End: 2024-02-09

## 2024-02-08 RX ORDER — POLYETHYLENE GLYCOL 3350 17 G/17G
17 POWDER, FOR SOLUTION ORAL
Refills: 0 | Status: DISCONTINUED | OUTPATIENT
Start: 2024-02-08 | End: 2024-02-09

## 2024-02-08 RX ORDER — OXYCODONE HYDROCHLORIDE 5 MG/1
5 TABLET ORAL EVERY 4 HOURS
Refills: 0 | Status: DISCONTINUED | OUTPATIENT
Start: 2024-02-08 | End: 2024-02-08

## 2024-02-08 RX ORDER — HYDROMORPHONE HYDROCHLORIDE 2 MG/ML
0.5 INJECTION INTRAMUSCULAR; INTRAVENOUS; SUBCUTANEOUS ONCE
Refills: 0 | Status: DISCONTINUED | OUTPATIENT
Start: 2024-02-08 | End: 2024-02-08

## 2024-02-08 RX ORDER — TRAMADOL HYDROCHLORIDE 50 MG/1
50 TABLET ORAL EVERY 4 HOURS
Refills: 0 | Status: DISCONTINUED | OUTPATIENT
Start: 2024-02-08 | End: 2024-02-08

## 2024-02-08 RX ORDER — RILUZOLE 50 MG/1
50 TABLET ORAL
Refills: 0 | Status: DISCONTINUED | OUTPATIENT
Start: 2024-02-08 | End: 2024-02-15

## 2024-02-08 RX ORDER — PANTOPRAZOLE SODIUM 20 MG/1
40 TABLET, DELAYED RELEASE ORAL
Refills: 0 | Status: DISCONTINUED | OUTPATIENT
Start: 2024-02-08 | End: 2024-02-09

## 2024-02-08 RX ORDER — HYDROMORPHONE HYDROCHLORIDE 2 MG/ML
30 INJECTION INTRAMUSCULAR; INTRAVENOUS; SUBCUTANEOUS
Refills: 0 | Status: DISCONTINUED | OUTPATIENT
Start: 2024-02-08 | End: 2024-02-12

## 2024-02-08 RX ORDER — INFLUENZA VIRUS VACCINE 15; 15; 15; 15 UG/.5ML; UG/.5ML; UG/.5ML; UG/.5ML
0.5 SUSPENSION INTRAMUSCULAR ONCE
Refills: 0 | Status: DISCONTINUED | OUTPATIENT
Start: 2024-02-08 | End: 2024-02-15

## 2024-02-08 RX ORDER — SODIUM CHLORIDE 9 MG/ML
1000 INJECTION INTRAMUSCULAR; INTRAVENOUS; SUBCUTANEOUS
Refills: 0 | Status: DISCONTINUED | OUTPATIENT
Start: 2024-02-08 | End: 2024-02-09

## 2024-02-08 RX ADMIN — RILUZOLE 50 MILLIGRAM(S): 50 TABLET ORAL at 21:46

## 2024-02-08 RX ADMIN — SENNA PLUS 2 TABLET(S): 8.6 TABLET ORAL at 21:45

## 2024-02-08 RX ADMIN — GABAPENTIN 800 MILLIGRAM(S): 400 CAPSULE ORAL at 23:30

## 2024-02-08 RX ADMIN — Medication 975 MILLIGRAM(S): at 22:00

## 2024-02-08 RX ADMIN — SIMETHICONE 80 MILLIGRAM(S): 80 TABLET, CHEWABLE ORAL at 23:29

## 2024-02-08 RX ADMIN — HYDROMORPHONE HYDROCHLORIDE 30 MILLILITER(S): 2 INJECTION INTRAMUSCULAR; INTRAVENOUS; SUBCUTANEOUS at 20:29

## 2024-02-08 RX ADMIN — HYDROMORPHONE HYDROCHLORIDE 30 MILLILITER(S): 2 INJECTION INTRAMUSCULAR; INTRAVENOUS; SUBCUTANEOUS at 22:08

## 2024-02-08 RX ADMIN — Medication 1 DROP(S): at 22:03

## 2024-02-08 RX ADMIN — Medication 975 MILLIGRAM(S): at 21:43

## 2024-02-08 RX ADMIN — Medication 1000 MILLIGRAM(S): at 06:44

## 2024-02-08 RX ADMIN — PANTOPRAZOLE SODIUM 40 MILLIGRAM(S): 20 TABLET, DELAYED RELEASE ORAL at 23:39

## 2024-02-08 RX ADMIN — Medication 1 DROP(S): at 22:02

## 2024-02-08 RX ADMIN — NORTRIPTYLINE HYDROCHLORIDE 25 MILLIGRAM(S): 10 CAPSULE ORAL at 21:44

## 2024-02-08 RX ADMIN — SODIUM CHLORIDE 100 MILLILITER(S): 9 INJECTION INTRAMUSCULAR; INTRAVENOUS; SUBCUTANEOUS at 19:51

## 2024-02-08 RX ADMIN — Medication 3 MILLILITER(S): at 19:51

## 2024-02-08 RX ADMIN — Medication 5 MILLIGRAM(S): at 21:46

## 2024-02-08 RX ADMIN — Medication 1000 MILLIGRAM(S): at 07:00

## 2024-02-08 RX ADMIN — APREPITANT 40 MILLIGRAM(S): 80 CAPSULE ORAL at 06:44

## 2024-02-08 RX ADMIN — Medication 100 MILLIGRAM(S): at 21:46

## 2024-02-08 NOTE — PRE-ANESTHESIA EVALUATION ADULT - NSPROPOSEDPROCEDFT_GEN_ALL_CORE
C3-C4 anterior cervical discectomy and fusion C3-C7 posterior cervical laminectomy and spinal fusion

## 2024-02-08 NOTE — CONSULT NOTE ADULT - ASSESSMENT
ASSESSMENT:  63 year old male with a PMHx of ALS (diagnosed 2023 at [facility]),  ACDF C4-5 on 07/12/23 and C4-C6 ACDF 8/2016, chronic pain (daily marijuana user) who presents for scheduled cervical spine surgery. Patient now s/p C2-4 ACDF, C3-7 PSF on 2/8. Brought to SICU for q1 neuro checks and MAP goals >80.    PLAN:   Neuro:  - q1 neuro checks  -   Resp:  - Out of bed to chair, ambulate as tolerated, and incentive spirometry to prevent atelectasis  - Mechanical ventilation     CVS: Shock 2/2 hx of   - Will wean vasopressor support w/ goal MAP > 65 mmHg    GI:   -   - Bowel regimen with senna & Miralax  - Protonix for stress ulcer prophylaxis while intubated/ npo    Renal:  - Monitor I&Os and electrolytes  - replete electrolytes as needed     Heme:  - Monitor CBC and coags  -  VTE prophylaxis:   -SCD's    ID: sepsis:  - Monitor for clinical evidence of active infection  - Monitor WBC, temperature, and procalcitonin  - Antibiotcis:  -Cultures:     Endo: hx of DM, Hypothyroidism  - Monitor glucose of bmp  - HgbA1C  - for HLD  - for hypothyroidism    GOC:     Lines/Tubes:         ASSESSMENT:  63 year old male with a PMHx of ALS (diagnosed 2023 at [facility]),  ACDF C4-5 on 07/12/23 and C4-C6 ACDF 8/2016, chronic pain (daily marijuana user) who presents for scheduled cervical spine surgery. Patient now s/p C2-4 ACDF, C3-7 PSF on 2/8. Brought to SICU for q1 neuro checks and MAP goals >80.    PLAN:   Neuro:  - q1 neuro checks  - c-collar in place  - Dilaudid PCA, tylenol, flexaril, gabapentin for pain  - home nortriptylline    Resp:  - Out of bed to chair, ambulate as tolerated, and incentive spirometry to prevent atelectasis  - on room air     CVS:   - maintain MAP >80  - hemodynamically stable    GI:   - regular diet  - Bowel regimen with senna & Miralax  - home protonix   - emesis, given zofran and reglan    Renal:  - Monitor I&Os and electrolytes  - replete electrolytes as needed   - NS @100 (IVL when patient eats)  - loya in place    Heme:  - Monitor CBC and coags  - VTE ppx: ASA 81 QD  - SCD's    ID:   - Monitor for clinical evidence of active infection  - Monitor WBC, temperature, and procalcitonin  - naima-op ancef x24 hours    Endo:  - monitor glucose  - steroid taper per primary team    Miscellaneous:  - riluzole for ALS

## 2024-02-08 NOTE — CONSULT NOTE ADULT - SUBJECTIVE AND OBJECTIVE BOX
HISTORY  63 year old male with a PMHx of ALS (diagnosed 2023 at [facility]),  ACDF C4-5 on 07/12/23 and C4-C6 ACDF 8/2016, chronic pain (daily marijuana user) who presents for scheduled cervical spine surgery. Patient now s/p C2-4 ACDF, C3-7 PSF on 2/8. Brought to SICU for q1 neuro checks and MAP goals >80.    SUBJECTIVE/ROS:  [ ] A ten-point review of systems was otherwise negative except as noted.  [ ] Due to altered mental status/intubation, subjective information were not able to be obtained from the patient. History was obtained, to the extent possible, from review of the chart and collateral sources of information.      NEURO  Exam: awake, alert, oriented  Meds: acetaminophen     Tablet .. 975 milliGRAM(s) Oral every 8 hours  cyclobenzaprine 10 milliGRAM(s) Oral three times a day PRN Muscle Spasm  gabapentin 800 milliGRAM(s) Oral three times a day  HYDROmorphone PCA (1 mG/mL) 30 milliLiter(s) PCA Continuous PCA Continuous  metoclopramide Injectable 10 milliGRAM(s) IV Push once  nortriptyline 25 milliGRAM(s) Oral at bedtime  ondansetron Injectable 4 milliGRAM(s) IV Push every 6 hours PRN Nausea  riluzole 50 milliGRAM(s) Oral two times a day  [x] Adequacy of sedation and pain control has been assessed and adjusted      RESPIRATORY  RR: 16 (02-09-24 @ 00:00) (15 - 19)  SpO2: 100% (02-09-24 @ 00:00) (94% - 100%)  Exam: unlabored, clear to auscultation bilaterally  Mechanical Ventilation:   ABG - ( 08 Feb 2024 17:18 )  pH: 7.37  /  pCO2: 37    /  pO2: 144   / HCO3: 21    / Base Excess: -3.4  /  SaO2: 98.4        [N/A] Extubation Readiness Assessed  Meds: sodium chloride 3%  Inhalation 4 milliLiter(s) Inhalation once      CARDIOVASCULAR  HR: 69 (02-09-24 @ 00:00) (69 - 92)  BP: 137/79 (02-09-24 @ 00:00) (132/89 - 150/69)  BP(mean): 103 (02-09-24 @ 00:00) (97 - 118)  ABP: 151/79 (02-09-24 @ 00:00) (133/76 - 165/96)  ABP(mean): 105 (02-09-24 @ 00:00) (99 - 122)      Exam: regular rate and rhythm  Cardiac Rhythm: sinus  Perfusion     [x]Adequate   [ ]Inadequate  Mentation   [x]Normal       [ ]Reduced  Extremities  [x]Warm         [ ]Cool  Volume Status [ ]Hypervolemic [x]Euvolemic [ ]Hypovolemic      GI/NUTRITION  Exam: soft, nontender, nondistended  Diet: regular diet  Meds: magnesium hydroxide Suspension 30 milliLiter(s) Oral every 12 hours PRN Constipation  pantoprazole    Tablet 40 milliGRAM(s) Oral before breakfast  polyethylene glycol 3350 17 Gram(s) Oral two times a day  senna 2 Tablet(s) Oral at bedtime      GENITOURINARY  I&O's Detail    02-08 @ 07:01  -  02-09 @ 00:59  --------------------------------------------------------  IN:    IV PiggyBack: 50 mL    Oral Fluid: 250 mL    sodium chloride 0.9%: 600 mL  Total IN: 900 mL    OUT:    Drain (mL): 20 mL    Indwelling Catheter - Urethral (mL): 650 mL  Total OUT: 670 mL    Total NET: 230 mL        Weight (kg): 95.7 (02-08 @ 07:12)  02-08    139  |  108  |  19  ----------------------------<  184<H>  4.7   |  19<L>  |  0.82    Ca    9.0      08 Feb 2024 18:42      [ ] Mac catheter, indication: N/A  Meds: sodium chloride 0.9%. 1000 milliLiter(s) IV Continuous <Continuous>        HEMATOLOGIC  Meds: aspirin enteric coated 81 milliGRAM(s) Oral daily    [x] VTE Prophylaxis                        13.1   11.68 )-----------( 217      ( 08 Feb 2024 18:42 )             39.5       Transfusion     [ ] PRBC   [ ] Platelets   [ ] FFP   [ ] Cryoprecipitate      INFECTIOUS DISEASES  WBC Count: 11.68 K/uL (02-08 @ 18:42)    RECENT CULTURES:  Meds: ceFAZolin   IVPB 2000 milliGRAM(s) IV Intermittent every 8 hours  influenza   Vaccine 0.5 milliLiter(s) IntraMuscular once      ENDOCRINE  CAPILLARY BLOOD GLUCOSE  POCT Blood Glucose.: 84 mg/dL (08 Feb 2024 06:46)  Meds: dexAMETHasone  Injectable 5 milliGRAM(s) IV Push every 6 hours      OTHER MEDICATIONS:  chlorhexidine 2% Cloths 1 Application(s) Topical <User Schedule>  naloxone Injectable 0.1 milliGRAM(s) IV Push every 3 minutes PRN  tobramycin 0.3% Ophthalmic Solution 1 Drop(s) Right EYE every 4 hours      CODE STATUS: full code

## 2024-02-08 NOTE — BRIEF OPERATIVE NOTE - DISPOSITION
Concussion: Care Instructions  Your Care Instructions    A concussion is a kind of injury to the brain. It happens when the head receives a hard blow. The impact can jar or shake the brain against the skull. This interrupts the brain's normal activities. Although you may have cuts or bruises on your head or face, you may have no other visible signs of a brain injury. In most cases, damage to the brain from a concussion can't be seen in tests such as a CT or MRI scan. For a few weeks, you may have low energy, dizziness, trouble sleeping, a headache, ringing in your ears, or nausea. You may also feel anxious, grumpy, or depressed. You may have problems with memory and concentration. These symptoms are common after a concussion. They should slowly improve over time. Sometimes this takes weeks or even months. Someone who lives with you should know how to care for you. Please share this and all information with a caregiver who will be available to help if needed. Follow-up care is a key part of your treatment and safety. Be sure to make and go to all appointments, and call your doctor if you are having problems. It's also a good idea to know your test results and keep a list of the medicines you take. How can you care for yourself at home? Pain control  · Put ice or a cold pack on the part of your head that hurts for 10 to 20 minutes at a time. Put a thin cloth between the ice and your skin. · Be safe with medicines. Read and follow all instructions on the label. ? If the doctor gave you a prescription medicine for pain, take it as prescribed. ? If you are not taking a prescription pain medicine, ask your doctor if you can take an over-the-counter medicine. Recovery  · Follow your doctor's instructions. He or she will tell you if you need someone to watch you closely for the next 24 hours or longer. · Rest is the best way to recover from a concussion.  You need to rest your body and your brain:  ? Get plenty of sleep at night. And take rest breaks during the day. ? Avoid activities that take a lot of physical or mental work. This includes housework, exercise, schoolwork, video games, text messaging, and using the computer. ? You may need to change your school or work schedule while you recover. ? Return to your normal activities slowly. Do not try to do too much at once. · Do not drink alcohol or use illegal drugs. Alcohol and illegal drugs can slow your recovery. And they can increase your risk of a second brain injury. · Avoid activities that could lead to another concussion. Follow your doctor's instructions for a gradual return to activity and sports. · Ask your doctor when it's okay for you to drive a car, ride a bike, or operate machinery. How should you return to activity? Your return to activity can begin after 1 to 2 days of physical and mental rest. After resting, you can gradually increase your activity as long as it does not cause new symptoms or worsen your symptoms. Doctors and concussion specialists suggest steps to follow for returning to sports after a concussion. Use these steps as a guide. You should slowly progress through the following levels of activity:  1. Limited activity. You can take part in daily activities as long as the activity doesn't increase your symptoms or cause new symptoms. 2. Light aerobic activity. This can include walking, swimming, or other exercise at less than 70% of maximum heart rate. No resistance training is included in this step. 3. Sport-specific exercise. This includes running drills or skating drills (depending on the sport), but no head impact. 4. Noncontact training drills. This includes more complex training drills such as passing. The athlete may also begin light resistance training. 5. Full-contact practice. The athlete can participate in normal training. 6. Return to normal game play.  This is the final step and allows the athlete to join in normal game play. Watch and keep track of your progress. It should take at least 6 days for you to go from light activity to normal game play. Make sure that you can stay at each new level of activity for at least 24 hours without symptoms, or as long as your doctor says, before doing more. If one or more symptoms come back, return to a lower level of activity for at least 24 hours. Don't move on until all symptoms are gone. When should you call for help? Call 911 anytime you think you may need emergency care. For example, call if:    · You have a seizure.     · You passed out (lost consciousness).     · You are confused or can't stay awake.    Call your doctor now or seek immediate medical care if:    · You have new or worse vomiting.     · You feel less alert.     · You have new weakness or numbness in any part of your body.    Watch closely for changes in your health, and be sure to contact your doctor if:    · You do not get better as expected.     · You have new symptoms, such as headaches, trouble concentrating, or changes in mood. Where can you learn more? Go to http://jackie-brooklyn.info/. Enter V993 in the search box to learn more about \"Concussion: Care Instructions. \"  Current as of: March 28, 2019  Content Version: 12.2  © 9651-5162 [employer]. Care instructions adapted under license by [employer] (which disclaims liability or warranty for this information). If you have questions about a medical condition or this instruction, always ask your healthcare professional. [place] [address] any warranty or liability for your use of this information. PACU - SICU

## 2024-02-08 NOTE — PATIENT PROFILE ADULT - FALL HARM RISK - UNIVERSAL INTERVENTIONS
Bed in lowest position, wheels locked, appropriate side rails in place/Call bell, personal items and telephone in reach/Instruct patient to call for assistance before getting out of bed or chair/Non-slip footwear when patient is out of bed/[place] to call system/Physically safe environment - no spills, clutter or unnecessary equipment/Purposeful Proactive Rounding/Room/bathroom lighting operational, light cord in reach

## 2024-02-08 NOTE — PRE-OP CHECKLIST - PATIENT'S PERSONAL PROPERTY GIVEN TO
Plan: Thinning occurred early in the bitemporal regions and had been stable.  Progressing recently.
Continue Regimen: Minoxidil compound with finasteride nightly as above
Detail Level: Zone
family member

## 2024-02-08 NOTE — BRIEF OPERATIVE NOTE - NSICDXBRIEFPROCEDURE_GEN_ALL_CORE_FT
PROCEDURES:  Fusion of anterior and posterior columns of cervical spine 08-Feb-2024 18:23:56  Portia Francois

## 2024-02-09 LAB
ALBUMIN SERPL ELPH-MCNC: 4.2 G/DL — SIGNIFICANT CHANGE UP (ref 3.3–5)
ALP SERPL-CCNC: 81 U/L — SIGNIFICANT CHANGE UP (ref 40–120)
ALT FLD-CCNC: 42 U/L — SIGNIFICANT CHANGE UP (ref 10–45)
ANION GAP SERPL CALC-SCNC: 12 MMOL/L — SIGNIFICANT CHANGE UP (ref 5–17)
AST SERPL-CCNC: 42 U/L — HIGH (ref 10–40)
BILIRUB SERPL-MCNC: 0.5 MG/DL — SIGNIFICANT CHANGE UP (ref 0.2–1.2)
BUN SERPL-MCNC: 20 MG/DL — SIGNIFICANT CHANGE UP (ref 7–23)
CALCIUM SERPL-MCNC: 9.2 MG/DL — SIGNIFICANT CHANGE UP (ref 8.4–10.5)
CHLORIDE SERPL-SCNC: 105 MMOL/L — SIGNIFICANT CHANGE UP (ref 96–108)
CO2 SERPL-SCNC: 22 MMOL/L — SIGNIFICANT CHANGE UP (ref 22–31)
CREAT SERPL-MCNC: 0.7 MG/DL — SIGNIFICANT CHANGE UP (ref 0.5–1.3)
EGFR: 105 ML/MIN/1.73M2 — SIGNIFICANT CHANGE UP
GLUCOSE SERPL-MCNC: 179 MG/DL — HIGH (ref 70–99)
HCT VFR BLD CALC: 41.1 % — SIGNIFICANT CHANGE UP (ref 39–50)
HGB BLD-MCNC: 13.8 G/DL — SIGNIFICANT CHANGE UP (ref 13–17)
MAGNESIUM SERPL-MCNC: 2 MG/DL — SIGNIFICANT CHANGE UP (ref 1.6–2.6)
MCHC RBC-ENTMCNC: 28.9 PG — SIGNIFICANT CHANGE UP (ref 27–34)
MCHC RBC-ENTMCNC: 33.6 GM/DL — SIGNIFICANT CHANGE UP (ref 32–36)
MCV RBC AUTO: 86 FL — SIGNIFICANT CHANGE UP (ref 80–100)
NRBC # BLD: 0 /100 WBCS — SIGNIFICANT CHANGE UP (ref 0–0)
PHOSPHATE SERPL-MCNC: 3.7 MG/DL — SIGNIFICANT CHANGE UP (ref 2.5–4.5)
PLATELET # BLD AUTO: 210 K/UL — SIGNIFICANT CHANGE UP (ref 150–400)
POTASSIUM SERPL-MCNC: 4.5 MMOL/L — SIGNIFICANT CHANGE UP (ref 3.5–5.3)
POTASSIUM SERPL-SCNC: 4.5 MMOL/L — SIGNIFICANT CHANGE UP (ref 3.5–5.3)
PROT SERPL-MCNC: 6.7 G/DL — SIGNIFICANT CHANGE UP (ref 6–8.3)
RBC # BLD: 4.78 M/UL — SIGNIFICANT CHANGE UP (ref 4.2–5.8)
RBC # FLD: 13.1 % — SIGNIFICANT CHANGE UP (ref 10.3–14.5)
SODIUM SERPL-SCNC: 139 MMOL/L — SIGNIFICANT CHANGE UP (ref 135–145)
WBC # BLD: 19.59 K/UL — HIGH (ref 3.8–10.5)
WBC # FLD AUTO: 19.59 K/UL — HIGH (ref 3.8–10.5)

## 2024-02-09 RX ORDER — POLYETHYLENE GLYCOL 3350 17 G/17G
17 POWDER, FOR SOLUTION ORAL EVERY 24 HOURS
Refills: 0 | Status: DISCONTINUED | OUTPATIENT
Start: 2024-02-09 | End: 2024-02-15

## 2024-02-09 RX ORDER — PANTOPRAZOLE SODIUM 20 MG/1
40 TABLET, DELAYED RELEASE ORAL EVERY 24 HOURS
Refills: 0 | Status: DISCONTINUED | OUTPATIENT
Start: 2024-02-09 | End: 2024-02-15

## 2024-02-09 RX ORDER — ACETAMINOPHEN 500 MG
1000 TABLET ORAL EVERY 6 HOURS
Refills: 0 | Status: COMPLETED | OUTPATIENT
Start: 2024-02-09 | End: 2024-02-10

## 2024-02-09 RX ORDER — SODIUM CHLORIDE 9 MG/ML
1000 INJECTION INTRAMUSCULAR; INTRAVENOUS; SUBCUTANEOUS
Refills: 0 | Status: DISCONTINUED | OUTPATIENT
Start: 2024-02-09 | End: 2024-02-12

## 2024-02-09 RX ORDER — METOCLOPRAMIDE HCL 10 MG
10 TABLET ORAL ONCE
Refills: 0 | Status: COMPLETED | OUTPATIENT
Start: 2024-02-09 | End: 2024-02-09

## 2024-02-09 RX ORDER — FAMOTIDINE 10 MG/ML
20 INJECTION INTRAVENOUS ONCE
Refills: 0 | Status: DISCONTINUED | OUTPATIENT
Start: 2024-02-09 | End: 2024-02-09

## 2024-02-09 RX ORDER — SODIUM CHLORIDE 9 MG/ML
1000 INJECTION INTRAMUSCULAR; INTRAVENOUS; SUBCUTANEOUS
Refills: 0 | Status: DISCONTINUED | OUTPATIENT
Start: 2024-02-09 | End: 2024-02-09

## 2024-02-09 RX ORDER — FAMOTIDINE 10 MG/ML
20 INJECTION INTRAVENOUS ONCE
Refills: 0 | Status: COMPLETED | OUTPATIENT
Start: 2024-02-09 | End: 2024-02-09

## 2024-02-09 RX ORDER — PANTOPRAZOLE SODIUM 20 MG/1
40 TABLET, DELAYED RELEASE ORAL EVERY 12 HOURS
Refills: 0 | Status: DISCONTINUED | OUTPATIENT
Start: 2024-02-09 | End: 2024-02-09

## 2024-02-09 RX ORDER — CHLORHEXIDINE GLUCONATE 213 G/1000ML
1 SOLUTION TOPICAL
Refills: 0 | Status: DISCONTINUED | OUTPATIENT
Start: 2024-02-09 | End: 2024-02-12

## 2024-02-09 RX ADMIN — Medication 1 DROP(S): at 06:29

## 2024-02-09 RX ADMIN — ONDANSETRON 4 MILLIGRAM(S): 8 TABLET, FILM COATED ORAL at 00:31

## 2024-02-09 RX ADMIN — FAMOTIDINE 20 MILLIGRAM(S): 10 INJECTION INTRAVENOUS at 03:57

## 2024-02-09 RX ADMIN — PANTOPRAZOLE SODIUM 40 MILLIGRAM(S): 20 TABLET, DELAYED RELEASE ORAL at 17:51

## 2024-02-09 RX ADMIN — Medication 1000 MILLIGRAM(S): at 12:00

## 2024-02-09 RX ADMIN — GABAPENTIN 800 MILLIGRAM(S): 400 CAPSULE ORAL at 22:36

## 2024-02-09 RX ADMIN — CHLORHEXIDINE GLUCONATE 1 APPLICATION(S): 213 SOLUTION TOPICAL at 06:38

## 2024-02-09 RX ADMIN — Medication 1 DROP(S): at 14:09

## 2024-02-09 RX ADMIN — RILUZOLE 50 MILLIGRAM(S): 50 TABLET ORAL at 06:29

## 2024-02-09 RX ADMIN — Medication 400 MILLIGRAM(S): at 11:38

## 2024-02-09 RX ADMIN — SODIUM CHLORIDE 20 MILLILITER(S): 9 INJECTION INTRAMUSCULAR; INTRAVENOUS; SUBCUTANEOUS at 23:19

## 2024-02-09 RX ADMIN — GABAPENTIN 800 MILLIGRAM(S): 400 CAPSULE ORAL at 14:32

## 2024-02-09 RX ADMIN — NORTRIPTYLINE HYDROCHLORIDE 25 MILLIGRAM(S): 10 CAPSULE ORAL at 22:36

## 2024-02-09 RX ADMIN — HYDROMORPHONE HYDROCHLORIDE 30 MILLILITER(S): 2 INJECTION INTRAMUSCULAR; INTRAVENOUS; SUBCUTANEOUS at 19:00

## 2024-02-09 RX ADMIN — Medication 400 MILLIGRAM(S): at 17:48

## 2024-02-09 RX ADMIN — SODIUM CHLORIDE 20 MILLILITER(S): 9 INJECTION INTRAMUSCULAR; INTRAVENOUS; SUBCUTANEOUS at 21:06

## 2024-02-09 RX ADMIN — Medication 1 DROP(S): at 17:52

## 2024-02-09 RX ADMIN — Medication 100 MILLIGRAM(S): at 06:48

## 2024-02-09 RX ADMIN — RILUZOLE 50 MILLIGRAM(S): 50 TABLET ORAL at 18:48

## 2024-02-09 RX ADMIN — PANTOPRAZOLE SODIUM 40 MILLIGRAM(S): 20 TABLET, DELAYED RELEASE ORAL at 06:15

## 2024-02-09 RX ADMIN — SODIUM CHLORIDE 100 MILLILITER(S): 9 INJECTION INTRAMUSCULAR; INTRAVENOUS; SUBCUTANEOUS at 00:31

## 2024-02-09 RX ADMIN — Medication 5 MILLIGRAM(S): at 02:22

## 2024-02-09 RX ADMIN — Medication 1000 MILLIGRAM(S): at 06:30

## 2024-02-09 RX ADMIN — HYDROMORPHONE HYDROCHLORIDE 30 MILLILITER(S): 2 INJECTION INTRAMUSCULAR; INTRAVENOUS; SUBCUTANEOUS at 07:21

## 2024-02-09 RX ADMIN — Medication 1 DROP(S): at 11:30

## 2024-02-09 RX ADMIN — Medication 400 MILLIGRAM(S): at 06:15

## 2024-02-09 RX ADMIN — Medication 10 MILLIGRAM(S): at 01:14

## 2024-02-09 RX ADMIN — Medication 5 MILLIGRAM(S): at 14:10

## 2024-02-09 RX ADMIN — Medication 1000 MILLIGRAM(S): at 18:22

## 2024-02-09 RX ADMIN — Medication 81 MILLIGRAM(S): at 11:38

## 2024-02-09 RX ADMIN — Medication 5 MILLIGRAM(S): at 09:09

## 2024-02-09 RX ADMIN — SODIUM CHLORIDE 100 MILLILITER(S): 9 INJECTION INTRAMUSCULAR; INTRAVENOUS; SUBCUTANEOUS at 09:01

## 2024-02-09 RX ADMIN — HYDROMORPHONE HYDROCHLORIDE 30 MILLILITER(S): 2 INJECTION INTRAMUSCULAR; INTRAVENOUS; SUBCUTANEOUS at 06:27

## 2024-02-09 RX ADMIN — GABAPENTIN 800 MILLIGRAM(S): 400 CAPSULE ORAL at 06:29

## 2024-02-09 RX ADMIN — Medication 1 DROP(S): at 02:23

## 2024-02-09 NOTE — PHYSICAL THERAPY INITIAL EVALUATION ADULT - DIAGNOSIS, PT EVAL
Pt presents with postop pain, impairments in ROM, strength, muscle wasting, balance, endurance all impacting ability to perform ADLs and functional mobility

## 2024-02-09 NOTE — OCCUPATIONAL THERAPY INITIAL EVALUATION ADULT - RANGE OF MOTION EXAMINATION, UPPER EXTREMITY
AROM- RUE: shoulder 0-80, digits-unable to extend. AROM- LUE: WNL except digits 3-5 unable to extend/bilateral UE Passive ROM was WFL  (within functional limits)

## 2024-02-09 NOTE — PHYSICAL THERAPY INITIAL EVALUATION ADULT - ACTIVE RANGE OF MOTION EXAMINATION, REHAB EVAL
B UE AAROM WFL, R shoulder AROM ~80 degreres, unable to extend fingers R hand and L 4th and 5th digit. Muscle wasting noted distal UEs (R>L)./bilateral  lower extremity Active ROM was WFL (within functional limits)

## 2024-02-09 NOTE — OCCUPATIONAL THERAPY INITIAL EVALUATION ADULT - THERAPY FREQUENCY, OT EVAL
Additional Notes: Patient has had lesion recurred after Dr. Madie Vazquez and Keegan Esquivel PA-C treated in the past with curretage and LN2 respectively. Mother and daughter wish to have a shave removal due to patient being self-conscious of it. R/S/S explained, specifically s/k/i/r/Ftx needed. They both verbalized understanding. Detail Level: Simple 1-2x/week

## 2024-02-09 NOTE — PHYSICAL THERAPY INITIAL EVALUATION ADULT - GENERAL OBSERVATIONS, REHAB EVAL
Pt received semi-supine in bed +SICU monitoring, +PCA pump in progress, +loya, +A-line, +IVF, +tele, +Aspen cervical collar in place, VSS, agreeable to participate in therapy at this time

## 2024-02-09 NOTE — PHYSICAL THERAPY INITIAL EVALUATION ADULT - PERTINENT HX OF CURRENT PROBLEM, REHAB EVAL
64 yo M presents ambulatory with cane to [facility] prior to c3-c4 anterior cervical discectomy and fusion c3-c7 posterior cervical laminectomy and spinal fusion procedure on 2/8/24 with Dr. Eliud Quinones. Patient has a PMHx including ALS (diagnosed 2023 at [facility]),  ACDF C4-5 on 07/12/23 and C4-C6 ACDF 8/2016. decreased ROM of right shoulder, chronic pain (daily marijuana user) and right  weakness (unable to extend fingers of right hand). Denies recent fevers, chills, chest pain. 63 year old male with a PMHx of ALS (diagnosed 2023 at [facility]),  ACDF C4-5 on 07/12/23 and C4-C6 ACDF 8/2016, chronic pain (daily marijuana user) who presents for scheduled cervical spine surgery. Patient now s/p C2-4 ACDF, C3-7 PSF on 2/8. Pt admitted to SICU for q1 neuro checks and MAP goals >80. Aspen collar to be worn at all times per MD orders.    CT C-Spine 2/8/24: Redemonstration of anterior cervical discectomy and fusion of C4-C7. No evidence for hardware fracture or loosening. Osseous fusion has been achieved. No acute fracture or traumatic subluxation. No prevertebral soft tissue swelling. Vertebral body height and facet alignment are maintained. Persistent grade 1 retrolisthesis of C4 on C5. Cervical lordosis maintained. Alignment at the craniocervical junction unremarkable. Multilevel degenerative changes. Visualized soft tissues unremarkable. Visualized lungs clear.  VA Duplex B LE Vein Scan 2/8/24: No evidence of deep venous thrombosis in either lower extremity.

## 2024-02-09 NOTE — PHYSICAL THERAPY INITIAL EVALUATION ADULT - TRANSFER TRAINING, PT EVAL
GOAL: Pt will perform all functional transfers with CGAx1 and least restrictive AD as needed within 3-4 wks.

## 2024-02-09 NOTE — PHYSICAL THERAPY INITIAL EVALUATION ADULT - PRECAUTIONS/LIMITATIONS, REHAB EVAL
fall precautions/spinal precautions Cervical collar at all times/fall precautions/spinal precautions

## 2024-02-09 NOTE — PHYSICAL THERAPY INITIAL EVALUATION ADULT - LIVES WITH, PROFILE
Pt resides with spouse in private home with 5 steps to enter (handrail being installed) first floor setup. Pt reports being ambulatory with straight cane prior to admission, requires assist with ADL management PRN from spouse. Pt notes >10 falls from July-December at home. Pt owns straight cane, RW, transport wheelchair./spouse

## 2024-02-09 NOTE — OCCUPATIONAL THERAPY INITIAL EVALUATION ADULT - PERTINENT HX OF CURRENT PROBLEM, REHAB EVAL
62 yo M presents ambulatory with cane to [facility] prior to c3-c4 anterior cervical discectomy and fusion c3-c7 posterior cervical laminectomy and spinal fusion procedure on 2/8/24 with Dr. Eliud Quinones. Patient has a PMHx including ALS (diagnosed 2023 at [facility]),  ACDF C4-5 on 07/12/23 and C4-C6 ACDF 8/2016. decreased ROM of right shoulder, chronic pain (daily marijuana user) and right  weakness (unable to extend fingers of right hand). Denies recent fevers, chills, chest pain.

## 2024-02-09 NOTE — PROGRESS NOTE ADULT - ASSESSMENT
63 year old male with a PMHx of ALS (diagnosed 2023 at [facility]),  ACDF C4-5 on 07/12/23 and C4-C6 ACDF 8/2016, chronic pain (daily marijuana user) who presents for scheduled cervical spine surgery. Patient now s/p C2-4 ACDF, C3-7 PSF on 2/8. Brought to SICU for q1 neuro checks and MAP goals >80.    PLAN:   Neuro:  - q1 neuro checks  - c-collar in place  - Dilaudid PCA, tylenol, flexaril, gabapentin for pain  - home nortriptylline    Resp:  - Out of bed to chair, ambulate as tolerated, and incentive spirometry to prevent atelectasis  - on room air     CVS:   - maintain MAP >80  - hemodynamically stable    GI:   - regular diet  - Bowel regimen with senna & Miralax  - home protonix   - emesis, given zofran and reglan    Renal:  - Monitor I&Os and electrolytes  - replete electrolytes as needed   - NS @100 (IVL when patient eats)  - loya in place    Heme:  - Monitor CBC and coags  - VTE ppx: ASA 81 QD  - SCD's    ID:   - Monitor for clinical evidence of active infection  - Monitor WBC, temperature, and procalcitonin  - naima-op ancef x24 hours    Endo:  - monitor glucose  - steroid taper per primary team    Miscellaneous:  - riluzole for ALS

## 2024-02-09 NOTE — PROGRESS NOTE ADULT - SUBJECTIVE AND OBJECTIVE BOX
Pt seen at bedside this AM. No acute complaints, pain is well managed. Denies numbness, tingling, fevers, chills, CP, SOB, N/V/D.    Vital Signs (24 Hrs):  T(C): 36.2 (02-09-24 @ 00:00), Max: 36.6 (02-08-24 @ 06:44)  HR: 94 (02-09-24 @ 04:00) (69 - 99)  BP: 149/88 (02-09-24 @ 04:00) (132/89 - 152/70)  RR: 18 (02-09-24 @ 04:00) (15 - 19)  SpO2: 98% (02-09-24 @ 04:00) (94% - 100%)  Wt(kg): --    LABS:                          13.8   19.59 )-----------( 210      ( 09 Feb 2024 01:03 )             41.1     02-09    139  |  105  |  20  ----------------------------<  179<H>  4.5   |  22  |  0.70    Ca    9.2      09 Feb 2024 01:03  Phos  3.7     02-09  Mg     2.0     02-09    TPro  6.7  /  Alb  4.2  /  TBili  0.5  /  DBili  x   /  AST  42<H>  /  ALT  42  /  AlkPhos  81  02-09    LIVER FUNCTIONS - ( 09 Feb 2024 01:03 )  Alb: 4.2 g/dL / Pro: 6.7 g/dL / ALK PHOS: 81 U/L / ALT: 42 U/L / AST: 42 U/L / GGT: x           Physical Exam:   General: NAD, patient laying in bed comfortably  Calves nontender  Compartments compressible, soft    Spine:  Dressings C/D/I  SNEHAL drain in place anteriorly   C Collar in place    No pain with SLR, no clonus, no babinksi, no brunson    A/P :  63y Male s/p C3-C4 ACDF, C3-C7 posterior cervical fusion. VSS. NAD.   -    Pain control, PCA pump in place   -    IS bedside  -    Taper  -    DVT ppx: Aspirin 81 mg QD, SCDs       -    GI ppx: Protonix 40 mg QD  -    Physical Therapy  -    Weight bearing status: WBAT, [place] collar to be on at all times   -    Dopp: performed post-op, negative for DVT, Caprini score 5   -    Monitor SNEHAL output   -    f/u AM labs   - Dispo: PACU to floor, pending PT/OT eval.     **INCOMPLETE**  **INCOMPLETE** **INCOMPLETE** **INCOMPLETE** **INCOMPLETE**  Pt seen at bedside this AM. No acute complaints, pain is well managed. Denies numbness, tingling, fevers, chills, CP, SOB, N/V/D.    Vital Signs (24 Hrs):  T(C): 36.2 (02-09-24 @ 00:00), Max: 36.6 (02-08-24 @ 06:44)  HR: 94 (02-09-24 @ 04:00) (69 - 99)  BP: 149/88 (02-09-24 @ 04:00) (132/89 - 152/70)  RR: 18 (02-09-24 @ 04:00) (15 - 19)  SpO2: 98% (02-09-24 @ 04:00) (94% - 100%)  Wt(kg): --    LABS:                          13.8   19.59 )-----------( 210      ( 09 Feb 2024 01:03 )             41.1     02-09    139  |  105  |  20  ----------------------------<  179<H>  4.5   |  22  |  0.70    Ca    9.2      09 Feb 2024 01:03  Phos  3.7     02-09  Mg     2.0     02-09    TPro  6.7  /  Alb  4.2  /  TBili  0.5  /  DBili  x   /  AST  42<H>  /  ALT  42  /  AlkPhos  81  02-09    LIVER FUNCTIONS - ( 09 Feb 2024 01:03 )  Alb: 4.2 g/dL / Pro: 6.7 g/dL / ALK PHOS: 81 U/L / ALT: 42 U/L / AST: 42 U/L / GGT: x           Physical Exam:   General: NAD, patient laying in bed comfortably  Calves nontender  Compartments compressible, soft    Spine:  Dressings C/D/I  SNEHAL drain in place anteriorly   C Collar in place    Motor:       C5   C6   C7   C8   T1  L   4+/5  4/5  4/5  2/5  2/5  R   4/5  3/5  3/5  1/5  1/5         L2   L3    L4   L5   S1  L   5/5  5/5  3/5  3/5  4/5  R   4/5  5/5  3/5  3/5  4/5    Sensory:       C5   C6   C7   C8   T1  L    2     2     2     2     2   R    2     2     2     2     2          L2   L3    L4   L5   S1  L    2     2     2     2     2   R    2     2     2     2     2     No pain with SLR, no clonus, no babinksi, no brunson    A/P :  63y Male s/p C3-C4 ACDF, C3-C7 posterior cervical fusion. VSS. NAD.   -    Pain control, PCA pump in place   -    IS bedside  -    Taper  -    DVT ppx: Aspirin 81 mg QD, SCDs       -    GI ppx: Protonix 40 mg QD  -    Physical Therapy  -    Weight bearing status: WBAT, [place] collar to be on at all times   -    Dopp: performed post-op, negative for DVT, Caprini score 5   -    Monitor SNEHAL output   -    f/u AM labs   - Dispo: PACU to floor, pending PT/OT eval.    Pt seen at bedside this AM. Pain is well managed. Has had issues with nausea/vomiting overnight. Denies numbness, tingling, fevers, chills, CP, SOB, urinary/bowel incontinence.    Vital Signs (24 Hrs):  T(C): 36.2 (02-09-24 @ 00:00), Max: 36.6 (02-08-24 @ 06:44)  HR: 94 (02-09-24 @ 04:00) (69 - 99)  BP: 149/88 (02-09-24 @ 04:00) (132/89 - 152/70)  RR: 18 (02-09-24 @ 04:00) (15 - 19)  SpO2: 98% (02-09-24 @ 04:00) (94% - 100%)  Wt(kg): --    LABS:                          13.8   19.59 )-----------( 210      ( 09 Feb 2024 01:03 )             41.1     02-09    139  |  105  |  20  ----------------------------<  179<H>  4.5   |  22  |  0.70    Ca    9.2      09 Feb 2024 01:03  Phos  3.7     02-09  Mg     2.0     02-09    TPro  6.7  /  Alb  4.2  /  TBili  0.5  /  DBili  x   /  AST  42<H>  /  ALT  42  /  AlkPhos  81  02-09    LIVER FUNCTIONS - ( 09 Feb 2024 01:03 )  Alb: 4.2 g/dL / Pro: 6.7 g/dL / ALK PHOS: 81 U/L / ALT: 42 U/L / AST: 42 U/L / GGT: x           Physical Exam:   General: NAD, patient laying in bed comfortably  Calves nontender  Compartments compressible, soft    Spine:  Dressings C/D/I  SNEHAL drain in place anteriorly   C Collar in place    Motor:       C5   C6   C7   C8   T1  L   4+/5  4/5  4/5  2/5  2/5  R   4/5  3/5  3/5  1/5  1/5         L2   L3    L4   L5   S1  L   5/5  5/5  3/5  3/5  4/5  R   4/5  5/5  3/5  3/5  4/5    Sensory:       C5   C6   C7   C8   T1  L    2     2     2     2     2   R    2     2     2     2     2          L2   L3    L4   L5   S1  L    2     2     2     2     2   R    2     2     2     2     2     No pain with SLR, no clonus, no babinksi, no brunson    A/P :  63y Male s/p C3-C4 ACDF, C3-C7 posterior cervical fusion. VSS. NAD.   -    Pain control, PCA pump in place   -    IS bedside  -    Taper  -    DVT ppx: Aspirin 81 mg QD, SCDs       -    GI ppx: Protonix 40 mg QD  -    Physical Therapy  -    Weight bearing status: WBAT, [place] collar to be on at all times   -    Dopp: performed post-op, negative for DVT, Caprini score 5   -    Monitor SNEHAL output   -    f/u AM labs   - Dispo: PACU to floor, pending PT/OT eval.    Pt seen at bedside this AM. Pain is well managed. Has had issues with nausea/vomiting overnight. Denies numbness, tingling, fevers, chills, CP, SOB, urinary/bowel incontinence.    Vital Signs (24 Hrs):  T(C): 36.2 (02-09-24 @ 00:00), Max: 36.6 (02-08-24 @ 06:44)  HR: 94 (02-09-24 @ 04:00) (69 - 99)  BP: 149/88 (02-09-24 @ 04:00) (132/89 - 152/70)  RR: 18 (02-09-24 @ 04:00) (15 - 19)  SpO2: 98% (02-09-24 @ 04:00) (94% - 100%)  Wt(kg): --    LABS:                          13.8   19.59 )-----------( 210      ( 09 Feb 2024 01:03 )             41.1     02-09    139  |  105  |  20  ----------------------------<  179<H>  4.5   |  22  |  0.70    Ca    9.2      09 Feb 2024 01:03  Phos  3.7     02-09  Mg     2.0     02-09    TPro  6.7  /  Alb  4.2  /  TBili  0.5  /  DBili  x   /  AST  42<H>  /  ALT  42  /  AlkPhos  81  02-09    LIVER FUNCTIONS - ( 09 Feb 2024 01:03 )  Alb: 4.2 g/dL / Pro: 6.7 g/dL / ALK PHOS: 81 U/L / ALT: 42 U/L / AST: 42 U/L / GGT: x           Physical Exam:   General: NAD, patient laying in bed comfortably  Calves nontender  Compartments compressible, soft    Spine:  Dressings C/D/I  SNEHAL drain in place anteriorly   C Collar in place    Motor:       C5   C6   C7   C8   T1  L   4+/5  4/5  4/5  2/5  2/5  R   4/5  3/5  3/5  1/5  1/5         L2   L3    L4   L5   S1  L   5/5  5/5  3/5  3/5  4/5  R   4/5  5/5  3/5  3/5  4/5    Sensory:       C5   C6   C7   C8   T1  L    2     2     2     2     2   R    2     2     2     2     2          L2   L3    L4   L5   S1  L    2     2     2     2     2   R    2     2     2     2     2     No pain with SLR, no clonus, no babinksi, no brunson    A/P :  63y Male s/p C3-C4 ACDF, C3-C7 posterior cervical fusion. VSS. NAD.   - q1 neurochecks for 24 hrs  -    Pain control, PCA pump in place   -    IS bedside  -    Taper  -    DVT ppx: Aspirin 81 mg QD, SCDs       -    GI ppx: Protonix 40 mg QD  -    Physical Therapy  -    Weight bearing status: WBAT, [place] collar to be on at all times   -    Dopp: performed post-op, negative for DVT, Caprini score 5   -    Monitor SNEHAL output   -    f/u AM labs   - Dispo: PACU to floor, pending PT/OT eval.

## 2024-02-09 NOTE — PROGRESS NOTE ADULT - SUBJECTIVE AND OBJECTIVE BOX
INTERVAL EVENTS:  - given simethicone for gas pain  - coffee ground emesis x1, drawing labs now. Will f/u    SUBJECTIVE/ROS:  [ ] A ten-point review of systems was otherwise negative except as noted.  [ ] Due to altered mental status/intubation, subjective information were not able to be obtained from the patient. History was obtained, to the extent possible, from review of the chart and collateral sources of information.      NEURO  Exam: awake, alert, oriented  Meds: acetaminophen     Tablet .. 975 milliGRAM(s) Oral every 8 hours  cyclobenzaprine 10 milliGRAM(s) Oral three times a day PRN Muscle Spasm  gabapentin 800 milliGRAM(s) Oral three times a day  HYDROmorphone PCA (1 mG/mL) 30 milliLiter(s) PCA Continuous PCA Continuous  nortriptyline 25 milliGRAM(s) Oral at bedtime  ondansetron Injectable 4 milliGRAM(s) IV Push every 6 hours PRN Nausea  riluzole 50 milliGRAM(s) Oral two times a day  [x] Adequacy of sedation and pain control has been assessed and adjusted      RESPIRATORY  RR: 16 (02-09-24 @ 01:00) (15 - 19)  SpO2: 99% (02-09-24 @ 01:00) (94% - 100%)  Exam: unlabored, clear to auscultation bilaterally  Mechanical Ventilation:   ABG - ( 08 Feb 2024 17:18 )  pH: 7.37  /  pCO2: 37    /  pO2: 144   / HCO3: 21    / Base Excess: -3.4  /  SaO2: 98.4        CARDIOVASCULAR  HR: 80 (02-09-24 @ 01:00) (69 - 92)  BP: 149/79 (02-09-24 @ 01:00) (132/89 - 150/69)  BP(mean): 108 (02-09-24 @ 01:00) (97 - 118)  ABP: 162/88 (02-09-24 @ 01:00) (133/76 - 165/96)  ABP(mean): 115 (02-09-24 @ 01:00) (99 - 122)      Exam: regular rate and rhythm  Cardiac Rhythm: sinus  Perfusion     [x]Adequate   [ ]Inadequate  Mentation   [x]Normal       [ ]Reduced  Extremities  [x]Warm         [ ]Cool  Volume Status [ ]Hypervolemic [x]Euvolemic [ ]Hypovolemic  Meds:       GI/NUTRITION  Exam: soft, nontender, nondistended  Diet: regular  Meds: magnesium hydroxide Suspension 30 milliLiter(s) Oral every 12 hours PRN Constipation  pantoprazole    Tablet 40 milliGRAM(s) Oral before breakfast  polyethylene glycol 3350 17 Gram(s) Oral two times a day  senna 2 Tablet(s) Oral at bedtime      GENITOURINARY  I&O's Detail    02-08 @ 07:01  -  02-09 @ 01:27  --------------------------------------------------------  IN:    IV PiggyBack: 50 mL    Oral Fluid: 250 mL    sodium chloride 0.9%: 700 mL  Total IN: 1000 mL    OUT:    Drain (mL): 20 mL    Emesis (mL): 100 mL    Indwelling Catheter - Urethral (mL): 730 mL  Total OUT: 850 mL    Total NET: 150 mL        Weight (kg): 95.7 (02-08 @ 07:12)  02-08    139  |  108  |  19  ----------------------------<  184<H>  4.7   |  19<L>  |  0.82    Ca    9.0      08 Feb 2024 18:42      [ ] Mac catheter, indication: N/A  Meds: sodium chloride 0.9%. 1000 milliLiter(s) IV Continuous <Continuous>        HEMATOLOGIC  Meds: aspirin enteric coated 81 milliGRAM(s) Oral daily    [x] VTE Prophylaxis                        13.1   11.68 )-----------( 217      ( 08 Feb 2024 18:42 )             39.5       Transfusion     [ ] PRBC   [ ] Platelets   [ ] FFP   [ ] Cryoprecipitate      INFECTIOUS DISEASES  WBC Count: 11.68 K/uL (02-08 @ 18:42)    RECENT CULTURES:  Meds: ceFAZolin   IVPB 2000 milliGRAM(s) IV Intermittent every 8 hours  influenza   Vaccine 0.5 milliLiter(s) IntraMuscular once      ENDOCRINE  CAPILLARY BLOOD GLUCOSE    POCT Blood Glucose.: 84 mg/dL (08 Feb 2024 06:46)  Meds: dexAMETHasone  Injectable 5 milliGRAM(s) IV Push every 6 hours      OTHER MEDICATIONS:  chlorhexidine 2% Cloths 1 Application(s) Topical <User Schedule>  naloxone Injectable 0.1 milliGRAM(s) IV Push every 3 minutes PRN  tobramycin 0.3% Ophthalmic Solution 1 Drop(s) Right EYE every 4 hours      CODE STATUS: full code

## 2024-02-09 NOTE — PROGRESS NOTE ADULT - SUBJECTIVE AND OBJECTIVE BOX
Day 1 of Anesthesia Pain Management Service    SUBJECTIVE: I'm doing ok    Pain Scale Score:	[X] Refer to charted pain scores    THERAPY:    [ ] IV PCA Morphine		[ ] 5 mg/mL	[ ] 1 mg/mL  [X] IV PCA Hydromorphone	[ ] 5 mg/mL	[X] 1 mg/mL  [ ] IV PCA Fentanyl		[ ] 50 micrograms/mL    Demand dose: 0.2 mg     Lockout: 6 minutes   Continuous Rate: 0 mg/hr  4 Hour Limit: 4 mg    MEDICATIONS  (STANDING):  acetaminophen   IVPB .. 1000 milliGRAM(s) IV Intermittent every 6 hours  aspirin enteric coated 81 milliGRAM(s) Oral daily  chlorhexidine 2% Cloths 1 Application(s) Topical <User Schedule>  dexAMETHasone  Injectable 5 milliGRAM(s) IV Push every 6 hours  gabapentin 800 milliGRAM(s) Oral three times a day  HYDROmorphone PCA (1 mG/mL) 30 milliLiter(s) PCA Continuous PCA Continuous  influenza   Vaccine 0.5 milliLiter(s) IntraMuscular once  nortriptyline 25 milliGRAM(s) Oral at bedtime  pantoprazole  Injectable 40 milliGRAM(s) IV Push every 12 hours  polyethylene glycol 3350 17 Gram(s) Oral every 24 hours  riluzole 50 milliGRAM(s) Oral two times a day  senna 2 Tablet(s) Oral at bedtime  sodium chloride 0.9%. 1000 milliLiter(s) (100 mL/Hr) IV Continuous <Continuous>  tobramycin 0.3% Ophthalmic Solution 1 Drop(s) Right EYE every 4 hours    MEDICATIONS  (PRN):  cyclobenzaprine 10 milliGRAM(s) Oral three times a day PRN Muscle Spasm  magnesium hydroxide Suspension 30 milliLiter(s) Oral every 12 hours PRN Constipation  naloxone Injectable 0.1 milliGRAM(s) IV Push every 3 minutes PRN For ANY of the following changes in patient status:  A. RR LESS THAN 10 breaths per minute, B. Oxygen saturation LESS THAN 90%, C. Sedation score of 6  ondansetron Injectable 4 milliGRAM(s) IV Push every 6 hours PRN Nausea      OBJECTIVE:    Sedation Score:	[ X] Alert 	[ ] Drowsy 	[ ] Arousable	[ ] Asleep	[ ] Unresponsive    Side Effects:	[X ] None	[ ] Nausea	[ ] Vomiting	[ ] Pruritus  		[ ] Other:    Vital Signs Last 24 Hrs  T(C): 37 (09 Feb 2024 07:15), Max: 37 (09 Feb 2024 07:15)  T(F): 98.6 (09 Feb 2024 07:15), Max: 98.6 (09 Feb 2024 07:15)  HR: 90 (09 Feb 2024 10:00) (69 - 105)  BP: 120/67 (09 Feb 2024 10:00) (120/67 - 152/70)  BP(mean): 89 (09 Feb 2024 10:00) (89 - 118)  RR: 12 (09 Feb 2024 10:00) (12 - 28)  SpO2: 92% (09 Feb 2024 10:00) (92% - 100%)    Parameters below as of 09 Feb 2024 08:58  Patient On (Oxygen Delivery Method): room air        ASSESSMENT/ PLAN    Therapy to  be:               [X] Continued   [ ] Discontinued   [ ] Changed to PRN Analgesics    Documentation and Verification of current medications:   [X] Done	[ ] Not done, not eligible    Comments: Total PCA use 3mg / 12 hours. Using 0-1x/hr.

## 2024-02-09 NOTE — PHYSICAL THERAPY INITIAL EVALUATION ADULT - MANUAL MUSCLE TESTING RESULTS, REHAB EVAL
L LE hip/knee: 3+/5, ankle: 3-/5, R hip/knee: 3/5, R ankle 2/5. R shoulder: 3-/5, elbow: 3-/5, hand: 2/5, L shoulder: 3/5, L elbow/hand: 3-/5/grossly assessed due to

## 2024-02-09 NOTE — OCCUPATIONAL THERAPY INITIAL EVALUATION ADULT - ADDITIONAL COMMENTS
Pt resides with spouse in private home with 5 steps to enter (handrail being installed) first floor setup. Pt reports being ambulatory with straight cane prior to admission, requires some assist with ADL management PRN from spouse. Pt notes >10 falls from July-December at home. Pt owns straight cane, RW, transport wheelchair. Walk in shower +shower chair +grab bars

## 2024-02-10 LAB
ALBUMIN SERPL ELPH-MCNC: 4.1 G/DL — SIGNIFICANT CHANGE UP (ref 3.3–5)
ALP SERPL-CCNC: 74 U/L — SIGNIFICANT CHANGE UP (ref 40–120)
ALT FLD-CCNC: 28 U/L — SIGNIFICANT CHANGE UP (ref 10–45)
ANION GAP SERPL CALC-SCNC: 13 MMOL/L — SIGNIFICANT CHANGE UP (ref 5–17)
AST SERPL-CCNC: 24 U/L — SIGNIFICANT CHANGE UP (ref 10–40)
BILIRUB SERPL-MCNC: 0.4 MG/DL — SIGNIFICANT CHANGE UP (ref 0.2–1.2)
BUN SERPL-MCNC: 21 MG/DL — SIGNIFICANT CHANGE UP (ref 7–23)
CALCIUM SERPL-MCNC: 9.4 MG/DL — SIGNIFICANT CHANGE UP (ref 8.4–10.5)
CHLORIDE SERPL-SCNC: 106 MMOL/L — SIGNIFICANT CHANGE UP (ref 96–108)
CO2 SERPL-SCNC: 22 MMOL/L — SIGNIFICANT CHANGE UP (ref 22–31)
CREAT SERPL-MCNC: 0.71 MG/DL — SIGNIFICANT CHANGE UP (ref 0.5–1.3)
EGFR: 104 ML/MIN/1.73M2 — SIGNIFICANT CHANGE UP
GLUCOSE SERPL-MCNC: 120 MG/DL — HIGH (ref 70–99)
HCT VFR BLD CALC: 39.5 % — SIGNIFICANT CHANGE UP (ref 39–50)
HGB BLD-MCNC: 13.2 G/DL — SIGNIFICANT CHANGE UP (ref 13–17)
MAGNESIUM SERPL-MCNC: 2.4 MG/DL — SIGNIFICANT CHANGE UP (ref 1.6–2.6)
MCHC RBC-ENTMCNC: 29.1 PG — SIGNIFICANT CHANGE UP (ref 27–34)
MCHC RBC-ENTMCNC: 33.4 GM/DL — SIGNIFICANT CHANGE UP (ref 32–36)
MCV RBC AUTO: 87 FL — SIGNIFICANT CHANGE UP (ref 80–100)
NRBC # BLD: 0 /100 WBCS — SIGNIFICANT CHANGE UP (ref 0–0)
PHOSPHATE SERPL-MCNC: 2.3 MG/DL — LOW (ref 2.5–4.5)
PLATELET # BLD AUTO: 213 K/UL — SIGNIFICANT CHANGE UP (ref 150–400)
POTASSIUM SERPL-MCNC: 4.3 MMOL/L — SIGNIFICANT CHANGE UP (ref 3.5–5.3)
POTASSIUM SERPL-SCNC: 4.3 MMOL/L — SIGNIFICANT CHANGE UP (ref 3.5–5.3)
PROT SERPL-MCNC: 6.6 G/DL — SIGNIFICANT CHANGE UP (ref 6–8.3)
RBC # BLD: 4.54 M/UL — SIGNIFICANT CHANGE UP (ref 4.2–5.8)
RBC # FLD: 13.4 % — SIGNIFICANT CHANGE UP (ref 10.3–14.5)
SODIUM SERPL-SCNC: 141 MMOL/L — SIGNIFICANT CHANGE UP (ref 135–145)
WBC # BLD: 17.89 K/UL — HIGH (ref 3.8–10.5)
WBC # FLD AUTO: 17.89 K/UL — HIGH (ref 3.8–10.5)

## 2024-02-10 PROCEDURE — 99231 SBSQ HOSP IP/OBS SF/LOW 25: CPT | Mod: GC

## 2024-02-10 RX ORDER — SODIUM,POTASSIUM PHOSPHATES 278-250MG
2 POWDER IN PACKET (EA) ORAL ONCE
Refills: 0 | Status: COMPLETED | OUTPATIENT
Start: 2024-02-10 | End: 2024-02-10

## 2024-02-10 RX ORDER — ACETAMINOPHEN 500 MG
650 TABLET ORAL EVERY 6 HOURS
Refills: 0 | Status: DISCONTINUED | OUTPATIENT
Start: 2024-02-10 | End: 2024-02-15

## 2024-02-10 RX ADMIN — Medication 81 MILLIGRAM(S): at 11:41

## 2024-02-10 RX ADMIN — PANTOPRAZOLE SODIUM 40 MILLIGRAM(S): 20 TABLET, DELAYED RELEASE ORAL at 05:17

## 2024-02-10 RX ADMIN — CHLORHEXIDINE GLUCONATE 1 APPLICATION(S): 213 SOLUTION TOPICAL at 06:48

## 2024-02-10 RX ADMIN — SODIUM CHLORIDE 20 MILLILITER(S): 9 INJECTION INTRAMUSCULAR; INTRAVENOUS; SUBCUTANEOUS at 21:50

## 2024-02-10 RX ADMIN — GABAPENTIN 800 MILLIGRAM(S): 400 CAPSULE ORAL at 21:50

## 2024-02-10 RX ADMIN — POLYETHYLENE GLYCOL 3350 17 GRAM(S): 17 POWDER, FOR SOLUTION ORAL at 08:07

## 2024-02-10 RX ADMIN — GABAPENTIN 800 MILLIGRAM(S): 400 CAPSULE ORAL at 13:44

## 2024-02-10 RX ADMIN — GABAPENTIN 800 MILLIGRAM(S): 400 CAPSULE ORAL at 06:50

## 2024-02-10 RX ADMIN — RILUZOLE 50 MILLIGRAM(S): 50 TABLET ORAL at 17:04

## 2024-02-10 RX ADMIN — CYCLOBENZAPRINE HYDROCHLORIDE 10 MILLIGRAM(S): 10 TABLET, FILM COATED ORAL at 10:00

## 2024-02-10 RX ADMIN — SODIUM CHLORIDE 20 MILLILITER(S): 9 INJECTION INTRAMUSCULAR; INTRAVENOUS; SUBCUTANEOUS at 07:15

## 2024-02-10 RX ADMIN — Medication 255 MILLIMOLE(S): at 11:42

## 2024-02-10 RX ADMIN — Medication 2 TABLET(S): at 13:37

## 2024-02-10 RX ADMIN — HYDROMORPHONE HYDROCHLORIDE 30 MILLILITER(S): 2 INJECTION INTRAMUSCULAR; INTRAVENOUS; SUBCUTANEOUS at 07:14

## 2024-02-10 RX ADMIN — Medication 40 MILLIGRAM(S): at 06:50

## 2024-02-10 RX ADMIN — NORTRIPTYLINE HYDROCHLORIDE 25 MILLIGRAM(S): 10 CAPSULE ORAL at 21:51

## 2024-02-10 RX ADMIN — RILUZOLE 50 MILLIGRAM(S): 50 TABLET ORAL at 06:49

## 2024-02-10 RX ADMIN — HYDROMORPHONE HYDROCHLORIDE 30 MILLILITER(S): 2 INJECTION INTRAMUSCULAR; INTRAVENOUS; SUBCUTANEOUS at 19:02

## 2024-02-10 RX ADMIN — HYDROMORPHONE HYDROCHLORIDE 30 MILLILITER(S): 2 INJECTION INTRAMUSCULAR; INTRAVENOUS; SUBCUTANEOUS at 16:04

## 2024-02-10 NOTE — PROGRESS NOTE ADULT - SUBJECTIVE AND OBJECTIVE BOX
Pt seen/examined. Doing well. Pain controlled. No acute overnight complaints or events.    T(C): 36.7 (02-10-24 @ 07:00), Max: 37 (02-09-24 @ 11:00)  HR: 93 (02-10-24 @ 07:00) (83 - 120)  BP: 139/79 (02-10-24 @ 07:00) (110/55 - 140/94)  RR: 22 (02-10-24 @ 07:00) (12 - 30)  SpO2: 95% (02-10-24 @ 07:00) (92% - 96%)  Wt(kg): --  - Gen: NAD    Physical Exam:   General: NAD, patient laying in bed comfortably  Calves nontender  Compartments compressible, soft    Spine:  Dressings C/D/I  SNEHAL drain in place anteriorly   C Collar in place    Motor:       C5   C6   C7   C8   T1  L   4+/5  4/5  4/5  2/5  2/5  R   4/5  3/5  3/5  1/5  1/5         L2   L3    L4   L5   S1  L   5/5  5/5  3/5  3/5  4/5  R   4/5  5/5  3/5  3/5  4/5    Sensory:       C5   C6   C7   C8   T1  L    2     2     2     2     2   R    2     2     2     2     2          L2   L3    L4   L5   S1  L    2     2     2     2     2   R    2     2     2     2     2       A/P :  63y Male s/p C3-C4 ACDF, C3-C7 posterior cervical fusion. VSS. NAD.     -    q1 neurochecks for 24 hrs  -    Pain control, PCA pump in place   -    IS bedside  -    Taper  -    DVT ppx: Aspirin 81 mg QD, SCDs       -    GI ppx: Protonix 40 mg QD  -    Physical Therapy  -    Weight bearing status: WBAT, [place] collar to be on at all times   -    Dopp: performed post-op, negative for DVT, Caprini score 5   -    Monitor SNEHAL output   -    f/u AM labs   - Dispo: PACU to floor, pending PT/OT eval.

## 2024-02-10 NOTE — PROGRESS NOTE ADULT - ASSESSMENT
63 year old male with a PMHx of ALS (diagnosed 2023 at [facility]),  ACDF C4-5 on 07/12/23 and C4-C6 ACDF 8/2016, chronic pain (daily marijuana user) who presents for scheduled cervical spine surgery. Patient now s/p C2-4 ACDF, C3-7 PSF on 2/8. Brought to SICU for q1 neuro checks and MAP goals >80.    PLAN:   Neuro:  - q1 neuro checks until 2/10 @ 6pm  - c-collar in place  - Dilaudid PCA, tylenol, flexaril, gabapentin for pain  - home nortriptylline    Resp:  - Out of bed to chair, ambulate as tolerated, and incentive spirometry to prevent atelectasis  - on room air     CVS:   - maintain MAP >80  - hemodynamically stable    GI:   - regular diet  - Bowel regimen with senna & Miralax  - home protonix   - emesis, given zofran and reglan    Renal:  - Monitor I&Os and electrolytes  - replete electrolytes as needed     Heme:  - Monitor CBC and coags  - VTE ppx: ASA 81 QD  - SCD's    ID:   - Monitor for clinical evidence of active infection  - Monitor WBC, temperature, and procalcitonin  - naima-op ancef x24 hours    Endo:  - monitor glucose  - steroid taper per primary team    Miscellaneous:  - riluzole for ALS

## 2024-02-10 NOTE — PROGRESS NOTE ADULT - ATTENDING COMMENTS
Pt seen and examined with SICU team, agree with above.     Pt doing well, pain well-controlled with Tylenol, gabapentin, flexeril, and PCA. Hemodynamically normal. Eating well today, excellent urine output. On steroid taper per Spine.

## 2024-02-10 NOTE — PROGRESS NOTE ADULT - SUBJECTIVE AND OBJECTIVE BOX
INTERVAL EVENTS:  - given simethicone for gas pain  - coffee ground emesis x1, drawing labs now. Will f/u  - dc loya and a-line  - diet started    SUBJECTIVE/ROS:  [x] A ten-point review of systems was otherwise negative except as noted.      NEURO  Exam: awake, alert, oriented x3   Meds: cyclobenzaprine 10 milliGRAM(s) Oral three times a day PRN Muscle Spasm  gabapentin 800 milliGRAM(s) Oral three times a day  HYDROmorphone PCA (1 mG/mL) 30 milliLiter(s) PCA Continuous PCA Continuous  nortriptyline 25 milliGRAM(s) Oral at bedtime  ondansetron Injectable 4 milliGRAM(s) IV Push every 6 hours PRN Nausea  riluzole 50 milliGRAM(s) Oral two times a day    [x] Adequacy of sedation and pain control has been assessed and adjusted      RESPIRATORY  RR: 15 (02-10-24 @ 00:00) (12 - 30)  SpO2: 93% (02-10-24 @ 00:00) (92% - 98%)  Exam: unlabored, clear to auscultation bilaterally  ABG - ( 08 Feb 2024 17:18 )  pH: 7.37  /  pCO2: 37    /  pO2: 144   / HCO3: 21    / Base Excess: -3.4  /  SaO2: 98.4    Lactate: x                [N/A] Extubation Readiness Assessed      CARDIOVASCULAR  HR: 93 (02-10-24 @ 00:00) (77 - 120)  BP: 111/66 (02-10-24 @ 00:00) (111/66 - 152/70)  BP(mean): 83 (02-10-24 @ 00:00) (83 - 113)  ABP: 121/72 (02-09-24 @ 18:00) (91/57 - 164/84)  ABP(mean): 94 (02-09-24 @ 18:00) (72 - 115)        Exam: regular rate and rhythm  Cardiac Rhythm: sinus  Perfusion     [x]Adequate   [ ]Inadequate  Mentation   [x]Normal       [ ]Reduced  Extremities  [x]Warm         [ ]Cool  Volume Status [ ]Hypervolemic [x]Euvolemic [ ]Hypovolemic  Meds:       GI/NUTRITION  Exam: soft, nontender, nondistende  Meds: magnesium hydroxide Suspension 30 milliLiter(s) Oral every 12 hours PRN Constipation  pantoprazole  Injectable 40 milliGRAM(s) IV Push every 24 hours  polyethylene glycol 3350 17 Gram(s) Oral every 24 hours  senna 2 Tablet(s) Oral at bedtime      GENITOURINARY  I&O's Detail    02-08 @ 07:01  -  02-09 @ 07:00  --------------------------------------------------------  IN:    IV PiggyBack: 50 mL    Oral Fluid: 250 mL    sodium chloride 0.9%: 1300 mL  Total IN: 1600 mL    OUT:    Drain (mL): 20 mL    Emesis (mL): 360 mL    Indwelling Catheter - Urethral (mL): 1175 mL  Total OUT: 1555 mL    Total NET: 45 mL      02-09 @ 07:01  -  02-10 @ 01:41  --------------------------------------------------------  IN:    Oral Fluid: 80 mL    sodium chloride 0.9%: 700 mL    sodium chloride 0.9%: 20 mL    sodium chloride 0.9%: 180 mL  Total IN: 980 mL    OUT:    Drain (mL): 30 mL    Indwelling Catheter - Urethral (mL): 2390 mL  Total OUT: 2420 mL    Total NET: -1440 mL          02-09    139  |  105  |  20  ----------------------------<  179<H>  4.5   |  22  |  0.70    Ca    9.2      09 Feb 2024 01:03  Phos  3.7     02-09  Mg     2.0     02-09    TPro  6.7  /  Alb  4.2  /  TBili  0.5  /  DBili  x   /  AST  42<H>  /  ALT  42  /  AlkPhos  81  02-09    Meds: sodium chloride 0.9%. 1000 milliLiter(s) IV Continuous <Continuous>        HEMATOLOGIC  Meds: aspirin enteric coated 81 milliGRAM(s) Oral daily    [x] VTE Prophylaxis                        13.8   19.59 )-----------( 210      ( 09 Feb 2024 01:03 )             41.1       INFECTIOUS DISEASES    RECENT CULTURES:    Meds: influenza   Vaccine 0.5 milliLiter(s) IntraMuscular once        ENDOCRINE  CAPILLARY BLOOD GLUCOSE        Meds: predniSONE   Tablet   Oral   predniSONE   Tablet 40 milliGRAM(s) Oral daily        ACCESS DEVICES:  [ ] Peripheral IV  [ ] Central Venous Line	[ ] R	[ ] L	[ ] IJ	[ ] Fem	[ ] SC	Placed:   [ ] Arterial Line		[ ] R	[ ] L	[ ] Fem	[ ] Rad	[ ] Ax	Placed:   [ ] PICC:					[ ] Mediport  [ ] Urinary Catheter, Date Placed:   [x] Necessity of urinary, arterial, and venous catheters discussed    OTHER MEDICATIONS:  chlorhexidine 2% Cloths 1 Application(s) Topical <User Schedule>  naloxone Injectable 0.1 milliGRAM(s) IV Push every 3 minutes PRN

## 2024-02-10 NOTE — PROGRESS NOTE ADULT - SUBJECTIVE AND OBJECTIVE BOX
Day __ of Anesthesia Pain Management Service    SUBJECTIVE: Patient is doing well with IV PCA    Pain Scale Score:	[X] Refer to charted pain scores    THERAPY:    [ ] IV PCA Morphine		[ ] 5 mg/mL	[ ] 1 mg/mL  [X] IV PCA Hydromorphone	[ ] 5 mg/mL	[X] 1 mg/mL  [ ] IV PCA Fentanyl		[ ] 50 micrograms/mL    Demand dose: 0.2 mg     Lockout: 6 minutes   Continuous Rate: 0 mg/hr  4 Hour Limit: 4 mg    MEDICATIONS  (STANDING):  aspirin enteric coated 81 milliGRAM(s) Oral daily  chlorhexidine 2% Cloths 1 Application(s) Topical <User Schedule>  gabapentin 800 milliGRAM(s) Oral three times a day  HYDROmorphone PCA (1 mG/mL) 30 milliLiter(s) PCA Continuous PCA Continuous  influenza   Vaccine 0.5 milliLiter(s) IntraMuscular once  nortriptyline 25 milliGRAM(s) Oral at bedtime  pantoprazole  Injectable 40 milliGRAM(s) IV Push every 24 hours  polyethylene glycol 3350 17 Gram(s) Oral every 24 hours  potassium phosphate / sodium phosphate Tablet (K-PHOS No. 2) 2 Tablet(s) Oral once  predniSONE   Tablet 40 milliGRAM(s) Oral daily  predniSONE   Tablet   Oral   riluzole 50 milliGRAM(s) Oral two times a day  senna 2 Tablet(s) Oral at bedtime  sodium chloride 0.9%. 1000 milliLiter(s) (20 mL/Hr) IV Continuous <Continuous>  sodium phosphate 15 milliMole(s)/250 mL IVPB 15 milliMole(s) IV Intermittent once    MEDICATIONS  (PRN):  cyclobenzaprine 10 milliGRAM(s) Oral three times a day PRN Muscle Spasm  magnesium hydroxide Suspension 30 milliLiter(s) Oral every 12 hours PRN Constipation  naloxone Injectable 0.1 milliGRAM(s) IV Push every 3 minutes PRN For ANY of the following changes in patient status:  A. RR LESS THAN 10 breaths per minute, B. Oxygen saturation LESS THAN 90%, C. Sedation score of 6  ondansetron Injectable 4 milliGRAM(s) IV Push every 6 hours PRN Nausea      OBJECTIVE:    Sedation Score:	[ X] Alert	[ ] Drowsy 	[ ] Arousable	[ ] Asleep	[ ] Unresponsive    Side Effects:	[X ] None	[ ] Nausea	[ ] Vomiting	[ ] Pruritus  		[ ] Other:    Vital Signs Last 24 Hrs  T(C): 36.7 (10 Feb 2024 07:00), Max: 37 (09 Feb 2024 11:00)  T(F): 98.1 (10 Feb 2024 07:00), Max: 98.6 (09 Feb 2024 11:00)  HR: 93 (10 Feb 2024 07:00) (83 - 120)  BP: 139/79 (10 Feb 2024 07:00) (110/55 - 140/94)  BP(mean): 103 (10 Feb 2024 07:00) (76 - 112)  RR: 22 (10 Feb 2024 07:00) (12 - 30)  SpO2: 95% (10 Feb 2024 07:00) (92% - 95%)    Parameters below as of 10 Feb 2024 07:00  Patient On (Oxygen Delivery Method): room air        ASSESSMENT/ PLAN    Therapy to  be:               [X] Continued   [ ] Discontinued   [ ] Changed to PRN Analgesics    Documentation and Verification of current medications:   [X] Done	[ ] Not done, not eligible    Comments:    Arnulfo Allan,   Anesthesia

## 2024-02-10 NOTE — CONSULT NOTE ADULT - SUBJECTIVE AND OBJECTIVE BOX
Patient is a 63y old  Male who presented with upper back pain S/p c3-c4 anterior cervical discectomy and fusion c3-c7 posterior cervical   laminectomy and spinal fusion        HPI:  63 year old male presents ambulatory with cane to PST prior to c3-c4 anterior cervical discectomy and fusion c3-c7 posterior cervical laminectomy and spinal fusion procedure on 2.8.24 with Dr. Eliud Quinones. Patient has a PMHx including ALS (diagnosed 2023 at [facility]),  ACDF C4-5 on 07/12/23 and C4-C6 ACDF 8/2016. decreased ROM of right shoulder, chronic pain (daily marijuana user) and right  weakness (unable to extend fingers of right hand). Denies recent fevers, chills, chest pain.    (01 Feb 2024 16:41)      PAST MEDICAL & SURGICAL HISTORY:  Spinal stenosis in cervical region  s/p fusion 2017      GERD (gastroesophageal reflux disease)      History of kidney stones  multiple episodes, last in 2015      Arthritis      Osteoporosis      Cervical stenosis of spine      Radiculopathy, cervical region      Carpal tunnel syndrome, bilateral upper limbs      Spondylosis without myelopathy or radiculopathy, cervical region      2019 novel coronavirus disease (COVID-19)      Medical marijuana use      History of heroin use      History of depression      ALS (amyotrophic lateral sclerosis)      Fibromyalgia      Degenerative joint disease      Lumbar radiculopathy      Chronic pain syndrome      S/P cystoscopy  Oct 2014, h/o renal stent      History of lithotripsy  multiple, most recent 2015, 2022      History of fusion of cervical spine      History of carpal tunnel surgery of left wrist      History of colonoscopy      History of endoscopy      S/P cervical discectomy          Review of Systems:   CONSTITUTIONAL: No fever,  or fatigue  NECK:  C collar  RESPIRATORY: No cough,  No shortness of breath  CARDIOVASCULAR: No chest pain, palpitations, dizziness, or leg swelling  GASTROINTESTINAL: No abdominal  pain. No nausea, vomiting.  NEUROLOGICAL: No headache          Allergies    Pt has allergy to mustard (Anaphylaxis)  No Known Drug Allergies    Intolerances        Social History:     FAMILY HISTORY:      MEDICATIONS  (STANDING):  aspirin enteric coated 81 milliGRAM(s) Oral daily  chlorhexidine 2% Cloths 1 Application(s) Topical <User Schedule>  gabapentin 800 milliGRAM(s) Oral three times a day  HYDROmorphone PCA (1 mG/mL) 30 milliLiter(s) PCA Continuous PCA Continuous  influenza   Vaccine 0.5 milliLiter(s) IntraMuscular once  nortriptyline 25 milliGRAM(s) Oral at bedtime  pantoprazole  Injectable 40 milliGRAM(s) IV Push every 24 hours  polyethylene glycol 3350 17 Gram(s) Oral every 24 hours  predniSONE   Tablet   Oral   predniSONE   Tablet 40 milliGRAM(s) Oral daily  riluzole 50 milliGRAM(s) Oral two times a day  senna 2 Tablet(s) Oral at bedtime  sodium chloride 0.9%. 1000 milliLiter(s) (20 mL/Hr) IV Continuous <Continuous>    MEDICATIONS  (PRN):  acetaminophen     Tablet .. 650 milliGRAM(s) Oral every 6 hours PRN Mild Pain (1 - 3)  cyclobenzaprine 10 milliGRAM(s) Oral three times a day PRN Muscle Spasm  magnesium hydroxide Suspension 30 milliLiter(s) Oral every 12 hours PRN Constipation  naloxone Injectable 0.1 milliGRAM(s) IV Push every 3 minutes PRN For ANY of the following changes in patient status:  A. RR LESS THAN 10 breaths per minute, B. Oxygen saturation LESS THAN 90%, C. Sedation score of 6  ondansetron Injectable 4 milliGRAM(s) IV Push every 6 hours PRN Nausea      CAPILLARY BLOOD GLUCOSE        I&O's Summary    09 Feb 2024 07:01  -  10 Feb 2024 07:00  --------------------------------------------------------  IN: 1120 mL / OUT: 3400 mL / NET: -2280 mL    10 Feb 2024 07:01  -  10 Feb 2024 22:10  --------------------------------------------------------  IN: 470 mL / OUT: 860 mL / NET: -390 mL        T(C): 36.5 (02-10-24 @ 15:00), Max: 36.8 (02-09-24 @ 23:00)  HR: 93 (02-10-24 @ 21:00) (83 - 110)  BP: 140/78 (02-10-24 @ 20:00) (110/55 - 147/85)  RR: 14 (02-10-24 @ 21:00) (12 - 31)  SpO2: 95% (02-10-24 @ 21:00) (91% - 95%)    PHYSICAL EXAM:    GENERAL: NAD  NECK: Supple, No JVD  CHEST/LUNG: Clear to auscultation bilaterally; No wheezing.  HEART: Regular rate and rhythm; No murmurs, rubs, or gallops  ABDOMEN: Soft, Nontender, Nondistended; Bowel sounds present  EXTREMITIES:  2+ Peripheral Pulses, No edema  NEUROLOGY: AAOx 3      LABS:                        13.2   17.89 )-----------( 213      ( 10 Feb 2024 06:17 )             39.5     02-10    141  |  106  |  21  ----------------------------<  120<H>  4.3   |  22  |  0.71    Ca    9.4      10 Feb 2024 06:17  Phos  2.3     02-10  Mg     2.4     02-10    TPro  6.6  /  Alb  4.1  /  TBili  0.4  /  DBili  x   /  AST  24  /  ALT  28  /  AlkPhos  74  02-10          Urinalysis Basic - ( 10 Feb 2024 06:17 )    Color: x / Appearance: x / SG: x / pH: x  Gluc: 120 mg/dL / Ketone: x  / Bili: x / Urobili: x   Blood: x / Protein: x / Nitrite: x   Leuk Esterase: x / RBC: x / WBC x   Sq Epi: x / Non Sq Epi: x / Bacteria: x      CAPILLARY BLOOD GLUCOSE            RADIOLOGY & ADDITIONAL TESTS:    Imaging Personally Reviewed:    Consultant(s) Notes Reviewed:      Care Discussed with Consultants/Other Providers:    Thanks for consult. Will follow.

## 2024-02-10 NOTE — CONSULT NOTE ADULT - ASSESSMENT
Patient is a 63y old  Male who presented with upper back pain S/p c3-c4 anterior cervical discectomy and fusion c3-c7 posterior cervical   laminectomy and spinal fusion    S/p C3-C4 ACDF, C3-C7 posterior cervical fusion:    Ortho spine f/up appreciated  Cont mx as per SICU  Dilaudid PCA  Po Prednisone  Neurochecks    Neuropathy:    Cw Neurontin

## 2024-02-11 RX ORDER — FAMOTIDINE 10 MG/ML
40 INJECTION INTRAVENOUS DAILY
Refills: 0 | Status: DISCONTINUED | OUTPATIENT
Start: 2024-02-11 | End: 2024-02-15

## 2024-02-11 RX ADMIN — FAMOTIDINE 40 MILLIGRAM(S): 10 INJECTION INTRAVENOUS at 04:09

## 2024-02-11 RX ADMIN — HYDROMORPHONE HYDROCHLORIDE 30 MILLILITER(S): 2 INJECTION INTRAMUSCULAR; INTRAVENOUS; SUBCUTANEOUS at 07:47

## 2024-02-11 RX ADMIN — GABAPENTIN 800 MILLIGRAM(S): 400 CAPSULE ORAL at 21:29

## 2024-02-11 RX ADMIN — Medication 81 MILLIGRAM(S): at 12:34

## 2024-02-11 RX ADMIN — Medication 40 MILLIGRAM(S): at 05:28

## 2024-02-11 RX ADMIN — GABAPENTIN 800 MILLIGRAM(S): 400 CAPSULE ORAL at 05:28

## 2024-02-11 RX ADMIN — GABAPENTIN 800 MILLIGRAM(S): 400 CAPSULE ORAL at 15:43

## 2024-02-11 RX ADMIN — PANTOPRAZOLE SODIUM 40 MILLIGRAM(S): 20 TABLET, DELAYED RELEASE ORAL at 05:28

## 2024-02-11 RX ADMIN — HYDROMORPHONE HYDROCHLORIDE 30 MILLILITER(S): 2 INJECTION INTRAMUSCULAR; INTRAVENOUS; SUBCUTANEOUS at 00:29

## 2024-02-11 RX ADMIN — SODIUM CHLORIDE 20 MILLILITER(S): 9 INJECTION INTRAMUSCULAR; INTRAVENOUS; SUBCUTANEOUS at 21:05

## 2024-02-11 RX ADMIN — RILUZOLE 50 MILLIGRAM(S): 50 TABLET ORAL at 17:43

## 2024-02-11 RX ADMIN — SODIUM CHLORIDE 20 MILLILITER(S): 9 INJECTION INTRAMUSCULAR; INTRAVENOUS; SUBCUTANEOUS at 17:43

## 2024-02-11 RX ADMIN — POLYETHYLENE GLYCOL 3350 17 GRAM(S): 17 POWDER, FOR SOLUTION ORAL at 10:14

## 2024-02-11 RX ADMIN — RILUZOLE 50 MILLIGRAM(S): 50 TABLET ORAL at 05:27

## 2024-02-11 RX ADMIN — NORTRIPTYLINE HYDROCHLORIDE 25 MILLIGRAM(S): 10 CAPSULE ORAL at 21:29

## 2024-02-11 RX ADMIN — HYDROMORPHONE HYDROCHLORIDE 30 MILLILITER(S): 2 INJECTION INTRAMUSCULAR; INTRAVENOUS; SUBCUTANEOUS at 19:41

## 2024-02-11 NOTE — PROGRESS NOTE ADULT - SUBJECTIVE AND OBJECTIVE BOX
POST OPERATIVE DAY #:  3    Patient seen and evaluated at bedside. No acute events overnight. Resting without complaints. No CP/SOB. No N/V/D/HA. No numbness/tingling/muscle spasms.     T(C): 36.6 (02-11-24 @ 04:09), Max: 36.7 (02-11-24 @ 00:20)  HR: 94 (02-11-24 @ 04:09) (86 - 110)  BP: 123/76 (02-11-24 @ 04:09) (123/76 - 156/97)  RR: 18 (02-11-24 @ 04:09) (14 - 31)  SpO2: 93% (02-11-24 @ 04:09) (91% - 98%)  Wt(kg): --  Exam:   Alert/Oriented, No Acute Distress  Resp: no respiratory distress, CTAB   Anterior Neck:          Aspen collar in place          Dressings: anterior neck (Gauze and tegaderm) and posterior neck with microfoam tape clean/dry/intact          Drains: no drains in place         Motor:       C5   C6   C7   C8   T1  L   4+/5  4/5  4/5  3/5  3/5  R   4/5  4/5  4/5  3/5  3/5         L2   L3    L4   L5   S1  L   5/5  5/5  3/5  3/5  4/5  R   4/5  5/5  3/5  3/5  4/5    Sensory:       C5   C6   C7   C8   T1  L    2     2     2     2     2   R    2     2     2     2     2          L2   L3    L4   L5   S1  L    2     2     2     2     2   R    2     2     2     2     2                     Calves Soft/Non-tender bilaterally         warm well perfused; capillary refill <3 seconds     LABS:                        13.2   17.89 )-----------( 213      ( 10 Feb 2024 06:17 )             39.5     02-10    141  |  106  |  21  ----------------------------<  120<H>  4.3   |  22  |  0.71    Ca    9.4      10 Feb 2024 06:17  Phos  2.3     02-10  Mg     2.4     02-10    TPro  6.6  /  Alb  4.1  /  TBili  0.4  /  DBili  x   /  AST  24  /  ALT  28  /  AlkPhos  74  02-10

## 2024-02-11 NOTE — PROGRESS NOTE ADULT - SUBJECTIVE AND OBJECTIVE BOX
Patient is a 63y old  Male who presents with a chief complaint of C3-C4 ACDF/C3-7 Post Fusion (11 Feb 2024 08:03)      SUBJECTIVE / OVERNIGHT EVENTS:    Events noted.  CONSTITUTIONAL: No fever,  or fatigue  RESPIRATORY: No cough, wheezing,  No shortness of breath  CARDIOVASCULAR: No chest pain, palpitations, dizziness, or leg swelling  GASTROINTESTINAL: No abdominal or epigastric pain.       MEDICATIONS  (STANDING):  aspirin enteric coated 81 milliGRAM(s) Oral daily  chlorhexidine 2% Cloths 1 Application(s) Topical <User Schedule>  gabapentin 800 milliGRAM(s) Oral three times a day  HYDROmorphone PCA (1 mG/mL) 30 milliLiter(s) PCA Continuous PCA Continuous  influenza   Vaccine 0.5 milliLiter(s) IntraMuscular once  nortriptyline 25 milliGRAM(s) Oral at bedtime  pantoprazole  Injectable 40 milliGRAM(s) IV Push every 24 hours  polyethylene glycol 3350 17 Gram(s) Oral every 24 hours  predniSONE   Tablet   Oral   predniSONE   Tablet 40 milliGRAM(s) Oral daily  riluzole 50 milliGRAM(s) Oral two times a day  senna 2 Tablet(s) Oral at bedtime  sodium chloride 0.9%. 1000 milliLiter(s) (20 mL/Hr) IV Continuous <Continuous>    MEDICATIONS  (PRN):  acetaminophen     Tablet .. 650 milliGRAM(s) Oral every 6 hours PRN Mild Pain (1 - 3)  cyclobenzaprine 10 milliGRAM(s) Oral three times a day PRN Muscle Spasm  famotidine    Tablet 40 milliGRAM(s) Oral daily PRN indigestion  magnesium hydroxide Suspension 30 milliLiter(s) Oral every 12 hours PRN Constipation  naloxone Injectable 0.1 milliGRAM(s) IV Push every 3 minutes PRN For ANY of the following changes in patient status:  A. RR LESS THAN 10 breaths per minute, B. Oxygen saturation LESS THAN 90%, C. Sedation score of 6  ondansetron Injectable 4 milliGRAM(s) IV Push every 6 hours PRN Nausea        CAPILLARY BLOOD GLUCOSE        I&O's Summary    10 Feb 2024 07:01  -  11 Feb 2024 07:00  --------------------------------------------------------  IN: 860 mL / OUT: 1360 mL / NET: -500 mL    11 Feb 2024 07:01  -  11 Feb 2024 17:45  --------------------------------------------------------  IN: 240 mL / OUT: 1500 mL / NET: -1260 mL        T(C): 36.3 (02-11-24 @ 16:43), Max: 36.7 (02-11-24 @ 00:20)  HR: 109 (02-11-24 @ 16:43) (86 - 109)  BP: 151/87 (02-11-24 @ 16:43) (123/76 - 156/97)  RR: 18 (02-11-24 @ 16:43) (14 - 30)  SpO2: 94% (02-11-24 @ 16:43) (93% - 98%)    PHYSICAL EXAM:  GENERAL: NAD  NECK: C Collar  CHEST/LUNG: Clear to auscultation bilaterally; No wheezing.  HEART: Regular rate and rhythm; No murmurs, rubs, or gallops  ABDOMEN: Soft, Nontender, Nondistended; Bowel sounds present  EXTREMITIES:   No edema  NEUROLOGY: AAO X 3      LABS:                        13.2   17.89 )-----------( 213      ( 10 Feb 2024 06:17 )             39.5     02-10    141  |  106  |  21  ----------------------------<  120<H>  4.3   |  22  |  0.71    Ca    9.4      10 Feb 2024 06:17  Phos  2.3     02-10  Mg     2.4     02-10    TPro  6.6  /  Alb  4.1  /  TBili  0.4  /  DBili  x   /  AST  24  /  ALT  28  /  AlkPhos  74  02-10          Urinalysis Basic - ( 10 Feb 2024 06:17 )    Color: x / Appearance: x / SG: x / pH: x  Gluc: 120 mg/dL / Ketone: x  / Bili: x / Urobili: x   Blood: x / Protein: x / Nitrite: x   Leuk Esterase: x / RBC: x / WBC x   Sq Epi: x / Non Sq Epi: x / Bacteria: x      CAPILLARY BLOOD GLUCOSE            RADIOLOGY & ADDITIONAL TESTS:    Imaging Personally Reviewed:    Consultant(s) Notes Reviewed:      Care Discussed with Consultants/Other Providers:    Ulises James MD, CMD, FACP    [address] [place] [address]  Office Tel: [phone]  Cell: [phone]

## 2024-02-11 NOTE — PROGRESS NOTE ADULT - SUBJECTIVE AND OBJECTIVE BOX
Day 3 of Anesthesia Pain Management Service    SUBJECTIVE: Patient is doing well with IV PCA    Pain Scale Score:	[X] Refer to charted pain scores    THERAPY:    [ ] IV PCA Morphine		[ ] 5 mg/mL	[ ] 1 mg/mL  [X] IV PCA Hydromorphone	[ ] 5 mg/mL	[X] 1 mg/mL  [ ] IV PCA Fentanyl		[ ] 50 micrograms/mL    Demand dose: 0.25 mg     Lockout: 6 minutes   Continuous Rate: 0 mg/hr  4 Hour Limit: 4 mg    MEDICATIONS  (STANDING):  aspirin enteric coated 81 milliGRAM(s) Oral daily  chlorhexidine 2% Cloths 1 Application(s) Topical <User Schedule>  gabapentin 800 milliGRAM(s) Oral three times a day  HYDROmorphone PCA (1 mG/mL) 30 milliLiter(s) PCA Continuous PCA Continuous  influenza   Vaccine 0.5 milliLiter(s) IntraMuscular once  nortriptyline 25 milliGRAM(s) Oral at bedtime  pantoprazole  Injectable 40 milliGRAM(s) IV Push every 24 hours  polyethylene glycol 3350 17 Gram(s) Oral every 24 hours  predniSONE   Tablet   Oral   predniSONE   Tablet 40 milliGRAM(s) Oral daily  riluzole 50 milliGRAM(s) Oral two times a day  senna 2 Tablet(s) Oral at bedtime  sodium chloride 0.9%. 1000 milliLiter(s) (20 mL/Hr) IV Continuous <Continuous>    MEDICATIONS  (PRN):  acetaminophen     Tablet .. 650 milliGRAM(s) Oral every 6 hours PRN Mild Pain (1 - 3)  cyclobenzaprine 10 milliGRAM(s) Oral three times a day PRN Muscle Spasm  famotidine    Tablet 40 milliGRAM(s) Oral daily PRN indigestion  magnesium hydroxide Suspension 30 milliLiter(s) Oral every 12 hours PRN Constipation  naloxone Injectable 0.1 milliGRAM(s) IV Push every 3 minutes PRN For ANY of the following changes in patient status:  A. RR LESS THAN 10 breaths per minute, B. Oxygen saturation LESS THAN 90%, C. Sedation score of 6  ondansetron Injectable 4 milliGRAM(s) IV Push every 6 hours PRN Nausea      OBJECTIVE:    Sedation Score:	[ X] Alert	[ ] Drowsy 	[ ] Arousable	[ ] Asleep	[ ] Unresponsive    Side Effects:	[X ] None	[ ] Nausea	[ ] Vomiting	[ ] Pruritus  		[ ] Other:    Vital Signs Last 24 Hrs  T(C): 36.6 (11 Feb 2024 04:09), Max: 36.7 (11 Feb 2024 00:20)  T(F): 97.8 (11 Feb 2024 04:09), Max: 98 (11 Feb 2024 00:20)  HR: 104 (11 Feb 2024 09:17) (86 - 110)  BP: 137/86 (11 Feb 2024 09:17) (123/76 - 156/97)  BP(mean): 103 (10 Feb 2024 23:00) (97 - 117)  RR: 17 (11 Feb 2024 09:17) (14 - 31)  SpO2: 94% (11 Feb 2024 09:17) (91% - 98%)    Parameters below as of 11 Feb 2024 09:17  Patient On (Oxygen Delivery Method): room air        ASSESSMENT/ PLAN    Therapy to  be:               [X] Continued   [ ] Discontinued   [ ] Changed to PRN Analgesics    Documentation and Verification of current medications:   [X] Done	[ ] Not done, not eligible    Comments:

## 2024-02-11 NOTE — PROGRESS NOTE ADULT - ASSESSMENT
Patient is a 63y old  Male who presented with upper back pain S/p c3-c4 anterior cervical discectomy and fusion c3-c7 posterior cervical   laminectomy and spinal fusion    S/p C3-C4 ACDF, C3-C7 posterior cervical fusion:    Ortho spine f/up appreciated  Dilaudid PCA  Po Prednisone  Neurochecks    Neuropathy:    Cw Neurontin

## 2024-02-11 NOTE — PROGRESS NOTE ADULT - ASSESSMENT
A/P :  63y Male s/p C3-C4 ACDF, C3-C7 posterior cervical fusion, POD3. VSS. NAD.     -    Pain control, PCA pump in place   -    IS bedside  -    Taper  -    DVT ppx: Aspirin 81 mg QD, SCDs       -    GI ppx: Protonix 40 mg QD  -    Physical Therapy  -    Weight bearing status: WBAT, [place] collar to be on at all times   -    Dopp: performed post-op, negative for DVT, Caprini score 5   -    SNEHAL Drain DC'd 2/10  -    appreciate Medical comanagement   - Dispo: Acute rehab, pending PMR consult.     Kevin Frey PA-C  Orthopedic Surgery  Pager #[phone]

## 2024-02-12 ENCOUNTER — TRANSCRIPTION ENCOUNTER (OUTPATIENT)
Age: 64
End: 2024-02-12

## 2024-02-12 LAB
ALBUMIN SERPL ELPH-MCNC: 4 G/DL — SIGNIFICANT CHANGE UP (ref 3.3–5)
ALP SERPL-CCNC: 77 U/L — SIGNIFICANT CHANGE UP (ref 40–120)
ALT FLD-CCNC: 36 U/L — SIGNIFICANT CHANGE UP (ref 10–45)
ANION GAP SERPL CALC-SCNC: 11 MMOL/L — SIGNIFICANT CHANGE UP (ref 5–17)
AST SERPL-CCNC: 22 U/L — SIGNIFICANT CHANGE UP (ref 10–40)
BILIRUB SERPL-MCNC: 0.8 MG/DL — SIGNIFICANT CHANGE UP (ref 0.2–1.2)
BLD GP AB SCN SERPL QL: NEGATIVE — SIGNIFICANT CHANGE UP
BUN SERPL-MCNC: 22 MG/DL — SIGNIFICANT CHANGE UP (ref 7–23)
CALCIUM SERPL-MCNC: 9.3 MG/DL — SIGNIFICANT CHANGE UP (ref 8.4–10.5)
CHLORIDE SERPL-SCNC: 101 MMOL/L — SIGNIFICANT CHANGE UP (ref 96–108)
CO2 SERPL-SCNC: 25 MMOL/L — SIGNIFICANT CHANGE UP (ref 22–31)
CREAT SERPL-MCNC: 0.72 MG/DL — SIGNIFICANT CHANGE UP (ref 0.5–1.3)
EGFR: 104 ML/MIN/1.73M2 — SIGNIFICANT CHANGE UP
GLUCOSE SERPL-MCNC: 109 MG/DL — HIGH (ref 70–99)
HCT VFR BLD CALC: 40.7 % — SIGNIFICANT CHANGE UP (ref 39–50)
HGB BLD-MCNC: 13.3 G/DL — SIGNIFICANT CHANGE UP (ref 13–17)
MAGNESIUM SERPL-MCNC: 2.1 MG/DL — SIGNIFICANT CHANGE UP (ref 1.6–2.6)
MCHC RBC-ENTMCNC: 28.6 PG — SIGNIFICANT CHANGE UP (ref 27–34)
MCHC RBC-ENTMCNC: 32.7 GM/DL — SIGNIFICANT CHANGE UP (ref 32–36)
MCV RBC AUTO: 87.5 FL — SIGNIFICANT CHANGE UP (ref 80–100)
NRBC # BLD: 0 /100 WBCS — SIGNIFICANT CHANGE UP (ref 0–0)
PHOSPHATE SERPL-MCNC: 2.7 MG/DL — SIGNIFICANT CHANGE UP (ref 2.5–4.5)
PLATELET # BLD AUTO: 229 K/UL — SIGNIFICANT CHANGE UP (ref 150–400)
POTASSIUM SERPL-MCNC: 3.7 MMOL/L — SIGNIFICANT CHANGE UP (ref 3.5–5.3)
POTASSIUM SERPL-SCNC: 3.7 MMOL/L — SIGNIFICANT CHANGE UP (ref 3.5–5.3)
PROT SERPL-MCNC: 6.6 G/DL — SIGNIFICANT CHANGE UP (ref 6–8.3)
RBC # BLD: 4.65 M/UL — SIGNIFICANT CHANGE UP (ref 4.2–5.8)
RBC # FLD: 13.2 % — SIGNIFICANT CHANGE UP (ref 10.3–14.5)
RH IG SCN BLD-IMP: NEGATIVE — SIGNIFICANT CHANGE UP
SODIUM SERPL-SCNC: 137 MMOL/L — SIGNIFICANT CHANGE UP (ref 135–145)
WBC # BLD: 10.44 K/UL — SIGNIFICANT CHANGE UP (ref 3.8–10.5)
WBC # FLD AUTO: 10.44 K/UL — SIGNIFICANT CHANGE UP (ref 3.8–10.5)

## 2024-02-12 PROCEDURE — 99222 1ST HOSP IP/OBS MODERATE 55: CPT

## 2024-02-12 PROCEDURE — 93010 ELECTROCARDIOGRAM REPORT: CPT

## 2024-02-12 RX ORDER — OXYCODONE HYDROCHLORIDE 5 MG/1
10 TABLET ORAL EVERY 4 HOURS
Refills: 0 | Status: DISCONTINUED | OUTPATIENT
Start: 2024-02-12 | End: 2024-02-15

## 2024-02-12 RX ORDER — OXYCODONE HYDROCHLORIDE 5 MG/1
15 TABLET ORAL EVERY 8 HOURS
Refills: 0 | Status: DISCONTINUED | OUTPATIENT
Start: 2024-02-12 | End: 2024-02-15

## 2024-02-12 RX ADMIN — NORTRIPTYLINE HYDROCHLORIDE 25 MILLIGRAM(S): 10 CAPSULE ORAL at 21:43

## 2024-02-12 RX ADMIN — RILUZOLE 50 MILLIGRAM(S): 50 TABLET ORAL at 17:19

## 2024-02-12 RX ADMIN — GABAPENTIN 800 MILLIGRAM(S): 400 CAPSULE ORAL at 05:47

## 2024-02-12 RX ADMIN — HYDROMORPHONE HYDROCHLORIDE 30 MILLILITER(S): 2 INJECTION INTRAMUSCULAR; INTRAVENOUS; SUBCUTANEOUS at 07:38

## 2024-02-12 RX ADMIN — GABAPENTIN 800 MILLIGRAM(S): 400 CAPSULE ORAL at 21:43

## 2024-02-12 RX ADMIN — CHLORHEXIDINE GLUCONATE 1 APPLICATION(S): 213 SOLUTION TOPICAL at 05:46

## 2024-02-12 RX ADMIN — Medication 81 MILLIGRAM(S): at 11:00

## 2024-02-12 RX ADMIN — RILUZOLE 50 MILLIGRAM(S): 50 TABLET ORAL at 05:47

## 2024-02-12 RX ADMIN — PANTOPRAZOLE SODIUM 40 MILLIGRAM(S): 20 TABLET, DELAYED RELEASE ORAL at 05:47

## 2024-02-12 RX ADMIN — GABAPENTIN 800 MILLIGRAM(S): 400 CAPSULE ORAL at 13:21

## 2024-02-12 RX ADMIN — Medication 40 MILLIGRAM(S): at 05:47

## 2024-02-12 RX ADMIN — OXYCODONE HYDROCHLORIDE 10 MILLIGRAM(S): 5 TABLET ORAL at 17:20

## 2024-02-12 RX ADMIN — OXYCODONE HYDROCHLORIDE 10 MILLIGRAM(S): 5 TABLET ORAL at 11:56

## 2024-02-12 RX ADMIN — OXYCODONE HYDROCHLORIDE 10 MILLIGRAM(S): 5 TABLET ORAL at 18:20

## 2024-02-12 RX ADMIN — OXYCODONE HYDROCHLORIDE 10 MILLIGRAM(S): 5 TABLET ORAL at 10:56

## 2024-02-12 NOTE — CONSULT NOTE ADULT - ASSESSMENT
63 year old male, known to our service from last admission, with PMHx including ALS (diagnosed 2023 at [facility]),  ACDF C4-5 on 07/12/23 and C4-C6 ACDF 8/2016. decreased ROM of right shoulder, chronic pain (daily recreational marijuana user) and right  weakness (unable to extend fingers of right hand).    S/P C3-4 ACDF, C3-7 PSF 2/8 with Dr. Eliud Quinones.     Goal: "to function as a human being".    Current out- patient pain regimen: Oxy IR 10 mg BID PRN; neurontin 800 mg TID; pamelor 25 mg QHS  Out Patient Pain Management provider: Vijay Bourne MD/ Angeline Saenz NP    Pt with acute on chronic pain.    Continue Oxy IR 10 mg Q 4 hours PRN moderate pain.  Can add Oxy IR 15 mg Q 8 hours PRN severe pain (for PT).  Continue neurontin 800 mg Q 8 hours- current CrCl is 144.2.  Continue flexeril 10 mg Q 8 hours PRN.  Continue pamelor 25 mg QHS.    Monitor for sedation and respiratory depression.  Bowel regimen.  Incentive spirometer.  PT/ OOB per Orthopedics.    Pt for acute rehab.  Follow up with Dr Vijay Bourne in the community for continued pain management.  Discharge with a narcan rescue kit (naloxone 4 mg/ 0.1 ml nasal spray- 1 spray Q 2-3 minutes alternating between nostrils).      Minutes spent on total encounter: 60 minutes      Chronic Pain Service  [phone]

## 2024-02-12 NOTE — CONSULT NOTE ADULT - SUBJECTIVE AND OBJECTIVE BOX
HPI:  63 year old male presents ambulatory with cane to [facility] prior to c3-c4 anterior cervical discectomy and fusion c3-c7 posterior cervical laminectomy and spinal fusion procedure on 2.8.24 with Dr. Eliud Quinones. Patient has a PMHx including ALS (diagnosed 2023 at [facility]),  ACDF C4-5 on 07/12/23 and C4-C6 ACDF 8/2016. decreased ROM of right shoulder, chronic pain (daily marijuana user) and right  weakness (unable to extend fingers of right hand). Denies recent fevers, chills, chest pain.    (01 Feb 2024 16:41)    Patient was admitted on 2/8, s/p C2-4 ACDF, C3-7 PSF on 2/8, seen today. Reports falls at home, usually on stairs. Since surgery, decreased pain in upper extremities, right side weaker than left.     REVIEW OF SYSTEMS  Denies chest pain, SOB, N/V, F/C, abdominal pain     VITALS  T(C): 36.5 (02-12-24 @ 08:00), Max: 36.6 (02-11-24 @ 20:19)  HR: 98 (02-12-24 @ 08:00) (86 - 109)  BP: 127/81 (02-12-24 @ 08:00) (124/85 - 151/87)  RR: 18 (02-12-24 @ 08:00) (18 - 18)  SpO2: 93% (02-12-24 @ 08:00) (93% - 95%)  Wt(kg): --    PAST MEDICAL & SURGICAL HISTORY  Kidney stone    Spinal stenosis in cervical region    GERD (gastroesophageal reflux disease)    History of drug abuse    Carpal tunnel syndrome of left wrist    Arthritis    Major depression    Migraines    Osteoporosis    Radiculopathy, cervical region    Carpal tunnel syndrome, bilateral upper limbs    Spondylosis without myelopathy or radiculopathy, cervical region    2019 novel coronavirus disease (COVID-19)    Medical marijuana use    History of heroin use    ALS (amyotrophic lateral sclerosis)    Fibromyalgia    Degenerative joint disease    Lumbar radiculopathy    Chronic pain syndrome    History of renal stent    S/P cystoscopy    Status post spinal disc removal    H/O spinal fusion    History of lithotripsy    History of fusion of cervical spine    History of carpal tunnel surgery of left wrist    History of colonoscopy    History of endoscopy    S/P cervical discectomy        FUNCTIONAL HISTORY  Lives with wife, 5 steps to enter, laundry downstairs   wife assists with ADLs as needed, uses st cane     CURRENT FUNCTIONAL STATUS  PT  2/12  bed mobility CG  transfers min to mod assist with platform walker  gait mod assist with platform walker x 30 feet     OT 2/9  bed mobility mod assist   transfers mod assist x 2  dressing max assist     RECENT LABS/IMAGING  CBC Full  -  ( 12 Feb 2024 07:30 )  WBC Count : 10.44 K/uL  RBC Count : 4.65 M/uL  Hemoglobin : 13.3 g/dL  Hematocrit : 40.7 %  Platelet Count - Automated : 229 K/uL  Mean Cell Volume : 87.5 fl  Mean Cell Hemoglobin : 28.6 pg  Mean Cell Hemoglobin Concentration : 32.7 gm/dL  Auto Neutrophil # : x  Auto Lymphocyte # : x  Auto Monocyte # : x  Auto Eosinophil # : x  Auto Basophil # : x  Auto Neutrophil % : x  Auto Lymphocyte % : x  Auto Monocyte % : x  Auto Eosinophil % : x  Auto Basophil % : x    02-12    137  |  101  |  22  ----------------------------<  109<H>  3.7   |  25  |  0.72    Ca    9.3      12 Feb 2024 07:06  Phos  2.7     02-12  Mg     2.1     02-12    TPro  6.6  /  Alb  4.0  /  TBili  0.8  /  DBili  x   /  AST  22  /  ALT  36  /  AlkPhos  77  02-12    Urinalysis Basic - ( 12 Feb 2024 07:06 )    Color: x / Appearance: x / SG: x / pH: x  Gluc: 109 mg/dL / Ketone: x  / Bili: x / Urobili: x   Blood: x / Protein: x / Nitrite: x   Leuk Esterase: x / RBC: x / WBC x   Sq Epi: x / Non Sq Epi: x / Bacteria: x    < from: CT Cervical Spine No Cont (02.08.24 @ 08:14) >    FINDINGS:    Redemonstration of anterior cervical discectomy and fusion of C4-C7. No   evidence for hardware fracture or loosening. Osseous fusion has been   achieved.    No acute fracture or traumatic subluxation.    No prevertebral soft tissue swelling.    Vertebral body height and facet alignment are maintained. Persistent   grade 1 retrolisthesis of C4 on C5. Cervical lordosis maintained.    Alignment at the craniocervical junction unremarkable.    Multilevel degenerative changes.    Visualized soft tissues unremarkable. Visualized lungs clear.    IMPRESSION:    No significant interval change from CT cervical spine 11/3/2023.        < end of copied text >      ALLERGIES  Pt has allergy to mustard (Anaphylaxis)  No Known Drug Allergies      MEDICATIONS   acetaminophen     Tablet .. 650 milliGRAM(s) Oral every 6 hours PRN  aspirin enteric coated 81 milliGRAM(s) Oral daily  chlorhexidine 2% Cloths 1 Application(s) Topical <User Schedule>  cyclobenzaprine 10 milliGRAM(s) Oral three times a day PRN  famotidine    Tablet 40 milliGRAM(s) Oral daily PRN  gabapentin 800 milliGRAM(s) Oral three times a day  influenza   Vaccine 0.5 milliLiter(s) IntraMuscular once  magnesium hydroxide Suspension 30 milliLiter(s) Oral every 12 hours PRN  nortriptyline 25 milliGRAM(s) Oral at bedtime  oxyCODONE    IR 10 milliGRAM(s) Oral every 4 hours PRN  pantoprazole  Injectable 40 milliGRAM(s) IV Push every 24 hours  polyethylene glycol 3350 17 Gram(s) Oral every 24 hours  predniSONE   Tablet   Oral   riluzole 50 milliGRAM(s) Oral two times a day  senna 2 Tablet(s) Oral at bedtime  sodium chloride 0.9%. 1000 milliLiter(s) IV Continuous <Continuous>      ----------------------------------------------------------------------------------------  PHYSICAL EXAM  Constitutional - NAD, Comfortable, in bed  +cervical collar   Chest - Breathing comfortably  Cardiovascular - S1S2   Abdomen - Soft   Extremities - No C/C/E, No calf tenderness   Neurologic Exam -   follows commands                 Cognitive - Awake, Alert, AAO to self, place, date, year, situation     Communication - Fluent, No dysarthria        Motor -                     LEFT    UE - ShAB 4/5, EF 4/5, EE 4/5, WE 2/5,  2/5                    RIGHT UE - ShAB 4/5, EF 3/5, EE 3/5, WE 1/5,  1/5                    LEFT    LE - HF 5/5, KE 4/5, DF 4/5, PF 4/5                    RIGHT LE - HF 4/5, KE 4/5, DF 3/5, PF 4/5        Sensory - Intact to LT     Psychiatric - Mood stable, Affect WNL  ----------------------------------------------------------------------------------------  ASSESSMENT/PLAN  63yMale h/o ALS with functional deficits after C3-C4 ACDF, C3-C7 posterior cervical fusion   WBAT, aspen collar at all times   ALS on riluzole   Pain - Tylenol PCA pump gabapentin, cyclobenzaprine   DVT PPX - SCDs ASA   Rehab - Will continue to follow for ongoing rehab needs and recommendations.    Recommend ACUTE inpatient rehabilitation for the functional deficits consisting of 3 hours of therapy/day & x 4 weeks, 24 hour RN/daily PMR physician for comorbid medical management. Patient will be able to tolerate 3 hours a day.

## 2024-02-12 NOTE — PROGRESS NOTE ADULT - SUBJECTIVE AND OBJECTIVE BOX
Pain Management Attending Addendum    SUBJECTIVE: Patient doing well with IV PCA    Therapy:    [X] IV PCA         [ ] PRN Analgesics    OBJECTIVE:   [X] Pain appropriately controlled    [ ] Other:    Side Effects:  [X] None	             [ ] Nausea              [ ] Pruritis                	[ ] Other:    ASSESSMENT/PLAN: D/C PCA. Transition to oral pain medicine.

## 2024-02-12 NOTE — PROVIDER CONTACT NOTE (CHANGE IN STATUS NOTIFICATION) - BACKGROUND
Problem: Patient Care Overview  Goal: Plan of Care Review  Outcome: Ongoing (interventions implemented as appropriate)  No acute events this shift. VSS. See flowsheet for full assessment. Morning labs and EKG done. Plan for pt to be discharge today. poc updated with Ms. Lee and her daughter. Verbalized understanding. Will continue to monitor closely.        see h&p

## 2024-02-12 NOTE — DISCHARGE NOTE PROVIDER - NSDCMRMEDTOKEN_GEN_ALL_CORE_FT
cyclobenzaprine 10 mg oral tablet: 1 tab(s) orally 3 times a day - (continue home medication)  gabapentin 800 mg oral tablet: 1 tab(s) orally 3 times a day  ibuprofen 800 mg oral tablet: 1 tab(s) orally 3 times a day  nortriptyline 25 mg oral capsule: orally once a day (at bedtime)  omeprazole 20 mg oral delayed release capsule: 1 cap(s) orally once a day (at bedtime)  oxyCODONE 10 mg oral tablet: 1 tab(s) orally 1 to 2 times a day as needed for  severe pain  riluzole 50 mg oral tablet: 1 tab(s) orally 2 times a day   acetaminophen 325 mg oral tablet: 2 tab(s) orally every 6 hours As needed Mild Pain (1 - 3)  ALPRAZolam 0.25 mg oral tablet: 1 tab(s) orally 2 times a day As needed anxiety  Aspen collar: RANDI: 99mths  aspirin 81 mg oral delayed release tablet: 1 tab(s) orally once a day  cyclobenzaprine 10 mg oral tablet: 1 tab(s) orally 3 times a day As needed Muscle Spasm  famotidine 40 mg oral tablet: 1 tab(s) orally once a day As needed indigestion  gabapentin 800 mg oral tablet: 1 tab(s) orally 3 times a day  ibuprofen 800 mg oral tablet: 1 tab(s) orally 3 times a day as needed for  moderate pain  nortriptyline 25 mg oral capsule: 1 cap(s) orally once a day (at bedtime)  omeprazole 20 mg oral delayed release capsule: 1 cap(s) orally once a day (at bedtime)  polyethylene glycol 3350 oral powder for reconstitution: 17 gram(s) orally every 24 hours  riluzole 50 mg oral tablet: 1 tab(s) orally 2 times a day  senna leaf extract oral tablet: 2 tab(s) orally once a day (at bedtime)

## 2024-02-12 NOTE — DISCHARGE NOTE PROVIDER - HOSPITAL COURSE
History of Present Illness:  History of Present Illness	  63 year old male presents ambulatory with cane to [facility] prior to c3-c4 anterior cervical discectomy and fusion c3-c7 posterior cervical laminectomy and spinal fusion procedure on 2.8.24 with Dr. Eliud Quinones. Patient has a PMHx including ALS (diagnosed 2023 at [facility]),  ACDF C4-5 on 07/12/23 and C4-C6 ACDF 8/2016. decreased ROM of right shoulder, chronic pain (daily marijuana user) and right  weakness (unable to extend fingers of right hand). Denies recent fevers, chills, chest pain.     Hospital Course  Patient underwent C3-C4 ACDF/C3-C7 Posterior fusion without complications. Placed on PCA for pain management.  Evaluated and treated by physical therapy whom recommended Acute Rehab for discharge disposition. PMR consulted and   agreed with AR recommendation.  PCA discontinued on 2/12/24. Chronic pain consulted and recommendations followed.      IStop Reference: [id number]  History of Present Illness:  History of Present Illness	  63 year old male presents ambulatory with cane to [facility] prior to c3-c4 anterior cervical discectomy and fusion c3-c7 posterior cervical laminectomy and spinal fusion procedure on 2.8.24 with Dr. Eliud Quinones. Patient has a PMHx including ALS (diagnosed 2023 at [facility]),  ACDF C4-5 on 07/12/23 and C4-C6 ACDF 8/2016. decreased ROM of right shoulder, chronic pain (daily marijuana user) and right  weakness (unable to extend fingers of right hand). Denies recent fevers, chills, chest pain.     Hospital Course  Patient underwent C3-C4 ACDF/C3-C7 Posterior fusion without complications. Placed on PCA for pain management.  Evaluated and treated by physical therapy whom recommended Acute Rehab for discharge disposition. PMR consulted and   agreed with AR recommendation.  PCA discontinued on 2/12/24. Chronic pain consulted and recommendations followed.  IStop Reference: [id number]  Patient cleared for d/c to Acute rehab by PM&R, 2/15/2024

## 2024-02-12 NOTE — DISCHARGE NOTE PROVIDER - NSDCCPTREATMENT_GEN_ALL_CORE_FT
PRINCIPAL PROCEDURE  Procedure: Fusion of anterior and posterior columns of cervical spine  Findings and Treatment:

## 2024-02-12 NOTE — DISCHARGE NOTE PROVIDER - CARE PROVIDER_API CALL
Eliud Quinones  Orthopaedic Surgery  [address], [address]  [place] [address]  Phone: [phone]  Fax: [phone]  Follow Up Time:

## 2024-02-12 NOTE — DISCHARGE NOTE PROVIDER - NSDCFUSCHEDAPPT_GEN_ALL_CORE_FT
Left sided chest tightness with a heart fluttering feeling since yesterday, ike states her BP has been high  
[facility]  [facility] [id number]  Scheduled Appointment: 02/21/2024

## 2024-02-12 NOTE — PROGRESS NOTE ADULT - SUBJECTIVE AND OBJECTIVE BOX
Patient seen and examined at bedside. Pain well controlled with PCA, patient endorses using PCA less. Otherwise feels similar to past days and looking forward to rehab placement. Patient denies fevers, chills, saddle anesthesia, bowel or bladder incontinence, leg pain, numbness, weakness, or any other orthopaedic complaint.     Physical Exam:   General: NAD, patient laying in bed  Spine:  Anterior neck incision dressing c/d/i. Aspen collar in place.   Ranjana-incisional TTP; otherwise, NTTP throughout the rest of the extremity.   Radial, DP pulses palpable.  No calf tenderness bilaterally.  Compartments soft and compressible.     Motor:       C5   C6   C7   C8   T1  L   4+/5  4/5  4/5  2/5  2/5  R   4/5  3/5  3/5  1/5  1/5         L2   L3    L4   L5   S1  L   5/5  5/5  3/5  3/5  4/5  R   4/5  5/5  3/5  3/5  4/5    Sensory:       C5   C6   C7   C8   T1  L    2     2     2     2     2   R    2     2     2     2     2          L2   L3    L4   L5   S1  L    2     2     2     2     2   R    2     2     2     2     2       A/P :  63y Male s/p C3-C4 ACDF, C3-C7 posterior cervical fusion 2/8    -    Pain control, PCA pump in place   -    IS bedside  -    Taper  -    DVT ppx: Aspirin 81 mg QD, SCDs       -    GI ppx: Protonix 40 mg QD  -    Physical Therapy  -    Weight bearing status: WBAT, Kimberly collar to be on at all times   -    Dopp: performed post-op, negative for DVT, Caprini score 5    -    f/u AM labs   -    AR pending PMR consult

## 2024-02-12 NOTE — PROGRESS NOTE ADULT - SUBJECTIVE AND OBJECTIVE BOX
Patient is a 63y old  Male who presents with a chief complaint of C3-C7 Fusion (12 Feb 2024 08:56)      SUBJECTIVE / OVERNIGHT EVENTS:    Events noted.  CONSTITUTIONAL: No fever,  or fatigue  RESPIRATORY: No cough, wheezing,  No shortness of breath  CARDIOVASCULAR: No chest pain, palpitations, dizziness, or leg swelling  GASTROINTESTINAL: No abdominal or epigastric pain. No nausea, vomiting.  NEUROLOGICAL: No headache    MEDICATIONS  (STANDING):  aspirin enteric coated 81 milliGRAM(s) Oral daily  chlorhexidine 2% Cloths 1 Application(s) Topical <User Schedule>  gabapentin 800 milliGRAM(s) Oral three times a day  influenza   Vaccine 0.5 milliLiter(s) IntraMuscular once  nortriptyline 25 milliGRAM(s) Oral at bedtime  pantoprazole  Injectable 40 milliGRAM(s) IV Push every 24 hours  polyethylene glycol 3350 17 Gram(s) Oral every 24 hours  predniSONE   Tablet   Oral   riluzole 50 milliGRAM(s) Oral two times a day  senna 2 Tablet(s) Oral at bedtime  sodium chloride 0.9%. 1000 milliLiter(s) (20 mL/Hr) IV Continuous <Continuous>    MEDICATIONS  (PRN):  acetaminophen     Tablet .. 650 milliGRAM(s) Oral every 6 hours PRN Mild Pain (1 - 3)  cyclobenzaprine 10 milliGRAM(s) Oral three times a day PRN Muscle Spasm  famotidine    Tablet 40 milliGRAM(s) Oral daily PRN indigestion  magnesium hydroxide Suspension 30 milliLiter(s) Oral every 12 hours PRN Constipation  oxyCODONE    IR 10 milliGRAM(s) Oral every 4 hours PRN Moderate Pain (4 - 6)        CAPILLARY BLOOD GLUCOSE        I&O's Summary    11 Feb 2024 07:01  -  12 Feb 2024 07:00  --------------------------------------------------------  IN: 1320 mL / OUT: 2650 mL / NET: -1330 mL    12 Feb 2024 07:01  -  12 Feb 2024 13:10  --------------------------------------------------------  IN: 0 mL / OUT: 700 mL / NET: -700 mL        T(C): 36.5 (02-12-24 @ 08:00), Max: 36.6 (02-11-24 @ 20:19)  HR: 98 (02-12-24 @ 08:00) (86 - 109)  BP: 127/81 (02-12-24 @ 08:00) (124/85 - 151/87)  RR: 18 (02-12-24 @ 08:00) (18 - 18)  SpO2: 93% (02-12-24 @ 08:00) (93% - 95%)    PHYSICAL EXAM:  GENERAL: NAD  NECK: Supple, No JVD  CHEST/LUNG: Clear to auscultation bilaterally; No wheezing.  HEART: Regular rate and rhythm; No murmurs, rubs, or gallops  ABDOMEN: Soft, Nontender, Nondistended; Bowel sounds present  EXTREMITIES:   No edema  NEUROLOGY: AAO X 3      LABS:                        13.3   10.44 )-----------( 229      ( 12 Feb 2024 07:30 )             40.7     02-12    137  |  101  |  22  ----------------------------<  109<H>  3.7   |  25  |  0.72    Ca    9.3      12 Feb 2024 07:06  Phos  2.7     02-12  Mg     2.1     02-12    TPro  6.6  /  Alb  4.0  /  TBili  0.8  /  DBili  x   /  AST  22  /  ALT  36  /  AlkPhos  77  02-12          Urinalysis Basic - ( 12 Feb 2024 07:06 )    Color: x / Appearance: x / SG: x / pH: x  Gluc: 109 mg/dL / Ketone: x  / Bili: x / Urobili: x   Blood: x / Protein: x / Nitrite: x   Leuk Esterase: x / RBC: x / WBC x   Sq Epi: x / Non Sq Epi: x / Bacteria: x      CAPILLARY BLOOD GLUCOSE            RADIOLOGY & ADDITIONAL TESTS:    Imaging Personally Reviewed:    Consultant(s) Notes Reviewed:      Care Discussed with Consultants/Other Providers:    Ulises James MD, CMD, FACP    [address] [place] [address]  Office Tel: [phone]  Cell: [phone]

## 2024-02-12 NOTE — CONSULT NOTE ADULT - SUBJECTIVE AND OBJECTIVE BOX
Chief Complaint:  Patient is a 63y old  Male who presents with a chief complaint of Cervical Myelopathy (12 Feb 2024 15:04)    HPI:  63 year old male, known to our service from last admission, with PMHx including ALS (diagnosed 2023 at [facility]),  ACDF C4-5 on 07/12/23 and C4-C6 ACDF 8/2016. decreased ROM of right shoulder, chronic pain (daily recreational marijuana user) and right  weakness (unable to extend fingers of right hand).    S/P C3-4 ACDF, C3-7 PSF 2/8 with Dr. Eliud Quinones.       Current Pain Score: 7/10    Current out- patient pain regimen: Oxy IR 10 mg BID PRN; neurontin 800 mg TID; pamelor 25 mg QHS    Out Patient Pain Management provider: Vijay Bourne MD/ [facility] NP    [facility] Prescription Monitoring Program: Reference #[id number]    PAST MEDICAL & SURGICAL HISTORY:  Spinal stenosis in cervical region  s/p fusion 2017      GERD (gastroesophageal reflux disease)      History of kidney stones  multiple episodes, last in 2015      Arthritis      Osteoporosis      Cervical stenosis of spine      Radiculopathy, cervical region      Carpal tunnel syndrome, bilateral upper limbs      Spondylosis without myelopathy or radiculopathy, cervical region      2019 novel coronavirus disease (COVID-19)      Medical marijuana use      History of heroin use      History of depression      ALS (amyotrophic lateral sclerosis)      Fibromyalgia      Degenerative joint disease      Lumbar radiculopathy      Chronic pain syndrome      S/P cystoscopy  Oct 2014, h/o renal stent      History of lithotripsy  multiple, most recent 2015, 2022      History of fusion of cervical spine      History of carpal tunnel surgery of left wrist      History of colonoscopy      History of endoscopy      S/P cervical discectomy        FAMILY HISTORY:    SOCIAL HISTORY:  Tobacco Use: quit 1980  Alcohol Use: social  Recreational Marijuana: daily  Illicit Drug Use: heroin, quit 1990    Opioid Risk Tool (ORT-OUD) Score: high      Allergies    Pt has allergy to mustard (Anaphylaxis)  No Known Drug Allergies    Intolerances        REVIEW OF SYSTEMS:  CONSTITUTIONAL: No fever, weight loss, fatigue; + falls  NEURO: No headaches, memory loss, tremors, dizziness or blurred vision  RESP: No shortness of breath, cough  CV: No chest pain, palpitations  GI: No abdominal pain, nausea, vomiting, diarrhea, constipation  : No urinary incontinence/retention; dysuria  MSK: No joint pain; + neck and back pain;  + L upper or lower motor strength weakness; no saddle anesthesia   SKIN: No itching, burning, rashes    PSYCHIATRIC: No depression, anxiety, mood swings, or difficulty sleeping      MEDICATIONS  (STANDING):  aspirin enteric coated 81 milliGRAM(s) Oral daily  chlorhexidine 2% Cloths 1 Application(s) Topical <User Schedule>  gabapentin 800 milliGRAM(s) Oral three times a day  influenza   Vaccine 0.5 milliLiter(s) IntraMuscular once  nortriptyline 25 milliGRAM(s) Oral at bedtime  pantoprazole  Injectable 40 milliGRAM(s) IV Push every 24 hours  polyethylene glycol 3350 17 Gram(s) Oral every 24 hours  predniSONE   Tablet   Oral   riluzole 50 milliGRAM(s) Oral two times a day  senna 2 Tablet(s) Oral at bedtime  sodium chloride 0.9%. 1000 milliLiter(s) (20 mL/Hr) IV Continuous <Continuous>    MEDICATIONS  (PRN):  acetaminophen     Tablet .. 650 milliGRAM(s) Oral every 6 hours PRN Mild Pain (1 - 3)  cyclobenzaprine 10 milliGRAM(s) Oral three times a day PRN Muscle Spasm  famotidine    Tablet 40 milliGRAM(s) Oral daily PRN indigestion  magnesium hydroxide Suspension 30 milliLiter(s) Oral every 12 hours PRN Constipation  oxyCODONE    IR 10 milliGRAM(s) Oral every 4 hours PRN Moderate Pain (4 - 6)      PHYSICAL EXAM  GENERAL: seen at bedside, OOB to chair; NAD; well developed, well groomed; no signs of toxicity  HEENT: head atraumatic, normocephalic; anicteric; speech clear, fluent; hard cervical collar on  NEURO: A + O X 3; good concentration; Cranial Nerves II- XII intact; SILT; R hand webbing atrophy  GI: abdomen soft, non distended; + bowel sounds; + BM; appetite good  : no CVA tenderness; voiding  EXTREMITIES/ PV: FREITAS; 2+ peripheral pulses; no cyanosis, edema or clubbing  MUSCULOSKELETAL: motor strength 5/5 L PF/ DF 4/5 hand grasp 4/5, R PF/DF 3/5 hand grasp 3/5; decreased ROM R shoulder  SKIN: no rashes, lesions   PSYCH: affect normal; good eye contact; no signs of depression or anxiety  Vital Signs:  T(C): 36.6 (02-12-24 @ 16:06)  HR: 108 (02-12-24 @ 16:36)  BP: 121/91 (02-12-24 @ 16:06)  RR: 18 (02-12-24 @ 16:06)  SpO2: 94% (02-12-24 @ 16:06)    Pertinent labs/radiology:                          13.3   10.44 )-----------( 229      ( 12 Feb 2024 07:30 )             40.7   02-12    137  |  101  |  22  ----------------------------<  109<H>  3.7   |  25  |  0.72    Ca    9.3      12 Feb 2024 07:06  Phos  2.7     02-12  Mg     2.1     02-12    TPro  6.6  /  Alb  4.0  /  TBili  0.8  /  DBili  x   /  AST  22  /  ALT  36  /  AlkPhos  77  02-12    < from: CT Cervical Spine No Cont (02.08.24 @ 08:14) >  IMPRESSION:  No significant interval change from CT cervical spine 11/3/2023.

## 2024-02-12 NOTE — PROGRESS NOTE ADULT - ASSESSMENT
Patient is a 63y old  Male who presented with upper back pain S/p c3-c4 anterior cervical discectomy and fusion c3-c7 posterior cervical   laminectomy and spinal fusion    S/p C3-C4 ACDF, C3-C7 posterior cervical fusion:    Ortho spine f/up appreciated  Pain control with   Po Prednisone  Oxy IR    Leukocytosis:    Resolved-likely from steroids    Neuropathy:    Cw Neurontin

## 2024-02-12 NOTE — DISCHARGE NOTE PROVIDER - NSDCCPCAREPLAN_GEN_ALL_CORE_FT
124
PRINCIPAL DISCHARGE DIAGNOSIS  Diagnosis: Cervical spinal stenosis  Assessment and Plan of Treatment:

## 2024-02-12 NOTE — PROGRESS NOTE ADULT - SUBJECTIVE AND OBJECTIVE BOX
Day 4 of Anesthesia Pain Management Service    SUBJECTIVE: Doing ok    Pain Scale Score:	[X] Refer to charted pain scores    THERAPY:    [ ] IV PCA Morphine		        [ ] 5 mg/mL	[ ] 1 mg/mL  [X] IV PCA Hydromorphone	[ ] 5 mg/mL	[X] 1 mg/mL  [ ] IV PCA Fentanyl		        [ ] 50 micrograms/mL    Demand dose: 0.25 mg     Lockout: 6 minutes   Continuous Rate: 0 mg/hr  4 Hour Limit: 4 mg    MEDICATIONS  (STANDING):  aspirin enteric coated 81 milliGRAM(s) Oral daily  chlorhexidine 2% Cloths 1 Application(s) Topical <User Schedule>  gabapentin 800 milliGRAM(s) Oral three times a day  HYDROmorphone PCA (1 mG/mL) 30 milliLiter(s) PCA Continuous PCA Continuous  influenza   Vaccine 0.5 milliLiter(s) IntraMuscular once  nortriptyline 25 milliGRAM(s) Oral at bedtime  pantoprazole  Injectable 40 milliGRAM(s) IV Push every 24 hours  polyethylene glycol 3350 17 Gram(s) Oral every 24 hours  predniSONE   Tablet   Oral   riluzole 50 milliGRAM(s) Oral two times a day  senna 2 Tablet(s) Oral at bedtime  sodium chloride 0.9%. 1000 milliLiter(s) (20 mL/Hr) IV Continuous <Continuous>    MEDICATIONS  (PRN):  acetaminophen     Tablet .. 650 milliGRAM(s) Oral every 6 hours PRN Mild Pain (1 - 3)  cyclobenzaprine 10 milliGRAM(s) Oral three times a day PRN Muscle Spasm  famotidine    Tablet 40 milliGRAM(s) Oral daily PRN indigestion  magnesium hydroxide Suspension 30 milliLiter(s) Oral every 12 hours PRN Constipation  naloxone Injectable 0.1 milliGRAM(s) IV Push every 3 minutes PRN For ANY of the following changes in patient status:  A. RR LESS THAN 10 breaths per minute, B. Oxygen saturation LESS THAN 90%, C. Sedation score of 6  ondansetron Injectable 4 milliGRAM(s) IV Push every 6 hours PRN Nausea      OBJECTIVE:    Sedation Score:	[ X] Alert	 [ ] Drowsy 	[ ] Arousable	[ ] Asleep	[ ] Unresponsive    Side Effects:	[X ] None	[ ] Nausea	[ ] Vomiting	[ ] Pruritus  		[ ] Other:    Vital Signs Last 24 Hrs  T(C): 36.5 (12 Feb 2024 08:00), Max: 36.6 (11 Feb 2024 12:26)  T(F): 97.7 (12 Feb 2024 08:00), Max: 97.9 (11 Feb 2024 12:26)  HR: 98 (12 Feb 2024 08:00) (86 - 109)  BP: 127/81 (12 Feb 2024 08:00) (124/85 - 151/87)  BP(mean): --  RR: 18 (12 Feb 2024 08:00) (18 - 18)  SpO2: 93% (12 Feb 2024 08:00) (93% - 95%)    Parameters below as of 12 Feb 2024 08:00  Patient On (Oxygen Delivery Method): room air        ASSESSMENT/ PLAN    Therapy to  be:               [  ] Continued   [X ] Discontinued   [ X] Changed to PRN Analgesics    Documentation and Verification of current medications:   [X] Done	[ ] Not done, not eligible    Comments: Pain is controlled. Total PCA use 4mg / 24 hours. D\C PCA and transition to Oxycodone 10mg po q4hr prn.

## 2024-02-13 PROCEDURE — 71275 CT ANGIOGRAPHY CHEST: CPT | Mod: 26

## 2024-02-13 PROCEDURE — 93010 ELECTROCARDIOGRAM REPORT: CPT

## 2024-02-13 PROCEDURE — 93970 EXTREMITY STUDY: CPT | Mod: 26

## 2024-02-13 RX ORDER — TRAMADOL HYDROCHLORIDE 50 MG/1
50 TABLET ORAL ONCE
Refills: 0 | Status: DISCONTINUED | OUTPATIENT
Start: 2024-02-13 | End: 2024-02-13

## 2024-02-13 RX ORDER — LACTULOSE 10 G/15ML
10 SOLUTION ORAL DAILY
Refills: 0 | Status: DISCONTINUED | OUTPATIENT
Start: 2024-02-13 | End: 2024-02-15

## 2024-02-13 RX ORDER — SIMETHICONE 80 MG/1
80 TABLET, CHEWABLE ORAL
Refills: 0 | Status: DISCONTINUED | OUTPATIENT
Start: 2024-02-13 | End: 2024-02-15

## 2024-02-13 RX ADMIN — Medication 30 MILLIGRAM(S): at 06:10

## 2024-02-13 RX ADMIN — SENNA PLUS 2 TABLET(S): 8.6 TABLET ORAL at 21:15

## 2024-02-13 RX ADMIN — RILUZOLE 50 MILLIGRAM(S): 50 TABLET ORAL at 06:10

## 2024-02-13 RX ADMIN — OXYCODONE HYDROCHLORIDE 10 MILLIGRAM(S): 5 TABLET ORAL at 05:49

## 2024-02-13 RX ADMIN — Medication 650 MILLIGRAM(S): at 11:06

## 2024-02-13 RX ADMIN — Medication 650 MILLIGRAM(S): at 10:09

## 2024-02-13 RX ADMIN — Medication 81 MILLIGRAM(S): at 13:13

## 2024-02-13 RX ADMIN — POLYETHYLENE GLYCOL 3350 17 GRAM(S): 17 POWDER, FOR SOLUTION ORAL at 13:14

## 2024-02-13 RX ADMIN — TRAMADOL HYDROCHLORIDE 50 MILLIGRAM(S): 50 TABLET ORAL at 17:11

## 2024-02-13 RX ADMIN — OXYCODONE HYDROCHLORIDE 10 MILLIGRAM(S): 5 TABLET ORAL at 04:47

## 2024-02-13 RX ADMIN — OXYCODONE HYDROCHLORIDE 15 MILLIGRAM(S): 5 TABLET ORAL at 10:09

## 2024-02-13 RX ADMIN — SIMETHICONE 80 MILLIGRAM(S): 80 TABLET, CHEWABLE ORAL at 21:14

## 2024-02-13 RX ADMIN — GABAPENTIN 800 MILLIGRAM(S): 400 CAPSULE ORAL at 13:13

## 2024-02-13 RX ADMIN — NORTRIPTYLINE HYDROCHLORIDE 25 MILLIGRAM(S): 10 CAPSULE ORAL at 21:14

## 2024-02-13 RX ADMIN — OXYCODONE HYDROCHLORIDE 15 MILLIGRAM(S): 5 TABLET ORAL at 11:06

## 2024-02-13 RX ADMIN — GABAPENTIN 800 MILLIGRAM(S): 400 CAPSULE ORAL at 21:15

## 2024-02-13 RX ADMIN — GABAPENTIN 800 MILLIGRAM(S): 400 CAPSULE ORAL at 06:10

## 2024-02-13 RX ADMIN — LACTULOSE 10 GRAM(S): 10 SOLUTION ORAL at 13:13

## 2024-02-13 RX ADMIN — PANTOPRAZOLE SODIUM 40 MILLIGRAM(S): 20 TABLET, DELAYED RELEASE ORAL at 06:10

## 2024-02-13 RX ADMIN — CYCLOBENZAPRINE HYDROCHLORIDE 10 MILLIGRAM(S): 10 TABLET, FILM COATED ORAL at 12:19

## 2024-02-13 RX ADMIN — RILUZOLE 50 MILLIGRAM(S): 50 TABLET ORAL at 17:11

## 2024-02-13 NOTE — PROGRESS NOTE ADULT - SUBJECTIVE AND OBJECTIVE BOX
Pt seen/examined. Doing well. Pain controlled. No acute overnight complaints or events.    T(C): 36.7 (02-13-24 @ 04:33), Max: 36.7 (02-13-24 @ 04:33)  HR: 99 (02-13-24 @ 04:33) (98 - 120)  BP: 112/75 (02-13-24 @ 04:33) (112/75 - 147/95)  RR: 18 (02-13-24 @ 04:33) (18 - 18)  SpO2: 94% (02-13-24 @ 04:33) (93% - 96%)  Wt(kg): --  - Gen: NAD    Physical Exam:   General: NAD, patient laying in bed  Spine:  Anterior neck incision dressing c/d/i. Aspen collar in place.   Ranjana-incisional TTP; otherwise, NTTP throughout the rest of the extremity.   Radial, DP pulses palpable.  No calf tenderness bilaterally.  Compartments soft and compressible.     Motor:       C5   C6   C7   C8   T1  L   4+/5  4/5  4/5  2/5  2/5  R   4/5  3/5  3/5  1/5  1/5         L2   L3    L4   L5   S1  L   5/5  5/5  3/5  3/5  4/5  R   4/5  5/5  3/5  3/5  4/5    Sensory:       C5   C6   C7   C8   T1  L    2     2     2     2     2   R    2     2     2     2     2          L2   L3    L4   L5   S1  L    2     2     2     2     2   R    2     2     2     2     2       A/P :  63y Male s/p C3-C4 ACDF, C3-C7 posterior cervical fusion 2/8. Recovering appropriately    -    Pain control, PCA d/c'd  -    IS bedside  -    Taper  -    DVT ppx: Aspirin 81 mg QD, SCDs       -    GI ppx: Protonix 40 mg QD  -    Physical Therapy  -    Weight bearing status: WBAT, [place] collar to be on at all times   -    Dopp: performed post-op, negative for DVT, Caprini score 5    -    f/u AM labs   -    appreciate acute rehab recs

## 2024-02-13 NOTE — CHART NOTE - NSCHARTNOTEFT_GEN_A_CORE
CAPRINI VTE 2.0 SCORE [CLOT updated 2019]    AGE RELATED RISK FACTORS                                                       MOBILITY RELATED FACTORS  [ ] Age 41-60 years                                            (1 Point)                    [ ] Bed rest                                                        (1 Point)  [x ] Age: 61-74 years                                           (2 Points)                  [ ] Plaster cast                                                   (2 Points)  [ ] Age= 75 years                                              (3 Points)                    [ ] Bed bound for more than 72 hours                 (2 Points)    DISEASE RELATED RISK FACTORS                                               GENDER SPECIFIC FACTORS  [ ] Edema in the lower extremities                       (1 Point)              [ ] Pregnancy                                                     (1 Point)  [ ] Varicose veins                                               (1 Point)                     [ ] Post-partum < 6 weeks                                   (1 Point)             [x ] BMI > 25 Kg/m2                                            (1 Point)                     [ ] Hormonal therapy  or oral contraception          (1 Point)                 [ ] Sepsis (in the previous month)                        (1 Point)               [ ] History of pregnancy complications                 (1 point)  [ ] Pneumonia or serious lung disease                                               [ ] Unexplained or recurrent                     (1 Point)           (in the previous month)                               (1 Point)  [ ] Abnormal pulmonary function test                     (1 Point)                 SURGERY RELATED RISK FACTORS  [ ] Acute myocardial infarction                              (1 Point)               [ ]  Section                                             (1 Point)  [ ] Congestive heart failure (in the previous month)  (1 Point)      [ ] Minor surgery                                                  (1 Point)   [ ] Inflammatory bowel disease                             (1 Point)               [ ] Arthroscopic surgery                                        (2 Points)  [ ] Central venous access                                      (2 Points)                [x ] General surgery lasting more than 45 minutes (2 points)  [ ] Malignancy- Present or previous                   (2 Points)                [ ] Elective arthroplasty                                         (5 points)    [ ] Stroke (in the previous month)                          (5 Points)                                                                                                                                                           HEMATOLOGY RELATED FACTORS                                                 TRAUMA RELATED RISK FACTORS  [ ] Prior episodes of VTE                                     (3 Points)                [ ] Fracture of the hip, pelvis, or leg                       (5 Points)  [ ] Positive family history for VTE                         (3 Points)             [ ] Acute spinal cord injury (in the previous month)  (5 Points)  [ ] Prothrombin 63441 A                                     (3 Points)               [ ] Paralysis  (less than 1 month)                             (5 Points)  [ ] Factor V Leiden                                             (3 Points)                  [ ] Multiple Trauma within 1 month                        (5 Points)  [ ] Lupus anticoagulants                                     (3 Points)                                                           [ ] Anticardiolipin antibodies                               (3 Points)                                                       [ ] High homocysteine in the blood                      (3 Points)                                             [ ] Other congenital or acquired thrombophilia      (3 Points)                                                [ ] Heparin induced thrombocytopenia                  (3 Points)                                     Total Score [      5    ]     Kevin Frey PA-C  Orthopedic Surgery  Pager #[phone]
Ortho PA Note    Notified by RN- Mush- patient with .  /77 RR18 O2 sat 94%RA    Since admission, patient with HR high 90's/low 100's.    Patient reports pain level 5/10 currently and feeling anxious due to not leaving  to rehab today.    Patient currently sitting up in chair at bedside, NAD, Speaking full sentences without difficulty.    EKG: Pending    A/P: Above d/w Dr. Quinones  As per Dr. Quinones, rec-check HR now. If patient HR still elevated,  check CTA and obtain cardiology consult.  Ortho Team made aware.      KILEY Christianson  Orthopedic Surgery  [phone]
Called to bedside by RN, patient endorsing right eye discomfort. Patient states, "It feels like there is something in my eye". Tetracaine 0.5% opthalmic solution administered with relief of right eye discomfort. On exam right eye sclera red, with tearing present. Patient presentation consistent with corneal abrasion. Additionally given 1 drop of tobramycin 0.3% ophthalmic solution. Will continue with tobramycin ophthalmic q4. hours x 24 hours. Patient educated that symptoms should resolve in 2-3 days. If symptoms do not improve or get worse, seek follow up care. No other patient concerns disclosed, will continue to monitor.
Patient seen and evaluated in the recovery unit. Patient Resting without complaints. Pain well controlled with PCA pump in place. No Chest Pain, SOB, N/V/D/HA. No parasthesias/tingling/muscle spasms.  Pre op s/sx: 2-3/5 RUE and 3-4 LUE proximal musculature, distally 1-2 both hands with clawing and wasting; Post op, patient reports: see exam below.    T(C): 36.6 (02-08-24 @ 18:10), Max: 36.6 (02-08-24 @ 06:44)  HR: 85 (02-08-24 @ 20:30) (75 - 92)  BP: 134/93 (02-08-24 @ 19:00) (132/89 - 150/69)  RR: 15 (02-08-24 @ 20:30) (15 - 19)  SpO2: 99% (02-08-24 @ 20:30) (94% - 100%)  Wt(kg): --    Exam:   Alert/Oriented, No Acute Distress  Cards: +S1/S2, RRR  Pulm: CTAB  Anterior Neck:          Aspen collar in place          Dressings: anterior neck (Gauze and tegaderm) and posterior neck with microfoam tape clean/dry/intact          Drains: SNEHAL (x1, sewn) draining sanguinous blood                    Motor:                                     C4              C5(Del/Bi)         C6(EF/WE)           C7 (EE/WF)           C8 (FDS)           T1 (FAbd)                        R           3/5                 3/5                    3/5                        3/5                   1/5                   2/5                        L            4/5                 4/5                    4/5                        4/5                   3/5                   3/5         Sensory:                                      C4          C5         C6         C7      C8       T1        (0=absent, 1=impaired, 2=normal, NT=not testable)                            R          2             2            2           2        2         2                            L           2            2            2           2        2          2         Calves Soft/Non-tender bilaterally         warm well perfused; capillary refill <3 seconds            LABS:                        13.1   11.68 )-----------( 217      ( 08 Feb 2024 18:42 )             39.5     02-08    139  |  108  |  19  ----------------------------<  184<H>  4.7   |  19<L>  |  0.82    Ca    9.0      08 Feb 2024 18:42    A/P :  63y Male s/p C3-C4 ACDF, C3-C7 posterior cervical fusion. VSS. NAD.   -    Pain control, PCA pump in place   -    IS bedside  -    Taper  -    DVT ppx: Aspirin 81 mg QD, SCDs       -    GI ppx: Protonix 40 mg QD  -    Physical Therapy  -    Weight bearing status: WBAT, [place] collar to be on at all times   -    Dopp: performed post-op, negative for DVT, Caprini score 5   -    Monitor SNEHAL output   -    f/u AM labs   - Dispo: PACU to floor, pending PT/OT eval.     BETTE ClarkC  Orthopedic Surgery  Pager # [phone]

## 2024-02-13 NOTE — PROVIDER CONTACT NOTE (OTHER) - ACTION/TREATMENT ORDERED:
provider karlie vera aware- spoke to [place], ok for CT PA, venodynes of pelvis and dopplers ordered urgent. will continue to monitor provider karlie vera aware- spoke to [place], ok for CT PA, venogram of pelvis and dopplers ordered urgent. will continue to monitor

## 2024-02-13 NOTE — PROGRESS NOTE ADULT - SUBJECTIVE AND OBJECTIVE BOX
Patient is a 63y old  Male who presents with a chief complaint of cervical myelopathy (12 Feb 2024 17:40)      SUBJECTIVE / OVERNIGHT EVENTS:    Events noted.  CONSTITUTIONAL: No fever,  or fatigue  RESPIRATORY: No cough, wheezing,  No shortness of breath  CARDIOVASCULAR: No chest pain, palpitations, dizziness, or leg swelling  GASTROINTESTINAL: No abdominal or epigastric pain.       MEDICATIONS  (STANDING):  aspirin enteric coated 81 milliGRAM(s) Oral daily  gabapentin 800 milliGRAM(s) Oral three times a day  influenza   Vaccine 0.5 milliLiter(s) IntraMuscular once  lactulose Syrup 10 Gram(s) Oral daily  nortriptyline 25 milliGRAM(s) Oral at bedtime  pantoprazole  Injectable 40 milliGRAM(s) IV Push every 24 hours  polyethylene glycol 3350 17 Gram(s) Oral every 24 hours  predniSONE   Tablet   Oral   predniSONE   Tablet 30 milliGRAM(s) Oral daily  riluzole 50 milliGRAM(s) Oral two times a day  senna 2 Tablet(s) Oral at bedtime    MEDICATIONS  (PRN):  acetaminophen     Tablet .. 650 milliGRAM(s) Oral every 6 hours PRN Mild Pain (1 - 3)  cyclobenzaprine 10 milliGRAM(s) Oral three times a day PRN Muscle Spasm  famotidine    Tablet 40 milliGRAM(s) Oral daily PRN indigestion  magnesium hydroxide Suspension 30 milliLiter(s) Oral every 12 hours PRN Constipation  oxyCODONE    IR 10 milliGRAM(s) Oral every 4 hours PRN Moderate Pain (4 - 6)  oxyCODONE    IR 15 milliGRAM(s) Oral every 8 hours PRN Severe Pain (7 - 10)        CAPILLARY BLOOD GLUCOSE        I&O's Summary    12 Feb 2024 07:01  -  13 Feb 2024 07:00  --------------------------------------------------------  IN: 1040 mL / OUT: 1200 mL / NET: -160 mL    13 Feb 2024 07:01  -  13 Feb 2024 19:17  --------------------------------------------------------  IN: 720 mL / OUT: 500 mL / NET: 220 mL        T(C): 37.1 (02-13-24 @ 16:44), Max: 37.1 (02-13-24 @ 16:44)  HR: 122 (02-13-24 @ 16:44) (99 - 122)  BP: 120/79 (02-13-24 @ 16:44) (112/75 - 147/95)  RR: 18 (02-13-24 @ 16:44) (18 - 18)  SpO2: 92% (02-13-24 @ 16:44) (92% - 95%)    PHYSICAL EXAM:    NECK: Supple, No JVD  CHEST/LUNG: Clear to auscultation bilaterally; No wheezing.  HEART: Regular rate and rhythm; No murmurs, rubs, or gallops  ABDOMEN: Soft, Nontender, Nondistended; Bowel sounds present  EXTREMITIES:   No edema  NEUROLOGY: AAO X 3      LABS:                        13.3   10.44 )-----------( 229      ( 12 Feb 2024 07:30 )             40.7     02-12    137  |  101  |  22  ----------------------------<  109<H>  3.7   |  25  |  0.72    Ca    9.3      12 Feb 2024 07:06  Phos  2.7     02-12  Mg     2.1     02-12    TPro  6.6  /  Alb  4.0  /  TBili  0.8  /  DBili  x   /  AST  22  /  ALT  36  /  AlkPhos  77  02-12          Urinalysis Basic - ( 12 Feb 2024 07:06 )    Color: x / Appearance: x / SG: x / pH: x  Gluc: 109 mg/dL / Ketone: x  / Bili: x / Urobili: x   Blood: x / Protein: x / Nitrite: x   Leuk Esterase: x / RBC: x / WBC x   Sq Epi: x / Non Sq Epi: x / Bacteria: x      CAPILLARY BLOOD GLUCOSE            RADIOLOGY & ADDITIONAL TESTS:    Imaging Personally Reviewed:    Consultant(s) Notes Reviewed:      Care Discussed with Consultants/Other Providers:    Ulises James MD, CMD, FACP    [address] [place] [address]  Office Tel: [phone]  Cell: [phone]

## 2024-02-14 LAB
ANION GAP SERPL CALC-SCNC: 13 MMOL/L — SIGNIFICANT CHANGE UP (ref 5–17)
BUN SERPL-MCNC: 32 MG/DL — HIGH (ref 7–23)
CALCIUM SERPL-MCNC: 9.7 MG/DL — SIGNIFICANT CHANGE UP (ref 8.4–10.5)
CHLORIDE SERPL-SCNC: 101 MMOL/L — SIGNIFICANT CHANGE UP (ref 96–108)
CO2 SERPL-SCNC: 25 MMOL/L — SIGNIFICANT CHANGE UP (ref 22–31)
CREAT SERPL-MCNC: 0.74 MG/DL — SIGNIFICANT CHANGE UP (ref 0.5–1.3)
EGFR: 103 ML/MIN/1.73M2 — SIGNIFICANT CHANGE UP
GLUCOSE SERPL-MCNC: 94 MG/DL — SIGNIFICANT CHANGE UP (ref 70–99)
HCT VFR BLD CALC: 38.6 % — LOW (ref 39–50)
HGB BLD-MCNC: 12.6 G/DL — LOW (ref 13–17)
MCHC RBC-ENTMCNC: 28.6 PG — SIGNIFICANT CHANGE UP (ref 27–34)
MCHC RBC-ENTMCNC: 32.6 GM/DL — SIGNIFICANT CHANGE UP (ref 32–36)
MCV RBC AUTO: 87.7 FL — SIGNIFICANT CHANGE UP (ref 80–100)
NRBC # BLD: 0 /100 WBCS — SIGNIFICANT CHANGE UP (ref 0–0)
PLATELET # BLD AUTO: 262 K/UL — SIGNIFICANT CHANGE UP (ref 150–400)
POTASSIUM SERPL-MCNC: 4.1 MMOL/L — SIGNIFICANT CHANGE UP (ref 3.5–5.3)
POTASSIUM SERPL-SCNC: 4.1 MMOL/L — SIGNIFICANT CHANGE UP (ref 3.5–5.3)
RBC # BLD: 4.4 M/UL — SIGNIFICANT CHANGE UP (ref 4.2–5.8)
RBC # FLD: 13.5 % — SIGNIFICANT CHANGE UP (ref 10.3–14.5)
SODIUM SERPL-SCNC: 139 MMOL/L — SIGNIFICANT CHANGE UP (ref 135–145)
SURGICAL PATHOLOGY STUDY: SIGNIFICANT CHANGE UP
WBC # BLD: 10.7 K/UL — HIGH (ref 3.8–10.5)
WBC # FLD AUTO: 10.7 K/UL — HIGH (ref 3.8–10.5)

## 2024-02-14 PROCEDURE — 74177 CT ABD & PELVIS W/CONTRAST: CPT | Mod: 26

## 2024-02-14 PROCEDURE — 93010 ELECTROCARDIOGRAM REPORT: CPT

## 2024-02-14 RX ORDER — ALPRAZOLAM 0.25 MG
0.25 TABLET ORAL
Refills: 0 | Status: DISCONTINUED | OUTPATIENT
Start: 2024-02-14 | End: 2024-02-15

## 2024-02-14 RX ORDER — MULTIVIT WITH MIN/MFOLATE/K2 340-15/3 G
296 POWDER (GRAM) ORAL ONCE
Refills: 0 | Status: COMPLETED | OUTPATIENT
Start: 2024-02-14 | End: 2024-02-14

## 2024-02-14 RX ORDER — TRAMADOL HYDROCHLORIDE 50 MG/1
50 TABLET ORAL EVERY 6 HOURS
Refills: 0 | Status: DISCONTINUED | OUTPATIENT
Start: 2024-02-14 | End: 2024-02-15

## 2024-02-14 RX ADMIN — LACTULOSE 10 GRAM(S): 10 SOLUTION ORAL at 13:38

## 2024-02-14 RX ADMIN — Medication 0.25 MILLIGRAM(S): at 17:35

## 2024-02-14 RX ADMIN — Medication 650 MILLIGRAM(S): at 16:10

## 2024-02-14 RX ADMIN — SENNA PLUS 2 TABLET(S): 8.6 TABLET ORAL at 21:21

## 2024-02-14 RX ADMIN — OXYCODONE HYDROCHLORIDE 15 MILLIGRAM(S): 5 TABLET ORAL at 21:21

## 2024-02-14 RX ADMIN — Medication 650 MILLIGRAM(S): at 10:50

## 2024-02-14 RX ADMIN — PANTOPRAZOLE SODIUM 40 MILLIGRAM(S): 20 TABLET, DELAYED RELEASE ORAL at 05:47

## 2024-02-14 RX ADMIN — TRAMADOL HYDROCHLORIDE 50 MILLIGRAM(S): 50 TABLET ORAL at 15:15

## 2024-02-14 RX ADMIN — SIMETHICONE 80 MILLIGRAM(S): 80 TABLET, CHEWABLE ORAL at 05:47

## 2024-02-14 RX ADMIN — Medication 650 MILLIGRAM(S): at 15:15

## 2024-02-14 RX ADMIN — Medication 296 MILLILITER(S): at 13:38

## 2024-02-14 RX ADMIN — RILUZOLE 50 MILLIGRAM(S): 50 TABLET ORAL at 05:47

## 2024-02-14 RX ADMIN — Medication 30 MILLIGRAM(S): at 05:49

## 2024-02-14 RX ADMIN — SIMETHICONE 80 MILLIGRAM(S): 80 TABLET, CHEWABLE ORAL at 17:39

## 2024-02-14 RX ADMIN — NORTRIPTYLINE HYDROCHLORIDE 25 MILLIGRAM(S): 10 CAPSULE ORAL at 21:20

## 2024-02-14 RX ADMIN — GABAPENTIN 800 MILLIGRAM(S): 400 CAPSULE ORAL at 13:38

## 2024-02-14 RX ADMIN — Medication 81 MILLIGRAM(S): at 13:38

## 2024-02-14 RX ADMIN — RILUZOLE 50 MILLIGRAM(S): 50 TABLET ORAL at 17:35

## 2024-02-14 RX ADMIN — TRAMADOL HYDROCHLORIDE 50 MILLIGRAM(S): 50 TABLET ORAL at 16:10

## 2024-02-14 RX ADMIN — OXYCODONE HYDROCHLORIDE 15 MILLIGRAM(S): 5 TABLET ORAL at 22:21

## 2024-02-14 RX ADMIN — GABAPENTIN 800 MILLIGRAM(S): 400 CAPSULE ORAL at 21:21

## 2024-02-14 RX ADMIN — GABAPENTIN 800 MILLIGRAM(S): 400 CAPSULE ORAL at 05:50

## 2024-02-14 NOTE — DIETITIAN INITIAL EVALUATION ADULT - OTHER INFO
- UBW: ~210 pounds per pt  	- Pt reports significant intentional weight loss from 280 pounds to 200 pounds x few years ago with current UBW stable ~210 pounds.   - Dosing wt: 210.9 pounds (2/08)  - [facility] hx [facility] in [facility]: 210 (11/29/23), 215 (7/24/23), 215 (6/30/23), 212 (5/11/23).   - RD to continue to monitor weight trends as able.   - Nutritionally Pertinent Meds in-house: Prednisone.   - Endo: A1c 5.7% (2/01) - indicates pre-DM range.   - Ortho: S/p Fusion of anterior and posterior columns of cervical spine 2/08.   - GI: ordered for PPI.

## 2024-02-14 NOTE — CONSULT NOTE ADULT - ASSESSMENT
63 year old male presents ambulatory with cane to PST prior to c3-c4 anterior cervical discectomy and fusion c3-c7 posterior cervical laminectomy and spinal fusion procedure on 2.8.24 with Dr. Eliud Quinones. Patient has a PMHx including ALS (diagnosed 2023 at [facility]),  ACDF C4-5 on 07/12/23 and C4-C6 ACDF 8/2016. decreased ROM of right shoulder, chronic pain (daily marijuana user) and right  weakness (unable to extend fingers of right hand). Denies recent fevers, chills, chest pain.   pt with hx of als, s/p spine  surgery with tachycardia sinus.  chart reviewed pt is been tachycardia from the admission.  tachycardia , CTA  negative for PE, ecg no ischemia , echo as out pt negative as per pt prior to admission  TSH normal   tachycardia ?sec to severe anxiety ?constipation , pt is very anxious about her ALS diagnosis.  xanax prn  dvt prophylaxis

## 2024-02-14 NOTE — PROGRESS NOTE ADULT - SUBJECTIVE AND OBJECTIVE BOX
Patient is a 63y old  Male who presents with a chief complaint of spine surgery (14 Feb 2024 11:34)      SUBJECTIVE / OVERNIGHT EVENTS:    Events noted.  CONSTITUTIONAL: Sinus tachy  RESPIRATORY: No cough, wheezing,  No shortness of breath  CARDIOVASCULAR: No chest pain, palpitations, dizziness, or leg swelling  GASTROINTESTINAL: No abdominal or epigastric pain. No nausea, vomiting.  NEUROLOGICAL: No headache    MEDICATIONS  (STANDING):  aspirin enteric coated 81 milliGRAM(s) Oral daily  gabapentin 800 milliGRAM(s) Oral three times a day  influenza   Vaccine 0.5 milliLiter(s) IntraMuscular once  lactulose Syrup 10 Gram(s) Oral daily  nortriptyline 25 milliGRAM(s) Oral at bedtime  pantoprazole  Injectable 40 milliGRAM(s) IV Push every 24 hours  polyethylene glycol 3350 17 Gram(s) Oral every 24 hours  predniSONE   Tablet   Oral   predniSONE   Tablet 30 milliGRAM(s) Oral daily  riluzole 50 milliGRAM(s) Oral two times a day  senna 2 Tablet(s) Oral at bedtime    MEDICATIONS  (PRN):  acetaminophen     Tablet .. 650 milliGRAM(s) Oral every 6 hours PRN Mild Pain (1 - 3)  ALPRAZolam 0.25 milliGRAM(s) Oral two times a day PRN anxiety  cyclobenzaprine 10 milliGRAM(s) Oral three times a day PRN Muscle Spasm  famotidine    Tablet 40 milliGRAM(s) Oral daily PRN indigestion  magnesium hydroxide Suspension 30 milliLiter(s) Oral every 12 hours PRN Constipation  oxyCODONE    IR 10 milliGRAM(s) Oral every 4 hours PRN Moderate Pain (4 - 6)  oxyCODONE    IR 15 milliGRAM(s) Oral every 8 hours PRN Severe Pain (7 - 10)  simethicone 80 milliGRAM(s) Chew two times a day PRN Indigestion        CAPILLARY BLOOD GLUCOSE        I&O's Summary    13 Feb 2024 07:01  -  14 Feb 2024 07:00  --------------------------------------------------------  IN: 1120 mL / OUT: 1000 mL / NET: 120 mL        T(C): 36.3 (02-14-24 @ 08:20), Max: 37.1 (02-13-24 @ 16:44)  HR: 93 (02-14-24 @ 08:20) (80 - 123)  BP: 113/77 (02-14-24 @ 08:20) (101/55 - 129/85)  RR: 18 (02-14-24 @ 08:20) (18 - 18)  SpO2: 93% (02-14-24 @ 08:20) (92% - 96%)    PHYSICAL EXAM:  GENERAL: NAD  NECK: Supple, No JVD  CHEST/LUNG: Clear to auscultation bilaterally; No wheezing.  HEART: Regular rate and rhythm; No murmurs, rubs, or gallops  ABDOMEN: Soft, Nontender, Nondistended; Bowel sounds present  EXTREMITIES:   No edema  NEUROLOGY: AAO X 3      LABS:                        12.6   10.70 )-----------( 262      ( 14 Feb 2024 06:44 )             38.6     02-14    139  |  101  |  32<H>  ----------------------------<  94  4.1   |  25  |  0.74    Ca    9.7      14 Feb 2024 06:44            Urinalysis Basic - ( 14 Feb 2024 06:44 )    Color: x / Appearance: x / SG: x / pH: x  Gluc: 94 mg/dL / Ketone: x  / Bili: x / Urobili: x   Blood: x / Protein: x / Nitrite: x   Leuk Esterase: x / RBC: x / WBC x   Sq Epi: x / Non Sq Epi: x / Bacteria: x      CAPILLARY BLOOD GLUCOSE            RADIOLOGY & ADDITIONAL TESTS:    Imaging Personally Reviewed:    Consultant(s) Notes Reviewed:      Care Discussed with Consultants/Other Providers:    Ulises James MD, CMD, FACP    [address] [place] [address]  Office Tel: [phone]  Cell: [phone]

## 2024-02-14 NOTE — DIETITIAN INITIAL EVALUATION ADULT - PERTINENT LABORATORY DATA
02-14    139  |  101  |  32<H>  ----------------------------<  94  4.1   |  25  |  0.74    Ca    9.7      14 Feb 2024 06:44    A1C with Estimated Average Glucose Result: 5.7 % (02-01-24 @ 18:36)  A1C with Estimated Average Glucose Result: 5.6 % (11-16-23 @ 12:21)

## 2024-02-14 NOTE — DIETITIAN INITIAL EVALUATION ADULT - ORAL INTAKE PTA/DIET HISTORY
Pt reports having a good appetite PTA but reports consuming only ~1.5 meals/day in setting of inability to cook (recent dx ALS). Follows regular diet; reports he has mostly been eating canned foods and/or food from delivery services. Pt with allergy to mustard per patient profile. No micronutrient supplementation PTA per outpatient medications list. Denies any difficulty chewing/swallowing at this time.  Pt reports having a good appetite PTA but reports consuming only ~1.5 meals/day in setting of reported difficulty cooking. Follows regular diet; reports he has mostly been eating canned foods and/or food from delivery services. Pt with allergy to mustard per patient profile. No micronutrient supplementation PTA per outpatient medications list. Denies any difficulty chewing/swallowing at this time.

## 2024-02-14 NOTE — DIETITIAN INITIAL EVALUATION ADULT - REASON
Unable to perform Nutrition Focused Physical Assessment in current setting; Pt with neck brace. RD will reassess as appropriate.

## 2024-02-14 NOTE — DIETITIAN INITIAL EVALUATION ADULT - NSICDXPASTMEDICALHX_GEN_ALL_CORE_FT
PAST MEDICAL HISTORY:  2019 novel coronavirus disease (COVID-19)     ALS (amyotrophic lateral sclerosis)     Arthritis     Carpal tunnel syndrome, bilateral upper limbs     Cervical stenosis of spine     Chronic pain syndrome     Degenerative joint disease     Fibromyalgia     GERD (gastroesophageal reflux disease)     History of depression     History of heroin use     History of kidney stones multiple episodes, last in 2015    Lumbar radiculopathy     Medical marijuana use     Osteoporosis     Radiculopathy, cervical region     Spinal stenosis in cervical region s/p fusion 2017    Spondylosis without myelopathy or radiculopathy, cervical region

## 2024-02-14 NOTE — DIETITIAN INITIAL EVALUATION ADULT - ADD RECOMMEND
1) Continue Regular diet free of therapeutic restrictions as tolerated.   	- Continue High Fiber diet to promote bowel regularity.  	- Defer texture/consistency to SLP/team.   2) Recommend Multivitamin daily to prevent micronutrient deficiencies pending no medical contraindications.  3) Continue to monitor PO intake, weight, labs, skin, GI status, and diet.  	- Monitor bowel regularity / Consider adjusting bowel regimen to promote bowel regularity PRN.   4) RD remains available for diet education PRN.

## 2024-02-14 NOTE — PROGRESS NOTE ADULT - SUBJECTIVE AND OBJECTIVE BOX
Patient 63 year old male evaluated custom measured  fitted or Aspen rigid multi post cervical spinal orthosis as  requested by Orthopedics to control cervical spine in all planes  delivered by [facility] [phone]

## 2024-02-14 NOTE — DIETITIAN INITIAL EVALUATION ADULT - PERTINENT MEDS FT
MEDICATIONS  (STANDING):  aspirin enteric coated 81 milliGRAM(s) Oral daily  gabapentin 800 milliGRAM(s) Oral three times a day  influenza   Vaccine 0.5 milliLiter(s) IntraMuscular once  lactulose Syrup 10 Gram(s) Oral daily  nortriptyline 25 milliGRAM(s) Oral at bedtime  pantoprazole  Injectable 40 milliGRAM(s) IV Push every 24 hours  polyethylene glycol 3350 17 Gram(s) Oral every 24 hours  predniSONE   Tablet 30 milliGRAM(s) Oral daily  predniSONE   Tablet   Oral   riluzole 50 milliGRAM(s) Oral two times a day  senna 2 Tablet(s) Oral at bedtime    MEDICATIONS  (PRN):  acetaminophen     Tablet .. 650 milliGRAM(s) Oral every 6 hours PRN Mild Pain (1 - 3)  ALPRAZolam 0.25 milliGRAM(s) Oral two times a day PRN anxiety  cyclobenzaprine 10 milliGRAM(s) Oral three times a day PRN Muscle Spasm  famotidine    Tablet 40 milliGRAM(s) Oral daily PRN indigestion  magnesium hydroxide Suspension 30 milliLiter(s) Oral every 12 hours PRN Constipation  oxyCODONE    IR 10 milliGRAM(s) Oral every 4 hours PRN Moderate Pain (4 - 6)  oxyCODONE    IR 15 milliGRAM(s) Oral every 8 hours PRN Severe Pain (7 - 10)  simethicone 80 milliGRAM(s) Chew two times a day PRN Indigestion

## 2024-02-14 NOTE — PROGRESS NOTE ADULT - ASSESSMENT
Patient is a 63y old  Male who presented with upper back pain S/p c3-c4 anterior cervical discectomy and fusion c3-c7 posterior cervical   laminectomy and spinal fusion    S/p C3-C4 ACDF, C3-C7 posterior cervical fusion:    Ortho spine f/up appreciated  Pain control with   Po Prednisone  Oxy IR    S Tachycardia:    Cardio eval    Neuropathy:    Cw Neurontin

## 2024-02-14 NOTE — DIETITIAN INITIAL EVALUATION ADULT - ENERGY INTAKE
Adequate (%) In house, pt reports good appetite and PO intake; reports enjoying the food. No PO intake information available per flowsheets at this time.

## 2024-02-14 NOTE — DIETITIAN INITIAL EVALUATION ADULT - NSFNSGIIOFT_GEN_A_CORE
Pt denies any current N/V; reports some constipation with last BM reported x 4-5 days. Bowel regimen: magnesium citrate, lactulose, miralax, senna. magnesium hydroxide. Ordered for high fiber diet.

## 2024-02-14 NOTE — DIETITIAN INITIAL EVALUATION ADULT - OTHER CALCULATIONS
Fluid needs deferred to team.  Estimated energy needs using dosing weight. Estimated protein needs using IBW.

## 2024-02-14 NOTE — DIETITIAN INITIAL EVALUATION ADULT - PERSON TAUGHT/METHOD
Provided diet education on general healthful diet, despite limiting resources/factors. Encouraged pt to consume a balanced diet - Discussed ways to incorporate balance including canned vegetables, canned proteins. Answered all questions from pt. Pt made aware RD remains available PRN./verbal instruction/patient instructed

## 2024-02-14 NOTE — CONSULT NOTE ADULT - SUBJECTIVE AND OBJECTIVE BOX
Date of Service, 02-14-24 @ 11:35  CHIEF COMPLAINT:Patient is a 63y old  Male who presents with a chief complaint of cervical myelopathy (12 Feb 2024 17:40)      HPI:  63 year old male presents ambulatory with cane to PST prior to c3-c4 anterior cervical discectomy and fusion c3-c7 posterior cervical laminectomy and spinal fusion procedure on 2.8.24 with Dr. Eliud Quinones. Patient has a PMHx including ALS (diagnosed 2023 at [facility]),  ACDF C4-5 on 07/12/23 and C4-C6 ACDF 8/2016. decreased ROM of right shoulder, chronic pain (daily marijuana user) and right  weakness (unable to extend fingers of right hand). Denies recent fevers, chills, chest pain.       PAST MEDICAL & SURGICAL HISTORY:  Spinal stenosis in cervical region  s/p fusion 2017  GERD (gastroesophageal reflux disease)  History of kidney stones  multiple episodes, last in 2015  Arthritis  Osteoporosis  Cervical stenosis of spine  Radiculopathy, cervical region  Carpal tunnel syndrome, bilateral upper limbs  Spondylosis without myelopathy or radiculopathy, cervical region  2019 novel coronavirus disease (COVID-19)  Medical marijuana use  History of heroin use  History of depression  ALS (amyotrophic lateral sclerosis)  Fibromyalgia  Degenerative joint disease  Lumbar radiculopathy  Chronic pain syndrome  S/P cystoscopy  Oct 2014, h/o renal stent  History of lithotripsy  multiple, most recent 2015, 2022  History of fusion of cervical spine  History of carpal tunnel surgery of left wrist  History of colonoscopy  History of endoscopy  S/P cervical discectomy    MEDICATIONS  (STANDING):  aspirin enteric coated 81 milliGRAM(s) Oral daily  gabapentin 800 milliGRAM(s) Oral three times a day  influenza   Vaccine 0.5 milliLiter(s) IntraMuscular once  lactulose Syrup 10 Gram(s) Oral daily  magnesium citrate Oral Solution 296 milliLiter(s) Oral once  nortriptyline 25 milliGRAM(s) Oral at bedtime  pantoprazole  Injectable 40 milliGRAM(s) IV Push every 24 hours  polyethylene glycol 3350 17 Gram(s) Oral every 24 hours  predniSONE   Tablet 30 milliGRAM(s) Oral daily  predniSONE   Tablet   Oral   riluzole 50 milliGRAM(s) Oral two times a day  senna 2 Tablet(s) Oral at bedtime    MEDICATIONS  (PRN):  acetaminophen     Tablet .. 650 milliGRAM(s) Oral every 6 hours PRN Mild Pain (1 - 3)  cyclobenzaprine 10 milliGRAM(s) Oral three times a day PRN Muscle Spasm  famotidine    Tablet 40 milliGRAM(s) Oral daily PRN indigestion  magnesium hydroxide Suspension 30 milliLiter(s) Oral every 12 hours PRN Constipation  oxyCODONE    IR 15 milliGRAM(s) Oral every 8 hours PRN Severe Pain (7 - 10)  oxyCODONE    IR 10 milliGRAM(s) Oral every 4 hours PRN Moderate Pain (4 - 6)  simethicone 80 milliGRAM(s) Chew two times a day PRN Indigestion      FAMILY HISTORY:      SOCIAL HISTORY:    [ ] Non-smoker  [ ] Smoker  [ ] Alcohol    Allergies    Pt has allergy to mustard (Anaphylaxis)  No Known Drug Allergies    Intolerances    	    REVIEW OF SYSTEMS:  CONSTITUTIONAL: No fever, weight loss, or fatigue  EYES: No eye pain, visual disturbances, or discharge  ENT:  No difficulty hearing, tinnitus, vertigo; No sinus or throat pain  NECK: No pain or stiffness  RESPIRATORY: No cough, wheezing, chills or hemoptysis; No Shortness of Breath  CARDIOVASCULAR: No chest pain, palpitations, passing out, dizziness, or leg swelling  GASTROINTESTINAL: No abdominal or epigastric pain. No nausea, vomiting, or hematemesis; No diarrhea or constipation. No melena or hematochezia.  GENITOURINARY: No dysuria, frequency, hematuria, or incontinence  NEUROLOGICAL: No headaches, memory loss, loss of strength, numbness, or tremors  SKIN: No itching, burning, rashes, or lesions   LYMPH Nodes: No enlarged glands  ENDOCRINE: No heat or cold intolerance; No hair loss  MUSCULOSKELETAL: No joint pain or swelling; No muscle, back, or extremity pain  PSYCHIATRIC: No depression, anxiety, mood swings, or difficulty sleeping  HEME/LYMPH: No easy bruising, or bleeding gums  ALLERGY AND IMMUNOLOGIC: No hives or eczema	    [ ] All others negative	  [ ] Unable to obtain    PHYSICAL EXAM:  T(C): 36.3 (02-14-24 @ 08:20), Max: 37.1 (02-13-24 @ 16:44)  HR: 93 (02-14-24 @ 08:20) (80 - 123)  BP: 113/77 (02-14-24 @ 08:20) (101/55 - 129/85)  RR: 18 (02-14-24 @ 08:20) (18 - 18)  SpO2: 93% (02-14-24 @ 08:20) (92% - 96%)  Wt(kg): --  I&O's Summary    13 Feb 2024 07:01  -  14 Feb 2024 07:00  --------------------------------------------------------  IN: 1120 mL / OUT: 1000 mL / NET: 120 mL        Appearance: Normal	  HEENT:   Normal oral mucosa, PERRL, EOMI	  Lymphatic: No lymphadenopathy  Cardiovascular: Normal S1 S2, No JVD, + murmurs, No edema  Respiratory: Lungs clear to auscultation	  Psychiatry: A & O x 3, Mood & affect appropriate  Gastrointestinal:  Soft, Non-tender, + BS	  Skin: No rashes, No ecchymoses, No cyanosis	  Neurologic: Non-focal  Extremities: Normal range of motion, No clubbing, cyanosis or edema  Vascular: Peripheral pulses palpable 2+ bilaterally    TELEMETRY: 	    ECG:  	  RADIOLOGY:  OTHER: 	  	  LABS:	 	    CARDIAC MARKERS:                              12.6   10.70 )-----------( 262      ( 14 Feb 2024 06:44 )             38.6     02-14    139  |  101  |  32<H>  ----------------------------<  94  4.1   |  25  |  0.74    Ca    9.7      14 Feb 2024 06:44      proBNP:   Lipid Profile:   HgA1c:   TSH:       PREVIOUS DIAGNOSTIC TESTING:      < from: 12 Lead ECG (02.12.24 @ 13:52) >  Diagnosis Line SINUS TACHYCARDIA  OTHERWISE NORMAL ECG    < from: CT Angio Chest PE Protocol w/ IV Cont (02.13.24 @ 19:33) >  IMPRESSION:  No acute pulmonary embolism to the segmental branches. Limited assessment   of the subsegmental branches due to suboptimal contrast bolus.    Bibasilar atelectasis               Date of Service, 02-14-24 @ 11:35  CHIEF Complaint: patient is a 63y old  Male who presents with a chief complaint of cervical myelopathy (12 Feb 2024 17:40)      HPI:  63 year old male presents ambulatory with cane to PST prior to c3-c4 anterior cervical discectomy and fusion c3-c7 posterior cervical laminectomy and spinal fusion procedure on 2.8.24 with Dr. Eliud Quinones. Patient has a PMHx including ALS (diagnosed 2023 at [facility]),  ACDF C4-5 on 07/12/23 and C4-C6 ACDF 8/2016. decreased ROM of right shoulder, chronic pain (daily marijuana user) and right  weakness (unable to extend fingers of right hand). Denies recent fevers, chills, chest pain.       PAST MEDICAL & SURGICAL HISTORY:  Spinal stenosis in cervical region  s/p fusion 2017  GERD (gastroesophageal reflux disease)  History of kidney stones  multiple episodes, last in 2015  Arthritis  Osteoporosis  Cervical stenosis of spine  Radiculopathy, cervical region  Carpal tunnel syndrome, bilateral upper limbs  Spondylosis without myelopathy or radiculopathy, cervical region  2019 novel coronavirus disease (COVID-19)  Medical marijuana use  History of heroin use  History of depression  ALS (amyotrophic lateral sclerosis)  Fibromyalgia  Degenerative joint disease  Lumbar radiculopathy  Chronic pain syndrome  S/P cystoscopy  Oct 2014, h/o renal stent  History of lithotripsy  multiple, most recent 2015, 2022  History of fusion of cervical spine  History of carpal tunnel surgery of left wrist  History of colonoscopy  History of endoscopy  S/P cervical discectomy    MEDICATIONS  (STANDING):  aspirin enteric coated 81 milliGRAM(s) Oral daily  gabapentin 800 milliGRAM(s) Oral three times a day  influenza   Vaccine 0.5 milliLiter(s) IntraMuscular once  lactulose Syrup 10 Gram(s) Oral daily  magnesium citrate Oral Solution 296 milliLiter(s) Oral once  nortriptyline 25 milliGRAM(s) Oral at bedtime  pantoprazole  Injectable 40 milliGRAM(s) IV Push every 24 hours  polyethylene glycol 3350 17 Gram(s) Oral every 24 hours  predniSONE   Tablet 30 milliGRAM(s) Oral daily  predniSONE   Tablet   Oral   riluzole 50 milliGRAM(s) Oral two times a day  senna 2 Tablet(s) Oral at bedtime    MEDICATIONS  (PRN):  acetaminophen     Tablet .. 650 milliGRAM(s) Oral every 6 hours PRN Mild Pain (1 - 3)  cyclobenzaprine 10 milliGRAM(s) Oral three times a day PRN Muscle Spasm  famotidine    Tablet 40 milliGRAM(s) Oral daily PRN indigestion  magnesium hydroxide Suspension 30 milliLiter(s) Oral every 12 hours PRN Constipation  oxyCODONE    IR 15 milliGRAM(s) Oral every 8 hours PRN Severe Pain (7 - 10)  oxyCODONE    IR 10 milliGRAM(s) Oral every 4 hours PRN Moderate Pain (4 - 6)  simethicone 80 milliGRAM(s) Chew two times a day PRN Indigestion      FAMILY HISTORY:      SOCIAL HISTORY:    [ ] Non-smoker  [ ] Smoker  [ ] Alcohol    Allergies    Pt has allergy to mustard (Anaphylaxis)  No Known Drug Allergies    Intolerances    	    REVIEW OF SYSTEMS:  CONSTITUTIONAL: No fever, weight loss, or fatigue  EYES: No eye pain, visual disturbances, or discharge  ENT:  No difficulty hearing, tinnitus, vertigo; No sinus or throat pain  NECK: No pain or stiffness  RESPIRATORY: No cough, wheezing, chills or hemoptysis; No Shortness of Breath  CARDIOVASCULAR: No chest pain, palpitations, passing out, dizziness, or leg swelling  GASTROINTESTINAL: No abdominal or epigastric pain. No nausea, vomiting, or hematemesis; No diarrhea or constipation. No melena or hematochezia.  GENITOURINARY: No dysuria, frequency, hematuria, or incontinence  NEUROLOGICAL: No headaches, memory loss, loss of strength, numbness, or tremors  SKIN: No itching, burning, rashes, or lesions   LYMPH Nodes: No enlarged glands  ENDOCRINE: No heat or cold intolerance; No hair loss  MUSCULOSKELETAL: No joint pain or swelling; No muscle, back, or extremity pain  PSYCHIATRIC: No depression, anxiety, mood swings, or difficulty sleeping  HEME/LYMPH: No easy bruising, or bleeding gums  ALLERGY AND IMMUNOLOGIC: No hives or eczema	    [ ] All others negative	  [ ] Unable to obtain    PHYSICAL EXAM:  T(C): 36.3 (02-14-24 @ 08:20), Max: 37.1 (02-13-24 @ 16:44)  HR: 93 (02-14-24 @ 08:20) (80 - 123)  BP: 113/77 (02-14-24 @ 08:20) (101/55 - 129/85)  RR: 18 (02-14-24 @ 08:20) (18 - 18)  SpO2: 93% (02-14-24 @ 08:20) (92% - 96%)  Wt(kg): --  I&O's Summary    13 Feb 2024 07:01  -  14 Feb 2024 07:00  --------------------------------------------------------  IN: 1120 mL / OUT: 1000 mL / NET: 120 mL        Appearance: Normal	  HEENT:   Normal oral mucosa, PERRL, EOMI	  Lymphatic: No lymphadenopathy  Cardiovascular: Normal S1 S2, No JVD, + murmurs, No edema  Respiratory: Lungs clear to auscultation	  Psychiatry: A & O x 3, Mood & affect appropriate  Gastrointestinal:  Soft, Non-tender, + BS	  Skin: No rashes, No ecchymoses, No cyanosis	  Neurologic: Non-focal  Extremities: Normal range of motion, No clubbing, cyanosis or edema  Vascular: Peripheral pulses palpable 2+ bilaterally    TELEMETRY: 	    ECG:  	  RADIOLOGY:  OTHER: 	  	  LABS:	 	    CARDIAC MARKERS:                              12.6   10.70 )-----------( 262      ( 14 Feb 2024 06:44 )             38.6     02-14    139  |  101  |  32<H>  ----------------------------<  94  4.1   |  25  |  0.74    Ca    9.7      14 Feb 2024 06:44      proBNP:   Lipid Profile:   HgA1c:   TSH:       PREVIOUS DIAGNOSTIC TESTING:      < from: 12 Lead ECG (02.12.24 @ 13:52) >  Diagnosis Line SINUS TACHYCARDIA  OTHERWISE NORMAL ECG    < from: CT Angio Chest PE Protocol w/ IV Cont (02.13.24 @ 19:33) >  IMPRESSION:  No acute pulmonary embolism to the segmental branches. Limited assessment   of the subsegmental branches due to suboptimal contrast bolus.    Thyroid Stimulating Hormone, Serum (12.05.23 @ 05:50)    Thyroid Stimulating Hormone, Serum: 0.79 uIU/mL      Bibasilar atelectasis

## 2024-02-15 ENCOUNTER — TRANSCRIPTION ENCOUNTER (OUTPATIENT)
Age: 64
End: 2024-02-15

## 2024-02-15 ENCOUNTER — INPATIENT (INPATIENT)
Facility: HOSPITAL | Age: 64
LOS: 20 days | Discharge: HOME HEALTH | DRG: 559 | End: 2024-03-07
Attending: PHYSICAL MEDICINE & REHABILITATION | Admitting: PHYSICAL MEDICINE & REHABILITATION
Payer: MEDICAID

## 2024-02-15 VITALS
SYSTOLIC BLOOD PRESSURE: 132 MMHG | HEIGHT: 73 IN | WEIGHT: 208.34 LBS | OXYGEN SATURATION: 95 % | DIASTOLIC BLOOD PRESSURE: 92 MMHG | RESPIRATION RATE: 18 BRPM | HEART RATE: 110 BPM

## 2024-02-15 VITALS
OXYGEN SATURATION: 95 % | DIASTOLIC BLOOD PRESSURE: 91 MMHG | RESPIRATION RATE: 18 BRPM | SYSTOLIC BLOOD PRESSURE: 133 MMHG | TEMPERATURE: 98 F | HEART RATE: 116 BPM

## 2024-02-15 DIAGNOSIS — R00.0 TACHYCARDIA, UNSPECIFIED: ICD-10-CM

## 2024-02-15 DIAGNOSIS — Z51.89 ENCOUNTER FOR OTHER SPECIFIED AFTERCARE: ICD-10-CM

## 2024-02-15 DIAGNOSIS — Z98.1 ARTHRODESIS STATUS: Chronic | ICD-10-CM

## 2024-02-15 DIAGNOSIS — Z98.890 OTHER SPECIFIED POSTPROCEDURAL STATES: Chronic | ICD-10-CM

## 2024-02-15 DIAGNOSIS — Z98.1 ARTHRODESIS STATUS: ICD-10-CM

## 2024-02-15 DIAGNOSIS — M21.371 FOOT DROP, RIGHT FOOT: ICD-10-CM

## 2024-02-15 DIAGNOSIS — R20.8 OTHER DISTURBANCES OF SKIN SENSATION: ICD-10-CM

## 2024-02-15 DIAGNOSIS — Z47.89 ENCOUNTER FOR OTHER ORTHOPEDIC AFTERCARE: ICD-10-CM

## 2024-02-15 DIAGNOSIS — G12.21 AMYOTROPHIC LATERAL SCLEROSIS: ICD-10-CM

## 2024-02-15 DIAGNOSIS — R27.8 OTHER LACK OF COORDINATION: ICD-10-CM

## 2024-02-15 DIAGNOSIS — F41.9 ANXIETY DISORDER, UNSPECIFIED: ICD-10-CM

## 2024-02-15 DIAGNOSIS — D72.829 ELEVATED WHITE BLOOD CELL COUNT, UNSPECIFIED: ICD-10-CM

## 2024-02-15 DIAGNOSIS — Z98.89 OTHER SPECIFIED POSTPROCEDURAL STATES: Chronic | ICD-10-CM

## 2024-02-15 DIAGNOSIS — G89.29 OTHER CHRONIC PAIN: ICD-10-CM

## 2024-02-15 DIAGNOSIS — M25.511 PAIN IN RIGHT SHOULDER: ICD-10-CM

## 2024-02-15 DIAGNOSIS — E44.0 MODERATE PROTEIN-CALORIE MALNUTRITION: ICD-10-CM

## 2024-02-15 DIAGNOSIS — G82.50 QUADRIPLEGIA, UNSPECIFIED: ICD-10-CM

## 2024-02-15 PROBLEM — M54.16 RADICULOPATHY, LUMBAR REGION: Chronic | Status: ACTIVE | Noted: 2024-02-01

## 2024-02-15 PROBLEM — M19.90 UNSPECIFIED OSTEOARTHRITIS, UNSPECIFIED SITE: Chronic | Status: ACTIVE | Noted: 2024-02-01

## 2024-02-15 PROBLEM — M79.7 FIBROMYALGIA: Chronic | Status: ACTIVE | Noted: 2024-02-01

## 2024-02-15 PROBLEM — G89.4 CHRONIC PAIN SYNDROME: Chronic | Status: ACTIVE | Noted: 2024-02-01

## 2024-02-15 LAB
FLUAV AG NPH QL: SIGNIFICANT CHANGE UP
FLUBV AG NPH QL: SIGNIFICANT CHANGE UP
RSV RNA NPH QL NAA+NON-PROBE: SIGNIFICANT CHANGE UP
SARS-COV-2 RNA SPEC QL NAA+PROBE: SIGNIFICANT CHANGE UP

## 2024-02-15 PROCEDURE — 80048 BASIC METABOLIC PNL TOTAL CA: CPT

## 2024-02-15 PROCEDURE — 97110 THERAPEUTIC EXERCISES: CPT

## 2024-02-15 PROCEDURE — 94640 AIRWAY INHALATION TREATMENT: CPT

## 2024-02-15 PROCEDURE — 84295 ASSAY OF SERUM SODIUM: CPT

## 2024-02-15 PROCEDURE — 84100 ASSAY OF PHOSPHORUS: CPT

## 2024-02-15 PROCEDURE — 82565 ASSAY OF CREATININE: CPT

## 2024-02-15 PROCEDURE — 93970 EXTREMITY STUDY: CPT

## 2024-02-15 PROCEDURE — 97162 PT EVAL MOD COMPLEX 30 MIN: CPT

## 2024-02-15 PROCEDURE — 85018 HEMOGLOBIN: CPT

## 2024-02-15 PROCEDURE — 93005 ELECTROCARDIOGRAM TRACING: CPT

## 2024-02-15 PROCEDURE — 82947 ASSAY GLUCOSE BLOOD QUANT: CPT

## 2024-02-15 PROCEDURE — 85027 COMPLETE CBC AUTOMATED: CPT

## 2024-02-15 PROCEDURE — 86901 BLOOD TYPING SEROLOGIC RH(D): CPT

## 2024-02-15 PROCEDURE — 82330 ASSAY OF CALCIUM: CPT

## 2024-02-15 PROCEDURE — 72125 CT NECK SPINE W/O DYE: CPT

## 2024-02-15 PROCEDURE — 76000 FLUOROSCOPY <1 HR PHYS/QHP: CPT

## 2024-02-15 PROCEDURE — 97535 SELF CARE MNGMENT TRAINING: CPT

## 2024-02-15 PROCEDURE — C1769: CPT

## 2024-02-15 PROCEDURE — 97530 THERAPEUTIC ACTIVITIES: CPT

## 2024-02-15 PROCEDURE — 86900 BLOOD TYPING SEROLOGIC ABO: CPT

## 2024-02-15 PROCEDURE — 80053 COMPREHEN METABOLIC PANEL: CPT

## 2024-02-15 PROCEDURE — C1713: CPT

## 2024-02-15 PROCEDURE — 85014 HEMATOCRIT: CPT

## 2024-02-15 PROCEDURE — 97166 OT EVAL MOD COMPLEX 45 MIN: CPT

## 2024-02-15 PROCEDURE — 74177 CT ABD & PELVIS W/CONTRAST: CPT

## 2024-02-15 PROCEDURE — 82803 BLOOD GASES ANY COMBINATION: CPT

## 2024-02-15 PROCEDURE — 84132 ASSAY OF SERUM POTASSIUM: CPT

## 2024-02-15 PROCEDURE — 97116 GAIT TRAINING THERAPY: CPT

## 2024-02-15 PROCEDURE — 82435 ASSAY OF BLOOD CHLORIDE: CPT

## 2024-02-15 PROCEDURE — 82962 GLUCOSE BLOOD TEST: CPT

## 2024-02-15 PROCEDURE — 88304 TISSUE EXAM BY PATHOLOGIST: CPT

## 2024-02-15 PROCEDURE — 99232 SBSQ HOSP IP/OBS MODERATE 35: CPT

## 2024-02-15 PROCEDURE — S2900: CPT

## 2024-02-15 PROCEDURE — 83735 ASSAY OF MAGNESIUM: CPT

## 2024-02-15 PROCEDURE — 83605 ASSAY OF LACTIC ACID: CPT

## 2024-02-15 PROCEDURE — C9399: CPT

## 2024-02-15 PROCEDURE — C1889: CPT

## 2024-02-15 PROCEDURE — 71275 CT ANGIOGRAPHY CHEST: CPT

## 2024-02-15 PROCEDURE — 86850 RBC ANTIBODY SCREEN: CPT

## 2024-02-15 RX ORDER — OXYCODONE HYDROCHLORIDE 5 MG/1
10 TABLET ORAL EVERY 4 HOURS
Refills: 0 | Status: DISCONTINUED | OUTPATIENT
Start: 2024-02-15 | End: 2024-02-22

## 2024-02-15 RX ORDER — ALPRAZOLAM 0.25 MG
0.25 TABLET ORAL
Refills: 0 | Status: DISCONTINUED | OUTPATIENT
Start: 2024-02-15 | End: 2024-02-15

## 2024-02-15 RX ORDER — LACTULOSE 10 G/15ML
10 SOLUTION ORAL DAILY
Refills: 0 | Status: DISCONTINUED | OUTPATIENT
Start: 2024-02-16 | End: 2024-03-07

## 2024-02-15 RX ORDER — TRAMADOL HYDROCHLORIDE 50 MG/1
50 TABLET ORAL EVERY 6 HOURS
Refills: 0 | Status: DISCONTINUED | OUTPATIENT
Start: 2024-02-15 | End: 2024-02-20

## 2024-02-15 RX ORDER — OXYCODONE HYDROCHLORIDE 5 MG/1
1 TABLET ORAL
Refills: 0 | DISCHARGE

## 2024-02-15 RX ORDER — GABAPENTIN 400 MG/1
1 CAPSULE ORAL
Refills: 0 | DISCHARGE

## 2024-02-15 RX ORDER — POLYETHYLENE GLYCOL 3350 17 G/17G
17 POWDER, FOR SOLUTION ORAL EVERY 24 HOURS
Refills: 0 | Status: DISCONTINUED | OUTPATIENT
Start: 2024-02-16 | End: 2024-02-22

## 2024-02-15 RX ORDER — OXYCODONE HYDROCHLORIDE 5 MG/1
15 TABLET ORAL EVERY 8 HOURS
Refills: 0 | Status: DISCONTINUED | OUTPATIENT
Start: 2024-02-15 | End: 2024-02-15

## 2024-02-15 RX ORDER — NORTRIPTYLINE HYDROCHLORIDE 10 MG/1
1 CAPSULE ORAL
Qty: 0 | Refills: 0 | DISCHARGE
Start: 2024-02-15

## 2024-02-15 RX ORDER — MAGNESIUM HYDROXIDE 400 MG/1
30 TABLET, CHEWABLE ORAL EVERY 12 HOURS
Refills: 0 | Status: DISCONTINUED | OUTPATIENT
Start: 2024-02-15 | End: 2024-03-07

## 2024-02-15 RX ORDER — SIMETHICONE 80 MG/1
80 TABLET, CHEWABLE ORAL
Refills: 0 | Status: DISCONTINUED | OUTPATIENT
Start: 2024-02-15 | End: 2024-03-07

## 2024-02-15 RX ORDER — ASPIRIN/CALCIUM CARB/MAGNESIUM 324 MG
1 TABLET ORAL
Qty: 0 | Refills: 0 | DISCHARGE
Start: 2024-02-15

## 2024-02-15 RX ORDER — TRAMADOL HYDROCHLORIDE 50 MG/1
50 TABLET ORAL EVERY 6 HOURS
Refills: 0 | Status: DISCONTINUED | OUTPATIENT
Start: 2024-02-15 | End: 2024-02-15

## 2024-02-15 RX ORDER — GABAPENTIN 400 MG/1
1 CAPSULE ORAL
Qty: 0 | Refills: 0 | DISCHARGE
Start: 2024-02-15

## 2024-02-15 RX ORDER — CYCLOBENZAPRINE HYDROCHLORIDE 10 MG/1
10 TABLET, FILM COATED ORAL THREE TIMES A DAY
Refills: 0 | Status: DISCONTINUED | OUTPATIENT
Start: 2024-02-15 | End: 2024-03-07

## 2024-02-15 RX ORDER — SENNA PLUS 8.6 MG/1
2 TABLET ORAL
Qty: 0 | Refills: 0 | DISCHARGE
Start: 2024-02-15

## 2024-02-15 RX ORDER — ALPRAZOLAM 0.25 MG
1 TABLET ORAL
Qty: 0 | Refills: 0 | DISCHARGE
Start: 2024-02-15

## 2024-02-15 RX ORDER — ASPIRIN/CALCIUM CARB/MAGNESIUM 324 MG
81 TABLET ORAL DAILY
Refills: 0 | Status: DISCONTINUED | OUTPATIENT
Start: 2024-02-16 | End: 2024-03-07

## 2024-02-15 RX ORDER — NORTRIPTYLINE HYDROCHLORIDE 10 MG/1
25 CAPSULE ORAL AT BEDTIME
Refills: 0 | Status: DISCONTINUED | OUTPATIENT
Start: 2024-02-15 | End: 2024-03-07

## 2024-02-15 RX ORDER — PANTOPRAZOLE SODIUM 20 MG/1
40 TABLET, DELAYED RELEASE ORAL EVERY 24 HOURS
Refills: 0 | Status: DISCONTINUED | OUTPATIENT
Start: 2024-02-16 | End: 2024-02-16

## 2024-02-15 RX ORDER — NORTRIPTYLINE HYDROCHLORIDE 10 MG/1
0 CAPSULE ORAL
Qty: 0 | Refills: 0 | DISCHARGE

## 2024-02-15 RX ORDER — RILUZOLE 50 MG/1
50 TABLET ORAL
Refills: 0 | Status: DISCONTINUED | OUTPATIENT
Start: 2024-02-15 | End: 2024-03-07

## 2024-02-15 RX ORDER — OXYCODONE HYDROCHLORIDE 5 MG/1
1 TABLET ORAL
Qty: 42 | Refills: 0
Start: 2024-02-15

## 2024-02-15 RX ORDER — POLYETHYLENE GLYCOL 3350 17 G/17G
17 POWDER, FOR SOLUTION ORAL
Qty: 0 | Refills: 0 | DISCHARGE
Start: 2024-02-15

## 2024-02-15 RX ORDER — SENNA PLUS 8.6 MG/1
2 TABLET ORAL AT BEDTIME
Refills: 0 | Status: DISCONTINUED | OUTPATIENT
Start: 2024-02-15 | End: 2024-03-07

## 2024-02-15 RX ORDER — ACETAMINOPHEN 500 MG
650 TABLET ORAL EVERY 6 HOURS
Refills: 0 | Status: DISCONTINUED | OUTPATIENT
Start: 2024-02-15 | End: 2024-03-07

## 2024-02-15 RX ORDER — ACETAMINOPHEN 500 MG
2 TABLET ORAL
Qty: 0 | Refills: 0 | DISCHARGE
Start: 2024-02-15

## 2024-02-15 RX ORDER — CYCLOBENZAPRINE HYDROCHLORIDE 10 MG/1
1 TABLET, FILM COATED ORAL
Qty: 0 | Refills: 0 | DISCHARGE
Start: 2024-02-15

## 2024-02-15 RX ORDER — GABAPENTIN 400 MG/1
800 CAPSULE ORAL THREE TIMES A DAY
Refills: 0 | Status: DISCONTINUED | OUTPATIENT
Start: 2024-02-15 | End: 2024-02-27

## 2024-02-15 RX ORDER — FAMOTIDINE 10 MG/ML
1 INJECTION INTRAVENOUS
Qty: 0 | Refills: 0 | DISCHARGE
Start: 2024-02-15

## 2024-02-15 RX ORDER — FAMOTIDINE 10 MG/ML
40 INJECTION INTRAVENOUS DAILY
Refills: 0 | Status: DISCONTINUED | OUTPATIENT
Start: 2024-02-15 | End: 2024-03-07

## 2024-02-15 RX ADMIN — OXYCODONE HYDROCHLORIDE 15 MILLIGRAM(S): 5 TABLET ORAL at 10:20

## 2024-02-15 RX ADMIN — GABAPENTIN 800 MILLIGRAM(S): 400 CAPSULE ORAL at 22:09

## 2024-02-15 RX ADMIN — GABAPENTIN 800 MILLIGRAM(S): 400 CAPSULE ORAL at 05:54

## 2024-02-15 RX ADMIN — NORTRIPTYLINE HYDROCHLORIDE 25 MILLIGRAM(S): 10 CAPSULE ORAL at 22:09

## 2024-02-15 RX ADMIN — GABAPENTIN 800 MILLIGRAM(S): 400 CAPSULE ORAL at 13:38

## 2024-02-15 RX ADMIN — Medication 30 MILLIGRAM(S): at 05:54

## 2024-02-15 RX ADMIN — SENNA PLUS 2 TABLET(S): 8.6 TABLET ORAL at 22:09

## 2024-02-15 RX ADMIN — RILUZOLE 50 MILLIGRAM(S): 50 TABLET ORAL at 23:07

## 2024-02-15 RX ADMIN — PANTOPRAZOLE SODIUM 40 MILLIGRAM(S): 20 TABLET, DELAYED RELEASE ORAL at 05:55

## 2024-02-15 RX ADMIN — Medication 0.25 MILLIGRAM(S): at 11:27

## 2024-02-15 RX ADMIN — OXYCODONE HYDROCHLORIDE 15 MILLIGRAM(S): 5 TABLET ORAL at 09:27

## 2024-02-15 RX ADMIN — RILUZOLE 50 MILLIGRAM(S): 50 TABLET ORAL at 05:53

## 2024-02-15 RX ADMIN — Medication 81 MILLIGRAM(S): at 11:27

## 2024-02-15 NOTE — PROVIDER CONTACT NOTE (OTHER) - DATE AND TIME:
15-Feb-2024 09:00
13-Feb-2024 13:30
12-Feb-2024 21:25
13-Feb-2024 17:30
15-Feb-2024 11:48
15-Feb-2024 12:05

## 2024-02-15 NOTE — PROVIDER CONTACT NOTE (OTHER) - ASSESSMENT
A&Ox4, VSS except for tachycardic, on 2L nc. No c/o pain, headache, CP, or palpitations. C/o feeling dyspnea on exertion and being SOB after ambulation
pt is tachy 120- 138. oxygen ranges from 92-94%. pt gets winded from long sentences at times.
A&Ox4, VSS except for tachycardic, on 2L nc. No c/o abdominal pain/discomfort, nausea, or vomiting. Abdomen soft/nontender. Normal bowel sounds.
Pt denies chest pain/sob. Pt denies dizziness. Resting comfortably in bed.
A&Ox4, VSS except for tachycardic, on 2L nc. No c/o abdominal pain/discomfort, nausea, or vomiting. Abdomen soft/nontender. Normal bowel sounds.
pt is TAchy varying from 120-130. pt sweating while ambulating, short of breath when speaking.

## 2024-02-15 NOTE — PROGRESS NOTE ADULT - ASSESSMENT
63 year old male presents ambulatory with cane to PST prior to c3-c4 anterior cervical discectomy and fusion c3-c7 posterior cervical laminectomy and spinal fusion procedure on 2.8.24 with Dr. Eliud Quinones. Patient has a PMHx including ALS (diagnosed 2023 at [facility]),  ACDF C4-5 on 07/12/23 and C4-C6 ACDF 8/2016. decreased ROM of right shoulder, chronic pain (daily marijuana user) and right  weakness (unable to extend fingers of right hand). Denies recent fevers, chills, chest pain.   pt with hx of als, s/p spine  surgery with tachycardia sinus.  chart reviewed pt is been tachycardia from the admission.  tachycardia , CTA  negative for PE, ecg no ischemia , echo as out pt negative as per pt prior to admission  TSH normal   SINUS TACHYCARDIAS IS FAIRLY COMMON ON PT WITH ALS SEC TO AUTONOMIC ABNORMALITY, NO NEED FOR AV BLOCKING AGENTS  xanax prn  dvt prophylaxis 63 year old male presents ambulatory with cane to PST prior to c3-c4 anterior cervical discectomy and fusion c3-c7 posterior cervical laminectomy and spinal fusion procedure on 2.8.24 with Dr. Eliud Quinones. Patient has a PMHx including ALS (diagnosed 2023 at [facility]),  ACDF C4-5 on 07/12/23 and C4-C6 ACDF 8/2016. decreased ROM of right shoulder, chronic pain (daily marijuana user) and right  weakness (unable to extend fingers of right hand). Denies recent fevers, chills, chest pain.   pt with hx of als, s/p spine  surgery with tachycardia sinus.  chart reviewed pt is been tachycardia from the admission.  tachycardia , CTA  negative for PE, ecg no ischemia , echo as out pt negative as per pt prior to admission  TSH normal   SINUS TACHYCARDIAS IS FAIRLY COMMON ON PT WITH ALS SEC TO AUTONOMIC ABNORMALITY, NO NEED FOR AV BLOCKING AGENTS  pt may dc to rehab  xanax prn  dvt prophylaxis

## 2024-02-15 NOTE — H&P ADULT - NSHPSOCIALHISTORY_GEN_ALL_CORE
Smoking -  EtOH -   Drugs -     Marital status:     Patient lives with wife in [place], 5 steps to enter, laundry downstairs   PTA: Independent in ADLs and ambulation     CURRENT FUNCTIONAL STATUS  Date: 2/14  Bed Mobility:  supervision, 1 person  Transfers:  mod a, 1 person  Gait: min a, 1 person, 40ft with RW Smoking - Marijuana   EtOH - During festive periods  Drugs - Denies    Marital status:     Patient lives with wife in [place], 5 steps to enter, laundry downstairs   PTA: Independent in ADLs and ambulation     CURRENT FUNCTIONAL STATUS  Date: 2/14  Bed Mobility:  supervision, 1 person  Transfers:  mod a, 1 person  Gait: min a, 1 person, 40ft with RW

## 2024-02-15 NOTE — H&P ADULT - ASSESSMENT
ASSESSMENT/PLAN  This is a 63 year old male presents PMHx including ALS (diagnosed 2023 at [facility]),  ACDF C4-5 on 07/12/23 and C4-C6 ACDF 8/2016, decreased ROM of right shoulder, chronic pain (daily marijuana user) and right  weakness (unable to extend fingers of right hand). Patient underwent C3-C4 ACDF/C3-C7 Posterior fusion without complications on 2/8/24 with Dr. Eliud Quinones.   Patient now with gait Instability, ADL impairments and Functional impairments.    #s/p cervical fusion  - C3-C4 ACDF/C3-C7 Posterior fusion without complications on 2/8/24  - Start Comprehensive Rehab Program: PT/OT, 3hours daily and 5 days weekly  - PT: Focused on improving strength, endurance, coordination, balance, functional mobility, and transfers  - OT: Focused on improving strength, fine motor skills, coordination, posture and ADLs.    - Prednisone taper   - pain management: Flexeril 10mg TID, Pamelor, gabapentin 800mg TID  - [place] collar at all times     #Pain management  - Tylenol PRN, Oxycodone PRN, tramadol 50mg     #DVT ppx  - SCD, TEDs    #GI ppx  - Pepcid 20mg     #Bowel Regimen  - Senna, miralax PRN    #Bladder management  - BS on admission, and q 8 hours (SC if > 400)  - Monitor UO    #FEN   - Diet: Regular- high fiber    #Skin:  - Skin on admission: ***    #Mood/Cognition  - Alprazolam 0.25mg BID PRN  - Neuropsychology consult PRN    #Precaution  - Fall, Aspiration, Spinal    #GOC  CODE STATUS: FULL CODE     Outpatient Follow-up (Specialty/Name of physician):    Eliud Quinones  Orthopaedic Surgery  [address], [address]  [place] [address]  Phone: [phone]  Fax: [phone]  Follow Up Time:    [facility]  [facility] [address]  Scheduled Appointment: 02/21/2024    MEDICAL PROGNOSIS: GOOD            REHAB POTENTIAL: GOOD             ESTIMATED DISPOSITION: HOME WITH HOME CARE            ELOS: 10-14 Days   EXPECTED THERAPY:     P.T. 2hr/day       O.T. 1hr/day      S.L.P. 0hr/day     P&O Unnecessary     EXP FREQUENCY: 5 days per 7 day period     PRESCREEN COMPARISON:   I have reviewed the prescreen information and I have found no relevant changes between the preadmission screening and my post admission evaluation     RATIONALE FOR INPATIENT ADMISSION - Patient demonstrates the following: (check all that apply)  [X] Medically appropriate for rehabilitation admission  [X] Has attainable rehab goals with an appropriate initial discharge plan  [X] Has rehabilitation potential (expected to make a significant improvement within a reasonable period of time)   [X] Requires close medical management by a rehab physician, rehab nursing care, Hospitalist and comprehensive interdisciplinary team (including PT, OT, & or SLP, Prosthetics and Orthotics)   ASSESSMENT/PLAN  This is a 63 year old male presents PMHx including ALS (diagnosed 2023 at [facility]),  ACDF C4-5 on 07/12/23 and C4-C6 ACDF 8/2016, decreased ROM of right shoulder, chronic pain (daily marijuana user) and right  weakness (unable to extend fingers of right hand). Patient underwent C3-C4 ACDF/C3-C7 Posterior fusion without complications on 2/8/24 with Dr. Eliud Quinones.   Patient now with gait Instability, ADL impairments and Functional impairments.    #s/p cervical fusion  - C3-C4 ACDF/C3-C7 Posterior fusion without complications on 2/8/24  - Start Comprehensive Rehab Program: PT/OT, 3hours daily and 5 days weekly  - PT: Focused on improving strength, endurance, coordination, balance, functional mobility, and transfers  - OT: Focused on improving strength, fine motor skills, coordination, posture and ADLs.    - Prednisone taper   - pain management: Flexeril 10mg TID, Pamelor, gabapentin 800mg TID  - [place] collar at all times     #ALS  - c/w Riluzole 50mg BID    #Pain management  - Tylenol PRN, Oxycodone PRN, tramadol 50mg     #DVT ppx  - SCD, TEDs    #GI ppx  - Pepcid 20mg     #Bowel Regimen  - Senna, miralax PRN    #Bladder management  - BS on admission, and q 8 hours (SC if > 400)  - Monitor UO    #FEN   - Diet: Regular- high fiber    #Skin:  - Skin on admission: Anterior and posterior cervical spine incision covered with dry dressing, anterior incision with surrounding erythema    #Mood/Cognition  - Alprazolam 0.25mg BID PRN  - Neuropsychology consult PRN    #Precaution  - Fall, Aspiration, Spinal    #GOC  CODE STATUS: FULL CODE     Outpatient Follow-up (Specialty/Name of physician):    Eliud Quinones  Orthopaedic Surgery  [address], [address]  [place] [address]  Phone: [phone]  Fax: [phone]  Follow Up Time:    [facility]  [facility] [address]  Scheduled Appointment: 02/21/2024    MEDICAL PROGNOSIS: GOOD            REHAB POTENTIAL: GOOD             ESTIMATED DISPOSITION: HOME WITH HOME CARE            ELOS: 10-14 Days   EXPECTED THERAPY:     P.T. 2hr/day       O.T. 1hr/day      S.L.P. 0hr/day     P&O Unnecessary     EXP FREQUENCY: 5 days per 7 day period     PRESCREEN COMPARISON:   I have reviewed the prescreen information and I have found no relevant changes between the preadmission screening and my post admission evaluation     RATIONALE FOR INPATIENT ADMISSION - Patient demonstrates the following: (check all that apply)  [X] Medically appropriate for rehabilitation admission  [X] Has attainable rehab goals with an appropriate initial discharge plan  [X] Has rehabilitation potential (expected to make a significant improvement within a reasonable period of time)   [X] Requires close medical management by a rehab physician, rehab nursing care, Hospitalist and comprehensive interdisciplinary team (including PT, OT, & or SLP, Prosthetics and Orthotics)   ASSESSMENT/PLAN  This is a 63 year old male presents PMHx including ALS (diagnosed 2023 at [facility]),  ACDF C4-5 on 07/12/23 and C4-C6 ACDF 8/2016, decreased ROM of right shoulder, chronic pain (daily marijuana user) and right  weakness (unable to extend fingers of right hand). Patient underwent C3-C4 ACDF/C3-C7 Posterior fusion without complications on 2/8/24 with Dr. Eliud Quinones.   Patient now with gait Instability, ADL impairments and Functional impairments.    #s/p cervical fusion  - C3-C4 ACDF/C3-C7 Posterior fusion without complications on 2/8/24  - Start Comprehensive Rehab Program: PT/OT, 3hours daily and 5 days weekly  - PT: Focused on improving strength, endurance, coordination, balance, functional mobility, and transfers  - OT: Focused on improving strength, fine motor skills, coordination, posture and ADLs.    - Prednisone 20mg daily x 3 days ( 2/16-2/18) then 10mg daily x 3 days ( 2/19-2/21)   - pain management: Flexeril 10mg TID, Pamelor, gabapentin 800mg TID  - [place] collar at all times     #ALS  - c/w Riluzole 50mg BID- non formulary; pharmacy will try to obtain    #Pain management  - Tylenol PRN, Oxycodone PRN, tramadol 50mg     #DVT ppx  - SCD, TEDs    #GI ppx  - Pepcid 20mg     #Bowel Regimen  - Senna, miralax PRN    #Bladder management  - BS on admission x 1  - Monitor UO    #FEN   - Diet: Regular- high fiber    #Skin:  - Skin on admission: Anterior and posterior cervical spine incision covered with dry dressing, anterior incision with surrounding erythema    #Mood  #Anxiety  - Alprazolam 0.25mg BID PRN  - Neuropsychology consult PRN    #Precaution  - Fall, Aspiration, Spinal    #GOC  CODE STATUS: FULL CODE     Outpatient Follow-up (Specialty/Name of physician):    Eliud Quinones  Orthopaedic Surgery  [address], [address]  [place] [address]  Phone: [phone]  Fax: [phone]  Follow Up Time:    [facility]  [facility] [address]  Scheduled Appointment: 02/21/2024    MEDICAL PROGNOSIS: GOOD            REHAB POTENTIAL: GOOD             ESTIMATED DISPOSITION: HOME WITH HOME CARE            ELOS: 10-14 Days   EXPECTED THERAPY:     P.T. 2hr/day       O.T. 1hr/day      S.L.P. 0hr/day     P&O Unnecessary     EXP FREQUENCY: 5 days per 7 day period     PRESCREEN COMPARISON:   I have reviewed the prescreen information and I have found no relevant changes between the preadmission screening and my post admission evaluation     RATIONALE FOR INPATIENT ADMISSION - Patient demonstrates the following: (check all that apply)  [X] Medically appropriate for rehabilitation admission  [X] Has attainable rehab goals with an appropriate initial discharge plan  [X] Has rehabilitation potential (expected to make a significant improvement within a reasonable period of time)   [X] Requires close medical management by a rehab physician, rehab nursing care, Hospitalist and comprehensive interdisciplinary team (including PT, OT, & or SLP, Prosthetics and Orthotics)   ASSESSMENT/PLAN  This is a 63 year old male presents PMHx including ALS (diagnosed 2023 at [facility]),  ACDF C4-5 on 07/12/23 and C4-C6 ACDF 8/2016, decreased ROM of right shoulder, chronic pain (daily marijuana user) and right  weakness (unable to extend fingers of right hand). Patient underwent C3-C4 ACDF/C3-C7 Posterior fusion without complications on 2/8/24 with Dr. Eliud Quinones.   Patient now with gait Instability, ADL impairments and Functional impairments.    #Cervical Myelopathy with quadriparesis s/p cervical fusion  - C3-C4 ACDF/C3-C7 Posterior fusion without complications on 2/8/24  - Start Comprehensive Rehab Program: PT/OT, 3hours daily and 5 days weekly  - PT: Focused on improving strength, endurance, coordination, balance, functional mobility, and transfers  - OT: Focused on improving strength, fine motor skills, coordination, posture and ADLs.    - Prednisone 20mg daily x 3 days ( 2/16-2/18) then 10mg daily x 3 days ( 2/19-2/21)   - pain management: Flexeril 10mg TID, Pamelor, gabapentin 800mg TID  - [place] collar at all times     #ALS  - c/w Riluzole 50mg BID- non formulary; pharmacy will try to obtain    #Pain management  - Tylenol PRN, Oxycodone PRN, tramadol 50mg     #DVT ppx  - SCD, TEDs    #GI ppx  - Pepcid 20mg     #Bowel Regimen  - Senna, miralax PRN    #Bladder management  - BS on admission x 1  - Monitor UO    #FEN   - Diet: Regular- high fiber    #Skin:  - Skin on admission: Anterior and posterior cervical spine incision covered with dry dressing, anterior incision with surrounding erythema    #Mood  #Anxiety  - Alprazolam 0.25mg BID PRN  - Neuropsychology consult PRN    #Precaution  - Fall, Aspiration, Spinal    #GOC  CODE STATUS: FULL CODE     Outpatient Follow-up (Specialty/Name of physician):    Eliud Quinones  Orthopaedic Surgery  [address], [address]  [place] [address]  Phone: [phone]  Fax: [phone]  Follow Up Time:    [facility]  [facility] [address]  Scheduled Appointment: 02/21/2024    MEDICAL PROGNOSIS: GOOD            REHAB POTENTIAL: GOOD             ESTIMATED DISPOSITION: HOME WITH HOME CARE            ELOS: 10-14 Days   EXPECTED THERAPY:     P.T. 2hr/day       O.T. 1hr/day      S.L.P. 0hr/day     P&O Unnecessary     EXP FREQUENCY: 5 days per 7 day period     PRESCREEN COMPARISON:   I have reviewed the prescreen information and I have found no relevant changes between the preadmission screening and my post admission evaluation     RATIONALE FOR INPATIENT ADMISSION - Patient demonstrates the following: (check all that apply)  [X] Medically appropriate for rehabilitation admission  [X] Has attainable rehab goals with an appropriate initial discharge plan  [X] Has rehabilitation potential (expected to make a significant improvement within a reasonable period of time)   [X] Requires close medical management by a rehab physician, rehab nursing care, Hospitalist and comprehensive interdisciplinary team (including PT, OT, & or SLP, Prosthetics and Orthotics)   ASSESSMENT/PLAN  This is a 63 year old male presents PMHx including ALS (diagnosed 2023 at [facility]),  ACDF C4-5 on 07/12/23 and C4-C6 ACDF 8/2016, decreased ROM of right shoulder, chronic pain (daily marijuana user) and right  weakness (unable to extend fingers of right hand). Patient underwent C3-C4 ACDF/C3-C7 Posterior fusion without complications on 2/8/24 with Dr. Eliud Quinones.   Patient now with gait Instability, ADL impairments and Functional impairments.    #Cervical stenosis with Myelopathy/ quadriparesis s/p cervical fusion  - C3-C4 ACDF/C3-C7 Posterior fusion without complications on 2/8/24  - Start Comprehensive Rehab Program: PT/OT, 3hours daily and 5 days weekly  - PT: Focused on improving strength, endurance, coordination, balance, functional mobility, and transfers  - OT: Focused on improving strength, fine motor skills, coordination, posture and ADLs.    - Prednisone 20mg daily x 3 days ( 2/16-2/18) then 10mg daily x 3 days ( 2/19-2/21)   - pain management: Flexeril 10mg TID, Pamelor, gabapentin 800mg TID  - [place] collar at all times     #ALS  - c/w Riluzole 50mg BID- non formulary; pharmacy will try to obtain    #Pain management  - Tylenol PRN, Oxycodone PRN, tramadol 50mg     #DVT ppx  - SCD, TEDs    #GI ppx  - Pepcid 20mg     #Bowel Regimen  - Senna, miralax PRN    #Bladder management  - BS on admission x 1  - Monitor UO    #FEN   - Diet: Regular- high fiber    #Skin:  - Skin on admission: Anterior and posterior cervical spine incision covered with dry dressing, anterior incision with surrounding erythema    #Mood  #Anxiety  - Alprazolam 0.25mg BID PRN  - Neuropsychology consult PRN    #Precaution  - Fall, Aspiration, Spinal    #GOC  CODE STATUS: FULL CODE     Outpatient Follow-up (Specialty/Name of physician):    Eliud Quinones  Orthopaedic Surgery  [address], [address]  [place] [address]  Phone: [phone]  Fax: [phone]  Follow Up Time:    [facility]  [facility] [address]  Scheduled Appointment: 02/21/2024    MEDICAL PROGNOSIS: GOOD            REHAB POTENTIAL: GOOD             ESTIMATED DISPOSITION: HOME WITH HOME CARE            ELOS: 10-14 Days   EXPECTED THERAPY:     P.T. 2hr/day       O.T. 1hr/day      S.L.P. 0hr/day     P&O Unnecessary     EXP FREQUENCY: 5 days per 7 day period     PRESCREEN COMPARISON:   I have reviewed the prescreen information and I have found no relevant changes between the preadmission screening and my post admission evaluation     RATIONALE FOR INPATIENT ADMISSION - Patient demonstrates the following: (check all that apply)  [X] Medically appropriate for rehabilitation admission  [X] Has attainable rehab goals with an appropriate initial discharge plan  [X] Has rehabilitation potential (expected to make a significant improvement within a reasonable period of time)   [X] Requires close medical management by a rehab physician, rehab nursing care, Hospitalist and comprehensive interdisciplinary team (including PT, OT, & or SLP, Prosthetics and Orthotics)

## 2024-02-15 NOTE — PROGRESS NOTE ADULT - PROVIDER SPECIALTY LIST ADULT
Anesthesia
Anesthesia
Internal Medicine
Internal Medicine
Orthopedics
Pain Medicine
Anesthesia
Internal Medicine
Internal Medicine
Orthopedics
Orthopedics
Rehab Medicine
SICU
SICU
Cardiology
Orthopedics
Orthopedics

## 2024-02-15 NOTE — H&P ADULT - NSHPADDITIONALINFOADULT_GEN_ALL_CORE
Saw and evaluated the patient independently, took history, examined, started an assessment and management plan.  Spent 120 minutes

## 2024-02-15 NOTE — PROVIDER CONTACT NOTE (OTHER) - REASON
pt refusing miralax
pt tachycardic, 
Pt is tachycardic
pt refused lactulose
PT is tachycardic
pt tachycardic

## 2024-02-15 NOTE — H&P ADULT - NSHPLABSRESULTS_GEN_ALL_CORE
RECENT LABS/IMAGING                        12.6   10.70 )-----------( 262      ( 14 Feb 2024 06:44 )             38.6     02-14    139  |  101  |  32<H>  ----------------------------<  94  4.1   |  25  |  0.74    Ca    9.7      14 Feb 2024 06:44        Urinalysis Basic - ( 14 Feb 2024 06:44 )    Color: x / Appearance: x / SG: x / pH: x  Gluc: 94 mg/dL / Ketone: x  / Bili: x / Urobili: x   Blood: x / Protein: x / Nitrite: x   Leuk Esterase: x / RBC: x / WBC x   Sq Epi: x / Non Sq Epi: x / Bacteria: x       CT Abdomen and Pelvis w/ IV Cont (02.14.24 @ 09:42)     IMPRESSION:  No evidence of venous thromboembolism as clinically questioned.  No acute pathology.      < from: CT Angio Chest PE Protocol w/ IV Cont (02.13.24 @ 19:33) >    IMPRESSION:  No acute pulmonary embolism to the segmental branches. Limited assessment   of the subsegmental branches due to suboptimal contrast bolus.    Bibasilar atelectasis    < end of copied text >

## 2024-02-15 NOTE — H&P ADULT - HISTORY OF PRESENT ILLNESS
This is a 63 year old male presents PMHx including ALS (diagnosed 2023 at [facility]),  ACDF C4-5 on 07/12/23 and C4-C6 ACDF 8/2016. decreased ROM of right shoulder, chronic pain (daily marijuana user) and right  weakness (unable to extend fingers of right hand). Patient underwent C3-C4 ACDF/C3-C7 Posterior fusion without complications on 2/8/24 with Dr. Eliud Quinones.     Patient was evaluated by PM&R and therapy for functional deficits, gait/ADL impairments and acute rehabilitation was recommended. Patient was medically optimized for discharge to [facility] IRU on 2/15/24.

## 2024-02-15 NOTE — PROGRESS NOTE ADULT - SUBJECTIVE AND OBJECTIVE BOX
feeling better after BM  +anxiety/excited about going to rehab     REVIEW OF SYSTEMS  Constitutional - No fever,  No fatigue  HEENT - No vertigo, No neck pain  Neurological - No headaches, No memory loss    FUNCTIONAL PROGRESS  2/14 PT  transfers mod assist with platform walker  gait min assist with platform walker x 40 feet     VITALS  T(C): 36.7 (02-15-24 @ 08:04), Max: 36.7 (02-15-24 @ 04:31)  HR: 93 (02-15-24 @ 08:04) (64 - 121)  BP: 108/75 (02-15-24 @ 08:04) (106/67 - 135/90)  RR: 18 (02-15-24 @ 08:04) (18 - 18)  SpO2: 96% (02-15-24 @ 08:04) (93% - 99%)  Wt(kg): --    MEDICATIONS   acetaminophen     Tablet .. 650 milliGRAM(s) every 6 hours PRN  ALPRAZolam 0.25 milliGRAM(s) two times a day PRN  aspirin enteric coated 81 milliGRAM(s) daily  cyclobenzaprine 10 milliGRAM(s) three times a day PRN  famotidine    Tablet 40 milliGRAM(s) daily PRN  gabapentin 800 milliGRAM(s) three times a day  influenza   Vaccine 0.5 milliLiter(s) once  lactulose Syrup 10 Gram(s) daily  magnesium hydroxide Suspension 30 milliLiter(s) every 12 hours PRN  nortriptyline 25 milliGRAM(s) at bedtime  oxyCODONE    IR 10 milliGRAM(s) every 4 hours PRN  oxyCODONE    IR 15 milliGRAM(s) every 8 hours PRN  pantoprazole  Injectable 40 milliGRAM(s) every 24 hours  polyethylene glycol 3350 17 Gram(s) every 24 hours  predniSONE   Tablet     riluzole 50 milliGRAM(s) two times a day  senna 2 Tablet(s) at bedtime  simethicone 80 milliGRAM(s) two times a day PRN  traMADol 50 milliGRAM(s) every 6 hours PRN      RECENT LABS - Reviewed                        12.6   10.70 )-----------( 262      ( 14 Feb 2024 06:44 )             38.6     02-14    139  |  101  |  32<H>  ----------------------------<  94  4.1   |  25  |  0.74    Ca    9.7      14 Feb 2024 06:44        Urinalysis Basic - ( 14 Feb 2024 06:44 )    Color: x / Appearance: x / SG: x / pH: x  Gluc: 94 mg/dL / Ketone: x  / Bili: x / Urobili: x   Blood: x / Protein: x / Nitrite: x   Leuk Esterase: x / RBC: x / WBC x   Sq Epi: x / Non Sq Epi: x / Bacteria: x        ----------------------------------------------------------------------------------------  PHYSICAL EXAM  Constitutional - NAD, Comfortable, in bed  +cervical collar   Chest - Breathing comfortably  Cardiovascular - S1S2   Abdomen - Soft   Extremities - No C/C/E, No calf tenderness   Neurologic Exam -   follows commands                 Cognitive - Awake, Alert, AAO to self, place, date, year, situation     Communication - Fluent, No dysarthria        Motor -                     LEFT    UE - ShAB 4/5, EF 4/5, EE 4/5, WE 2/5,  2/5                    RIGHT UE - ShAB 4/5, EF 3/5, EE 3/5, WE 1/5,  1/5                    LEFT    LE - HF 5/5, KE 4/5, DF 4/5, PF 4/5                    RIGHT LE - HF 4/5, KE 4/5, DF 3/5, PF 4/5        Sensory - Intact to LT     Psychiatric - Mood stable, Affect WNL  ----------------------------------------------------------------------------------------  ASSESSMENT/PLAN  63yMale h/o ALS with functional deficits after C3-C4 ACDF, C3-C7 posterior cervical fusion   WBAT, [place] collar at all times   ALS on riluzole   tachycardia, cardiology consulted, secondary to ALS, xanax prn   Pain - Tylenol tramadol, oxycodone, gabapentin, cyclobenzaprine   DVT PPX - SCDs ASA   Rehab - Will continue to follow for ongoing rehab needs and recommendations.    Recommend ACUTE inpatient rehabilitation for the functional deficits consisting of 3 hours of therapy/day & x 4 weeks, 24 hour RN/daily PMR physician for comorbid medical management. Patient will be able to tolerate 3 hours a day.

## 2024-02-15 NOTE — H&P ADULT - REASON FOR ADMISSION
s/p cervical fusion Cervical Myelopathy with quadriparesis s/p cervical fusion Cervical stenosis with Myelopathy/ quadriparesis s/p cervical fusion

## 2024-02-15 NOTE — PROVIDER CONTACT NOTE (OTHER) - SITUATION
Pt is tachycardic
PT is tachycardic see flowsheet
pt tachycardic -138
pt tachycardic, 
Pt refused miralax
pt refused lactulose

## 2024-02-15 NOTE — PROGRESS NOTE ADULT - ASSESSMENT
Impression: Stable       Plan:   Continue present treatment                 Out of bed, ambulate as tolerated                 Physical therapy follow up                 Continue to monitor    Roberto Ballard PA-C  Orthopaedic Surgery  Team pager [phone]  [phone]

## 2024-02-15 NOTE — DISCHARGE NOTE NURSING/CASE MANAGEMENT/SOCIAL WORK - PATIENT PORTAL LINK FT
You can access the FollowMyHealth Patient Portal offered by [facility] by registering at the following website: [email or web address]. By joining [facility]’s FollowMyHealth portal, you will also be able to view your health information using other applications (apps) compatible with our system.

## 2024-02-15 NOTE — PROGRESS NOTE ADULT - SUBJECTIVE AND OBJECTIVE BOX
Date of Service: 02-15-24 @ 09:31           CARDIOLOGY     PROGRESS  NOTE   ________________________________________________    CHIEF COMPLAINT:Patient is a 63y old  Male who presents with a chief complaint of Cervical myelopathy (15 Feb 2024 06:45)  no complain  	  REVIEW OF SYSTEMS:  CONSTITUTIONAL: No fever, weight loss, or fatigue  EYES: No eye pain, visual disturbances, or discharge  ENT:  No difficulty hearing, tinnitus, vertigo; No sinus or throat pain  NECK: No pain or stiffness  RESPIRATORY: No cough, wheezing, chills or hemoptysis; No Shortness of Breath  CARDIOVASCULAR: No chest pain, palpitations, passing out, dizziness, or leg swelling  GASTROINTESTINAL: No abdominal or epigastric pain. No nausea, vomiting, or hematemesis; No diarrhea or constipation. No melena or hematochezia.  GENITOURINARY: No dysuria, frequency, hematuria, or incontinence  NEUROLOGICAL: No headaches, memory loss, loss of strength, numbness, or tremors  SKIN: No itching, burning, rashes, or lesions   LYMPH Nodes: No enlarged glands  ENDOCRINE: No heat or cold intolerance; No hair loss  MUSCULOSKELETAL: No joint pain or swelling; No muscle, back, or extremity pain  PSYCHIATRIC: No depression, anxiety, mood swings, or difficulty sleeping  HEME/LYMPH: No easy bruising, or bleeding gums  ALLERGY AND IMMUNOLOGIC: No hives or eczema	    [x ] All others negative	  [ ] Unable to obtain    PHYSICAL EXAM:  T(C): 36.7 (02-15-24 @ 08:04), Max: 36.7 (02-15-24 @ 04:31)  HR: 93 (02-15-24 @ 08:04) (64 - 121)  BP: 108/75 (02-15-24 @ 08:04) (106/67 - 135/90)  RR: 18 (02-15-24 @ 08:04) (18 - 18)  SpO2: 96% (02-15-24 @ 08:04) (93% - 99%)  Wt(kg): --  I&O's Summary    14 Feb 2024 07:01  -  15 Feb 2024 07:00  --------------------------------------------------------  IN: 240 mL / OUT: 0 mL / NET: 240 mL        Appearance: Normal	  HEENT:   Normal oral mucosa, PERRL, EOMI	  Lymphatic: No lymphadenopathy  Cardiovascular: Normal S1 S2, No JVD, No murmurs, No edema  Respiratory: Lungs clear to auscultation	  Psychiatry: A & O x 3, Mood & affect appropriate  Gastrointestinal:  Soft, Non-tender, + BS	  Skin: No rashes, No ecchymoses, No cyanosis	  Neurologic: Non-focal  Extremities: Normal range of motion, No clubbing, cyanosis or edema  Vascular: Peripheral pulses palpable 2+ bilaterally    MEDICATIONS  (STANDING):  aspirin enteric coated 81 milliGRAM(s) Oral daily  gabapentin 800 milliGRAM(s) Oral three times a day  influenza   Vaccine 0.5 milliLiter(s) IntraMuscular once  lactulose Syrup 10 Gram(s) Oral daily  nortriptyline 25 milliGRAM(s) Oral at bedtime  pantoprazole  Injectable 40 milliGRAM(s) IV Push every 24 hours  polyethylene glycol 3350 17 Gram(s) Oral every 24 hours  predniSONE   Tablet   Oral   riluzole 50 milliGRAM(s) Oral two times a day  senna 2 Tablet(s) Oral at bedtime      TELEMETRY: 	    ECG:  	  RADIOLOGY:  OTHER: 	  	  LABS:	 	    CARDIAC MARKERS:                                12.6   10.70 )-----------( 262      ( 14 Feb 2024 06:44 )             38.6     02-14    139  |  101  |  32<H>  ----------------------------<  94  4.1   |  25  |  0.74    Ca    9.7      14 Feb 2024 06:44      proBNP:   Lipid Profile:   HgA1c:   TSH:         Assessment and plan  ---------------------------

## 2024-02-15 NOTE — PROGRESS NOTE ADULT - SUBJECTIVE AND OBJECTIVE BOX
ORTHO  Patient is a 63y old  Male who presents with a chief complaint of Other degeneration of cervical disc    Spinal stenosis of cervical region     (14 Feb 2024 14:01)    Pt. resting without complaint    VS-  T(C): 36.7 (02-15-24 @ 04:31), Max: 36.7 (02-15-24 @ 04:31)  HR: 90 (02-15-24 @ 04:31) (64 - 121)  BP: 106/67 (02-15-24 @ 04:31) (106/67 - 135/90)  RR: 18 (02-15-24 @ 04:31) (18 - 18)  SpO2: 99% (02-15-24 @ 04:31) (93% - 99%)  Wt(kg): --    M.S. A&O  [place] cervical collar in place- dressings C/D/I  Neuro-             Motor:       C5   C6   C7   C8   T1  L   4+/5  4/5  4/5  2/5  2/5  R   4/5  3/5  3/5  1/5  1/5         L2   L3    L4   L5   S1  L   5/5  5/5  3/5  3/5  4/5  R   4/5  5/5  3/5  3/5  4/5    Sensory:       C5   C6   C7   C8   T1  L    2     2     2     2     2   R    2     2     2     2     2          L2   L3    L4   L5   S1  L    2     2     2     2     2   R    2     2     2     2     2   Calves- soft, nontender- PAS                               12.6   10.70 )-----------( 262      ( 14 Feb 2024 06:44 )             38.6     02-14    139  |  101  |  32<H>  ----------------------------<  94  4.1   |  25  |  0.74    Ca    9.7      14 Feb 2024 06:44

## 2024-02-15 NOTE — H&P ADULT - NS ATTEND AMEND GEN_ALL_CORE FT
I have personally seen and examined the patient. Medical records reviewed. I have made amendments to the documentation where necessary and adjusted the history, physical examination, and plan as documented by the NP.

## 2024-02-15 NOTE — H&P ADULT - NSHPPHYSICALEXAM_GEN_ALL_CORE
PHYSICAL EXAM  VITALS  T(C): 36.5 (02-15-24 @ 16:05), Max: 36.7 (02-15-24 @ 04:31)  HR: 110 (02-15-24 @ 18:49) (64 - 126)  BP: 132/92 (02-15-24 @ 18:49) (106/67 - 135/90)  RR: 18 (02-15-24 @ 18:49) (18 - 18)  SpO2: 95% (02-15-24 @ 18:49) (93% - 99%)    Gen - NAD, Comfortable  HEENT - NCAT, EOMI, MMM  Neck - Supple, No limited ROM  Pulm - CTAB, No wheeze, No rhonchi  Cardiovascular - RRR, S1S2  Chest - good chest expansion, good respiratory effort  Abdomen - Soft, NT/ND, +BS  Extremities - No Cyanosis, no clubbing, no edema, no calf tenderness  Neuro-     Cognitive - awake, alert, oriented to person, place, date, year, and situation.  Able to follow command     Communication - Fluent, Comprehensible     Attention: Intact     Memory: Memory intact     Cranial Nerves - Decreased shoulder shrug, EOMI, face symmetric,                     LEFT    UE - ShAB 4/5, EF 4/5, EE 4/5,  2/5                    RIGHT UE - ShAB 4/5, EF 3/5, EE 3/5,   1/5                    LEFT    LE - HF 5/5, KE 4/5, DF 4/5, PF 4/5                    RIGHT LE - HF 5/5, KE 4/5, DF 3/5, PF4/5        Sensory - Decreased to left knee       Reflexes - DTR Intact, No primitive reflexive     Coordination - FTN impaired      Tone - normal  Psychiatric - Mood stable, Affect WNL  Skin:  Anterior and posterior cervical spine PHYSICAL EXAM  VITALS  T(C): 36.5 (02-15-24 @ 16:05), Max: 36.7 (02-15-24 @ 04:31)  HR: 110 (02-15-24 @ 18:49) (64 - 126)  BP: 132/92 (02-15-24 @ 18:49) (106/67 - 135/90)  RR: 18 (02-15-24 @ 18:49) (18 - 18)  SpO2: 95% (02-15-24 @ 18:49) (93% - 99%)    Gen - NAD, Comfortable  HEENT - NCAT, EOMI, MMM  Neck - Supple, No limited ROM  Pulm - CTAB, No wheeze, No rhonchi  Cardiovascular - RRR, S1S2  Chest - good chest expansion, good respiratory effort  Abdomen - Soft, NT/ND, +BS  Extremities - No Cyanosis, no clubbing, no edema, no calf tenderness  Neuro-     Cognitive - awake, alert, oriented to person, place, date, year, and situation.  Able to follow command     Communication - Fluent, Comprehensible     Attention: Intact     Memory: Memory intact     Cranial Nerves - Decreased shoulder shrug, EOMI, face symmetric                     LEFT    UE - ShAB 4/5, EF 4/5, EE 4/5,  2/5                    RIGHT UE - ShAB 4/5, EF 3/5, EE 3/5,   1/5                    LEFT    LE - HF 5/5, KE 4/5, DF 4/5, PF 4/5                    RIGHT LE - HF 5/5, KE 4/5, DF 3/5, PF4/5        Sensory - Decreased to left knee       Reflexes - DTR Intact, No primitive reflexive     Coordination - FTN impaired      Tone - normal  Psychiatric - Mood stable, Affect WNL  Skin:  Anterior and posterior cervical spine PHYSICAL EXAM  VITALS  T(C): 36.5 (02-15-24 @ 16:05), Max: 36.7 (02-15-24 @ 04:31)  HR: 110 (02-15-24 @ 18:49) (64 - 126)  BP: 132/92 (02-15-24 @ 18:49) (106/67 - 135/90)  RR: 18 (02-15-24 @ 18:49) (18 - 18)  SpO2: 95% (02-15-24 @ 18:49) (93% - 99%)    Gen - NAD, Comfortable  HEENT - NCAT, EOMI, VIELKA  Neck - Supple, No limited ROM  Pulm - CTAB, No wheeze, No rhonchi, No crackle   Cardiovascular - RRR, S1S2  Chest - good chest expansion, good respiratory effort  Abdomen - Soft, NT, ND, +BS  Extremities - No Cyanosis, no clubbing, no edema, no calf tenderness, B/L intrinsic muscles atrophy   Neuro-     Cognitive - awake, alert, oriented to person, place, date, year, and situation.  Able to follow command     Communication - Fluent, Comprehensible     Attention: Intact     Memory: Memory intact     Cranial Nerves - Decreased shoulder shrug, EOMI, face symmetric                     LEFT    UE - ShAB 4-/5, EF 4-/5, EE 4-/5,  2+/5                    RIGHT UE - ShAB 3-/5, EF 3-/5, EE 2+/5,   2-/5                    LEFT    LE - HF 4-/5, KE 4-/5, DF 2+/5, PF 2+/5                    RIGHT LE - HF 4-/5, KE 4-/5, DF 2-/5, PF2+/5        Sensory - Decreased to left knee, B/L last 2 fingers        Reflexes - DTR Hyporeflexia      Coordination - FTN impaired   Psychiatric - Mood stable, Affect WNL  Skin:  Anterior and posterior cervical spine, healed well.

## 2024-02-15 NOTE — H&P ADULT - NSHPREVIEWOFSYSTEMS_GEN_ALL_CORE
REVIEW OF SYSTEMS  Constitutional: No fever, No Chills, No fatigue  HEENT: No eye pain, No visual disturbances, No difficulty hearing  Pulm: No cough,  No shortness of breath  Cardio: No chest pain, No palpitations  GI:  No abdominal pain, No nausea, No vomiting, No diarrhea, No constipation  : No dysuria, No frequency, No hematuria  Neuro: No headaches, No memory loss, + loss of strength, no numbness, No tremors  Skin: No itching, No rashes, No lesions   Endo: No temperature intolerance  MSK: No joint pain, No joint swelling, No muscle pain, No Neck pain,  No back pain  Psych:  No depression, No anxiety REVIEW OF SYSTEMS  Constitutional: No fever, No Chills, No fatigue  HEENT: No eye pain, No visual disturbances, No difficulty hearing  Pulm: No cough,  No shortness of breath  Cardio: No chest pain, No palpitations  GI:  No abdominal pain, No nausea, No vomiting, No diarrhea, No constipation  : No dysuria, No frequency, No hematuria  Neuro: No headaches, No memory loss,  (+) loss of strength, no numbness, No tremors  Skin: No itching, No rashes, No lesions   Endo: No temperature intolerance  MSK: No joint pain, No joint swelling, No muscle pain, No Neck pain,  No back pain  Psych:  No depression, No anxiety

## 2024-02-16 DIAGNOSIS — M47.12 OTHER SPONDYLOSIS WITH MYELOPATHY, CERVICAL REGION: ICD-10-CM

## 2024-02-16 LAB
ALBUMIN SERPL ELPH-MCNC: 3 G/DL — LOW (ref 3.3–5)
ALP SERPL-CCNC: 78 U/L — SIGNIFICANT CHANGE UP (ref 40–120)
ALT FLD-CCNC: 41 U/L — SIGNIFICANT CHANGE UP (ref 10–45)
ANION GAP SERPL CALC-SCNC: 8 MMOL/L — SIGNIFICANT CHANGE UP (ref 5–17)
AST SERPL-CCNC: 17 U/L — SIGNIFICANT CHANGE UP (ref 10–40)
BASOPHILS # BLD AUTO: 0.05 K/UL — SIGNIFICANT CHANGE UP (ref 0–0.2)
BASOPHILS NFR BLD AUTO: 0.5 % — SIGNIFICANT CHANGE UP (ref 0–2)
BILIRUB SERPL-MCNC: 0.7 MG/DL — SIGNIFICANT CHANGE UP (ref 0.2–1.2)
BUN SERPL-MCNC: 26 MG/DL — HIGH (ref 7–23)
CALCIUM SERPL-MCNC: 9.3 MG/DL — SIGNIFICANT CHANGE UP (ref 8.4–10.5)
CHLORIDE SERPL-SCNC: 101 MMOL/L — SIGNIFICANT CHANGE UP (ref 96–108)
CO2 SERPL-SCNC: 29 MMOL/L — SIGNIFICANT CHANGE UP (ref 22–31)
CREAT SERPL-MCNC: 0.93 MG/DL — SIGNIFICANT CHANGE UP (ref 0.5–1.3)
EGFR: 93 ML/MIN/1.73M2 — SIGNIFICANT CHANGE UP
EOSINOPHIL # BLD AUTO: 0.15 K/UL — SIGNIFICANT CHANGE UP (ref 0–0.5)
EOSINOPHIL NFR BLD AUTO: 1.5 % — SIGNIFICANT CHANGE UP (ref 0–6)
GLUCOSE SERPL-MCNC: 91 MG/DL — SIGNIFICANT CHANGE UP (ref 70–99)
HCT VFR BLD CALC: 38.4 % — LOW (ref 39–50)
HGB BLD-MCNC: 13.1 G/DL — SIGNIFICANT CHANGE UP (ref 13–17)
IMM GRANULOCYTES NFR BLD AUTO: 1.3 % — HIGH (ref 0–0.9)
LYMPHOCYTES # BLD AUTO: 3.02 K/UL — SIGNIFICANT CHANGE UP (ref 1–3.3)
LYMPHOCYTES # BLD AUTO: 30.4 % — SIGNIFICANT CHANGE UP (ref 13–44)
MCHC RBC-ENTMCNC: 29.4 PG — SIGNIFICANT CHANGE UP (ref 27–34)
MCHC RBC-ENTMCNC: 34.1 GM/DL — SIGNIFICANT CHANGE UP (ref 32–36)
MCV RBC AUTO: 86.3 FL — SIGNIFICANT CHANGE UP (ref 80–100)
MONOCYTES # BLD AUTO: 0.86 K/UL — SIGNIFICANT CHANGE UP (ref 0–0.9)
MONOCYTES NFR BLD AUTO: 8.7 % — SIGNIFICANT CHANGE UP (ref 2–14)
NEUTROPHILS # BLD AUTO: 5.71 K/UL — SIGNIFICANT CHANGE UP (ref 1.8–7.4)
NEUTROPHILS NFR BLD AUTO: 57.6 % — SIGNIFICANT CHANGE UP (ref 43–77)
NRBC # BLD: 0 /100 WBCS — SIGNIFICANT CHANGE UP (ref 0–0)
PLATELET # BLD AUTO: 285 K/UL — SIGNIFICANT CHANGE UP (ref 150–400)
POTASSIUM SERPL-MCNC: 4 MMOL/L — SIGNIFICANT CHANGE UP (ref 3.5–5.3)
POTASSIUM SERPL-SCNC: 4 MMOL/L — SIGNIFICANT CHANGE UP (ref 3.5–5.3)
PROT SERPL-MCNC: 6.2 G/DL — SIGNIFICANT CHANGE UP (ref 6–8.3)
RBC # BLD: 4.45 M/UL — SIGNIFICANT CHANGE UP (ref 4.2–5.8)
RBC # FLD: 13.4 % — SIGNIFICANT CHANGE UP (ref 10.3–14.5)
SODIUM SERPL-SCNC: 138 MMOL/L — SIGNIFICANT CHANGE UP (ref 135–145)
WBC # BLD: 9.92 K/UL — SIGNIFICANT CHANGE UP (ref 3.8–10.5)
WBC # FLD AUTO: 9.92 K/UL — SIGNIFICANT CHANGE UP (ref 3.8–10.5)

## 2024-02-16 PROCEDURE — 99223 1ST HOSP IP/OBS HIGH 75: CPT

## 2024-02-16 PROCEDURE — 99222 1ST HOSP IP/OBS MODERATE 55: CPT | Mod: GC

## 2024-02-16 RX ADMIN — GABAPENTIN 800 MILLIGRAM(S): 400 CAPSULE ORAL at 06:27

## 2024-02-16 RX ADMIN — RILUZOLE 50 MILLIGRAM(S): 50 TABLET ORAL at 06:27

## 2024-02-16 RX ADMIN — GABAPENTIN 800 MILLIGRAM(S): 400 CAPSULE ORAL at 22:03

## 2024-02-16 RX ADMIN — Medication 81 MILLIGRAM(S): at 11:24

## 2024-02-16 RX ADMIN — NORTRIPTYLINE HYDROCHLORIDE 25 MILLIGRAM(S): 10 CAPSULE ORAL at 22:03

## 2024-02-16 RX ADMIN — FAMOTIDINE 40 MILLIGRAM(S): 10 INJECTION INTRAVENOUS at 14:10

## 2024-02-16 RX ADMIN — RILUZOLE 50 MILLIGRAM(S): 50 TABLET ORAL at 17:23

## 2024-02-16 RX ADMIN — Medication 20 MILLIGRAM(S): at 06:26

## 2024-02-16 RX ADMIN — GABAPENTIN 800 MILLIGRAM(S): 400 CAPSULE ORAL at 14:09

## 2024-02-16 NOTE — DIETITIAN INITIAL EVALUATION ADULT - FACTORS AFF FOOD INTAKE
States Fair Intake over Last  Consuming Less than Prior to Admission to Hospital (Per Patient)/persistent lack of appetite

## 2024-02-16 NOTE — DIETITIAN INITIAL EVALUATION ADULT - PERTINENT MEDS FT
MEDICATIONS  (STANDING):  aspirin enteric coated 81 milliGRAM(s) Oral daily  gabapentin 800 milliGRAM(s) Oral three times a day  lactulose Syrup 10 Gram(s) Oral daily  nortriptyline 25 milliGRAM(s) Oral at bedtime  polyethylene glycol 3350 17 Gram(s) Oral every 24 hours  predniSONE   Tablet 20 milliGRAM(s) Oral daily  riluzole 50 milliGRAM(s) Oral two times a day  senna 2 Tablet(s) Oral at bedtime    MEDICATIONS  (PRN):  acetaminophen     Tablet .. 650 milliGRAM(s) Oral every 6 hours PRN Mild Pain (1 - 3)  ALPRAZolam 0.25 milliGRAM(s) Oral two times a day PRN anxiety  cyclobenzaprine 10 milliGRAM(s) Oral three times a day PRN Muscle Spasm  famotidine    Tablet 40 milliGRAM(s) Oral daily PRN indigestion  magnesium hydroxide Suspension 30 milliLiter(s) Oral every 12 hours PRN Constipation  oxyCODONE    IR 10 milliGRAM(s) Oral every 4 hours PRN Moderate Pain (4 - 6)  oxyCODONE    IR 15 milliGRAM(s) Oral every 8 hours PRN Severe Pain (7 - 10)  simethicone 80 milliGRAM(s) Chew two times a day PRN Indigestion  traMADol 50 milliGRAM(s) Oral every 6 hours PRN Breakthrough Pain

## 2024-02-16 NOTE — PATIENT PROFILE ADULT - FALL HARM RISK - HARM RISK INTERVENTIONS
Assistance with ambulation/Assistance OOB with selected safe patient handling equipment/Communicate Risk of Fall with Harm to all staff/Discuss with provider need for PT consult/Monitor gait and stability/Reinforce activity limits and safety measures with patient and family/Tailored Fall Risk Interventions/Visual Cue: Yellow wristband and red socks/Bed in lowest position, wheels locked, appropriate side rails in place/Call bell, personal items and telephone in reach/Instruct patient to call for assistance before getting out of bed or chair/Non-slip footwear when patient is out of bed/Damar to call system/Physically safe environment - no spills, clutter or unnecessary equipment/Purposeful Proactive Rounding/Room/bathroom lighting operational, light cord in reach

## 2024-02-16 NOTE — DIETITIAN INITIAL EVALUATION ADULT - PERTINENT LABORATORY DATA
02-16    138  |  101  |  26<H>  ----------------------------<  91  4.0   |  29  |  0.93    Ca    9.3      16 Feb 2024 06:00    TPro  6.2  /  Alb  3.0<L>  /  TBili  0.7  /  DBili  x   /  AST  17  /  ALT  41  /  AlkPhos  78  02-16  A1C with Estimated Average Glucose Result: 5.7 % (02-01-24 @ 18:36)  A1C with Estimated Average Glucose Result: 5.6 % (11-16-23 @ 12:21)

## 2024-02-16 NOTE — DIETITIAN INITIAL EVALUATION ADULT - PHYSCIAL ASSESSMENT
States Weight Stable over Last 3 Months (Per Patient)   Previously Lost 90lb From Dieting  over Last 2 Years Prior(Per Patient)

## 2024-02-16 NOTE — DIETITIAN INITIAL EVALUATION ADULT - ADD RECOMMEND
1) Monitor Weights, Intake, Tolerance, Skin & Labwork  2) Ensure Max 8oz Daily  3) Education Provided on Proper Nutrition    4) Continue Nutrition Plan of Care

## 2024-02-16 NOTE — DIETITIAN INITIAL EVALUATION ADULT - ORAL INTAKE PTA/DIET HISTORY
Patient Does Not Follow Diet @Home  Consumes 1Meal a Day   Usually Cooks For Self Typically Easy to Make Meals  Doesn't Take Vitamin/Supplements @Home

## 2024-02-16 NOTE — PATIENT PROFILE ADULT - INTERNATIONAL TRAVEL
ST monitoring infant's feeding skills. Mother reports increased periods of alertness and interest in nippling. Infant showing increased times/quality with breastfeeding and increased nippling volumes over past 24 hours are above 60%. ST recommended bringing in home bottles. Mother reported that they ordered bottles which should be in the next couple of days. Discussed trialing home bottle with RN/ST upon arrival.     No

## 2024-02-16 NOTE — CONSULT NOTE ADULT - ASSESSMENT
ASSESSMENT/PLAN  This is a 63 year old male presents PMHx including ALS (diagnosed 2023 at [facility]),  ACDF C4-5 on 07/12/23 and C4-C6 ACDF 8/2016, decreased ROM of right shoulder, chronic pain (daily marijuana user) and right  weakness (unable to extend fingers of right hand). Patient underwent C3-C4 ACDF/C3-C7 Posterior fusion without complications on 2/8/24 with Dr. Eliud Quinones.   Patient now with gait Instability, ADL impairments and Functional impairments.    #s/p cervical fusion  - C3-C4 ACDF/C3-C7 Posterior fusion without complications on 2/8/24  - Prednisone 20mg daily x 3 days ( 2/16-2/18) then 10mg daily x 3 days ( 2/19-2/21)   - Aspen collar at all times     #ALS  - c/w Riluzole 50mg BID- non formulary; pharmacy will try to obtain    #Tachycardiac  -Cardiology evaluation at [facility]. No organic cause identified  -Likely anxiety driven  -Monitor for now     #DVT ppx  - SCD, TEDs

## 2024-02-16 NOTE — DIETITIAN INITIAL EVALUATION ADULT - NS FNS DIET ORDER
High Fiber Diet w/ Thin Liquids (IDDSI Level 0)   Recommend Initiate Ensure Max 8oz Daily  Education Provided on Proper Nutrition

## 2024-02-17 PROCEDURE — 99232 SBSQ HOSP IP/OBS MODERATE 35: CPT | Mod: GC

## 2024-02-17 RX ADMIN — RILUZOLE 50 MILLIGRAM(S): 50 TABLET ORAL at 06:02

## 2024-02-17 RX ADMIN — GABAPENTIN 800 MILLIGRAM(S): 400 CAPSULE ORAL at 21:51

## 2024-02-17 RX ADMIN — GABAPENTIN 800 MILLIGRAM(S): 400 CAPSULE ORAL at 13:00

## 2024-02-17 RX ADMIN — OXYCODONE HYDROCHLORIDE 10 MILLIGRAM(S): 5 TABLET ORAL at 12:44

## 2024-02-17 RX ADMIN — GABAPENTIN 800 MILLIGRAM(S): 400 CAPSULE ORAL at 06:02

## 2024-02-17 RX ADMIN — Medication 20 MILLIGRAM(S): at 06:03

## 2024-02-17 RX ADMIN — Medication 81 MILLIGRAM(S): at 11:41

## 2024-02-17 RX ADMIN — RILUZOLE 50 MILLIGRAM(S): 50 TABLET ORAL at 17:50

## 2024-02-17 RX ADMIN — NORTRIPTYLINE HYDROCHLORIDE 25 MILLIGRAM(S): 10 CAPSULE ORAL at 21:52

## 2024-02-17 RX ADMIN — OXYCODONE HYDROCHLORIDE 10 MILLIGRAM(S): 5 TABLET ORAL at 11:44

## 2024-02-17 NOTE — PROGRESS NOTE ADULT - SUBJECTIVE AND OBJECTIVE BOX
CC:  neck pain and weakness     HPI:  This is a 63 year old male presents PMHx including ALS (diagnosed 2023 at [facility]),  ACDF C4-5 on 07/12/23 and C4-C6 ACDF 8/2016. decreased ROM of right shoulder, chronic pain (daily marijuana user) and right  weakness (unable to extend fingers of right hand). Patient underwent C3-C4 ACDF/C3-C7 Posterior fusion without complications on 2/8/24 with Dr. Eliud Quinones.     Patient was evaluated by PM&R and therapy for functional deficits, gait/ADL impairments and acute rehabilitation was recommended. Patient was medically optimized for discharge to [facility] IRU on 2/15/24.     Allergies:  Pt has allergy to mustard (Anaphylaxis)  No Known Drug Allergies    Subjective:  - Patient was seen and examined at bed side, comfortable in his bed.  No overnight events  - Slept well last night, no new complaints  - Pain is well controlled with current  regimen  - GI/, LBM (02/15), voiding without issues   - Tolerating and participating in 3 hours of daily therapy.       ROS:  - Denies CP, palpitation, SOB, cough, fever, chills, abdominal pain, N/V/D/C, joint pain or swelling, dysuria, (+) neck pain    MEDICATIONS  (STANDING):  aspirin enteric coated 81 milliGRAM(s) Oral daily  gabapentin 800 milliGRAM(s) Oral three times a day  lactulose Syrup 10 Gram(s) Oral daily  nortriptyline 25 milliGRAM(s) Oral at bedtime  polyethylene glycol 3350 17 Gram(s) Oral every 24 hours  predniSONE   Tablet 20 milliGRAM(s) Oral daily  riluzole 50 milliGRAM(s) Oral two times a day  senna 2 Tablet(s) Oral at bedtime    MEDICATIONS  (PRN):  acetaminophen     Tablet .. 650 milliGRAM(s) Oral every 6 hours PRN Mild Pain (1 - 3)  ALPRAZolam 0.25 milliGRAM(s) Oral two times a day PRN anxiety  cyclobenzaprine 10 milliGRAM(s) Oral three times a day PRN Muscle Spasm  famotidine    Tablet 40 milliGRAM(s) Oral daily PRN indigestion  magnesium hydroxide Suspension 30 milliLiter(s) Oral every 12 hours PRN Constipation  oxyCODONE    IR 10 milliGRAM(s) Oral every 4 hours PRN Moderate Pain (4 - 6)  oxyCODONE    IR 15 milliGRAM(s) Oral every 8 hours PRN Severe Pain (7 - 10)  simethicone 80 milliGRAM(s) Chew two times a day PRN Indigestion  traMADol 50 milliGRAM(s) Oral every 6 hours PRN Breakthrough Pain    LAB:                         13.1   9.92  )-----------( 285      ( 16 Feb 2024 06:00 )             38.4     02-16    138  |  101  |  26<H>  ----------------------------<  91  4.0   |  29  |  0.93    Ca    9.3      16 Feb 2024 06:00    TPro  6.2  /  Alb  3.0<L>  /  TBili  0.7  /  DBili  x   /  AST  17  /  ALT  41  /  AlkPhos  78  02-16    LIVER FUNCTIONS - ( 16 Feb 2024 06:00 )  Alb: 3.0 g/dL / Pro: 6.2 g/dL / ALK PHOS: 78 U/L / ALT: 41 U/L / AST: 17 U/L / GGT: x           Radiology:  CT Abdomen and Pelvis w/ IV Cont (02.14.24 @ 09:42)   IMPRESSION:  No evidence of venous thromboembolism as clinically questioned.  No acute pathology.    CT Angio Chest PE Protocol w/ IV Cont (02.13.24 @ 19:33)   IMPRESSION:  No acute pulmonary embolism to the segmental branches. Limited assessment   of the subsegmental branches due to suboptimal contrast bolus.  Bibasilar atelectasis    PHYSICAL EXAM  Vital Signs Last 24 Hrs  T(C): 36.4 (17 Feb 2024 08:32), Max: 37 (16 Feb 2024 20:02)  T(F): 97.6 (17 Feb 2024 08:32), Max: 98.6 (16 Feb 2024 20:02)  HR: 82 (17 Feb 2024 08:32) (82 - 95)  BP: 108/74 (17 Feb 2024 08:32) (108/74 - 148/83)  RR: 15 (17 Feb 2024 08:32) (15 - 16)  SpO2: 94% (17 Feb 2024 08:32) (94% - 96%)    Gen - NAD, Comfortable  HEENT - NCAT, EOMI, VIELKA  Neck - Supple, No limited ROM  Pulm - CTAB,  No crackle   Cardiovascular - RRR, S1S2  Chest - good chest expansion, good respiratory effort  Abdomen - Soft, NT, ND, +BS  Extremities - No Cyanosis, no clubbing, no edema, no calf tenderness, B/L intrinsic muscles atrophy   Neuro-     Cognitive - awake, alert, oriented to person, place, date, year, and situation.  Able to follow command     Communication - Fluent, Comprehensible     Attention: Intact     Memory: Memory intact     Cranial Nerves - Decreased shoulder shrug, EOMI, face symmetric                     LEFT    UE - ShAB 4-/5, EF 4-/5, EE 4-/5,  2+/5                    RIGHT UE - ShAB 3-/5, EF 3-/5, EE 2+/5,   2-/5                    LEFT    LE - HF 4-/5, KE 4-/5, DF 2+/5, PF 2+/5                    RIGHT LE - HF 4-/5, KE 4-/5, DF 2-/5, PF2+/5        Sensory - Decreased to left knee, B/L last 2 fingers        Reflexes - DTR Hyporeflexia      Coordination - FTN impaired   Psychiatric - Mood stable, Affect WNL  Skin:  Anterior and posterior cervical spine, healed well

## 2024-02-17 NOTE — PROGRESS NOTE ADULT - ASSESSMENT
ASSESSMENT/PLAN  This is a 63 year old male presents PMHx including ALS (diagnosed 2023 at [facility]),  ACDF C4-5 on 07/12/23 and C4-C6 ACDF 8/2016, decreased ROM of right shoulder, chronic pain (daily marijuana user) and right  weakness (unable to extend fingers of right hand). Patient underwent C3-C4 ACDF/C3-C7 Posterior fusion without complications on 2/8/24 with Dr. Eliud Quinones.   Patient now with gait Instability, ADL impairments and Functional impairments.    #Cervical Myelopathy with quadriparesis s/p cervical fusion  - C3-C4 ACDF/C3-C7 Posterior fusion without complications on 2/8/24  - Comprehensive Rehab Program: PT/OT, 3hours daily and 5 days weekly  - PT: Focused on improving strength, endurance, coordination, balance, functional mobility, and transfers  - OT: Focused on improving strength, fine motor skills, coordination, posture and ADLs.    - Prednisone 20mg daily x 3 days ( 2/16-2/18) then 10mg daily x 3 days ( 2/19-2/21)   - pain management: Flexeril 10mg TID, Pamelor, gabapentin 800mg TID  - [place] collar at all times     #ALS  - c/w Riluzole 50mg BID- non formulary; pharmacy will try to obtain    #Pain management  - Tylenol PRN, Oxycodone PRN, tramadol 50mg     #DVT ppx  - SCD, TEDs    #GI ppx  - Pepcid 20mg     #Bowel Regimen  - Senna, miralax PRN    #Bladder management  - BS on admission x 1, Done   - Monitor UO, voiding without issues     #FEN   - Diet: Regular- high fiber    #Skin:  - Skin on admission: Anterior and posterior cervical spine incision covered with dry dressing, anterior incision with surrounding erythema    #Mood  #Anxiety  - Alprazolam 0.25mg BID PRN  - Neuropsychology consult PRN    #Precaution  - Fall, Aspiration, Spinal    #GOC  CODE STATUS: FULL CODE     Outpatient Follow-up (Specialty/Name of physician):    Eliud Quinones  Orthopaedic Surgery  [address], [address]  [place] [address]  Phone: [phone]  Fax: [phone]  Follow Up Time:    [facility]  [facility] [address]  Scheduled Appointment: 02/21/2024

## 2024-02-18 PROCEDURE — 99232 SBSQ HOSP IP/OBS MODERATE 35: CPT

## 2024-02-18 PROCEDURE — 99232 SBSQ HOSP IP/OBS MODERATE 35: CPT | Mod: GC

## 2024-02-18 RX ADMIN — RILUZOLE 50 MILLIGRAM(S): 50 TABLET ORAL at 06:37

## 2024-02-18 RX ADMIN — GABAPENTIN 800 MILLIGRAM(S): 400 CAPSULE ORAL at 13:03

## 2024-02-18 RX ADMIN — Medication 20 MILLIGRAM(S): at 06:37

## 2024-02-18 RX ADMIN — GABAPENTIN 800 MILLIGRAM(S): 400 CAPSULE ORAL at 06:37

## 2024-02-18 RX ADMIN — NORTRIPTYLINE HYDROCHLORIDE 25 MILLIGRAM(S): 10 CAPSULE ORAL at 22:03

## 2024-02-18 RX ADMIN — CYCLOBENZAPRINE HYDROCHLORIDE 10 MILLIGRAM(S): 10 TABLET, FILM COATED ORAL at 04:25

## 2024-02-18 RX ADMIN — RILUZOLE 50 MILLIGRAM(S): 50 TABLET ORAL at 17:07

## 2024-02-18 RX ADMIN — GABAPENTIN 800 MILLIGRAM(S): 400 CAPSULE ORAL at 22:03

## 2024-02-18 RX ADMIN — Medication 81 MILLIGRAM(S): at 11:06

## 2024-02-18 NOTE — PROGRESS NOTE ADULT - ASSESSMENT
63 year old male presents PMHx including ALS (diagnosed 2023 at [facility]),  ACDF C4-5 on 07/12/23 and C4-C6 ACDF 8/2016, decreased ROM of right shoulder, chronic pain (daily marijuana user) and right  weakness (unable to extend fingers of right hand). Patient underwent C3-C4 ACDF/C3-C7 Posterior fusion without complications on 2/8/24 with Dr. Eliud Quinones.   Patient now with gait Instability, ADL impairments and Functional impairments.    #s/p cervical fusion  - C3-C4 ACDF/C3-C7 Posterior fusion without complications on 2/8/24  - Prednisone 20mg daily x 3 days ( 2/16-2/18) then 10mg daily x 3 days ( 2/19-2/21)   - Aspen collar at all times     #ALS  - c/w Riluzole 50mg BID- non formulary; pharmacy will try to obtain    #Tachycardiac  -Cardiology evaluation at [facility]. No organic cause identified  -Likely anxiety driven      #DVT ppx- SCD, TEDs

## 2024-02-18 NOTE — PROGRESS NOTE ADULT - ASSESSMENT
ASSESSMENT/PLAN  This is a 63 year old male presents PMHx including ALS (diagnosed 2023 at [facility]),  ACDF C4-5 on 07/12/23 and C4-C6 ACDF 8/2016, decreased ROM of right shoulder, chronic pain (daily marijuana user) and right  weakness (unable to extend fingers of right hand). Patient underwent C3-C4 ACDF/C3-C7 Posterior fusion without complications on 2/8/24 with Dr. Eliud Quinones.   Patient now with gait Instability, ADL impairments and Functional impairments.    #Cervical Myelopathy with quadriparesis s/p cervical fusion  - C3-C4 ACDF/C3-C7 Posterior fusion without complications on 2/8/24  - Comprehensive Rehab Program: PT/OT, 3hours daily and 5 days weekly  - PT: Focused on improving strength, endurance, coordination, balance, functional mobility, and transfers  - OT: Focused on improving strength, fine motor skills, coordination, posture and ADLs.    - Prednisone 20mg daily x 3 days ( 2/16-2/18) then 10mg daily x 3 days ( 2/19-2/21)   - pain management: Flexeril 10mg TID, Pamelor, gabapentin 800mg TID  - [place] collar at all times     #ALS  - c/w Riluzole 50mg BID- non formulary; pharmacy will try to obtain    #Pain management  - Tylenol PRN, Oxycodone PRN, tramadol 50mg     #DVT ppx  - SCD, TEDs    #GI ppx  - Pepcid 20mg     #Bowel Regimen  - Senna, miralax PRN    #Bladder management  - BS on admission x 1, Done   - Monitor UO, voiding without issues     #FEN   - Diet: Regular- high fiber    #Skin:  - Skin on admission: Anterior and posterior cervical spine incision covered with dry dressing, anterior incision with surrounding erythema    #Mood  #Anxiety  - Alprazolam 0.25mg BID PRN  - Neuropsychology consult PRN    #Precaution  - Fall, Aspiration, Spinal    #GOC  CODE STATUS: FULL CODE     Outpatient Follow-up (Specialty/Name of physician):    Eliud Quinones  Orthopaedic Surgery  [address] [facility], [address]  [place] [address]  Phone: [phone]  Fax: [phone]  Follow Up Time:    [facility]  [facility] [address]  Scheduled Appointment: 02/21/2024

## 2024-02-18 NOTE — PROGRESS NOTE ADULT - SUBJECTIVE AND OBJECTIVE BOX
Patient is a 63y old  Male who presents with a chief complaint of Cervical Myelopathy with quadriparesis s/p cervical fusion (17 Feb 2024 13:11)      Patient seen and examined at bedside.    ALLERGIES:  Pt has allergy to mustard (Anaphylaxis)  No Known Drug Allergies    MEDICATIONS  (STANDING):  aspirin enteric coated 81 milliGRAM(s) Oral daily  gabapentin 800 milliGRAM(s) Oral three times a day  lactulose Syrup 10 Gram(s) Oral daily  nortriptyline 25 milliGRAM(s) Oral at bedtime  polyethylene glycol 3350 17 Gram(s) Oral every 24 hours  riluzole 50 milliGRAM(s) Oral two times a day  senna 2 Tablet(s) Oral at bedtime    MEDICATIONS  (PRN):  acetaminophen     Tablet .. 650 milliGRAM(s) Oral every 6 hours PRN Mild Pain (1 - 3)  ALPRAZolam 0.25 milliGRAM(s) Oral two times a day PRN anxiety  cyclobenzaprine 10 milliGRAM(s) Oral three times a day PRN Muscle Spasm  famotidine    Tablet 40 milliGRAM(s) Oral daily PRN indigestion  magnesium hydroxide Suspension 30 milliLiter(s) Oral every 12 hours PRN Constipation  oxyCODONE    IR 10 milliGRAM(s) Oral every 4 hours PRN Moderate Pain (4 - 6)  oxyCODONE    IR 15 milliGRAM(s) Oral every 8 hours PRN Severe Pain (7 - 10)  simethicone 80 milliGRAM(s) Chew two times a day PRN Indigestion  traMADol 50 milliGRAM(s) Oral every 6 hours PRN Breakthrough Pain    Vital Signs Last 24 Hrs  T(F): 98.1 (18 Feb 2024 09:02), Max: 98.1 (18 Feb 2024 09:02)  HR: 107 (18 Feb 2024 09:02) (106 - 107)  BP: 143/92 (18 Feb 2024 09:02) (121/81 - 143/92)  RR: 15 (18 Feb 2024 09:02) (15 - 16)  SpO2: 94% (18 Feb 2024 09:02) (94% - 95%)  I&O's Summary    BMI (kg/m2): 27.5 (02-15-24 @ 18:49)    PHYSICAL EXAM:  GENERAL: NAD, well-developed AAOx3 male   HEAD:  Atraumatic, Normocephalic  EYES: EOMI, PERRLA, conjunctiva and sclera clear  NECK: + C-collar   CHEST/LUNG: Clear to auscultation bilaterally; No wheeze, nonlabored breathing  HEART: Regular rate and rhythm; No murmurs, rubs, or gallops  ABDOMEN: Soft, Nontender, Nondistended; Bowel sounds present  EXTREMITIES:  No clubbing, cyanosis, or edema, Right hand contracted   PSYCH: calm, appropriate mood  SKIN: No rashes or lesions, warm intact    LABS:                        13.1   9.92  )-----------( 285      ( 16 Feb 2024 06:00 )             38.4       02-16    138  |  101  |  26  ----------------------------<  91  4.0   |  29  |  0.93    Ca    9.3      16 Feb 2024 06:00    TPro  6.2  /  Alb  3.0  /  TBili  0.7  /  DBili  x   /  AST  17  /  ALT  41  /  AlkPhos  78  02-16     Urinalysis Basic - ( 16 Feb 2024 06:00 )    Color: x / Appearance: x / SG: x / pH: x  Gluc: 91 mg/dL / Ketone: x  / Bili: x / Urobili: x   Blood: x / Protein: x / Nitrite: x   Leuk Esterase: x / RBC: x / WBC x   Sq Epi: x / Non Sq Epi: x / Bacteria: x

## 2024-02-19 PROCEDURE — 99232 SBSQ HOSP IP/OBS MODERATE 35: CPT | Mod: GC

## 2024-02-19 RX ADMIN — OXYCODONE HYDROCHLORIDE 10 MILLIGRAM(S): 5 TABLET ORAL at 11:07

## 2024-02-19 RX ADMIN — NORTRIPTYLINE HYDROCHLORIDE 25 MILLIGRAM(S): 10 CAPSULE ORAL at 21:37

## 2024-02-19 RX ADMIN — Medication 81 MILLIGRAM(S): at 11:07

## 2024-02-19 RX ADMIN — OXYCODONE HYDROCHLORIDE 10 MILLIGRAM(S): 5 TABLET ORAL at 22:37

## 2024-02-19 RX ADMIN — CYCLOBENZAPRINE HYDROCHLORIDE 10 MILLIGRAM(S): 10 TABLET, FILM COATED ORAL at 15:12

## 2024-02-19 RX ADMIN — GABAPENTIN 800 MILLIGRAM(S): 400 CAPSULE ORAL at 14:09

## 2024-02-19 RX ADMIN — GABAPENTIN 800 MILLIGRAM(S): 400 CAPSULE ORAL at 21:36

## 2024-02-19 RX ADMIN — RILUZOLE 50 MILLIGRAM(S): 50 TABLET ORAL at 05:23

## 2024-02-19 RX ADMIN — FAMOTIDINE 40 MILLIGRAM(S): 10 INJECTION INTRAVENOUS at 22:30

## 2024-02-19 RX ADMIN — OXYCODONE HYDROCHLORIDE 10 MILLIGRAM(S): 5 TABLET ORAL at 21:37

## 2024-02-19 RX ADMIN — RILUZOLE 50 MILLIGRAM(S): 50 TABLET ORAL at 18:20

## 2024-02-19 RX ADMIN — Medication 10 MILLIGRAM(S): at 05:23

## 2024-02-19 RX ADMIN — SIMETHICONE 80 MILLIGRAM(S): 80 TABLET, CHEWABLE ORAL at 15:13

## 2024-02-19 RX ADMIN — OXYCODONE HYDROCHLORIDE 10 MILLIGRAM(S): 5 TABLET ORAL at 12:00

## 2024-02-19 RX ADMIN — GABAPENTIN 800 MILLIGRAM(S): 400 CAPSULE ORAL at 05:22

## 2024-02-19 NOTE — PROGRESS NOTE ADULT - SUBJECTIVE AND OBJECTIVE BOX
CC:  neck pain and weakness     HPI:  This is a 63 year old male presents PMHx including ALS (diagnosed 2023 at [facility]),  ACDF C4-5 on 07/12/23 and C4-C6 ACDF 8/2016. decreased ROM of right shoulder, chronic pain (daily marijuana user) and right  weakness (unable to extend fingers of right hand). Patient underwent C3-C4 ACDF/C3-C7 Posterior fusion without complications on 2/8/24 with Dr. Eliud Quinones.   Patient was evaluated by PM&R and therapy for functional deficits, gait/ADL impairments and acute rehabilitation was recommended. Patient was medically optimized for discharge to [facility] IRU on 2/15/24.     Allergies:  Pt has allergy to mustard (Anaphylaxis)  No Known Drug Allergies    Subjective:  - Patient was seen and examined at bed side,  No overnight events  - Slept well last night, no new complaints  - Pain is well controlled with current  regimen  - GI/, LBM (02/16), voiding without issues   - Tolerating and participating in 3 hours of daily therapy, making progress     ROS:  - Denies CP, palpitation, SOB, cough, fever, chills, abdominal pain, N/V/D/C, joint pain or swelling, dysuria, (+) neck pain    MEDICATIONS  (STANDING):  aspirin enteric coated 81 milliGRAM(s) Oral daily  gabapentin 800 milliGRAM(s) Oral three times a day  lactulose Syrup 10 Gram(s) Oral daily  nortriptyline 25 milliGRAM(s) Oral at bedtime  polyethylene glycol 3350 17 Gram(s) Oral every 24 hours  predniSONE   Tablet 20 milliGRAM(s) Oral daily  riluzole 50 milliGRAM(s) Oral two times a day  senna 2 Tablet(s) Oral at bedtime    MEDICATIONS  (PRN):  acetaminophen     Tablet .. 650 milliGRAM(s) Oral every 6 hours PRN Mild Pain (1 - 3)  ALPRAZolam 0.25 milliGRAM(s) Oral two times a day PRN anxiety  cyclobenzaprine 10 milliGRAM(s) Oral three times a day PRN Muscle Spasm  famotidine    Tablet 40 milliGRAM(s) Oral daily PRN indigestion  magnesium hydroxide Suspension 30 milliLiter(s) Oral every 12 hours PRN Constipation  oxyCODONE    IR 10 milliGRAM(s) Oral every 4 hours PRN Moderate Pain (4 - 6)  oxyCODONE    IR 15 milliGRAM(s) Oral every 8 hours PRN Severe Pain (7 - 10)  simethicone 80 milliGRAM(s) Chew two times a day PRN Indigestion  traMADol 50 milliGRAM(s) Oral every 6 hours PRN Breakthrough Pain    LAB:                         13.1   9.92  )-----------( 285      ( 16 Feb 2024 06:00 )             38.4     02-16    138  |  101  |  26<H>  ----------------------------<  91  4.0   |  29  |  0.93    Ca    9.3      16 Feb 2024 06:00    TPro  6.2  /  Alb  3.0<L>  /  TBili  0.7  /  DBili  x   /  AST  17  /  ALT  41  /  AlkPhos  78  02-16    LIVER FUNCTIONS - ( 16 Feb 2024 06:00 )  Alb: 3.0 g/dL / Pro: 6.2 g/dL / ALK PHOS: 78 U/L / ALT: 41 U/L / AST: 17 U/L / GGT: x           Radiology:  CT Abdomen and Pelvis w/ IV Cont (02.14.24 @ 09:42)   IMPRESSION:  No evidence of venous thromboembolism as clinically questioned.  No acute pathology.    CT Angio Chest PE Protocol w/ IV Cont (02.13.24 @ 19:33)   IMPRESSION:  No acute pulmonary embolism to the segmental branches. Limited assessment   of the subsegmental branches due to suboptimal contrast bolus.  Bibasilar atelectasis    PHYSICAL EXAM  Vital Signs Last 24 Hrs  T(C): 36.3 (19 Feb 2024 07:22), Max: 36.5 (18 Feb 2024 22:05)  T(F): 97.4 (19 Feb 2024 07:22), Max: 97.7 (18 Feb 2024 22:05)  HR: 97 (19 Feb 2024 07:22) (97 - 116)  BP: 120/85 (19 Feb 2024 07:22) (120/85 - 143/88)  RR: 16 (19 Feb 2024 07:22) (16 - 16)  SpO2: 95% (19 Feb 2024 07:22) (95% - 95%)  Parameters below as of 19 Feb 2024 07:22  Patient On (Oxygen Delivery Method): room air    Gen - NAD, Comfortable  HEENT - NCAT, EOMI, VIELKA  Neck - Supple, No limited ROM  Pulm - CTAB,  No crackle   Cardiovascular - RRR, S1S2  Chest - good chest expansion, good respiratory effort  Abdomen - Soft, NT, ND, +BS  Extremities - No Cyanosis, no clubbing, no edema, no calf tenderness, B/L intrinsic muscles atrophy   Neuro-     Cognitive - awake, alert, oriented x 4,  Able to follow command     Communication - Fluent, Comprehensible     Attention: Intact     Memory: Memory intact     Cranial Nerves - Decreased shoulder shrug, EOMI, face symmetric                     LEFT    UE - ShAB 4-/5, EF 4-/5, EE 4-/5,  2+/5                    RIGHT UE - ShAB 3-/5, EF 3-/5, EE 2+/5,   2-/5                    LEFT    LE - HF 4-/5, KE 4-/5, DF 2+/5, PF 2+/5                    RIGHT LE - HF 4-/5, KE 4-/5, DF 2-/5, PF2+/5        Sensory - Decreased to left knee, B/L last 2 fingers        Reflexes - DTR Hyporeflexia      Coordination - FTN impaired   Psychiatric - Mood stable, Affect WNL  Skin:  Anterior and posterior cervical spine, healed well

## 2024-02-19 NOTE — PROGRESS NOTE ADULT - ASSESSMENT
ASSESSMENT/PLAN  This is a 63 year old male presents PMHx including ALS (diagnosed 2023 at [facility]),  ACDF C4-5 on 07/12/23 and C4-C6 ACDF 8/2016, decreased ROM of right shoulder, chronic pain (daily marijuana user) and right  weakness (unable to extend fingers of right hand). Patient underwent C3-C4 ACDF/C3-C7 Posterior fusion without complications on 2/8/24 with Dr. Eliud Quinones.   Patient now with gait Instability, ADL impairments and Functional impairments.    #Cervical Myelopathy with quadriparesis s/p cervical fusion  - C3-C4 ACDF/C3-C7 Posterior fusion without complications on 2/8/24  - Comprehensive Rehab Program: PT/OT, 3hours daily and 5 days weekly  - PT: Focused on improving strength, endurance, coordination, balance, functional mobility, and transfers  - OT: Focused on improving strength, fine motor skills, coordination, posture and ADLs.    - Prednisone 20mg daily x 3 days ( 2/16-2/18) then 10mg daily x 3 days ( 2/19-2/21)   - pain management: Flexeril 10mg TID, Pamelor, gabapentin 800mg TID  - [place] collar at all times     #ALS  - c/w Riluzole 50mg BID- non formulary; pharmacy will try to obtain    #Pain management  - Tylenol PRN, Oxycodone PRN, tramadol 50mg     #DVT ppx  - SCD, TEDs    #GI ppx  - Pepcid 20mg     #Bowel Regimen  - Senna, miralax PRN    #Bladder management  - BS on admission x 1, Done   - Monitor UO, voiding without issues     #FEN   - Diet: Regular- high fiber    #Skin:  - Skin on admission: Anterior and posterior cervical spine incision covered with dry dressing, anterior incision with surrounding erythema    #Mood  #Anxiety  - Alprazolam 0.25mg BID PRN  - Neuropsychology consult PRN    #Precaution  - Fall, Aspiration, Spinal    #GOC  CODE STATUS: FULL CODE     Outpatient Follow-up (Specialty/Name of physician):    Eliud Quinones  Orthopaedic Surgery  [address] [facility], [address]  [place] [address]  Phone: [phone]  Fax: [phone]  Follow Up Time:    [facility]  [facility] [address]  Scheduled Appointment: 02/21/2024

## 2024-02-20 LAB
ALBUMIN SERPL ELPH-MCNC: 3.5 G/DL — SIGNIFICANT CHANGE UP (ref 3.3–5)
ALP SERPL-CCNC: 115 U/L — SIGNIFICANT CHANGE UP (ref 40–120)
ALT FLD-CCNC: 70 U/L — HIGH (ref 10–45)
ANION GAP SERPL CALC-SCNC: 10 MMOL/L — SIGNIFICANT CHANGE UP (ref 5–17)
AST SERPL-CCNC: 27 U/L — SIGNIFICANT CHANGE UP (ref 10–40)
BILIRUB SERPL-MCNC: 0.7 MG/DL — SIGNIFICANT CHANGE UP (ref 0.2–1.2)
BUN SERPL-MCNC: 27 MG/DL — HIGH (ref 7–23)
CALCIUM SERPL-MCNC: 9.6 MG/DL — SIGNIFICANT CHANGE UP (ref 8.4–10.5)
CHLORIDE SERPL-SCNC: 101 MMOL/L — SIGNIFICANT CHANGE UP (ref 96–108)
CO2 SERPL-SCNC: 27 MMOL/L — SIGNIFICANT CHANGE UP (ref 22–31)
CREAT SERPL-MCNC: 1.05 MG/DL — SIGNIFICANT CHANGE UP (ref 0.5–1.3)
EGFR: 81 ML/MIN/1.73M2 — SIGNIFICANT CHANGE UP
GLUCOSE SERPL-MCNC: 114 MG/DL — HIGH (ref 70–99)
HCT VFR BLD CALC: 45 % — SIGNIFICANT CHANGE UP (ref 39–50)
HGB BLD-MCNC: 15 G/DL — SIGNIFICANT CHANGE UP (ref 13–17)
MCHC RBC-ENTMCNC: 28.6 PG — SIGNIFICANT CHANGE UP (ref 27–34)
MCHC RBC-ENTMCNC: 33.3 GM/DL — SIGNIFICANT CHANGE UP (ref 32–36)
MCV RBC AUTO: 85.7 FL — SIGNIFICANT CHANGE UP (ref 80–100)
NRBC # BLD: 0 /100 WBCS — SIGNIFICANT CHANGE UP (ref 0–0)
PLATELET # BLD AUTO: 364 K/UL — SIGNIFICANT CHANGE UP (ref 150–400)
POTASSIUM SERPL-MCNC: 4.5 MMOL/L — SIGNIFICANT CHANGE UP (ref 3.5–5.3)
POTASSIUM SERPL-SCNC: 4.5 MMOL/L — SIGNIFICANT CHANGE UP (ref 3.5–5.3)
PROT SERPL-MCNC: 7.2 G/DL — SIGNIFICANT CHANGE UP (ref 6–8.3)
RBC # BLD: 5.25 M/UL — SIGNIFICANT CHANGE UP (ref 4.2–5.8)
RBC # FLD: 13.4 % — SIGNIFICANT CHANGE UP (ref 10.3–14.5)
SODIUM SERPL-SCNC: 138 MMOL/L — SIGNIFICANT CHANGE UP (ref 135–145)
WBC # BLD: 13.87 K/UL — HIGH (ref 3.8–10.5)
WBC # FLD AUTO: 13.87 K/UL — HIGH (ref 3.8–10.5)

## 2024-02-20 PROCEDURE — 99233 SBSQ HOSP IP/OBS HIGH 50: CPT | Mod: GC

## 2024-02-20 RX ADMIN — NORTRIPTYLINE HYDROCHLORIDE 25 MILLIGRAM(S): 10 CAPSULE ORAL at 21:51

## 2024-02-20 RX ADMIN — OXYCODONE HYDROCHLORIDE 10 MILLIGRAM(S): 5 TABLET ORAL at 21:52

## 2024-02-20 RX ADMIN — GABAPENTIN 800 MILLIGRAM(S): 400 CAPSULE ORAL at 05:55

## 2024-02-20 RX ADMIN — Medication 10 MILLIGRAM(S): at 05:55

## 2024-02-20 RX ADMIN — SIMETHICONE 80 MILLIGRAM(S): 80 TABLET, CHEWABLE ORAL at 21:51

## 2024-02-20 RX ADMIN — RILUZOLE 50 MILLIGRAM(S): 50 TABLET ORAL at 17:36

## 2024-02-20 RX ADMIN — GABAPENTIN 800 MILLIGRAM(S): 400 CAPSULE ORAL at 21:51

## 2024-02-20 RX ADMIN — RILUZOLE 50 MILLIGRAM(S): 50 TABLET ORAL at 05:54

## 2024-02-20 RX ADMIN — FAMOTIDINE 40 MILLIGRAM(S): 10 INJECTION INTRAVENOUS at 21:51

## 2024-02-20 RX ADMIN — OXYCODONE HYDROCHLORIDE 10 MILLIGRAM(S): 5 TABLET ORAL at 08:34

## 2024-02-20 RX ADMIN — OXYCODONE HYDROCHLORIDE 10 MILLIGRAM(S): 5 TABLET ORAL at 22:52

## 2024-02-20 RX ADMIN — SIMETHICONE 80 MILLIGRAM(S): 80 TABLET, CHEWABLE ORAL at 13:09

## 2024-02-20 RX ADMIN — Medication 81 MILLIGRAM(S): at 11:05

## 2024-02-20 RX ADMIN — GABAPENTIN 800 MILLIGRAM(S): 400 CAPSULE ORAL at 13:08

## 2024-02-20 RX ADMIN — TRAMADOL HYDROCHLORIDE 50 MILLIGRAM(S): 50 TABLET ORAL at 17:36

## 2024-02-20 NOTE — PROGRESS NOTE ADULT - REASON FOR ADMISSION
Cervical Myelopathy with quadriparesis s/p cervical fusion Cervical stenosis with Cervical Myelopathy/ quadriparesis s/p cervical fusion

## 2024-02-20 NOTE — PROGRESS NOTE ADULT - SUBJECTIVE AND OBJECTIVE BOX
CC:  neck pain and weakness     HPI:  This is a 63 year old male presents PMHx including ALS (diagnosed 2023 at [facility]),  ACDF C4-5 on 07/12/23 and C4-C6 ACDF 8/2016. decreased ROM of right shoulder, chronic pain (daily marijuana user) and right  weakness (unable to extend fingers of right hand). Patient underwent C3-C4 ACDF/C3-C7 Posterior fusion without complications on 2/8/24 with Dr. Eliud Quinones.   Patient was evaluated by PM&R and therapy for functional deficits, gait/ADL impairments and acute rehabilitation was recommended. Patient was medically optimized for discharge to [facility] IRU on 2/15/24.     Allergies:  Pt has allergy to mustard (Anaphylaxis)  No Known Drug Allergies    Subjective:  - Patient was seen and examined at therapy,  No overnight events  - Slept well last night, no new complaints, feeling better  - Pain is well controlled with current  regimen  - GI/, LBM (02/18), voiding without issues   - Tolerating and participating in 3 hours of daily therapy, making progress   - Case discussed at IDT meeting today for progress and discharge plan     ROS:  - Denies CP, palpitation, SOB, cough, fever, chills, abdominal pain, N/V/D/C, joint pain or swelling, dysuria, neck pain    MEDICATIONS  (STANDING):  aspirin enteric coated 81 milliGRAM(s) Oral daily  gabapentin 800 milliGRAM(s) Oral three times a day  lactulose Syrup 10 Gram(s) Oral daily  nortriptyline 25 milliGRAM(s) Oral at bedtime  polyethylene glycol 3350 17 Gram(s) Oral every 24 hours  predniSONE   Tablet 10 milliGRAM(s) Oral daily  riluzole 50 milliGRAM(s) Oral two times a day  senna 2 Tablet(s) Oral at bedtime    MEDICATIONS  (PRN):  acetaminophen     Tablet .. 650 milliGRAM(s) Oral every 6 hours PRN Mild Pain (1 - 3)  ALPRAZolam 0.25 milliGRAM(s) Oral two times a day PRN anxiety  cyclobenzaprine 10 milliGRAM(s) Oral three times a day PRN Muscle Spasm  famotidine    Tablet 40 milliGRAM(s) Oral daily PRN indigestion  magnesium hydroxide Suspension 30 milliLiter(s) Oral every 12 hours PRN Constipation  oxyCODONE    IR 15 milliGRAM(s) Oral every 8 hours PRN Severe Pain (7 - 10)  oxyCODONE    IR 10 milliGRAM(s) Oral every 4 hours PRN Moderate Pain (4 - 6)  simethicone 80 milliGRAM(s) Chew two times a day PRN Indigestion  traMADol 50 milliGRAM(s) Oral every 6 hours PRN Breakthrough Pain      LAB:                         15.0   13.87 )-----------( 364      ( 20 Feb 2024 06:56 )             45.0     02-20    138  |  101  |  27<H>  ----------------------------<  114<H>  4.5   |  27  |  1.05    Ca    9.6      20 Feb 2024 06:56    TPro  7.2  /  Alb  3.5  /  TBili  0.7  /  DBili  x   /  AST  27  /  ALT  70<H>  /  AlkPhos  115  02-20    LIVER FUNCTIONS - ( 20 Feb 2024 06:56 )  Alb: 3.5 g/dL / Pro: 7.2 g/dL / ALK PHOS: 115 U/L / ALT: 70 U/L / AST: 27 U/L / GGT: x             Radiology:  CT Abdomen and Pelvis w/ IV Cont (02.14.24 @ 09:42)   IMPRESSION:  No evidence of venous thromboembolism as clinically questioned.  No acute pathology.    CT Angio Chest PE Protocol w/ IV Cont (02.13.24 @ 19:33)   IMPRESSION:  No acute pulmonary embolism to the segmental branches. Limited assessment   of the subsegmental branches due to suboptimal contrast bolus.  Bibasilar atelectasis    PHYSICAL EXAM  Vital Signs Last 24 Hrs  T(C): 36.7 (20 Feb 2024 08:17), Max: 36.7 (20 Feb 2024 08:17)  T(F): 98.1 (20 Feb 2024 08:17), Max: 98.1 (20 Feb 2024 08:17)  HR: 100 (20 Feb 2024 08:17) (100 - 118)  BP: 115/69 (20 Feb 2024 08:17) (115/69 - 132/98)  RR: 16 (20 Feb 2024 08:17) (16 - 16)  SpO2: 95% (20 Feb 2024 08:17) (95% - 95%)  Parameters below as of 20 Feb 2024 08:17  Patient On (Oxygen Delivery Method): room air    Gen - NAD, Comfortable  HEENT - NCAT, EOMI, VIELKA  Neck - Supple, No limited ROM  Pulm - CTAB,  No crackle   Cardiovascular - RRR, S1S2  Chest - good chest expansion, good respiratory effort  Abdomen - Soft, NT, ND, +BS  Extremities - No Cyanosis, no clubbing, no edema, no calf tenderness, B/L intrinsic muscles atrophy   Neuro-     Cognitive - awake, alert, oriented x 4,  Able to follow command     Communication - Fluent, Comprehensible     Attention: Intact     Memory: Memory intact     Cranial Nerves - Decreased shoulder shrug, EOMI, face symmetric                     LEFT    UE - ShAB 4-/5, EF 4-/5, EE 4-/5,  2+/5                    RIGHT UE - ShAB 3-/5, EF 3-/5, EE 2+/5,   2-/5                    LEFT    LE - HF 4-/5, KE 4-/5, DF 2+/5, PF 2+/5                    RIGHT LE - HF 4-/5, KE 4-/5, DF 2-/5, PF2+/5        Sensory - Decreased to left knee, B/L last 2 fingers        Reflexes - DTR Hyporeflexia      Coordination - FTN impaired   Psychiatric - Mood stable, Affect WNL  Skin:  Anterior and posterior cervical spine, healed well

## 2024-02-20 NOTE — PROGRESS NOTE ADULT - ASSESSMENT
ASSESSMENT/PLAN  This is a 63 year old male presents PMHx including ALS (diagnosed 2023 at [facility]),  ACDF C4-5 on 07/12/23 and C4-C6 ACDF 8/2016, decreased ROM of right shoulder, chronic pain (daily marijuana user) and right  weakness (unable to extend fingers of right hand). Patient underwent C3-C4 ACDF/C3-C7 Posterior fusion without complications on 2/8/24 with Dr. Eliud Quinones.   Patient now with gait Instability, ADL impairments and Functional impairments.    #Cervical Myelopathy with quadriparesis s/p cervical fusion  - C3-C4 ACDF/C3-C7 Posterior fusion without complications on 2/8/24  - Comprehensive Rehab Program: PT/OT, 3hours daily and 5 days weekly  - PT: Focused on improving strength, endurance, coordination, balance, functional mobility, and transfers  - OT: Focused on improving strength, fine motor skills, coordination, posture and ADLs.    - Prednisone 20mg daily x 3 days ( 2/16-2/18) then 10mg daily x 3 days ( 2/19-2/21)   - pain management: Flexeril 10mg TID, Pamelor, gabapentin 800mg TID  - [place] collar at all times     #ALS  - Riluzole 50mg BID- non formulary; pharmacy will try to obtain    #Leukocytosis  - WBC level (02/20) 13.87  - On Prednisone   - No fever  - Monitor     #Pain management  - Tylenol PRN  - Oxycodone PRN  - Tramadol 50mg q 6 hours PRN     #DVT ppx  - SCD, TEDs    #GI ppx  - Pepcid 20mg     #Bowel Regimen  - Senna daily  - Miralax PRN    #Bladder management  - BS on admission x 1, Done   - Monitor UO, voiding without issues     #FEN   - Diet: Regular- high fiber    #Skin:  - Skin on admission: Anterior and posterior cervical spine incision covered with dry dressing, anterior incision with surrounding erythema    #Mood  #Anxiety  - Alprazolam 0.25mg BID PRN  - Neuropsychology consult PRN    #Precaution  - Fall, Aspiration, Spinal    #GOC  CODE STATUS: FULL CODE     Outpatient Follow-up (Specialty/Name of physician):    Eliud Quinones  Orthopaedic Surgery  [address] [place] [address], [address]  [place] [address]  Phone: [phone]  Fax: [phone]  Follow Up Time:    [facility]  [facility] [address]  Scheduled Appointment: 02/21/2024   ASSESSMENT/PLAN  This is a 63 year old male presents PMHx including ALS (diagnosed 2023 at [facility]),  ACDF C4-5 on 07/12/23 and C4-C6 ACDF 8/2016, decreased ROM of right shoulder, chronic pain (daily marijuana user) and right  weakness (unable to extend fingers of right hand). Patient underwent C3-C4 ACDF/C3-C7 Posterior fusion without complications on 2/8/24 with Dr. Eliud Quinones.   Patient now with gait Instability, ADL impairments and Functional impairments.    #Cervical stenosis with Myelopathy/ quadriparesis s/p cervical fusion  - C3-C4 ACDF/C3-C7 Posterior fusion without complications on 2/8/24  - Comprehensive Rehab Program: PT/OT, 3hours daily and 5 days weekly  - PT: Focused on improving strength, endurance, coordination, balance, functional mobility, and transfers  - OT: Focused on improving strength, fine motor skills, coordination, posture and ADLs.    - Prednisone 20mg daily x 3 days ( 2/16-2/18) then 10mg daily x 3 days ( 2/19-2/21)   - pain management: Flexeril 10mg TID, Pamelor, gabapentin 800mg TID  - [place] collar at all times     #ALS  - Riluzole 50mg BID- non formulary; pharmacy will try to obtain    #Leukocytosis  - WBC level (02/20) 13.87  - On Prednisone   - No fever  - Monitor     #Pain management  - Tylenol PRN  - Oxycodone PRN  - Tramadol 50mg q 6 hours PRN     #DVT ppx  - SCD, TEDs    #GI ppx  - Pepcid 20mg     #Bowel Regimen  - Senna daily  - Miralax PRN    #Bladder management  - BS on admission x 1, Done   - Monitor UO, voiding without issues     #FEN   - Diet: Regular- high fiber    #Skin:  - Skin on admission: Anterior and posterior cervical spine incision covered with dry dressing, anterior incision with surrounding erythema    #Mood  #Anxiety  - Alprazolam 0.25mg BID PRN  - Neuropsychology consult PRN    #Precaution  - Fall, Aspiration, Spinal    #GOC  CODE STATUS: FULL CODE     Outpatient Follow-up (Specialty/Name of physician):    Eliud Quinones  Orthopaedic Surgery  [address] [place] [address], [address]  [place] [address]  Phone: [phone]  Fax: [phone]  Follow Up Time:    [facility]  [facility] [address]  Scheduled Appointment: 02/21/2024

## 2024-02-20 NOTE — CHART NOTE - NSCHARTNOTEFT_GEN_A_CORE
Nutrition Follow Up Note  Hospital Course   (Per Electronic Medical Record)    Source:  Patient [X]  Medical Record [X]      Diet:   High Fiber Diet w/ Thin Liquids (IDDSI Level 0)  Tolerates Diet Consistency Well  No Chewing/Swallowing Difficulties  No Recent Nausea, Vomiting, Diarrhea or Constipation (as Per Patient)  Consumes % of Meals (as Per Documentation) - States Good PO Intake/Appetite (Per Patient)  Ensure Plus High Protein 8oz PO Daily (Provides 350kcal & 20grams of Protein) - Order Pending Verification   Education Provided on Need for Supplementation   Obtained Food Preferences from Patient  Family Brings in Food from Home    Enteral/Parenteral Nutrition: Not Applicable    Current Weight: 199lb on 2/18  Obtain New Weight to Confirm  Obtain Weights Weekly    Pertinent Medications: MEDICATIONS  (STANDING):  aspirin enteric coated 81 milliGRAM(s) Oral daily  gabapentin 800 milliGRAM(s) Oral three times a day  lactulose Syrup 10 Gram(s) Oral daily  nortriptyline 25 milliGRAM(s) Oral at bedtime  polyethylene glycol 3350 17 Gram(s) Oral every 24 hours  predniSONE   Tablet 10 milliGRAM(s) Oral daily  riluzole 50 milliGRAM(s) Oral two times a day  senna 2 Tablet(s) Oral at bedtime    MEDICATIONS  (PRN):  acetaminophen     Tablet .. 650 milliGRAM(s) Oral every 6 hours PRN Mild Pain (1 - 3)  ALPRAZolam 0.25 milliGRAM(s) Oral two times a day PRN anxiety  cyclobenzaprine 10 milliGRAM(s) Oral three times a day PRN Muscle Spasm  famotidine    Tablet 40 milliGRAM(s) Oral daily PRN indigestion  magnesium hydroxide Suspension 30 milliLiter(s) Oral every 12 hours PRN Constipation  oxyCODONE    IR 15 milliGRAM(s) Oral every 8 hours PRN Severe Pain (7 - 10)  oxyCODONE    IR 10 milliGRAM(s) Oral every 4 hours PRN Moderate Pain (4 - 6)  simethicone 80 milliGRAM(s) Chew two times a day PRN Indigestion  traMADol 50 milliGRAM(s) Oral every 6 hours PRN Breakthrough Pain    Pertinent Labs:  02-20 Na138 mmol/L Glu 114 mg/dL<H> K+ 4.5 mmol/L Cr  1.05 mg/dL BUN 27 mg/dL<H> 02-20 Alb 3.5 g/dL    Skin: No Pressure Ulcers  Multiple Surgical Incisions  (as Per Nursing Flow Sheet)     Edema: None Noted (as Per Documentation)     Last Bowel Movement: on 2/18    Estimated Needs:   [X] No Change Since Previous Assessment    Previous Nutrition Diagnosis:   Moderate Malnutrition    Nutrition Diagnosis is [X] Ongoing - Supplement Order Pending Verification - Education Provided on Need for Supplementation     New Nutrition Diagnosis: [X] Not Applicable    Interventions:   1. Recommend Continue Nutrition Plan of Care     Monitoring & Evaluation:   [X] Weights   [X] PO Intake   [X] Skin Integrity   [X] Follow Up (Per Protocol)  [X] Tolerance to Diet Prescription   [X] Other: Labs     Registered Dietitian/Nutritionist Remains Available.  Julito Trevizo RDN, CDN    Phone# [phone]

## 2024-02-21 ENCOUNTER — APPOINTMENT (OUTPATIENT)
Dept: PAIN MANAGEMENT | Facility: CLINIC | Age: 64
End: 2024-02-21

## 2024-02-21 PROCEDURE — 99232 SBSQ HOSP IP/OBS MODERATE 35: CPT | Mod: GC

## 2024-02-21 RX ORDER — OXYCODONE HYDROCHLORIDE 5 MG/1
15 TABLET ORAL EVERY 8 HOURS
Refills: 0 | Status: DISCONTINUED | OUTPATIENT
Start: 2024-02-23 | End: 2024-03-01

## 2024-02-21 RX ORDER — TRAMADOL HYDROCHLORIDE 50 MG/1
50 TABLET ORAL EVERY 6 HOURS
Refills: 0 | Status: DISCONTINUED | OUTPATIENT
Start: 2024-02-23 | End: 2024-02-23

## 2024-02-21 RX ORDER — ALPRAZOLAM 0.25 MG
0.25 TABLET ORAL
Refills: 0 | Status: DISCONTINUED | OUTPATIENT
Start: 2024-02-23 | End: 2024-02-23

## 2024-02-21 RX ORDER — OXYCODONE HYDROCHLORIDE 5 MG/1
10 TABLET ORAL EVERY 4 HOURS
Refills: 0 | Status: DISCONTINUED | OUTPATIENT
Start: 2024-02-23 | End: 2024-02-29

## 2024-02-21 RX ADMIN — GABAPENTIN 800 MILLIGRAM(S): 400 CAPSULE ORAL at 14:31

## 2024-02-21 RX ADMIN — Medication 650 MILLIGRAM(S): at 01:40

## 2024-02-21 RX ADMIN — GABAPENTIN 800 MILLIGRAM(S): 400 CAPSULE ORAL at 22:02

## 2024-02-21 RX ADMIN — GABAPENTIN 800 MILLIGRAM(S): 400 CAPSULE ORAL at 05:43

## 2024-02-21 RX ADMIN — OXYCODONE HYDROCHLORIDE 10 MILLIGRAM(S): 5 TABLET ORAL at 23:39

## 2024-02-21 RX ADMIN — Medication 10 MILLIGRAM(S): at 05:43

## 2024-02-21 RX ADMIN — OXYCODONE HYDROCHLORIDE 10 MILLIGRAM(S): 5 TABLET ORAL at 09:53

## 2024-02-21 RX ADMIN — RILUZOLE 50 MILLIGRAM(S): 50 TABLET ORAL at 17:33

## 2024-02-21 RX ADMIN — Medication 81 MILLIGRAM(S): at 11:36

## 2024-02-21 RX ADMIN — SIMETHICONE 80 MILLIGRAM(S): 80 TABLET, CHEWABLE ORAL at 14:32

## 2024-02-21 RX ADMIN — SENNA PLUS 2 TABLET(S): 8.6 TABLET ORAL at 22:03

## 2024-02-21 RX ADMIN — OXYCODONE HYDROCHLORIDE 10 MILLIGRAM(S): 5 TABLET ORAL at 10:53

## 2024-02-21 RX ADMIN — NORTRIPTYLINE HYDROCHLORIDE 25 MILLIGRAM(S): 10 CAPSULE ORAL at 22:03

## 2024-02-21 RX ADMIN — OXYCODONE HYDROCHLORIDE 10 MILLIGRAM(S): 5 TABLET ORAL at 22:03

## 2024-02-21 RX ADMIN — Medication 650 MILLIGRAM(S): at 00:40

## 2024-02-21 RX ADMIN — RILUZOLE 50 MILLIGRAM(S): 50 TABLET ORAL at 05:43

## 2024-02-21 RX ADMIN — FAMOTIDINE 40 MILLIGRAM(S): 10 INJECTION INTRAVENOUS at 22:02

## 2024-02-21 NOTE — PROGRESS NOTE ADULT - SUBJECTIVE AND OBJECTIVE BOX
CC:  neck pain and weakness     HPI:  This is a 63 year old male presents PMHx including ALS (diagnosed 2023 at [facility]),  ACDF C4-5 on 07/12/23 and C4-C6 ACDF 8/2016. decreased ROM of right shoulder, chronic pain (daily marijuana user) and right  weakness (unable to extend fingers of right hand). Patient underwent C3-C4 ACDF/C3-C7 Posterior fusion without complications on 2/8/24 with Dr. Eliud Quinones.   Patient was evaluated by PM&R and therapy for functional deficits, gait/ADL impairments and acute rehabilitation was recommended. Patient was medically optimized for discharge to [facility] IRU on 2/15/24.     Allergies:  Pt has allergy to mustard (Anaphylaxis)  No Known Drug Allergies    Subjective:  - Patient was seen and examined during PT in gym  - Endorse HA overnight, took tylenol and finally fell asleep around 2am  - Pain to RUE tolerable  - GI/, LBM (02/18), voiding without issues   - Tolerating therapy - overall with progress.  continues to have left knee numbness + buckling.  Difficulty with shower transfer d/t quad weakness    ROS:  - Denies CP, palpitation, SOB, cough, fever, chills, abdominal pain, N/V/D/C, dysuria, neck pain   - Right shoulder pain with movement (but better than baseline) - limited ROM  - Left knee numbness (unchanged)    MEDICATIONS  (STANDING):  aspirin enteric coated 81 milliGRAM(s) Oral daily  gabapentin 800 milliGRAM(s) Oral three times a day  lactulose Syrup 10 Gram(s) Oral daily  nortriptyline 25 milliGRAM(s) Oral at bedtime  polyethylene glycol 3350 17 Gram(s) Oral every 24 hours  riluzole 50 milliGRAM(s) Oral two times a day  senna 2 Tablet(s) Oral at bedtime    MEDICATIONS  (PRN):  acetaminophen     Tablet .. 650 milliGRAM(s) Oral every 6 hours PRN Mild Pain (1 - 3)  ALPRAZolam 0.25 milliGRAM(s) Oral two times a day PRN anxiety  cyclobenzaprine 10 milliGRAM(s) Oral three times a day PRN Muscle Spasm  famotidine    Tablet 40 milliGRAM(s) Oral daily PRN indigestion  magnesium hydroxide Suspension 30 milliLiter(s) Oral every 12 hours PRN Constipation  oxyCODONE    IR 15 milliGRAM(s) Oral every 8 hours PRN Severe Pain (7 - 10)  oxyCODONE    IR 10 milliGRAM(s) Oral every 4 hours PRN Moderate Pain (4 - 6)  simethicone 80 milliGRAM(s) Chew two times a day PRN Indigestion  traMADol 50 milliGRAM(s) Oral every 6 hours PRN Breakthrough Pain      LAB:                         15.0   13.87 )-----------( 364      ( 20 Feb 2024 06:56 )             45.0     02-20    138  |  101  |  27<H>  ----------------------------<  114<H>  4.5   |  27  |  1.05    Ca    9.6      20 Feb 2024 06:56    TPro  7.2  /  Alb  3.5  /  TBili  0.7  /  DBili  x   /  AST  27  /  ALT  70<H>  /  AlkPhos  115  02-20    LIVER FUNCTIONS - ( 20 Feb 2024 06:56 )  Alb: 3.5 g/dL / Pro: 7.2 g/dL / ALK PHOS: 115 U/L / ALT: 70 U/L / AST: 27 U/L / GGT: x             Radiology:  CT Abdomen and Pelvis w/ IV Cont (02.14.24 @ 09:42)   IMPRESSION:  No evidence of venous thromboembolism as clinically questioned.  No acute pathology.    CT Angio Chest PE Protocol w/ IV Cont (02.13.24 @ 19:33)   IMPRESSION:  No acute pulmonary embolism to the segmental branches. Limited assessment   of the subsegmental branches due to suboptimal contrast bolus.  Bibasilar atelectasis    PHYSICAL EXAM  Vital Signs Last 24 Hrs  T(C): 36.3 (02-21-24 @ 08:53), Max: 36.6 (02-20-24 @ 19:23)  T(F): 97.3 (02-21-24 @ 08:53), Max: 97.8 (02-20-24 @ 19:23)  HR: 86 (02-21-24 @ 08:53) (86 - 117)  BP: 120/81 (02-21-24 @ 08:53) (120/81 - 132/84)  RR: 15 (02-21-24 @ 08:53) (15 - 16)  SpO2: 94% (02-21-24 @ 08:53) (94% - 95%)    Gen - NAD, Comfortable  HEENT - NCAT, EOMI, VIELKA  Neck - Supple, limited neck ROM - in cervical collar. incisions healed  Pulm - CTAB,  No crackle   Cardiovascular - RRR, S1S2  Chest - good chest expansion, good respiratory effort  Abdomen - Soft, NT, ND, +BS  Extremities - No Cyanosis, no clubbing, no edema, no calf tenderness, B/L intrinsic muscles atrophy   Neuro-     Cognitive - awake, alert, oriented x 4,  Able to follow command     Communication - Fluent, Comprehensible     Attention: Intact     Cranial Nerves - Decreased shoulder shrug, EOMI, face symmetric                     LEFT    UE - ShAB 4-/5, EF 4-/5, EE 4-/5,  2+/5                    RIGHT UE - ShAB 3-/5, EF 3-/5, EE 2+/5,   2-/5                    LEFT    LE - HF 4-/5, KE 4-/5, DF 2+/5, PF 2+/5                    RIGHT LE - HF 4-/5, KE 4-/5, DF 2-/5, PF2+/5        Sensory - Decreased to left knee, B/L last 2 fingers        Reflexes - DTR Hyporeflexia      Coordination - FTN impaired   MSK: limited R shoulder ROM. unable to flex >90*  Psychiatric - Mood stable, Affect WNL             CC:  neck pain and weakness     HPI:  This is a 63 year old male presents PMHx including ALS (diagnosed 2023 at [facility]),  ACDF C4-5 on 07/12/23 and C4-C6 ACDF 8/2016. decreased ROM of right shoulder, chronic pain (daily marijuana user) and right  weakness (unable to extend fingers of right hand). Patient underwent C3-C4 ACDF/C3-C7 Posterior fusion without complications on 2/8/24 with Dr. Eliud Quinones.   Patient was evaluated by PM&R and therapy for functional deficits, gait/ADL impairments and acute rehabilitation was recommended. Patient was medically optimized for discharge to [facility] IRU on 2/15/24.     Allergies:  Pt has allergy to mustard (Anaphylaxis)  No Known Drug Allergies    Subjective:  - Patient was seen and examined during PT in gym  - Endorse HA overnight, took tylenol and finally fell asleep around 2am  - Pain to RUE tolerable  - GI/, LBM (02/18), voiding without issues   - Tolerating therapy - overall with progress. Continues to have left knee numbness + buckling.  Difficulty with shower transfer d/t quad weakness    ROS:  - Denies CP, palpitation, SOB, cough, fever, chills, abdominal pain, N/V/D/C, dysuria, neck pain   - Right shoulder pain with movement (but better than baseline) - limited ROM  - Left knee numbness (unchanged)    MEDICATIONS  (STANDING):  aspirin enteric coated 81 milliGRAM(s) Oral daily  gabapentin 800 milliGRAM(s) Oral three times a day  lactulose Syrup 10 Gram(s) Oral daily  nortriptyline 25 milliGRAM(s) Oral at bedtime  polyethylene glycol 3350 17 Gram(s) Oral every 24 hours  riluzole 50 milliGRAM(s) Oral two times a day  senna 2 Tablet(s) Oral at bedtime    MEDICATIONS  (PRN):  acetaminophen     Tablet .. 650 milliGRAM(s) Oral every 6 hours PRN Mild Pain (1 - 3)  ALPRAZolam 0.25 milliGRAM(s) Oral two times a day PRN anxiety  cyclobenzaprine 10 milliGRAM(s) Oral three times a day PRN Muscle Spasm  famotidine    Tablet 40 milliGRAM(s) Oral daily PRN indigestion  magnesium hydroxide Suspension 30 milliLiter(s) Oral every 12 hours PRN Constipation  oxyCODONE    IR 15 milliGRAM(s) Oral every 8 hours PRN Severe Pain (7 - 10)  oxyCODONE    IR 10 milliGRAM(s) Oral every 4 hours PRN Moderate Pain (4 - 6)  simethicone 80 milliGRAM(s) Chew two times a day PRN Indigestion  traMADol 50 milliGRAM(s) Oral every 6 hours PRN Breakthrough Pain      LAB:                         15.0   13.87 )-----------( 364      ( 20 Feb 2024 06:56 )             45.0     02-20    138  |  101  |  27<H>  ----------------------------<  114<H>  4.5   |  27  |  1.05    Ca    9.6      20 Feb 2024 06:56    TPro  7.2  /  Alb  3.5  /  TBili  0.7  /  DBili  x   /  AST  27  /  ALT  70<H>  /  AlkPhos  115  02-20    LIVER FUNCTIONS - ( 20 Feb 2024 06:56 )  Alb: 3.5 g/dL / Pro: 7.2 g/dL / ALK PHOS: 115 U/L / ALT: 70 U/L / AST: 27 U/L / GGT: x             Radiology:  CT Abdomen and Pelvis w/ IV Cont (02.14.24 @ 09:42)   IMPRESSION:  No evidence of venous thromboembolism as clinically questioned.  No acute pathology.    CT Angio Chest PE Protocol w/ IV Cont (02.13.24 @ 19:33)   IMPRESSION:  No acute pulmonary embolism to the segmental branches. Limited assessment   of the subsegmental branches due to suboptimal contrast bolus.  Bibasilar atelectasis    PHYSICAL EXAM  Vital Signs Last 24 Hrs  T(C): 36.3 (02-21-24 @ 08:53), Max: 36.6 (02-20-24 @ 19:23)  T(F): 97.3 (02-21-24 @ 08:53), Max: 97.8 (02-20-24 @ 19:23)  HR: 86 (02-21-24 @ 08:53) (86 - 117)  BP: 120/81 (02-21-24 @ 08:53) (120/81 - 132/84)  RR: 15 (02-21-24 @ 08:53) (15 - 16)  SpO2: 94% (02-21-24 @ 08:53) (94% - 95%)    Gen - NAD, Comfortable  HEENT - NCAT, EOMI, VIELKA  Neck - Supple, limited neck ROM - in cervical collar. incisions are healed  Pulm - CTAB,  No crackle   Cardiovascular - RRR, S1S2  Chest - good chest expansion, good respiratory effort  Abdomen - Soft, NT, ND, +BS  Extremities - No Cyanosis, no clubbing, no edema, no calf tenderness, B/L intrinsic muscles atrophy   Neuro-     Cognitive - awake, alert, oriented x 4,  Able to follow command     Communication - Fluent, Comprehensible     Attention: Intact     Cranial Nerves - Decreased shoulder shrug, EOMI, face symmetric                     LEFT    UE - ShAB 4-/5, EF 4-/5, EE 4-/5,  2+/5                    RIGHT UE - ShAB 3-/5, EF 3-/5, EE 2+/5,   2-/5                    LEFT    LE - HF 4-/5, KE 4-/5, DF 2+/5, PF 2+/5                    RIGHT LE - HF 4-/5, KE 4-/5, DF 2-/5, PF2+/5        Sensory - Decreased to left knee, B/L last 2 fingers        Reflexes - DTR Hyporeflexia      Coordination - FTN impaired   MSK: limited R shoulder ROM. unable to flex >90*  Psychiatric - Mood stable, Affect WNL

## 2024-02-21 NOTE — PROGRESS NOTE ADULT - ASSESSMENT
ASSESSMENT/PLAN  This is a 63 year old male presents PMHx including ALS (diagnosed 2023 at [facility]),  ACDF C4-5 on 07/12/23 and C4-C6 ACDF 8/2016, decreased ROM of right shoulder, chronic pain (daily marijuana user) and right  weakness (unable to extend fingers of right hand). Patient underwent C3-C4 ACDF/C3-C7 Posterior fusion without complications on 2/8/24 with Dr. Eliud Quinones.   Patient now with gait Instability, ADL impairments and Functional impairments.    #Cervical stenosis with Myelopathy/ quadriparesis s/p cervical fusion  - C3-C4 ACDF/C3-C7 Posterior fusion without complications on 2/8/24  - Comprehensive Rehab Program: PT/OT, 3hours daily and 5 days weekly  - PT: Focused on improving strength, endurance, coordination, balance, functional mobility, and transfers  - OT: Focused on improving strength, fine motor skills, coordination, posture and ADLs.    - Completed Prednisone course: 20mg daily x 3 days ( 2/16-2/18) then 10mg daily x 3 days ( 2/19-2/21)  - pain management: Flexeril 10mg TID, Pamelor, gabapentin 800mg TID  - [place] collar at all times     #ALS  - Riluzole 50mg BID- non formulary; pharmacy will try to obtain    #Leukocytosis  - WBC level (02/20) 13.87  - likely secondary to steroid  - no overt sign of infection  - Next 2/22    #Pain management  - Tylenol PRN  - Oxycodone PRN  - Tramadol 50mg q 6 hours PRN     #DVT ppx  - SCD, hemalatha    #GI ppx  - Pepcid 20mg PRN    #Bowel Regimen  - Senna HS  - Miralax QD    #Bladder management  - BS on admission x 1, <100cc, dc monitoring   - voiding without issues     #FEN   - Diet: Regular- high fiber    #Skin:  - Skin on admission: Anterior and posterior cervical spine incision covered with dry dressing, anterior incision with surrounding erythema    #Mood  #Anxiety  - Alprazolam 0.25mg BID PRN    #Precaution  - Fall, Aspiration, Spinal    #GOC  CODE STATUS: FULL CODE     Outpatient Follow-up (Specialty/Name of physician):    Eliud Quinones  Orthopaedic Surgery  [address], [address]  [place] [address]  Phone: [phone]  Fax: [phone]  Follow Up Time:    [facility]  [facility] [address]  Scheduled Appointment: 02/21/2024   ASSESSMENT/PLAN  This is a 63 year old male presents PMHx including ALS (diagnosed 2023 at [facility]),  ACDF C4-5 on 07/12/23 and C4-C6 ACDF 8/2016, decreased ROM of right shoulder, chronic pain (daily marijuana user) and right  weakness (unable to extend fingers of right hand). Patient underwent C3-C4 ACDF/C3-C7 Posterior fusion without complications on 2/8/24 with Dr. Eliud Quinones.   Patient now with gait Instability, ADL impairments and Functional impairments.    #Cervical stenosis with Myelopathy/ quadriparesis s/p cervical fusion  - C3-C4 ACDF/C3-C7 Posterior fusion without complications on 2/8/24  - Comprehensive Rehab Program: PT/OT, 3hours daily and 5 days weekly  - PT: Focused on improving strength, endurance, coordination, balance, functional mobility, and transfers  - OT: Focused on improving strength, fine motor skills, coordination, posture and ADLs.    - Completed Prednisone course: 20mg daily x 3 days ( 2/16-2/18) then 10mg daily x 3 days ( 2/19-2/21)  - pain management: Flexeril 10mg TID, Pamelor, gabapentin 800mg TID  - [place] collar at all times     #ALS  - Riluzole 50mg BID- non formulary; pharmacy will try to obtain    #Leukocytosis  - WBC level (02/20) 13.87  - likely secondary to steroid  - no overt sign of infection  - Next 2/22    #Pain management  - Tylenol PRN  - Oxycodone PRN  - Tramadol 50mg q 6 hours PRN     #DVT ppx  - SCD, hemalatha    #GI ppx  - Pepcid 20mg PRN    #Bowel Regimen  - Senna HS  - Miralax QD    #Bladder management  - BS on admission x 1, <100cc, dc monitoring. Done    - voiding without issues     #FEN   - Diet: Regular- high fiber    #Skin:  - Skin on admission: Anterior and posterior cervical spine incision covered with dry dressing, anterior incision with surrounding erythema    #Mood  #Anxiety  - Alprazolam 0.25mg BID PRN    #Precaution  - Fall, Aspiration, Spinal    #GOC  CODE STATUS: FULL CODE     Outpatient Follow-up (Specialty/Name of physician):    Eliud Quinones  Orthopaedic Surgery  [address], [address]  [place] [address]  Phone: [phone]  Fax: [phone]  Follow Up Time:    [facility]  [address] [address]  Scheduled Appointment: 02/21/2024

## 2024-02-22 LAB
ALBUMIN SERPL ELPH-MCNC: 3.2 G/DL — LOW (ref 3.3–5)
ALP SERPL-CCNC: 89 U/L — SIGNIFICANT CHANGE UP (ref 40–120)
ALT FLD-CCNC: 52 U/L — HIGH (ref 10–45)
ANION GAP SERPL CALC-SCNC: 10 MMOL/L — SIGNIFICANT CHANGE UP (ref 5–17)
AST SERPL-CCNC: 24 U/L — SIGNIFICANT CHANGE UP (ref 10–40)
BILIRUB SERPL-MCNC: 0.9 MG/DL — SIGNIFICANT CHANGE UP (ref 0.2–1.2)
BUN SERPL-MCNC: 32 MG/DL — HIGH (ref 7–23)
CALCIUM SERPL-MCNC: 9.5 MG/DL — SIGNIFICANT CHANGE UP (ref 8.4–10.5)
CHLORIDE SERPL-SCNC: 102 MMOL/L — SIGNIFICANT CHANGE UP (ref 96–108)
CO2 SERPL-SCNC: 27 MMOL/L — SIGNIFICANT CHANGE UP (ref 22–31)
CREAT SERPL-MCNC: 1.03 MG/DL — SIGNIFICANT CHANGE UP (ref 0.5–1.3)
EGFR: 83 ML/MIN/1.73M2 — SIGNIFICANT CHANGE UP
GLUCOSE SERPL-MCNC: 99 MG/DL — SIGNIFICANT CHANGE UP (ref 70–99)
HCT VFR BLD CALC: 41.4 % — SIGNIFICANT CHANGE UP (ref 39–50)
HGB BLD-MCNC: 13.5 G/DL — SIGNIFICANT CHANGE UP (ref 13–17)
MCHC RBC-ENTMCNC: 28.7 PG — SIGNIFICANT CHANGE UP (ref 27–34)
MCHC RBC-ENTMCNC: 32.6 GM/DL — SIGNIFICANT CHANGE UP (ref 32–36)
MCV RBC AUTO: 88.1 FL — SIGNIFICANT CHANGE UP (ref 80–100)
NRBC # BLD: 0 /100 WBCS — SIGNIFICANT CHANGE UP (ref 0–0)
PLATELET # BLD AUTO: 291 K/UL — SIGNIFICANT CHANGE UP (ref 150–400)
POTASSIUM SERPL-MCNC: 4.2 MMOL/L — SIGNIFICANT CHANGE UP (ref 3.5–5.3)
POTASSIUM SERPL-SCNC: 4.2 MMOL/L — SIGNIFICANT CHANGE UP (ref 3.5–5.3)
PROT SERPL-MCNC: 6.4 G/DL — SIGNIFICANT CHANGE UP (ref 6–8.3)
RBC # BLD: 4.7 M/UL — SIGNIFICANT CHANGE UP (ref 4.2–5.8)
RBC # FLD: 13.6 % — SIGNIFICANT CHANGE UP (ref 10.3–14.5)
SODIUM SERPL-SCNC: 139 MMOL/L — SIGNIFICANT CHANGE UP (ref 135–145)
WBC # BLD: 10.2 K/UL — SIGNIFICANT CHANGE UP (ref 3.8–10.5)
WBC # FLD AUTO: 10.2 K/UL — SIGNIFICANT CHANGE UP (ref 3.8–10.5)

## 2024-02-22 PROCEDURE — 99232 SBSQ HOSP IP/OBS MODERATE 35: CPT | Mod: 25

## 2024-02-22 RX ORDER — POLYETHYLENE GLYCOL 3350 17 G/17G
17 POWDER, FOR SOLUTION ORAL DAILY
Refills: 0 | Status: DISCONTINUED | OUTPATIENT
Start: 2024-02-22 | End: 2024-03-07

## 2024-02-22 RX ORDER — LACTULOSE 10 G/15ML
20 SOLUTION ORAL ONCE
Refills: 0 | Status: DISCONTINUED | OUTPATIENT
Start: 2024-02-22 | End: 2024-02-22

## 2024-02-22 RX ADMIN — Medication 81 MILLIGRAM(S): at 11:04

## 2024-02-22 RX ADMIN — OXYCODONE HYDROCHLORIDE 10 MILLIGRAM(S): 5 TABLET ORAL at 21:18

## 2024-02-22 RX ADMIN — OXYCODONE HYDROCHLORIDE 10 MILLIGRAM(S): 5 TABLET ORAL at 11:30

## 2024-02-22 RX ADMIN — SIMETHICONE 80 MILLIGRAM(S): 80 TABLET, CHEWABLE ORAL at 21:18

## 2024-02-22 RX ADMIN — Medication 650 MILLIGRAM(S): at 07:41

## 2024-02-22 RX ADMIN — Medication 650 MILLIGRAM(S): at 06:39

## 2024-02-22 RX ADMIN — OXYCODONE HYDROCHLORIDE 10 MILLIGRAM(S): 5 TABLET ORAL at 22:18

## 2024-02-22 RX ADMIN — SIMETHICONE 80 MILLIGRAM(S): 80 TABLET, CHEWABLE ORAL at 11:04

## 2024-02-22 RX ADMIN — GABAPENTIN 800 MILLIGRAM(S): 400 CAPSULE ORAL at 06:39

## 2024-02-22 RX ADMIN — GABAPENTIN 800 MILLIGRAM(S): 400 CAPSULE ORAL at 13:57

## 2024-02-22 RX ADMIN — FAMOTIDINE 40 MILLIGRAM(S): 10 INJECTION INTRAVENOUS at 13:56

## 2024-02-22 RX ADMIN — NORTRIPTYLINE HYDROCHLORIDE 25 MILLIGRAM(S): 10 CAPSULE ORAL at 21:18

## 2024-02-22 RX ADMIN — RILUZOLE 50 MILLIGRAM(S): 50 TABLET ORAL at 06:39

## 2024-02-22 RX ADMIN — GABAPENTIN 800 MILLIGRAM(S): 400 CAPSULE ORAL at 21:18

## 2024-02-22 RX ADMIN — OXYCODONE HYDROCHLORIDE 10 MILLIGRAM(S): 5 TABLET ORAL at 11:04

## 2024-02-22 RX ADMIN — RILUZOLE 50 MILLIGRAM(S): 50 TABLET ORAL at 17:34

## 2024-02-22 NOTE — PROGRESS NOTE ADULT - SUBJECTIVE AND OBJECTIVE BOX
CC:  neck pain and weakness     HPI:  This is a 63 year old male presents PMHx including ALS (diagnosed 2023 at [facility]),  ACDF C4-5 on 07/12/23 and C4-C6 ACDF 8/2016. decreased ROM of right shoulder, chronic pain (daily marijuana user) and right  weakness (unable to extend fingers of right hand). Patient underwent C3-C4 ACDF/C3-C7 Posterior fusion without complications on 2/8/24 with Dr. Eliud Quinones.   Patient was evaluated by PM&R and therapy for functional deficits, gait/ADL impairments and acute rehabilitation was recommended. Patient was medically optimized for discharge to [facility] IRU on 2/15/24.     Allergies:  Pt has allergy to mustard (Anaphylaxis)  No Known Drug Allergies    Subjective:  - Patient was seen and examined while sitting in WC (OT gym)  - Slept overnight  - Overall feels better post op - pain is more tolerable and able to move RUE  - GI/: LBM (02/21) - have not been getting lactulose, voiding without issues   - Tolerating therapy - noted with right DF weakness. discuss AFO for safety    ROS:  - Denies CP, palpitation, SOB, cough, fever, chills, HD, abdominal discomfort, N/V/D, dysuria, neck pain   - Right shoulder pain with movement (but better than baseline) - limited ROM  - right DF weakness  - Left knee numbness (unchanged)    MEDICATIONS  (STANDING):  MEDICATIONS  (STANDING):  aspirin enteric coated 81 milliGRAM(s) Oral daily  gabapentin 800 milliGRAM(s) Oral three times a day  lactulose Syrup 10 Gram(s) Oral daily  lactulose Syrup 20 Gram(s) Oral once  nortriptyline 25 milliGRAM(s) Oral at bedtime  polyethylene glycol 3350 17 Gram(s) Oral daily  riluzole 50 milliGRAM(s) Oral two times a day  senna 2 Tablet(s) Oral at bedtime    MEDICATIONS  (PRN):  acetaminophen     Tablet .. 650 milliGRAM(s) Oral every 6 hours PRN Mild Pain (1 - 3)  ALPRAZolam 0.25 milliGRAM(s) Oral two times a day PRN anxiety  cyclobenzaprine 10 milliGRAM(s) Oral three times a day PRN Muscle Spasm  famotidine    Tablet 40 milliGRAM(s) Oral daily PRN indigestion  magnesium hydroxide Suspension 30 milliLiter(s) Oral every 12 hours PRN Constipation  oxyCODONE    IR 10 milliGRAM(s) Oral every 4 hours PRN Moderate Pain (4 - 6)  oxyCODONE    IR 15 milliGRAM(s) Oral every 8 hours PRN Severe Pain (7 - 10)  simethicone 80 milliGRAM(s) Chew two times a day PRN Indigestion  traMADol 50 milliGRAM(s) Oral every 6 hours PRN Breakthrough Pain    LAB:                             13.5   10.20 )-----------( 291      ( 22 Feb 2024 07:10 )             41.4     02-22    139  |  102  |  32<H>  ----------------------------<  99  4.2   |  27  |  1.03    Ca    9.5      22 Feb 2024 07:10    TPro  6.4  /  Alb  3.2<L>  /  TBili  0.9  /  DBili  x   /  AST  24  /  ALT  52<H>  /  AlkPhos  89  02-22    LIVER FUNCTIONS - ( 22 Feb 2024 07:10 )  Alb: 3.2 g/dL / Pro: 6.4 g/dL / ALK PHOS: 89 U/L / ALT: 52 U/L / AST: 24 U/L / GGT: x           PHYSICAL EXAM  Vital Signs Last 24 Hrs  T(C): 36.4 (02-22-24 @ 11:14), Max: 36.8 (02-21-24 @ 21:57)  T(F): 97.5 (02-22-24 @ 11:14), Max: 98.2 (02-21-24 @ 21:57)  HR: 90 (02-22-24 @ 11:14) (90 - 92)  BP: 122/78 (02-22-24 @ 11:14) (122/78 - 160/71)  RR: 16 (02-22-24 @ 11:14) (16 - 17)  SpO2: 94% (02-22-24 @ 11:14) (94% - 95%)    Gen - NAD, Comfortable  HEENT - NCAT, EOMI, PERRLA  Neck - Supple, limited neck ROM - in cervical collar. incisions are healed, posterior incision with redness, left anterior with steri strip  Pulm - resp nonlabored  Cardiovascular - warm and well perfused, no cyanosis  Abdomen - Soft, NT, ND  Extremities - No peripheral edema, no calf tenderness, B/L intrinsic muscles atrophy   Neuro-     Cognitive - awake, alert, oriented x 4,  Able to follow command     Communication - Fluent, Comprehensible     Attention: Intact     Cranial Nerves - Decreased shoulder shrug, EOMI, face symmetric                     LEFT    UE - ShAB 4-/5, EF 4-/5, EE 4-/5,  2+/5                    RIGHT UE - ShAB 3-/5, EF 3-/5, EE 2+/5,   2-/5                    LEFT    LE - HF 4-/5, KE 4-/5, DF 2+/5, PF 2+/5                    RIGHT LE - HF 4-/5, KE 4-/5, DF 2-/5, PF2+/5        Sensory - Decreased to left knee, B/L last 2 fingers        Coordination - FTN impaired   MSK: limited R shoulder ROM. unable to flex >90*  Psychiatric - Mood stable, Affect WNL

## 2024-02-22 NOTE — PROGRESS NOTE ADULT - ASSESSMENT
ASSESSMENT/PLAN  This is a 63 year old male presents PMHx including ALS (diagnosed 2023 at [facility]),  ACDF C4-5 on 07/12/23 and C4-C6 ACDF 8/2016, decreased ROM of right shoulder, chronic pain (daily marijuana user) and right  weakness (unable to extend fingers of right hand). Patient underwent C3-C4 ACDF/C3-C7 Posterior fusion without complications on 2/8/24 with Dr. Eliud Quinones.   Patient now with gait Instability, ADL impairments and Functional impairments.    #Cervical stenosis with Myelopathy/ quadriparesis s/p cervical fusion  - C3-C4 ACDF/C3-C7 Posterior fusion without complications on 2/8/24  - posterior incision with redness -> picture sent via teams to Dr. Quinones (2/22)  - Comprehensive Rehab Program: PT/OT, 3hours daily and 5 days weekly  - PT: Focused on improving strength, endurance, coordination, balance, functional mobility, and transfers  - OT: Focused on improving strength, fine motor skills, coordination, posture and ADLs.    - Completed Prednisone course: 20mg daily x 3 days ( 2/16-2/18) then 10mg daily x 3 days ( 2/19-2/21)  - pain management: Flexeril 10mg TID, Pamelor, gabapentin 800mg TID  - [place] collar at all times     #ALS  - Riluzole 50mg BID- non formulary; pharmacy will try to obtain    #Leukocytosis  - WBC level (02/20) 13.87 > 10.20 (2/22)  - likely secondary to steroid (completed 2/21)  - no overt sign of infection    #Pain management  - Tylenol PRN  - Oxycodone PRN  - Tramadol 50mg q 6 hours PRN     #DVT ppx  - SCD, hemalatha    #GI ppx  - Pepcid 20mg PRN    #Bowel Regimen  - Senna HS  - Miralax QD - have not started?!  - Lactulose 10g - have been refusing    #Bladder management  - BS on admission x 1, <100cc, dc monitoring. Done    - voiding without issues     #FEN   - Diet: Regular- high fiber    #Skin:  - Skin on admission: Anterior and posterior cervical spine incision covered with dry dressing, anterior incision with surrounding erythema    #Mood  #Anxiety  - Alprazolam 0.25mg BID PRN    #Precaution  - Fall, Aspiration, Spinal    #GOC  CODE STATUS: FULL CODE     Outpatient Follow-up (Specialty/Name of physician):    Eluid Quinones  Orthopaedic Surgery  [address], [address]  [place] [address]  Phone: [phone]  Fax: [phone]  Follow Up Time:    [facility]  [facility] [address]  Scheduled Appointment: 02/21/2024

## 2024-02-23 PROCEDURE — 99232 SBSQ HOSP IP/OBS MODERATE 35: CPT

## 2024-02-23 RX ADMIN — GABAPENTIN 800 MILLIGRAM(S): 400 CAPSULE ORAL at 14:36

## 2024-02-23 RX ADMIN — SIMETHICONE 80 MILLIGRAM(S): 80 TABLET, CHEWABLE ORAL at 08:06

## 2024-02-23 RX ADMIN — SIMETHICONE 80 MILLIGRAM(S): 80 TABLET, CHEWABLE ORAL at 13:40

## 2024-02-23 RX ADMIN — GABAPENTIN 800 MILLIGRAM(S): 400 CAPSULE ORAL at 06:11

## 2024-02-23 RX ADMIN — OXYCODONE HYDROCHLORIDE 10 MILLIGRAM(S): 5 TABLET ORAL at 21:51

## 2024-02-23 RX ADMIN — RILUZOLE 50 MILLIGRAM(S): 50 TABLET ORAL at 06:12

## 2024-02-23 RX ADMIN — OXYCODONE HYDROCHLORIDE 10 MILLIGRAM(S): 5 TABLET ORAL at 06:12

## 2024-02-23 RX ADMIN — NORTRIPTYLINE HYDROCHLORIDE 25 MILLIGRAM(S): 10 CAPSULE ORAL at 21:51

## 2024-02-23 RX ADMIN — OXYCODONE HYDROCHLORIDE 10 MILLIGRAM(S): 5 TABLET ORAL at 07:12

## 2024-02-23 RX ADMIN — Medication 81 MILLIGRAM(S): at 12:13

## 2024-02-23 RX ADMIN — RILUZOLE 50 MILLIGRAM(S): 50 TABLET ORAL at 17:27

## 2024-02-23 RX ADMIN — GABAPENTIN 800 MILLIGRAM(S): 400 CAPSULE ORAL at 21:51

## 2024-02-23 NOTE — PROGRESS NOTE ADULT - SUBJECTIVE AND OBJECTIVE BOX
CC:  neck pain and weakness     HPI:  This is a 63 year old male presents PMHx including ALS (diagnosed 2023 at [facility]),  ACDF C4-5 on 07/12/23 and C4-C6 ACDF 8/2016. decreased ROM of right shoulder, chronic pain (daily marijuana user) and right  weakness (unable to extend fingers of right hand). Patient underwent C3-C4 ACDF/C3-C7 Posterior fusion without complications on 2/8/24 with Dr. Eliud Quinones.   Patient was evaluated by PM&R and therapy for functional deficits, gait/ADL impairments and acute rehabilitation was recommended. Patient was medically optimized for discharge to [facility] IRU on 2/15/24.     Allergies:  Pt has allergy to mustard (Anaphylaxis)  No Known Drug Allergies    Subjective:  - Patient was seen and examined while resting in bed  - overnight event: GI symptoms of nausea/vomiting - attribute to lunch (chicken pot pie).  Feeling better at current  - Pain continues to be well controlled  - GI/: LBM (02/22). voiding withint issues  - Tolerating therapy - noted with right DF weakness. discuss AFO for safety.  Also educated on PRAFO for bed use  - Informed that Dr. Quinones was contacted in regards to incision and awaiting for response    ROS:  - Denies CP, palpitation, SOB, cough, fever, chills, HD, abdominal discomfort, N/V/D, dysuria, neck pain   - Right shoulder pain with movement (but better than baseline) - limited ROM  - right DF weakness  - Left knee numbness (unchanged)    MEDICATIONS  (STANDING):  MEDICATIONS  (STANDING):  aspirin enteric coated 81 milliGRAM(s) Oral daily  gabapentin 800 milliGRAM(s) Oral three times a day  lactulose Syrup 10 Gram(s) Oral daily  nortriptyline 25 milliGRAM(s) Oral at bedtime  polyethylene glycol 3350 17 Gram(s) Oral daily  riluzole 50 milliGRAM(s) Oral two times a day  senna 2 Tablet(s) Oral at bedtime    MEDICATIONS  (PRN):  acetaminophen     Tablet .. 650 milliGRAM(s) Oral every 6 hours PRN Mild Pain (1 - 3)  ALPRAZolam 0.25 milliGRAM(s) Oral two times a day PRN anxiety  cyclobenzaprine 10 milliGRAM(s) Oral three times a day PRN Muscle Spasm  famotidine    Tablet 40 milliGRAM(s) Oral daily PRN indigestion  magnesium hydroxide Suspension 30 milliLiter(s) Oral every 12 hours PRN Constipation  oxyCODONE    IR 15 milliGRAM(s) Oral every 8 hours PRN Severe Pain (7 - 10)  oxyCODONE    IR 10 milliGRAM(s) Oral every 4 hours PRN Moderate Pain (4 - 6)  simethicone 80 milliGRAM(s) Chew two times a day PRN Indigestion  traMADol 50 milliGRAM(s) Oral every 6 hours PRN Breakthrough Pain    LAB:              13.5   10.20 )-----------( 291      ( 22 Feb 2024 07:10 )             41.4     02-22    139  |  102  |  32<H>  ----------------------------<  99  4.2   |  27  |  1.03    Ca    9.5      22 Feb 2024 07:10    TPro  6.4  /  Alb  3.2<L>  /  TBili  0.9  /  DBili  x   /  AST  24  /  ALT  52<H>  /  AlkPhos  89  02-22    LIVER FUNCTIONS - ( 22 Feb 2024 07:10 )  Alb: 3.2 g/dL / Pro: 6.4 g/dL / ALK PHOS: 89 U/L / ALT: 52 U/L / AST: 24 U/L / GGT: x           PHYSICAL EXAM  Vital Signs Last 24 Hrs  T(C): 36.4 (02-23-24 @ 08:00), Max: 36.7 (02-22-24 @ 21:15)  T(F): 97.6 (02-23-24 @ 08:00), Max: 98 (02-22-24 @ 21:15)  HR: 105 (02-23-24 @ 08:00) (94 - 105)  BP: 131/83 (02-23-24 @ 08:00) (131/83 - 150/87)  RR: 16 (02-23-24 @ 08:00) (16 - 16)  SpO2: 94% (02-23-24 @ 08:00) (94% - 95%)    Gen - NAD, Comfortable  HEENT - NCAT, EOMI, PERRLA  Neck - Supple, limited neck ROM - in cervical collar. incisions are healed, posterior incision with redness, left anterior with steri strip  Pulm - resp nonlabored  Cardiovascular - warm and well perfused, no cyanosis  Abdomen - Soft, NT, ND  Extremities - No peripheral edema, no calf tenderness, B/L intrinsic muscles atrophy   Neuro-     Cognitive - awake, alert, oriented x 4,  Able to follow command     Communication - Fluent, Comprehensible     Attention: Intact     Cranial Nerves - Decreased shoulder shrug, EOMI, face symmetric                     LEFT    UE - ShAB 4-/5, EF 4-/5, EE 4-/5,  2+/5                    RIGHT UE - ShAB 3-/5, EF 3-/5, EE 2+/5,   2-/5                    LEFT    LE - HF 4-/5, KE 4-/5, DF 2+/5, PF 2+/5                    RIGHT LE - HF 4-/5, KE 4-/5, DF 2-/5, PF2+/5        Sensory - Decreased to left knee, B/L last 2 fingers        Coordination - FTN impaired   MSK: limited R shoulder ROM. unable to flex >90*  Psychiatric - Mood stable, Affect WNL

## 2024-02-23 NOTE — PROGRESS NOTE ADULT - ASSESSMENT
ASSESSMENT/PLAN  This is a 63 year old male presents PMHx including ALS (diagnosed 2023 at [facility]),  ACDF C4-5 on 07/12/23 and C4-C6 ACDF 8/2016, decreased ROM of right shoulder, chronic pain (daily marijuana user) and right  weakness (unable to extend fingers of right hand). Patient underwent C3-C4 ACDF/C3-C7 Posterior fusion without complications on 2/8/24 with Dr. Eliud Quinones.   Patient now with gait Instability, ADL impairments and Functional impairments.    #Cervical stenosis with Myelopathy/ quadriparesis s/p cervical fusion  - C3-C4 ACDF/C3-C7 Posterior fusion without complications on 2/8/24  - posterior incision with redness -> picture sent via teams to Dr. Quinones (2/22)  - put gauze to cushion skin and cervical brace  - Comprehensive Rehab Program: PT/OT, 3hours daily and 5 days weekly  - PT: Focused on improving strength, endurance, coordination, balance, functional mobility, and transfers  - OT: Focused on improving strength, fine motor skills, coordination, posture and ADLs.    - Completed Prednisone course: 20mg daily x 3 days ( 2/16-2/18) then 10mg daily x 3 days ( 2/19-2/21)  - pain management: Flexeril 10mg TID, Pamelor, gabapentin 800mg TID  - [place] collar at all times     #ALS  - Riluzole 50mg BID- non formulary; pharmacy will try to obtain    #Leukocytosis  - WBC level (02/20) 13.87 > 10.20 (2/22)  - likely secondary to steroid (completed 2/21)  - no overt sign of infection    #Pain management  - Tylenol PRN  - Oxycodone PRN  - Tramadol 50mg q 6 hours PRN     #DVT ppx  - SCD, hemalatha    #GI ppx  - Pepcid 20mg PRN    #Bowel Regimen  - Senna HS  - Miralax QD  - Lactulose 10g     #Bladder management  - BS on admission x 1, <100cc, dc monitoring. Done    - voiding without issues     #FEN   - Diet: Regular- high fiber    #Skin:  - Skin on admission: Anterior and posterior cervical spine incision covered with dry dressing, anterior incision with surrounding erythema    #Mood  #Anxiety  - Alprazolam 0.25mg BID PRN    #Precaution  - Fall, Aspiration, Spinal    #GOC  CODE STATUS: FULL CODE     Outpatient Follow-up (Specialty/Name of physician):    Eliud Quinones  Orthopaedic Surgery  [address], [address]  [place] [address]  Phone: [phone]  Fax: [phone]  Follow Up Time:    [facility]  [id number] [address]  Scheduled Appointment: 02/21/2024

## 2024-02-23 NOTE — PROGRESS NOTE ADULT - NS ATTEND AMEND GEN_ALL_CORE FT
I have personally seen and examined the patient with the NP. Medical records reviewed. I have made amendments to the documentation where necessary and adjusted the history, physical examination, and plan as documented by the NP.
I have personally seen and examined the patient with the NP. Medical records reviewed. I have made amendments to the documentation where necessary and adjusted the history, physical examination, and plan as documented by the NP.

## 2024-02-24 PROCEDURE — 99232 SBSQ HOSP IP/OBS MODERATE 35: CPT

## 2024-02-24 RX ADMIN — Medication 81 MILLIGRAM(S): at 13:33

## 2024-02-24 RX ADMIN — NORTRIPTYLINE HYDROCHLORIDE 25 MILLIGRAM(S): 10 CAPSULE ORAL at 21:55

## 2024-02-24 RX ADMIN — GABAPENTIN 800 MILLIGRAM(S): 400 CAPSULE ORAL at 06:46

## 2024-02-24 RX ADMIN — RILUZOLE 50 MILLIGRAM(S): 50 TABLET ORAL at 19:02

## 2024-02-24 RX ADMIN — GABAPENTIN 800 MILLIGRAM(S): 400 CAPSULE ORAL at 16:14

## 2024-02-24 RX ADMIN — OXYCODONE HYDROCHLORIDE 10 MILLIGRAM(S): 5 TABLET ORAL at 13:33

## 2024-02-24 RX ADMIN — RILUZOLE 50 MILLIGRAM(S): 50 TABLET ORAL at 06:46

## 2024-02-24 RX ADMIN — GABAPENTIN 800 MILLIGRAM(S): 400 CAPSULE ORAL at 21:55

## 2024-02-24 NOTE — PROGRESS NOTE ADULT - SUBJECTIVE AND OBJECTIVE BOX
Pt. seen and examined at bedside.  No overnight events.      REVIEW OF SYSTEMS  Constitutional - No fever,  No fatigue  Neurological - No headaches, ++ loss of strength  Musculoskeletal - ++ C-spine joint pain, No joint swelling, No muscle pain    VITALS  T(C): 36.7 (02-23-24 @ 21:49), Max: 36.7 (02-23-24 @ 21:49)  HR: 102 (02-23-24 @ 21:49) (102 - 102)  BP: 119/84 (02-23-24 @ 21:49) (119/84 - 119/84)  RR: 16 (02-23-24 @ 21:49) (16 - 16)  SpO2: 92% (02-23-24 @ 21:49) (92% - 92%)  Wt(kg): --       MEDICATIONS   acetaminophen     Tablet .. 650 milliGRAM(s) every 6 hours PRN  ALPRAZolam 0.25 milliGRAM(s) two times a day PRN  aspirin enteric coated 81 milliGRAM(s) daily  cyclobenzaprine 10 milliGRAM(s) three times a day PRN  famotidine    Tablet 40 milliGRAM(s) daily PRN  gabapentin 800 milliGRAM(s) three times a day  lactulose Syrup 10 Gram(s) daily  magnesium hydroxide Suspension 30 milliLiter(s) every 12 hours PRN  nortriptyline 25 milliGRAM(s) at bedtime  oxyCODONE    IR 15 milliGRAM(s) every 8 hours PRN  oxyCODONE    IR 10 milliGRAM(s) every 4 hours PRN  polyethylene glycol 3350 17 Gram(s) daily  riluzole 50 milliGRAM(s) two times a day  senna 2 Tablet(s) at bedtime  simethicone 80 milliGRAM(s) two times a day PRN  traMADol 50 milliGRAM(s) every 6 hours PRN      RECENT LABS/IMAGING                        ---------  PHYSICAL EXAM  Constitutional - NAD, Comfortable  C-spine incision - C/D/I  Pulm - Breathing comfortably, No wheezing  Abd - Soft, NTND  Extremities - No edema, No calf tenderness  Neurologic Exam -                    Cognitive - Awake, Alert     Communication - Fluent     Motor - b/l UE weakness w/ FDI atrophy; poor  strength     Sensory - Intact to LT  Psychiatric - Mood WNL, Affect WNL    ASSESSMENT/PLAN  63y Male with h/o ALS now w/ functional deficits s/p C3-4 ACDF/C3-7 PSF.  Continue current medical management  Pain - Tylenol PRN; gabapentin; oxycodone; tramadol; cyclobenzaprine  DVT PPX - aspirin enteric coated 81 milliGRAM(s) daily  Active issues - none  Continue 3hrs a day of comprehensive rehab program.

## 2024-02-24 NOTE — PROGRESS NOTE ADULT - SUBJECTIVE AND OBJECTIVE BOX
Patient seen and examined at bedside. no complaints.      ALLERGIES:  Pt has allergy to mustard (Anaphylaxis)  No Known Drug Allergies    MEDICATIONS  (STANDING):  aspirin enteric coated 81 milliGRAM(s) Oral daily  gabapentin 800 milliGRAM(s) Oral three times a day  lactulose Syrup 10 Gram(s) Oral daily  nortriptyline 25 milliGRAM(s) Oral at bedtime  polyethylene glycol 3350 17 Gram(s) Oral daily  riluzole 50 milliGRAM(s) Oral two times a day  senna 2 Tablet(s) Oral at bedtime    MEDICATIONS  (PRN):  acetaminophen     Tablet .. 650 milliGRAM(s) Oral every 6 hours PRN Mild Pain (1 - 3)  ALPRAZolam 0.25 milliGRAM(s) Oral two times a day PRN anxiety  cyclobenzaprine 10 milliGRAM(s) Oral three times a day PRN Muscle Spasm  famotidine    Tablet 40 milliGRAM(s) Oral daily PRN indigestion  magnesium hydroxide Suspension 30 milliLiter(s) Oral every 12 hours PRN Constipation  oxyCODONE    IR 10 milliGRAM(s) Oral every 4 hours PRN Moderate Pain (4 - 6)  oxyCODONE    IR 15 milliGRAM(s) Oral every 8 hours PRN Severe Pain (7 - 10)  simethicone 80 milliGRAM(s) Chew two times a day PRN Indigestion  traMADol 50 milliGRAM(s) Oral every 6 hours PRN Breakthrough Pain    Vital Signs Last 24 Hrs  T(F): 98 (23 Feb 2024 21:49), Max: 98 (23 Feb 2024 21:49)  HR: 102 (23 Feb 2024 21:49) (102 - 102)  BP: 119/84 (23 Feb 2024 21:49) (119/84 - 119/84)  RR: 16 (23 Feb 2024 21:49) (16 - 16)  SpO2: 92% (23 Feb 2024 21:49) (92% - 92%)  I&O's Summary      PHYSICAL EXAM:    Gen: nad, resting in bed  Neuro: aaox3, no focal deficits  Heent: eomi b/l, no jvd, no oral exudates  Neck: C-collar in place  Pulm: cta b/l, no w/r/r  CV: +s1s2, no m/r/g  Ab: soft, nt/nd, normoactive bs x 4  Extrem: no edema, pulses intact and equal  Skin: no rashes  Psych: normal    LABS:                        13.5   10.20 )-----------( 291      ( 22 Feb 2024 07:10 )             41.4       02-22    139  |  102  |  32  ----------------------------<  99  4.2   |  27  |  1.03    Ca    9.5      22 Feb 2024 07:10    TPro  6.4  /  Alb  3.2  /  TBili  0.9  /  DBili  x   /  AST  24  /  ALT  52  /  AlkPhos  89  02-22              Urinalysis Basic - ( 22 Feb 2024 07:10 )    Color: x / Appearance: x / SG: x / pH: x  Gluc: 99 mg/dL / Ketone: x  / Bili: x / Urobili: x   Blood: x / Protein: x / Nitrite: x   Leuk Esterase: x / RBC: x / WBC x   Sq Epi: x / Non Sq Epi: x / Bacteria: x            RADIOLOGY & ADDITIONAL TESTS:    Care Discussed with Consultants/Other Providers:

## 2024-02-24 NOTE — PROGRESS NOTE ADULT - ASSESSMENT
63 year old male presents PMHx including ALS (diagnosed 2023 at [facility]),  ACDF C4-5 on 07/12/23 and C4-C6 ACDF 8/2016, decreased ROM of right shoulder, chronic pain (daily marijuana user) and right  weakness (unable to extend fingers of right hand). Patient underwent C3-C4 ACDF/C3-C7 Posterior fusion without complications on 2/8/24 with Dr. Eliud Quinones. Patient now with gait Instability, ADL impairments and Functional impairments. Admitted to acute rehab.    #s/p cervical fusion  - C3-C4 ACDF/C3-C7 Posterior fusion without complications on 2/8/24  - s/p prednisone taper  - Aspen collar per rehab.     #ALS  - c/w Riluzole 50mg BID- non formulary    #Tachycardiac  -currently 101-105 in the morning. normal at nighttime  -Cardiology evaluation at [facility]. No organic cause identified  -Likely anxiety driven    #DVT ppx- SCD, TEDs

## 2024-02-25 PROCEDURE — 99232 SBSQ HOSP IP/OBS MODERATE 35: CPT

## 2024-02-25 RX ADMIN — GABAPENTIN 800 MILLIGRAM(S): 400 CAPSULE ORAL at 22:51

## 2024-02-25 RX ADMIN — OXYCODONE HYDROCHLORIDE 10 MILLIGRAM(S): 5 TABLET ORAL at 23:22

## 2024-02-25 RX ADMIN — RILUZOLE 50 MILLIGRAM(S): 50 TABLET ORAL at 05:41

## 2024-02-25 RX ADMIN — RILUZOLE 50 MILLIGRAM(S): 50 TABLET ORAL at 18:19

## 2024-02-25 RX ADMIN — Medication 81 MILLIGRAM(S): at 11:17

## 2024-02-25 RX ADMIN — NORTRIPTYLINE HYDROCHLORIDE 25 MILLIGRAM(S): 10 CAPSULE ORAL at 22:52

## 2024-02-25 RX ADMIN — OXYCODONE HYDROCHLORIDE 10 MILLIGRAM(S): 5 TABLET ORAL at 22:52

## 2024-02-25 RX ADMIN — GABAPENTIN 800 MILLIGRAM(S): 400 CAPSULE ORAL at 05:41

## 2024-02-25 RX ADMIN — GABAPENTIN 800 MILLIGRAM(S): 400 CAPSULE ORAL at 13:08

## 2024-02-25 RX ADMIN — SENNA PLUS 2 TABLET(S): 8.6 TABLET ORAL at 22:52

## 2024-02-25 NOTE — PROGRESS NOTE ADULT - SUBJECTIVE AND OBJECTIVE BOX
Patient seen and examined at bedside. neck pain 5/10 since he has had the brace off.      ALLERGIES:  Pt has allergy to mustard (Anaphylaxis)  No Known Drug Allergies    MEDICATIONS  (STANDING):  aspirin enteric coated 81 milliGRAM(s) Oral daily  gabapentin 800 milliGRAM(s) Oral three times a day  lactulose Syrup 10 Gram(s) Oral daily  nortriptyline 25 milliGRAM(s) Oral at bedtime  polyethylene glycol 3350 17 Gram(s) Oral daily  riluzole 50 milliGRAM(s) Oral two times a day  senna 2 Tablet(s) Oral at bedtime    MEDICATIONS  (PRN):  acetaminophen     Tablet .. 650 milliGRAM(s) Oral every 6 hours PRN Mild Pain (1 - 3)  ALPRAZolam 0.25 milliGRAM(s) Oral two times a day PRN anxiety  cyclobenzaprine 10 milliGRAM(s) Oral three times a day PRN Muscle Spasm  famotidine    Tablet 40 milliGRAM(s) Oral daily PRN indigestion  magnesium hydroxide Suspension 30 milliLiter(s) Oral every 12 hours PRN Constipation  oxyCODONE    IR 10 milliGRAM(s) Oral every 4 hours PRN Moderate Pain (4 - 6)  oxyCODONE    IR 15 milliGRAM(s) Oral every 8 hours PRN Severe Pain (7 - 10)  simethicone 80 milliGRAM(s) Chew two times a day PRN Indigestion  traMADol 50 milliGRAM(s) Oral every 6 hours PRN Breakthrough Pain    Vital Signs Last 24 Hrs  T(F): 98 (25 Feb 2024 08:27), Max: 98.1 (24 Feb 2024 21:53)  HR: 106 (25 Feb 2024 08:27) (97 - 106)  BP: 120/89 (25 Feb 2024 08:27) (120/89 - 155/97)  RR: 16 (25 Feb 2024 08:27) (16 - 16)  SpO2: 93% (25 Feb 2024 08:27) (92% - 93%)  I&O's Summary      PHYSICAL EXAM:    Gen: nad, resting in bed  Neuro: aaox3, no focal deficits  Heent: eomi b/l, no jvd, no oral exudates  Pulm: cta b/l, no w/r/r  CV: +s1s2, no m/r/g  Ab: soft, nt/nd, normoactive bs x 4  Extrem: no edema, pulses intact and equal  Skin: no rashes  Psych: normal    LABS:                  RADIOLOGY & ADDITIONAL TESTS:    Care Discussed with Consultants/Other Providers:

## 2024-02-25 NOTE — PROGRESS NOTE ADULT - SUBJECTIVE AND OBJECTIVE BOX
Pt. seen and examined at bedside.  No overnight events.      REVIEW OF SYSTEMS  Constitutional - No fever,  No fatigue  Neurological - No headaches, ++ loss of strength  Musculoskeletal - No joint pain, No joint swelling, ++ cervical spine muscle pain    VITALS  T(C): 36.7 (02-25-24 @ 08:27), Max: 36.7 (02-24-24 @ 21:53)  HR: 106 (02-25-24 @ 08:27) (97 - 106)  BP: 120/89 (02-25-24 @ 08:27) (120/89 - 155/97)  RR: 16 (02-25-24 @ 08:27) (16 - 16)  SpO2: 93% (02-25-24 @ 08:27) (92% - 93%)  Wt(kg): --       MEDICATIONS   acetaminophen     Tablet .. 650 milliGRAM(s) every 6 hours PRN  ALPRAZolam 0.25 milliGRAM(s) two times a day PRN  aspirin enteric coated 81 milliGRAM(s) daily  cyclobenzaprine 10 milliGRAM(s) three times a day PRN  famotidine    Tablet 40 milliGRAM(s) daily PRN  gabapentin 800 milliGRAM(s) three times a day  lactulose Syrup 10 Gram(s) daily  magnesium hydroxide Suspension 30 milliLiter(s) every 12 hours PRN  nortriptyline 25 milliGRAM(s) at bedtime  oxyCODONE    IR 10 milliGRAM(s) every 4 hours PRN  oxyCODONE    IR 15 milliGRAM(s) every 8 hours PRN  polyethylene glycol 3350 17 Gram(s) daily  riluzole 50 milliGRAM(s) two times a day  senna 2 Tablet(s) at bedtime  simethicone 80 milliGRAM(s) two times a day PRN  traMADol 50 milliGRAM(s) every 6 hours PRN      RECENT LABS/IMAGING                        ---------  PHYSICAL EXAM  Constitutional - NAD, Comfortable  C-SPINE - INCISION C/D/I  Pulm - Breathing comfortably, No wheezing  Abd - Soft, NTND  Extremities - No edema, No calf tenderness  Neurologic Exam -                    Cognitive - Awake, Alert     Communication - Fluent     Motor - B/L FDI ATROPHY; UE/LE WEAK     Sensory - Intact to LT  Psychiatric - Mood WNL, Affect WNL    ASSESSMENT/PLAN  63y Male with h/o ALS now w/ functional deficits after C3-4 ACDF + C3-7 PSF.  Continue current medical management  Pain - Tylenol PRN; oxycodone; tramadol; gabapentin; cyclobenzaprine  DVT PPX - aspirin enteric coated 81 milliGRAM(s) daily  Active issues - none  Continue 3hrs a day of comprehensive rehab program.

## 2024-02-25 NOTE — PROGRESS NOTE ADULT - ASSESSMENT
63 year old male presents PMHx including ALS (diagnosed 2023 at [facility]),  ACDF C4-5 on 07/12/23 and C4-C6 ACDF 8/2016, decreased ROM of right shoulder, chronic pain (daily marijuana user) and right  weakness (unable to extend fingers of right hand). Patient underwent C3-C4 ACDF/C3-C7 Posterior fusion without complications on 2/8/24 with Dr. Eliud Quinones. Patient now with gait Instability, ADL impairments and Functional impairments. Admitted to acute rehab.    #s/p cervical fusion  - C3-C4 ACDF/C3-C7 Posterior fusion without complications on 2/8/24  - s/p prednisone taper  - Aspen collar per rehab.     #ALS  - c/w Riluzole 50mg BID- non formulary    #Tachycardia  -normal rates at night time  -Cardiology evaluation at [facility]. No organic cause identified  -Likely anxiety/pain driven    #DVT ppx- SCD, TEDs

## 2024-02-26 LAB
ALBUMIN SERPL ELPH-MCNC: 3.4 G/DL — SIGNIFICANT CHANGE UP (ref 3.3–5)
ALP SERPL-CCNC: 104 U/L — SIGNIFICANT CHANGE UP (ref 40–120)
ALT FLD-CCNC: 59 U/L — HIGH (ref 10–45)
ANION GAP SERPL CALC-SCNC: 11 MMOL/L — SIGNIFICANT CHANGE UP (ref 5–17)
AST SERPL-CCNC: 28 U/L — SIGNIFICANT CHANGE UP (ref 10–40)
BILIRUB SERPL-MCNC: 0.9 MG/DL — SIGNIFICANT CHANGE UP (ref 0.2–1.2)
BUN SERPL-MCNC: 23 MG/DL — SIGNIFICANT CHANGE UP (ref 7–23)
CALCIUM SERPL-MCNC: 9.5 MG/DL — SIGNIFICANT CHANGE UP (ref 8.4–10.5)
CHLORIDE SERPL-SCNC: 101 MMOL/L — SIGNIFICANT CHANGE UP (ref 96–108)
CO2 SERPL-SCNC: 25 MMOL/L — SIGNIFICANT CHANGE UP (ref 22–31)
CREAT SERPL-MCNC: 0.8 MG/DL — SIGNIFICANT CHANGE UP (ref 0.5–1.3)
EGFR: 101 ML/MIN/1.73M2 — SIGNIFICANT CHANGE UP
GLUCOSE SERPL-MCNC: 101 MG/DL — HIGH (ref 70–99)
HCT VFR BLD CALC: 45.4 % — SIGNIFICANT CHANGE UP (ref 39–50)
HGB BLD-MCNC: 14.8 G/DL — SIGNIFICANT CHANGE UP (ref 13–17)
MCHC RBC-ENTMCNC: 28.6 PG — SIGNIFICANT CHANGE UP (ref 27–34)
MCHC RBC-ENTMCNC: 32.6 GM/DL — SIGNIFICANT CHANGE UP (ref 32–36)
MCV RBC AUTO: 87.8 FL — SIGNIFICANT CHANGE UP (ref 80–100)
NRBC # BLD: 0 /100 WBCS — SIGNIFICANT CHANGE UP (ref 0–0)
PLATELET # BLD AUTO: 274 K/UL — SIGNIFICANT CHANGE UP (ref 150–400)
POTASSIUM SERPL-MCNC: 4.2 MMOL/L — SIGNIFICANT CHANGE UP (ref 3.5–5.3)
POTASSIUM SERPL-SCNC: 4.2 MMOL/L — SIGNIFICANT CHANGE UP (ref 3.5–5.3)
PROT SERPL-MCNC: 6.9 G/DL — SIGNIFICANT CHANGE UP (ref 6–8.3)
RBC # BLD: 5.17 M/UL — SIGNIFICANT CHANGE UP (ref 4.2–5.8)
RBC # FLD: 13.5 % — SIGNIFICANT CHANGE UP (ref 10.3–14.5)
SODIUM SERPL-SCNC: 137 MMOL/L — SIGNIFICANT CHANGE UP (ref 135–145)
WBC # BLD: 10.03 K/UL — SIGNIFICANT CHANGE UP (ref 3.8–10.5)
WBC # FLD AUTO: 10.03 K/UL — SIGNIFICANT CHANGE UP (ref 3.8–10.5)

## 2024-02-26 PROCEDURE — 99232 SBSQ HOSP IP/OBS MODERATE 35: CPT | Mod: GC

## 2024-02-26 RX ADMIN — OXYCODONE HYDROCHLORIDE 10 MILLIGRAM(S): 5 TABLET ORAL at 22:48

## 2024-02-26 RX ADMIN — GABAPENTIN 800 MILLIGRAM(S): 400 CAPSULE ORAL at 22:18

## 2024-02-26 RX ADMIN — OXYCODONE HYDROCHLORIDE 10 MILLIGRAM(S): 5 TABLET ORAL at 12:45

## 2024-02-26 RX ADMIN — Medication 81 MILLIGRAM(S): at 11:57

## 2024-02-26 RX ADMIN — OXYCODONE HYDROCHLORIDE 10 MILLIGRAM(S): 5 TABLET ORAL at 22:18

## 2024-02-26 RX ADMIN — SIMETHICONE 80 MILLIGRAM(S): 80 TABLET, CHEWABLE ORAL at 15:40

## 2024-02-26 RX ADMIN — FAMOTIDINE 40 MILLIGRAM(S): 10 INJECTION INTRAVENOUS at 16:38

## 2024-02-26 RX ADMIN — GABAPENTIN 800 MILLIGRAM(S): 400 CAPSULE ORAL at 05:41

## 2024-02-26 RX ADMIN — RILUZOLE 50 MILLIGRAM(S): 50 TABLET ORAL at 05:41

## 2024-02-26 RX ADMIN — GABAPENTIN 800 MILLIGRAM(S): 400 CAPSULE ORAL at 13:19

## 2024-02-26 RX ADMIN — NORTRIPTYLINE HYDROCHLORIDE 25 MILLIGRAM(S): 10 CAPSULE ORAL at 22:17

## 2024-02-26 RX ADMIN — OXYCODONE HYDROCHLORIDE 10 MILLIGRAM(S): 5 TABLET ORAL at 12:01

## 2024-02-26 RX ADMIN — RILUZOLE 50 MILLIGRAM(S): 50 TABLET ORAL at 17:21

## 2024-02-26 NOTE — PROGRESS NOTE ADULT - SUBJECTIVE AND OBJECTIVE BOX
CC:  neck pain and weakness     HPI:  This is a 63 year old male presents PMHx including ALS (diagnosed 2023 at [facility]),  ACDF C4-5 on 07/12/23 and C4-C6 ACDF 8/2016. decreased ROM of right shoulder, chronic pain (daily marijuana user) and right  weakness (unable to extend fingers of right hand). Patient underwent C3-C4 ACDF/C3-C7 Posterior fusion without complications on 2/8/24 with Dr. Eliud Quinones.   Patient was evaluated by PM&R and therapy for functional deficits, gait/ADL impairments and acute rehabilitation was recommended. Patient was medically optimized for discharge to [facility] IRU on 2/15/24.     Allergies:  Pt has allergy to mustard (Anaphylaxis)  No Known Drug Allergies    Subjective:  - Patient was seen and examined at bed side, no overnight events   - Slept well last night, no new complaints   - Pain continues to be well controlled  - GI/: LBM (02/24). voiding without issues  - Tolerating therapy - noted with right DF weakness. discuss AFO for safety.  Also educated on PRAFO for bed use  - Informed that Dr. Quinones was contacted in regards to incision      ROS:  - Denies CP, palpitation, SOB, cough, fever, chills, HD, abdominal discomfort, N/V/D, dysuria, neck pain, (+) left shoulder pain and decreased ROM (better this am)     MEDICATIONS  (STANDING):  aspirin enteric coated 81 milliGRAM(s) Oral daily  gabapentin 800 milliGRAM(s) Oral three times a day  lactulose Syrup 10 Gram(s) Oral daily  nortriptyline 25 milliGRAM(s) Oral at bedtime  polyethylene glycol 3350 17 Gram(s) Oral daily  riluzole 50 milliGRAM(s) Oral two times a day  senna 2 Tablet(s) Oral at bedtime    MEDICATIONS  (PRN):  acetaminophen     Tablet .. 650 milliGRAM(s) Oral every 6 hours PRN Mild Pain (1 - 3)  ALPRAZolam 0.25 milliGRAM(s) Oral two times a day PRN anxiety  cyclobenzaprine 10 milliGRAM(s) Oral three times a day PRN Muscle Spasm  famotidine    Tablet 40 milliGRAM(s) Oral daily PRN indigestion  magnesium hydroxide Suspension 30 milliLiter(s) Oral every 12 hours PRN Constipation  oxyCODONE    IR 10 milliGRAM(s) Oral every 4 hours PRN Moderate Pain (4 - 6)  oxyCODONE    IR 15 milliGRAM(s) Oral every 8 hours PRN Severe Pain (7 - 10)  simethicone 80 milliGRAM(s) Chew two times a day PRN Indigestion  traMADol 50 milliGRAM(s) Oral every 6 hours PRN Breakthrough Pain      LAB:                           14.8   10.03 )-----------( 274      ( 26 Feb 2024 07:11 )             45.4     02-26    137  |  101  |  23  ----------------------------<  101<H>  4.2   |  25  |  0.80    Ca    9.5      26 Feb 2024 07:11    TPro  6.9  /  Alb  3.4  /  TBili  0.9  /  DBili  x   /  AST  28  /  ALT  59<H>  /  AlkPhos  104  02-26    LIVER FUNCTIONS - ( 26 Feb 2024 07:11 )  Alb: 3.4 g/dL / Pro: 6.9 g/dL / ALK PHOS: 104 U/L / ALT: 59 U/L / AST: 28 U/L / GGT: x           PHYSICAL EXAM  Vital Signs Last 24 Hrs  T(C): 36.5 (26 Feb 2024 07:53), Max: 36.6 (25 Feb 2024 20:20)  T(F): 97.7 (26 Feb 2024 07:53), Max: 97.8 (25 Feb 2024 20:20)  HR: 87 (26 Feb 2024 07:53) (74 - 108)  BP: 127/85 (26 Feb 2024 07:53) (122/81 - 133/87)  RR: 16 (26 Feb 2024 07:53) (16 - 17)  SpO2: 93% (26 Feb 2024 07:53) (93% - 94%)  Parameters below as of 26 Feb 2024 07:53  Patient On (Oxygen Delivery Method): room air      Gen - NAD, Comfortable  HEENT - NCAT, EOMI, PERRLA  Neck - Supple, limited neck ROM - in cervical collar. Incisions are healed, posterior incision with redness, left anterior with steri strip  Pulm - Respiration nonlabored  Cardiovascular - warm and well perfused, no cyanosis  Abdomen - Soft, NT, ND, (+) BS  Extremities - No peripheral edema, no calf tenderness, B/L intrinsic muscles atrophy   Neuro-     Cognitive - awake, alert, oriented x 4,  Able to follow command     Communication - Fluent, Comprehensible     Attention: Intact     Cranial Nerves - Decreased shoulder shrug, EOMI, face symmetric                     LEFT    UE - ShAB 4-/5, EF 4-/5, EE 4-/5,  2+/5                    RIGHT UE - ShAB 4-/5, EF 4-/5, EE 2+/5,   2-/5                    LEFT    LE - HF 4-/5, KE 4-/5, DF 2+/5, PF 2+/5                    RIGHT LE - HF 4-/5, KE 4-/5, DF 2-/5, PF2+/5        Sensory - Decreased to left knee, B/L last 2 fingers        Coordination - FTN impaired   MSK: limited R shoulder ROM. unable to flex >90*  Psychiatric - Mood stable, Affect WNL             CC:  neck pain and weakness     HPI:  This is a 63 year old male presents PMHx including ALS (diagnosed 2023 at [facility]),  ACDF C4-5 on 07/12/23 and C4-C6 ACDF 8/2016. decreased ROM of right shoulder, chronic pain (daily marijuana user) and right  weakness (unable to extend fingers of right hand). Patient underwent C3-C4 ACDF/C3-C7 Posterior fusion without complications on 2/8/24 with Dr. Eliud Quinones.   Patient was evaluated by PM&R and therapy for functional deficits, gait/ADL impairments and acute rehabilitation was recommended. Patient was medically optimized for discharge to [facility] IRU on 2/15/24.     Allergies:  Pt has allergy to mustard (Anaphylaxis)  No Known Drug Allergies    Subjective:  - Patient was seen and examined at bed side, no overnight events   - Slept well last night, no new complaints   - Pain continues to be well controlled  - GI/: LBM (02/24). voiding without issues  - Tolerating therapy - noted with right DF weakness. discuss AFO for safety.  Also educated on PRAFO for bed use  - Informed that Dr. Quinones was contacted in regards to incision   - Patient presented with RLE weakness, foot drop with knee buckling due to ALS, (+) instability and requires a custom AFO to assist with safe ambulation. Patient is unable to use prefabricated brace as use of the orthosis will be for longer than 6 months.  Patient remains at risk for falls without the custom AFO.      ROS:  - Denies CP, palpitation, SOB, cough, fever, chills, HD, abdominal discomfort, N/V/D, dysuria, neck pain, (+) left shoulder pain and decreased ROM (better this am)     MEDICATIONS  (STANDING):  aspirin enteric coated 81 milliGRAM(s) Oral daily  gabapentin 800 milliGRAM(s) Oral three times a day  lactulose Syrup 10 Gram(s) Oral daily  nortriptyline 25 milliGRAM(s) Oral at bedtime  polyethylene glycol 3350 17 Gram(s) Oral daily  riluzole 50 milliGRAM(s) Oral two times a day  senna 2 Tablet(s) Oral at bedtime    MEDICATIONS  (PRN):  acetaminophen     Tablet .. 650 milliGRAM(s) Oral every 6 hours PRN Mild Pain (1 - 3)  ALPRAZolam 0.25 milliGRAM(s) Oral two times a day PRN anxiety  cyclobenzaprine 10 milliGRAM(s) Oral three times a day PRN Muscle Spasm  famotidine    Tablet 40 milliGRAM(s) Oral daily PRN indigestion  magnesium hydroxide Suspension 30 milliLiter(s) Oral every 12 hours PRN Constipation  oxyCODONE    IR 10 milliGRAM(s) Oral every 4 hours PRN Moderate Pain (4 - 6)  oxyCODONE    IR 15 milliGRAM(s) Oral every 8 hours PRN Severe Pain (7 - 10)  simethicone 80 milliGRAM(s) Chew two times a day PRN Indigestion  traMADol 50 milliGRAM(s) Oral every 6 hours PRN Breakthrough Pain      LAB:                           14.8   10.03 )-----------( 274      ( 26 Feb 2024 07:11 )             45.4     02-26    137  |  101  |  23  ----------------------------<  101<H>  4.2   |  25  |  0.80    Ca    9.5      26 Feb 2024 07:11    TPro  6.9  /  Alb  3.4  /  TBili  0.9  /  DBili  x   /  AST  28  /  ALT  59<H>  /  AlkPhos  104  02-26    LIVER FUNCTIONS - ( 26 Feb 2024 07:11 )  Alb: 3.4 g/dL / Pro: 6.9 g/dL / ALK PHOS: 104 U/L / ALT: 59 U/L / AST: 28 U/L / GGT: x           PHYSICAL EXAM  Vital Signs Last 24 Hrs  T(C): 36.5 (26 Feb 2024 07:53), Max: 36.6 (25 Feb 2024 20:20)  T(F): 97.7 (26 Feb 2024 07:53), Max: 97.8 (25 Feb 2024 20:20)  HR: 87 (26 Feb 2024 07:53) (74 - 108)  BP: 127/85 (26 Feb 2024 07:53) (122/81 - 133/87)  RR: 16 (26 Feb 2024 07:53) (16 - 17)  SpO2: 93% (26 Feb 2024 07:53) (93% - 94%)  Parameters below as of 26 Feb 2024 07:53  Patient On (Oxygen Delivery Method): room air      Gen - NAD, Comfortable  HEENT - NCAT, EOMI, PERRLA  Neck - Supple, limited neck ROM - in cervical collar. Incisions are healed, posterior incision with redness, left anterior with steri strip  Pulm - Respiration nonlabored  Cardiovascular - warm and well perfused, no cyanosis  Abdomen - Soft, NT, ND, (+) BS  Extremities - No peripheral edema, no calf tenderness, B/L intrinsic muscles atrophy   Neuro-     Cognitive - awake, alert, oriented x 4,  Able to follow command     Communication - Fluent, Comprehensible     Attention: Intact     Cranial Nerves - Decreased shoulder shrug, EOMI, face symmetric                     LEFT    UE - ShAB 4-/5, EF 4-/5, EE 4-/5,  2+/5                    RIGHT UE - ShAB 4-/5, EF 4-/5, EE 2+/5,   2-/5                    LEFT    LE - HF 4-/5, KE 3/5, KF 3/5, DF 2/5, PF 2/5                    RIGHT LE - HF 4-/5, KE 4-/5, DF 2-/5, PF2+/5        Sensory - Decreased to left knee, B/L last 2 fingers        Coordination - FTN impaired   MSK: limited R shoulder ROM. unable to flex >90*  Psychiatric - Mood stable, Affect WNL

## 2024-02-26 NOTE — PROGRESS NOTE ADULT - ASSESSMENT
ASSESSMENT/PLAN  This is a 63 year old male presents PMHx including ALS (diagnosed 2023 at [facility]),  ACDF C4-5 on 07/12/23 and C4-C6 ACDF 8/2016, decreased ROM of right shoulder, chronic pain (daily marijuana user) and right  weakness (unable to extend fingers of right hand). Patient underwent C3-C4 ACDF/C3-C7 Posterior fusion without complications on 2/8/24 with Dr. Eliud Quinones.   Patient now with gait Instability, ADL impairments and Functional impairments.    #Cervical stenosis with Myelopathy/ quadriparesis s/p cervical fusion  - C3-C4 ACDF/C3-C7 Posterior fusion without complications on 2/8/24  - posterior incision with redness -> picture sent via teams to Dr. Quinones (2/22)  - put gauze to cushion skin and cervical brace  - Comprehensive Rehab Program: PT/OT, 3hours daily and 5 days weekly  - PT: Focused on improving strength, endurance, coordination, balance, functional mobility, and transfers  - OT: Focused on improving strength, fine motor skills, coordination, posture and ADLs.    - Completed Prednisone course: 20mg daily x 3 days ( 2/16-2/18) then 10mg daily x 3 days ( 2/19-2/21)  - pain management: Flexeril 10mg TID, Pamelor, gabapentin 800mg TID  - [place] collar at all times     #ALS  - Riluzole 50mg BID- non formulary; pharmacy will try to obtain    #Leukocytosis/ Resolved   - WBC level (02/20) 13.87 > 10.20 (2/22), 10/03 (02/26)   - likely secondary to steroid (completed 2/21)  - no overt sign of infection    #Pain management  - Tylenol PRN  - Oxycodone PRN  - Tramadol 50mg q 6 hours PRN     #DVT ppx  - SCD, hemalatha    #GI ppx  - Pepcid 20mg PRN    #Bowel Regimen  - Senna HS  - Miralax QD  - Lactulose 10g     #Bladder management  - BS on admission x 1, <100cc, dc monitoring. Done    - voiding without issues     #FEN   - Diet: Regular- high fiber    #Skin:  - Skin on admission: Anterior and posterior cervical spine incision covered with dry dressing, anterior incision with surrounding erythema    #Mood  #Anxiety  - Alprazolam 0.25mg BID PRN    #Precaution  - Fall, Aspiration, Spinal    #GOC  CODE STATUS: FULL CODE     Outpatient Follow-up (Specialty/Name of physician):    Eliud Quinones  Orthopaedic Surgery  [address], [address]  [place] [address]  Phone: [phone]  Fax: [phone]  Follow Up Time:    [facility]  [facility] [address]  Scheduled Appointment: 02/21/2024

## 2024-02-27 PROCEDURE — 99232 SBSQ HOSP IP/OBS MODERATE 35: CPT

## 2024-02-27 PROCEDURE — 99233 SBSQ HOSP IP/OBS HIGH 50: CPT | Mod: GC

## 2024-02-27 RX ORDER — GABAPENTIN 400 MG/1
800 CAPSULE ORAL
Refills: 0 | Status: DISCONTINUED | OUTPATIENT
Start: 2024-02-27 | End: 2024-03-07

## 2024-02-27 RX ORDER — GABAPENTIN 400 MG/1
400 CAPSULE ORAL AT BEDTIME
Refills: 0 | Status: DISCONTINUED | OUTPATIENT
Start: 2024-02-27 | End: 2024-03-07

## 2024-02-27 RX ADMIN — GABAPENTIN 800 MILLIGRAM(S): 400 CAPSULE ORAL at 05:30

## 2024-02-27 RX ADMIN — OXYCODONE HYDROCHLORIDE 10 MILLIGRAM(S): 5 TABLET ORAL at 14:00

## 2024-02-27 RX ADMIN — NORTRIPTYLINE HYDROCHLORIDE 25 MILLIGRAM(S): 10 CAPSULE ORAL at 22:51

## 2024-02-27 RX ADMIN — RILUZOLE 50 MILLIGRAM(S): 50 TABLET ORAL at 05:30

## 2024-02-27 RX ADMIN — GABAPENTIN 800 MILLIGRAM(S): 400 CAPSULE ORAL at 18:41

## 2024-02-27 RX ADMIN — OXYCODONE HYDROCHLORIDE 10 MILLIGRAM(S): 5 TABLET ORAL at 13:11

## 2024-02-27 RX ADMIN — GABAPENTIN 800 MILLIGRAM(S): 400 CAPSULE ORAL at 13:11

## 2024-02-27 RX ADMIN — Medication 81 MILLIGRAM(S): at 13:11

## 2024-02-27 RX ADMIN — RILUZOLE 50 MILLIGRAM(S): 50 TABLET ORAL at 18:41

## 2024-02-27 RX ADMIN — GABAPENTIN 400 MILLIGRAM(S): 400 CAPSULE ORAL at 22:50

## 2024-02-27 RX ADMIN — FAMOTIDINE 40 MILLIGRAM(S): 10 INJECTION INTRAVENOUS at 18:49

## 2024-02-27 NOTE — CHART NOTE - NSCHARTNOTEFT_GEN_A_CORE
Nutrition Follow Up Note  Hospital Course   (Per Electronic Medical Record)    Source:  Patient [X]  Medical Record [X]      Diet:   High Fiber Diet w/ Thin Liquids (IDDSI Level 0)  Tolerates Diet Consistency Well  No Chewing/Swallowing Difficulties  No Recent Nausea, Vomiting, Diarrhea or Constipation (as Per Patient)  Consumes % of Meals (as Per Documentation) - States Good PO Intake/Appetite (Per Patient)  on Ensure Max 11oz PO Daily - Patient Takes Nutrition Supplement Well  Obtained Food Preferences from Patient    Enteral/Parenteral Nutrition: Not Applicable    Current Weight: 196.8lb on 2/28  Obtain New Weight to Confirm  Obtain Weights Weekly     Pertinent Medications: MEDICATIONS  (STANDING):  aspirin enteric coated 81 milliGRAM(s) Oral daily  gabapentin 400 milliGRAM(s) Oral at bedtime  gabapentin 800 milliGRAM(s) Oral <User Schedule>  lactulose Syrup 10 Gram(s) Oral daily  nortriptyline 25 milliGRAM(s) Oral at bedtime  polyethylene glycol 3350 17 Gram(s) Oral daily  riluzole 50 milliGRAM(s) Oral two times a day  senna 2 Tablet(s) Oral at bedtime    MEDICATIONS  (PRN):  acetaminophen     Tablet .. 650 milliGRAM(s) Oral every 6 hours PRN Mild Pain (1 - 3)  ALPRAZolam 0.25 milliGRAM(s) Oral two times a day PRN anxiety  cyclobenzaprine 10 milliGRAM(s) Oral three times a day PRN Muscle Spasm  famotidine    Tablet 40 milliGRAM(s) Oral daily PRN indigestion  magnesium hydroxide Suspension 30 milliLiter(s) Oral every 12 hours PRN Constipation  oxyCODONE    IR 15 milliGRAM(s) Oral every 8 hours PRN Severe Pain (7 - 10)  oxyCODONE    IR 10 milliGRAM(s) Oral every 4 hours PRN Moderate Pain (4 - 6)  simethicone 80 milliGRAM(s) Chew two times a day PRN Indigestion  traMADol 50 milliGRAM(s) Oral every 6 hours PRN Breakthrough Pain    Pertinent Labs:  02-26 Na137 mmol/L Glu 101 mg/dL<H> K+ 4.2 mmol/L Cr  0.80 mg/dL BUN 23 mg/dL 02-26 Alb 3.4 g/dL    Skin: No Pressure Ulcers  Multiple Surgical Incisions  (as Per Nursing Flow Sheet)     Edema: None Noted (as Per Documentation)     Last Bowel Movement: on 2/26    Estimated Needs:   [X] No Change Since Previous Assessment    Previous Nutrition Diagnosis:   Moderate Malnutrition     Nutrition Diagnosis is [X] Ongoing - Continues on Nutrition Supplement & Patient Takes Nutrition Supplement     New Nutrition Diagnosis: [X] Not Applicable    Interventions:   1. Recommend Continue Nutrition Plan of Care     Monitoring & Evaluation:   [X] Weights   [X] PO Intake   [X] Skin Integrity   [X] Follow Up (Per Protocol)  [X] Tolerance to Diet Prescription   [X] Other: Labs     Registered Dietitian/Nutritionist Remains Available.  Julito Trevizo, EMELIN, CDN    Phone# [phone]

## 2024-02-27 NOTE — PROGRESS NOTE ADULT - SUBJECTIVE AND OBJECTIVE BOX
Patient is a 63y old  Male who presents with a chief complaint of Cervical stenosis with Myelopathy/ quadriparesis s/p cervical fusion (26 Feb 2024 11:39)      Patient seen and examined at bedside. No events overnight but continues to complain of neck pain, no changes in pain level since brace has been removed. Admits to some numbness in first 2 digits on R hand, denies weakness to RUE. Denies other neurological changes, chest pain, sob, abd pain, headache, changes in vision    ALLERGIES:  Pt has allergy to mustard (Anaphylaxis)  No Known Drug Allergies    MEDICATIONS  (STANDING):  aspirin enteric coated 81 milliGRAM(s) Oral daily  gabapentin 800 milliGRAM(s) Oral three times a day  lactulose Syrup 10 Gram(s) Oral daily  nortriptyline 25 milliGRAM(s) Oral at bedtime  polyethylene glycol 3350 17 Gram(s) Oral daily  riluzole 50 milliGRAM(s) Oral two times a day  senna 2 Tablet(s) Oral at bedtime    MEDICATIONS  (PRN):  acetaminophen     Tablet .. 650 milliGRAM(s) Oral every 6 hours PRN Mild Pain (1 - 3)  ALPRAZolam 0.25 milliGRAM(s) Oral two times a day PRN anxiety  cyclobenzaprine 10 milliGRAM(s) Oral three times a day PRN Muscle Spasm  famotidine    Tablet 40 milliGRAM(s) Oral daily PRN indigestion  magnesium hydroxide Suspension 30 milliLiter(s) Oral every 12 hours PRN Constipation  oxyCODONE    IR 10 milliGRAM(s) Oral every 4 hours PRN Moderate Pain (4 - 6)  oxyCODONE    IR 15 milliGRAM(s) Oral every 8 hours PRN Severe Pain (7 - 10)  simethicone 80 milliGRAM(s) Chew two times a day PRN Indigestion  traMADol 50 milliGRAM(s) Oral every 6 hours PRN Breakthrough Pain    Vital Signs Last 24 Hrs  T(F): 97.5 (26 Feb 2024 19:48), Max: 97.5 (26 Feb 2024 19:48)  HR: 89 (26 Feb 2024 19:48) (89 - 89)  BP: 125/85 (26 Feb 2024 19:48) (125/85 - 125/85)  RR: 16 (26 Feb 2024 19:48) (16 - 16)  SpO2: 93% (26 Feb 2024 19:48) (93% - 93%)  I&O's Summary      PHYSICAL EXAM:  GENERAL: NAD, laying in bed  HEAD:  Atraumatic, Normocephalic  EYES: PEERL, conjunctiva and sclera clear  ENMT: Moist mucous membranes, Supple, No JVD  CHEST/LUNG: Clear to auscultation bilaterally, good air entry, non-labored breathing  HEART: RRR; S1/S2, No murmur  ABDOMEN: Soft, Nontender, Nondistended; Bowel sounds present  EXTREMITIES: No calf tenderness, No cyanosis, No edema  SKIN: Warm, perfused  PSYCH: Normal mood, Normal affect  NERVOUS SYSTEM:  A/O x3, Good concentration    LABS:                        14.8   10.03 )-----------( 274      ( 26 Feb 2024 07:11 )             45.4     02-26    137  |  101  |  23  ----------------------------<  101  4.2   |  25  |  0.80    Ca    9.5      26 Feb 2024 07:11    TPro  6.9  /  Alb  3.4  /  TBili  0.9  /  DBili  x   /  AST  28  /  ALT  59  /  AlkPhos  104  02-26                                Urinalysis Basic - ( 26 Feb 2024 07:11 )    Color: x / Appearance: x / SG: x / pH: x  Gluc: 101 mg/dL / Ketone: x  / Bili: x / Urobili: x   Blood: x / Protein: x / Nitrite: x   Leuk Esterase: x / RBC: x / WBC x   Sq Epi: x / Non Sq Epi: x / Bacteria: x            RADIOLOGY & ADDITIONAL TESTS:    Care Discussed with Consultants/Other Providers:

## 2024-02-27 NOTE — PROGRESS NOTE ADULT - ASSESSMENT
ASSESSMENT/PLAN  This is a 63 year old male presents PMHx including ALS (diagnosed 2023 at [facility]),  ACDF C4-5 on 07/12/23 and C4-C6 ACDF 8/2016, decreased ROM of right shoulder, chronic pain (daily marijuana user) and right  weakness (unable to extend fingers of right hand). Patient underwent C3-C4 ACDF/C3-C7 Posterior fusion without complications on 2/8/24 with Dr. Eliud Quinones.   Patient now with gait Instability, ADL impairments and Functional impairments.    #Cervical stenosis with Myelopathy/ quadriparesis s/p cervical fusion  - C3-C4 ACDF/C3-C7 Posterior fusion without complications on 2/8/24  - posterior incision with redness -> picture sent via teams to Dr. Quinones (2/22), looks fine   - put gauze to cushion skin and cervical brace  - Comprehensive Rehab Program: PT/OT, 3hours daily and 5 days weekly  - PT: Focused on improving strength, endurance, coordination, balance, functional mobility, and transfers  - OT: Focused on improving strength, fine motor skills, coordination, posture and ADLs.    - Completed Prednisone course: 20mg daily x 3 days ( 2/16-2/18) then 10mg daily x 3 days ( 2/19-2/21)  - pain management: Flexeril 10mg TID, Pamelor, gabapentin 800mg TID  - [place] collar at all times     #ALS  - Riluzole 50mg BID- non formulary; pharmacy will try to obtain    #Leukocytosis/ Resolved   - WBC level (02/20) 13.87 > 10.20 (2/22), 10/03 (02/26)   - likely secondary to steroid (completed 2/21)  - no overt sign of infection    #Pain management  - Tylenol PRN  - Oxycodone PRN  - Tramadol 50mg q 6 hours PRN     #DVT ppx  - SCD, hemalatha    #GI ppx  - Pepcid 20mg PRN    #Bowel Regimen  - Senna HS  - Miralax QD  - Lactulose 10g     #Bladder management  - BS on admission x 1, <100cc, dc monitoring. Done    - voiding without issues     #FEN   - Diet: Regular- high fiber    #Skin:  - Skin on admission: Anterior and posterior cervical spine incision covered with dry dressing, anterior incision with surrounding erythema    #Mood  #Anxiety  - Alprazolam 0.25mg BID PRN    #Precaution  - Fall, Aspiration, Spinal    #GOC  CODE STATUS: FULL CODE     Outpatient Follow-up (Specialty/Name of physician):    Eliud Quinones  Orthopaedic Surgery  [address], [address]  [place] [address]  Phone: [phone]  Fax: [phone]  Follow Up Time:    [facility]  [facility] [address]  Scheduled Appointment: 02/21/2024   ASSESSMENT/PLAN  This is a 63 year old male presents PMHx including ALS (diagnosed 2023 at [facility]),  ACDF C4-5 on 07/12/23 and C4-C6 ACDF 8/2016, decreased ROM of right shoulder, chronic pain (daily marijuana user) and right  weakness (unable to extend fingers of right hand). Patient underwent C3-C4 ACDF/C3-C7 Posterior fusion without complications on 2/8/24 with Dr. Eliud Quinones.   Patient now with gait Instability, ADL impairments and Functional impairments.    #Cervical stenosis with Myelopathy/ quadriparesis s/p cervical fusion  - C3-C4 ACDF/C3-C7 Posterior fusion without complications on 2/8/24  - posterior incision with redness -> picture sent via teams to Dr. Quinones (2/22), looks fine   - put gauze to cushion skin and cervical brace  - Comprehensive Rehab Program: PT/OT, 3hours daily and 5 days weekly  - PT: Focused on improving strength, endurance, coordination, balance, functional mobility, and transfers  - OT: Focused on improving strength, fine motor skills, coordination, posture and ADLs.    - Completed Prednisone course: 20mg daily x 3 days ( 2/16-2/18) then 10mg daily x 3 days ( 2/19-2/21)  - pain management: Flexeril 10mg TID, Pamelor, gabapentin 800mg TID--> Added 400 mg po at bed time (02/27)   - [place] collar at all times     #ALS  - Riluzole 50mg BID- non formulary; pharmacy will try to obtain    #Leukocytosis/ Resolved   - WBC level (02/20) 13.87 > 10.20 (2/22), 10/03 (02/26)   - likely secondary to steroid (completed 2/21)  - no overt sign of infection    #Pain management  - Tylenol PRN  - Oxycodone PRN  - Tramadol 50mg q 6 hours PRN     #DVT ppx  - SCD, hemalatha    #GI ppx  - Pepcid 20mg PRN    #Bowel Regimen  - Senna HS  - Miralax QD  - Lactulose 10g     #Bladder management  - BS on admission x 1, <100cc, dc monitoring. Done    - voiding without issues     #FEN   - Diet: Regular- high fiber    #Skin:  - Skin on admission: Anterior and posterior cervical spine incision covered with dry dressing, anterior incision with surrounding erythema    #Mood  #Anxiety  - Alprazolam 0.25mg BID PRN    #Precaution  - Fall, Aspiration, Spinal    #GOC  CODE STATUS: FULL CODE     Outpatient Follow-up (Specialty/Name of physician):    Eliud Quinones  Orthopaedic Surgery  [address], [address]  [place] [address]  Phone: [phone]  Fax: [phone]  Follow Up Time:    [facility]  [facility] [address]  Scheduled Appointment: 02/21/2024

## 2024-02-27 NOTE — PROGRESS NOTE ADULT - SUBJECTIVE AND OBJECTIVE BOX
CC:  neck pain and weakness     HPI:  This is a 63 year old male presents PMHx including ALS (diagnosed 2023 at [facility]),  ACDF C4-5 on 07/12/23 and C4-C6 ACDF 8/2016. decreased ROM of right shoulder, chronic pain (daily marijuana user) and right  weakness (unable to extend fingers of right hand). Patient underwent C3-C4 ACDF/C3-C7 Posterior fusion without complications on 2/8/24 with Dr. Eliud Quinones.   Patient was evaluated by PM&R and therapy for functional deficits, gait/ADL impairments and acute rehabilitation was recommended. Patient was medically optimized for discharge to [facility] IRU on 2/15/24.     Allergies:  Pt has allergy to mustard (Anaphylaxis)  No Known Drug Allergies    Subjective:  - Patient was seen and examined at bed side, no overnight events   - Slept well last night, complains of left 2nd and 3rd fingers numbness, started 2-3 days ago   - Pain continues to be well controlled  - GI/: LBM (02/26). Voiding without issues  - Tolerating therapy - noted with right DF weakness. discussed AFO for safety.  Also educated on PRAFO for bed use  - Informed that Dr. Quinones was contacted in regards to incision   - Patient presented with RLE weakness, foot drop with knee buckling due to ALS, (+) instability and requires a custom AFO to assist with safe ambulation. Patient is unable to use prefabricated brace as use of the orthosis will be for longer than 6 months.  Patient remains at risk for falls without the custom AFO.    - Case discussed at IDT meeting today for progress and discharge plan.     ROS:  - Denies CP, palpitation, SOB, cough, fever, chills, HD, abdominal discomfort, N/V/D, dysuria, neck pain, (+) left shoulder pain and decreased ROM (better this am)     MEDICATIONS  (STANDING):  aspirin enteric coated 81 milliGRAM(s) Oral daily  gabapentin 800 milliGRAM(s) Oral three times a day  lactulose Syrup 10 Gram(s) Oral daily  nortriptyline 25 milliGRAM(s) Oral at bedtime  polyethylene glycol 3350 17 Gram(s) Oral daily  riluzole 50 milliGRAM(s) Oral two times a day  senna 2 Tablet(s) Oral at bedtime    MEDICATIONS  (PRN):  acetaminophen     Tablet .. 650 milliGRAM(s) Oral every 6 hours PRN Mild Pain (1 - 3)  ALPRAZolam 0.25 milliGRAM(s) Oral two times a day PRN anxiety  cyclobenzaprine 10 milliGRAM(s) Oral three times a day PRN Muscle Spasm  famotidine    Tablet 40 milliGRAM(s) Oral daily PRN indigestion  magnesium hydroxide Suspension 30 milliLiter(s) Oral every 12 hours PRN Constipation  oxyCODONE    IR 10 milliGRAM(s) Oral every 4 hours PRN Moderate Pain (4 - 6)  oxyCODONE    IR 15 milliGRAM(s) Oral every 8 hours PRN Severe Pain (7 - 10)  simethicone 80 milliGRAM(s) Chew two times a day PRN Indigestion  traMADol 50 milliGRAM(s) Oral every 6 hours PRN Breakthrough Pain      LAB:                           14.8   10.03 )-----------( 274      ( 26 Feb 2024 07:11 )             45.4     02-26    137  |  101  |  23  ----------------------------<  101<H>  4.2   |  25  |  0.80    Ca    9.5      26 Feb 2024 07:11    TPro  6.9  /  Alb  3.4  /  TBili  0.9  /  DBili  x   /  AST  28  /  ALT  59<H>  /  AlkPhos  104  02-26    LIVER FUNCTIONS - ( 26 Feb 2024 07:11 )  Alb: 3.4 g/dL / Pro: 6.9 g/dL / ALK PHOS: 104 U/L / ALT: 59 U/L / AST: 28 U/L / GGT: x           PHYSICAL EXAM  Vital Signs Last 24 Hrs  T(C): 36.5 (27 Feb 2024 09:00), Max: 36.5 (27 Feb 2024 09:00)  T(F): 97.7 (27 Feb 2024 09:00), Max: 97.7 (27 Feb 2024 09:00)  HR: 102 (27 Feb 2024 09:00) (89 - 102)  BP: 114/76 (27 Feb 2024 09:00) (114/76 - 125/85)  RR: 16 (26 Feb 2024 19:48) (16 - 16)  SpO2: 93% (26 Feb 2024 19:48) (93% - 93%)  Parameters below as of 26 Feb 2024 19:48  Patient On (Oxygen Delivery Method): room air    Gen - NAD, Comfortable  HEENT - NCAT, EOMI, PERRLA  Neck - Supple, limited neck ROM - in cervical collar. Incisions are healed, posterior incision with redness, left anterior with steri strip  Pulm - Respiration nonlabored  Cardiovascular - warm and well perfused, no cyanosis  Abdomen - Soft, NT, ND, (+) BS  Extremities - No peripheral edema, no calf tenderness, B/L intrinsic muscles atrophy   Neuro-     Cognitive - awake, alert, oriented x 4,  Able to follow command     Communication - Fluent, Comprehensible     Attention: Intact     Cranial Nerves - Decreased shoulder shrug, EOMI, face symmetric                     LEFT    UE - ShAB 4-/5, EF 4-/5, EE 4-/5,  2+/5                    RIGHT UE - ShAB 4-/5, EF 4-/5, EE 2+/5,   2-/5                    LEFT    LE - HF 4-/5, KE 3/5, KF 3/5, DF 2/5, PF 2/5                    RIGHT LE - HF 4-/5, KE 4-/5, DF 2-/5, PF2+/5        Sensory - Decreased to left knee, B/L last 2 fingers, now numbness in the left middle and ring fingers        Coordination - FTN impaired   MSK: limited R shoulder ROM. unable to flex >90*  Psychiatric - Mood stable, Affect WNL

## 2024-02-27 NOTE — PROGRESS NOTE ADULT - ASSESSMENT
63 year old M PMH ALS (diagnosed 2023 at [facility]),  ACDF C4-5 on 07/12/23 and C4-C6 ACDF 8/2016, decreased ROM of right shoulder, chronic pain (daily marijuana user) and right  weakness (unable to extend fingers of right hand). Patient underwent C3-C4 ACDF/C3-C7 Posterior fusion without complications on 2/8/24 with Dr. Eliud Quinones. Patient now with gait Instability, ADL impairments and Functional impairments. Admitted to acute rehab.    #s/p cervical fusion  - C3-C4 ACDF/C3-C7 Posterior fusion without complications on 2/8/24  - s/p prednisone taper  - Aspen collar per rehab     #ALS  - c/w Riluzole 50mg BID- non formulary    #Tachycardia  -normal rates at night time  -Cardiology evaluation at [facility]. No organic cause identified  -Likely anxiety/pain driven    #DVT ppx  - SCD, TEDs

## 2024-02-28 PROCEDURE — 99232 SBSQ HOSP IP/OBS MODERATE 35: CPT | Mod: GC

## 2024-02-28 PROCEDURE — 99232 SBSQ HOSP IP/OBS MODERATE 35: CPT

## 2024-02-28 RX ORDER — LANOLIN ALCOHOL/MO/W.PET/CERES
6 CREAM (GRAM) TOPICAL AT BEDTIME
Refills: 0 | Status: DISCONTINUED | OUTPATIENT
Start: 2024-02-28 | End: 2024-03-07

## 2024-02-28 RX ADMIN — RILUZOLE 50 MILLIGRAM(S): 50 TABLET ORAL at 05:01

## 2024-02-28 RX ADMIN — GABAPENTIN 800 MILLIGRAM(S): 400 CAPSULE ORAL at 05:01

## 2024-02-28 RX ADMIN — RILUZOLE 50 MILLIGRAM(S): 50 TABLET ORAL at 17:43

## 2024-02-28 RX ADMIN — Medication 6 MILLIGRAM(S): at 22:46

## 2024-02-28 RX ADMIN — NORTRIPTYLINE HYDROCHLORIDE 25 MILLIGRAM(S): 10 CAPSULE ORAL at 22:46

## 2024-02-28 RX ADMIN — SIMETHICONE 80 MILLIGRAM(S): 80 TABLET, CHEWABLE ORAL at 09:47

## 2024-02-28 RX ADMIN — Medication 81 MILLIGRAM(S): at 13:02

## 2024-02-28 RX ADMIN — GABAPENTIN 400 MILLIGRAM(S): 400 CAPSULE ORAL at 22:46

## 2024-02-28 RX ADMIN — GABAPENTIN 800 MILLIGRAM(S): 400 CAPSULE ORAL at 13:02

## 2024-02-28 RX ADMIN — GABAPENTIN 800 MILLIGRAM(S): 400 CAPSULE ORAL at 17:46

## 2024-02-28 NOTE — PROGRESS NOTE ADULT - SUBJECTIVE AND OBJECTIVE BOX
Patient is a 63y old  Male who presents with a chief complaint of Cervical stenosis with Myelopathy/ quadriparesis s/p cervical fusion (27 Feb 2024 10:24)    Patient seen and examined at bedside. No events overnight. Denies new neurological changes, chest pain, sob, abd pain, headache, changes in vision    ALLERGIES:  Pt has allergy to mustard (Anaphylaxis)  No Known Drug Allergies    MEDICATIONS  (STANDING):  aspirin enteric coated 81 milliGRAM(s) Oral daily  gabapentin 400 milliGRAM(s) Oral at bedtime  gabapentin 800 milliGRAM(s) Oral <User Schedule>  lactulose Syrup 10 Gram(s) Oral daily  nortriptyline 25 milliGRAM(s) Oral at bedtime  polyethylene glycol 3350 17 Gram(s) Oral daily  riluzole 50 milliGRAM(s) Oral two times a day  senna 2 Tablet(s) Oral at bedtime    MEDICATIONS  (PRN):  acetaminophen     Tablet .. 650 milliGRAM(s) Oral every 6 hours PRN Mild Pain (1 - 3)  ALPRAZolam 0.25 milliGRAM(s) Oral two times a day PRN anxiety  cyclobenzaprine 10 milliGRAM(s) Oral three times a day PRN Muscle Spasm  famotidine    Tablet 40 milliGRAM(s) Oral daily PRN indigestion  magnesium hydroxide Suspension 30 milliLiter(s) Oral every 12 hours PRN Constipation  oxyCODONE    IR 10 milliGRAM(s) Oral every 4 hours PRN Moderate Pain (4 - 6)  oxyCODONE    IR 15 milliGRAM(s) Oral every 8 hours PRN Severe Pain (7 - 10)  simethicone 80 milliGRAM(s) Chew two times a day PRN Indigestion  traMADol 50 milliGRAM(s) Oral every 6 hours PRN Breakthrough Pain    Vital Signs Last 24 Hrs  T(F): 97.8 (28 Feb 2024 07:44), Max: 98.2 (27 Feb 2024 18:30)  HR: 105 (28 Feb 2024 07:44) (100 - 128)  BP: 109/71 (28 Feb 2024 07:44) (109/71 - 123/83)  RR: 14 (28 Feb 2024 07:44) (14 - 18)  SpO2: 98% (28 Feb 2024 07:44) (93% - 98%)  I&O's Summary    27 Feb 2024 07:01  -  28 Feb 2024 07:00  --------------------------------------------------------  IN: 0 mL / OUT: 300 mL / NET: -300 mL        PHYSICAL EXAM:  GENERAL: NAD, laying in bed  HEAD:  Atraumatic, Normocephalic  EYES: PEERL, conjunctiva and sclera clear  ENMT: Moist mucous membranes, Supple, No JVD  CHEST/LUNG: Clear to auscultation bilaterally, good air entry, non-labored breathing  HEART: RRR; S1/S2, No murmur  ABDOMEN: Soft, Nontender, Nondistended; Bowel sounds present  EXTREMITIES: No calf tenderness, No cyanosis, No edema  SKIN: Warm, perfused  PSYCH: Normal mood, Normal affect  NERVOUS SYSTEM:  A/O x3, Good concentration    LABS:                        14.8   10.03 )-----------( 274      ( 26 Feb 2024 07:11 )             45.4     02-26    137  |  101  |  23  ----------------------------<  101  4.2   |  25  |  0.80    Ca    9.5      26 Feb 2024 07:11    TPro  6.9  /  Alb  3.4  /  TBili  0.9  /  DBili  x   /  AST  28  /  ALT  59  /  AlkPhos  104  02-26                                Urinalysis Basic - ( 26 Feb 2024 07:11 )    Color: x / Appearance: x / SG: x / pH: x  Gluc: 101 mg/dL / Ketone: x  / Bili: x / Urobili: x   Blood: x / Protein: x / Nitrite: x   Leuk Esterase: x / RBC: x / WBC x   Sq Epi: x / Non Sq Epi: x / Bacteria: x            RADIOLOGY & ADDITIONAL TESTS:    Care Discussed with Consultants/Other Providers:

## 2024-02-28 NOTE — PROGRESS NOTE ADULT - ASSESSMENT
ASSESSMENT/PLAN  This is a 63 year old male presents PMHx including ALS (diagnosed 2023 at [facility]),  ACDF C4-5 on 07/12/23 and C4-C6 ACDF 8/2016, decreased ROM of right shoulder, chronic pain (daily marijuana user) and right  weakness (unable to extend fingers of right hand). Patient underwent C3-C4 ACDF/C3-C7 Posterior fusion without complications on 2/8/24 with Dr. Eliud Quinones.   Patient now with gait Instability, ADL impairments and Functional impairments.    #Cervical stenosis with Myelopathy/ quadriparesis s/p cervical fusion  - C3-C4 ACDF/C3-C7 Posterior fusion without complications on 2/8/24  - posterior incision with redness -> picture sent via teams to Dr. Quinones (2/22), looks fine   - put gauze to cushion skin and cervical brace  - Comprehensive Rehab Program: PT/OT, 3hours daily and 5 days weekly  - PT: Focused on improving strength, endurance, coordination, balance, functional mobility, and transfers  - OT: Focused on improving strength, fine motor skills, coordination, posture and ADLs.    - Completed Prednisone course: 20mg daily x 3 days ( 2/16-2/18) then 10mg daily x 3 days ( 2/19-2/21)  - pain management: Flexeril 10mg TID, Pamelor, gabapentin 800mg TID--> Added 400 mg po at bed time (02/27)   - Aspen collar at all times     #Tachycardiac  -Cardiology evaluation at [facility]. No organic cause identified  -Likely anxiety driven  -Monitor, HR (02/28) 100 - 128      #ALS  - Riluzole 50mg BID- non formulary; pharmacy will try to obtain    #Leukocytosis/ Resolved   - WBC level (02/20) 13.87 > 10.20 (2/22), 10/03 (02/26)   - likely secondary to steroid (completed 2/21)  - no overt sign of infection    #Pain management  - Tylenol PRN  - Oxycodone PRN  - Tramadol 50mg q 6 hours PRN     #DVT ppx  - SCD, hemalatha    #GI ppx  - Pepcid 20mg PRN    #Bowel Regimen  - Senna HS  - Miralax QD  - Lactulose 10g     #Bladder management  - BS on admission x 1, <100cc, dc monitoring. Done    - voiding without issues     #FEN   - Diet: Regular- high fiber    #Skin:  - Skin on admission: Anterior and posterior cervical spine incision covered with dry dressing, anterior incision with surrounding erythema    #Mood  #Anxiety  - Alprazolam 0.25mg BID PRN    #Precaution  - Fall, Aspiration, Spinal    #GOC  CODE STATUS: FULL CODE     Outpatient Follow-up (Specialty/Name of physician):    Eliud Quinones  Orthopaedic Surgery  [address], [address]  [place] [address]  Phone: [phone]  Fax: [phone]  Follow Up Time:    [facility]  [id number] [address]  Scheduled Appointment: 02/21/2024

## 2024-02-28 NOTE — PROGRESS NOTE ADULT - SUBJECTIVE AND OBJECTIVE BOX
CC:  neck pain and weakness     HPI:  This is a 63 year old male presents PMHx including ALS (diagnosed 2023 at [facility]),  ACDF C4-5 on 07/12/23 and C4-C6 ACDF 8/2016. decreased ROM of right shoulder, chronic pain (daily marijuana user) and right  weakness (unable to extend fingers of right hand). Patient underwent C3-C4 ACDF/C3-C7 Posterior fusion without complications on 2/8/24 with Dr. Eliud Quinones.   Patient was evaluated by PM&R and therapy for functional deficits, gait/ADL impairments and acute rehabilitation was recommended. Patient was medically optimized for discharge to [facility] IRU on 2/15/24.     Allergies:  Pt has allergy to mustard (Anaphylaxis)  No Known Drug Allergies    Subjective:  - Patient was seen and examined at bed side, no overnight events   - Patient had difficulties with sleep last night and feels anxious    - Pain continues to be well controlled  - GI/: LBM (02/27). Voiding without issues  - Tolerating therapy - noted with right DF weakness. discussed AFO for safety.  Also educated on PRAFO for bed use   - Patient presented with RLE weakness, foot drop with knee buckling due to ALS, (+) instability and requires a custom AFO to assist with safe ambulation. Patient is unable to use prefabricated brace as use of the orthosis will be for longer than 6 months.  Patient remains at risk for falls without the custom AFO.    - Patient mentioned that he use Marijuana at home and if he can use it here in the hospital, discussed with patient and he is in an agreement not to use.     ROS:  - Denies CP, SOB, cough, fever, chills, HD, abdominal discomfort, N/V/D, dysuria, neck pain, (+) left shoulder pain and decreased ROM (better this am), (+) palpitation     MEDICATIONS  (STANDING):  aspirin enteric coated 81 milliGRAM(s) Oral daily  gabapentin 400 milliGRAM(s) Oral at bedtime  gabapentin 800 milliGRAM(s) Oral <User Schedule>  lactulose Syrup 10 Gram(s) Oral daily  melatonin 6 milliGRAM(s) Oral at bedtime  nortriptyline 25 milliGRAM(s) Oral at bedtime  polyethylene glycol 3350 17 Gram(s) Oral daily  riluzole 50 milliGRAM(s) Oral two times a day  senna 2 Tablet(s) Oral at bedtime    MEDICATIONS  (PRN):  acetaminophen     Tablet .. 650 milliGRAM(s) Oral every 6 hours PRN Mild Pain (1 - 3)  ALPRAZolam 0.25 milliGRAM(s) Oral two times a day PRN anxiety  cyclobenzaprine 10 milliGRAM(s) Oral three times a day PRN Muscle Spasm  famotidine    Tablet 40 milliGRAM(s) Oral daily PRN indigestion  magnesium hydroxide Suspension 30 milliLiter(s) Oral every 12 hours PRN Constipation  oxyCODONE    IR 15 milliGRAM(s) Oral every 8 hours PRN Severe Pain (7 - 10)  oxyCODONE    IR 10 milliGRAM(s) Oral every 4 hours PRN Moderate Pain (4 - 6)  simethicone 80 milliGRAM(s) Chew two times a day PRN Indigestion  traMADol 50 milliGRAM(s) Oral every 6 hours PRN Breakthrough Pain      LAB:                           14.8   10.03 )-----------( 274      ( 26 Feb 2024 07:11 )             45.4     02-26    137  |  101  |  23  ----------------------------<  101<H>  4.2   |  25  |  0.80    Ca    9.5      26 Feb 2024 07:11    TPro  6.9  /  Alb  3.4  /  TBili  0.9  /  DBili  x   /  AST  28  /  ALT  59<H>  /  AlkPhos  104  02-26    LIVER FUNCTIONS - ( 26 Feb 2024 07:11 )  Alb: 3.4 g/dL / Pro: 6.9 g/dL / ALK PHOS: 104 U/L / ALT: 59 U/L / AST: 28 U/L / GGT: x           PHYSICAL EXAM  Vital Signs Last 24 Hrs  T(C): 36.6 (28 Feb 2024 07:44), Max: 36.8 (27 Feb 2024 18:30)  T(F): 97.8 (28 Feb 2024 07:44), Max: 98.2 (27 Feb 2024 18:30)  HR: 105 (28 Feb 2024 07:44) (100 - 128)  BP: 109/71 (28 Feb 2024 07:44) (109/71 - 123/83)  RR: 14 (28 Feb 2024 07:44) (14 - 18)  SpO2: 98% (28 Feb 2024 07:44) (93% - 98%)  Parameters below as of 28 Feb 2024 07:44  Patient On (Oxygen Delivery Method): room air    Gen - NAD, Comfortable  HEENT - NCAT, EOMI, PERRLA  Neck - Supple, limited neck ROM - in cervical collar. Incisions are healed, posterior incision with redness, left anterior with steri strip  Pulm - Respiration nonlabored  Cardiovascular - warm and well perfused, no cyanosis  Abdomen - Soft, NT, ND, (+) BS  Extremities - No peripheral edema, no calf tenderness, B/L intrinsic muscles atrophy   Neuro-     Cognitive - awake, alert, oriented x 4,  Able to follow command     Communication - Fluent, Comprehensible     Attention: Intact     Cranial Nerves - Decreased shoulder shrug, EOMI, face symmetric                     LEFT    UE - ShAB 4-/5, EF 4-/5, EE 4-/5,  2+/5                    RIGHT UE - ShAB 4-/5, EF 4-/5, EE 2+/5,   2-/5                    LEFT    LE - HF 4-/5, KE 3/5, KF 3/5, DF 2/5, PF 2/5                    RIGHT LE - HF 4-/5, KE 4-/5, DF 2-/5, PF2+/5        Sensory - Decreased to left knee, B/L last 2 fingers, now numbness in the left middle and ring fingers        Coordination - FTN impaired   MSK: limited R shoulder ROM. unable to flex >90*  Psychiatric - Mood stable, Affect WNL

## 2024-02-28 NOTE — PROGRESS NOTE ADULT - ASSESSMENT
63 year old M PMH ALS (diagnosed 2023 at [facility] Clinic [facility]),  ACDF C4-5 on 07/12/23 and C4-C6 ACDF 8/2016, decreased ROM of right shoulder, chronic pain (daily marijuana user) and right  weakness (unable to extend fingers of right hand). Patient underwent C3-C4 ACDF/C3-C7 Posterior fusion without complications on 2/8/24 with Dr. Eliud Quinones. Patient now with gait Instability, ADL impairments and Functional impairments. Admitted to acute rehab.    #s/p cervical fusion  - C3-C4 ACDF/C3-C7 Posterior fusion without complications on 2/8/24  - s/p prednisone taper  - Aspen collar per rehab     #ALS  - c/w Riluzole 50mg BID- non formulary    #Tachycardia  -normal rates at night time  -Cardiology evaluation at [facility]. No organic cause identified  -Likely anxiety/pain driven    #DVT ppx  - SCD, TEDs

## 2024-02-29 LAB
ALBUMIN SERPL ELPH-MCNC: 3.1 G/DL — LOW (ref 3.3–5)
ALP SERPL-CCNC: 98 U/L — SIGNIFICANT CHANGE UP (ref 40–120)
ALT FLD-CCNC: 62 U/L — HIGH (ref 10–45)
ANION GAP SERPL CALC-SCNC: 12 MMOL/L — SIGNIFICANT CHANGE UP (ref 5–17)
AST SERPL-CCNC: 28 U/L — SIGNIFICANT CHANGE UP (ref 10–40)
BILIRUB SERPL-MCNC: 0.9 MG/DL — SIGNIFICANT CHANGE UP (ref 0.2–1.2)
BUN SERPL-MCNC: 18 MG/DL — SIGNIFICANT CHANGE UP (ref 7–23)
CALCIUM SERPL-MCNC: 9.3 MG/DL — SIGNIFICANT CHANGE UP (ref 8.4–10.5)
CHLORIDE SERPL-SCNC: 104 MMOL/L — SIGNIFICANT CHANGE UP (ref 96–108)
CO2 SERPL-SCNC: 24 MMOL/L — SIGNIFICANT CHANGE UP (ref 22–31)
CREAT SERPL-MCNC: 0.85 MG/DL — SIGNIFICANT CHANGE UP (ref 0.5–1.3)
EGFR: 99 ML/MIN/1.73M2 — SIGNIFICANT CHANGE UP
GLUCOSE SERPL-MCNC: 93 MG/DL — SIGNIFICANT CHANGE UP (ref 70–99)
HCT VFR BLD CALC: 40.9 % — SIGNIFICANT CHANGE UP (ref 39–50)
HGB BLD-MCNC: 13.5 G/DL — SIGNIFICANT CHANGE UP (ref 13–17)
MCHC RBC-ENTMCNC: 29.2 PG — SIGNIFICANT CHANGE UP (ref 27–34)
MCHC RBC-ENTMCNC: 33 GM/DL — SIGNIFICANT CHANGE UP (ref 32–36)
MCV RBC AUTO: 88.5 FL — SIGNIFICANT CHANGE UP (ref 80–100)
NRBC # BLD: 0 /100 WBCS — SIGNIFICANT CHANGE UP (ref 0–0)
PLATELET # BLD AUTO: 236 K/UL — SIGNIFICANT CHANGE UP (ref 150–400)
POTASSIUM SERPL-MCNC: 3.9 MMOL/L — SIGNIFICANT CHANGE UP (ref 3.5–5.3)
POTASSIUM SERPL-SCNC: 3.9 MMOL/L — SIGNIFICANT CHANGE UP (ref 3.5–5.3)
PROT SERPL-MCNC: 6.2 G/DL — SIGNIFICANT CHANGE UP (ref 6–8.3)
RBC # BLD: 4.62 M/UL — SIGNIFICANT CHANGE UP (ref 4.2–5.8)
RBC # FLD: 13.8 % — SIGNIFICANT CHANGE UP (ref 10.3–14.5)
SODIUM SERPL-SCNC: 140 MMOL/L — SIGNIFICANT CHANGE UP (ref 135–145)
WBC # BLD: 6.58 K/UL — SIGNIFICANT CHANGE UP (ref 3.8–10.5)
WBC # FLD AUTO: 6.58 K/UL — SIGNIFICANT CHANGE UP (ref 3.8–10.5)

## 2024-02-29 PROCEDURE — 99232 SBSQ HOSP IP/OBS MODERATE 35: CPT | Mod: GC

## 2024-02-29 RX ADMIN — OXYCODONE HYDROCHLORIDE 10 MILLIGRAM(S): 5 TABLET ORAL at 19:50

## 2024-02-29 RX ADMIN — Medication 6 MILLIGRAM(S): at 22:11

## 2024-02-29 RX ADMIN — GABAPENTIN 800 MILLIGRAM(S): 400 CAPSULE ORAL at 17:46

## 2024-02-29 RX ADMIN — NORTRIPTYLINE HYDROCHLORIDE 25 MILLIGRAM(S): 10 CAPSULE ORAL at 22:13

## 2024-02-29 RX ADMIN — GABAPENTIN 800 MILLIGRAM(S): 400 CAPSULE ORAL at 11:44

## 2024-02-29 RX ADMIN — FAMOTIDINE 40 MILLIGRAM(S): 10 INJECTION INTRAVENOUS at 00:38

## 2024-02-29 RX ADMIN — GABAPENTIN 800 MILLIGRAM(S): 400 CAPSULE ORAL at 06:05

## 2024-02-29 RX ADMIN — GABAPENTIN 400 MILLIGRAM(S): 400 CAPSULE ORAL at 22:11

## 2024-02-29 RX ADMIN — RILUZOLE 50 MILLIGRAM(S): 50 TABLET ORAL at 06:05

## 2024-02-29 RX ADMIN — Medication 81 MILLIGRAM(S): at 11:44

## 2024-02-29 RX ADMIN — SENNA PLUS 2 TABLET(S): 8.6 TABLET ORAL at 22:13

## 2024-02-29 RX ADMIN — RILUZOLE 50 MILLIGRAM(S): 50 TABLET ORAL at 17:45

## 2024-02-29 NOTE — PROGRESS NOTE ADULT - SUBJECTIVE AND OBJECTIVE BOX
CC:  neck pain and weakness     HPI:  This is a 63 year old male presents PMHx including ALS (diagnosed 2023 at [facility]),  ACDF C4-5 on 07/12/23 and C4-C6 ACDF 8/2016. decreased ROM of right shoulder, chronic pain (daily marijuana user) and right  weakness (unable to extend fingers of right hand). Patient underwent C3-C4 ACDF/C3-C7 Posterior fusion without complications on 2/8/24 with Dr. Eliud Quinones.   Patient was evaluated by PM&R and therapy for functional deficits, gait/ADL impairments and acute rehabilitation was recommended. Patient was medically optimized for discharge to [facility] IRU on 2/15/24.     Allergies:  Pt has allergy to mustard (Anaphylaxis)  No Known Drug Allergies    Subjective:  - Patient was seen and examined at bed side, no overnight events   - Patient slept well last night, less anxious   - Pain continues to be well controlled  - GI/: LBM (02/28). Voiding without issues  - Tolerating therapy - noted with right DF weakness. Discussed AFO for safety.  Also educated on PRAFO for bed use      ROS:  - Denies CP, SOB, cough, fever, chills, HD, abdominal discomfort, N/V/D, dysuria, neck pain, (+) left shoulder pain and decreased ROM (better this am), (+) palpitation     MEDICATIONS  (STANDING):  aspirin enteric coated 81 milliGRAM(s) Oral daily  gabapentin 400 milliGRAM(s) Oral at bedtime  gabapentin 800 milliGRAM(s) Oral <User Schedule>  lactulose Syrup 10 Gram(s) Oral daily  melatonin 6 milliGRAM(s) Oral at bedtime  nortriptyline 25 milliGRAM(s) Oral at bedtime  polyethylene glycol 3350 17 Gram(s) Oral daily  riluzole 50 milliGRAM(s) Oral two times a day  senna 2 Tablet(s) Oral at bedtime    MEDICATIONS  (PRN):  acetaminophen     Tablet .. 650 milliGRAM(s) Oral every 6 hours PRN Mild Pain (1 - 3)  ALPRAZolam 0.25 milliGRAM(s) Oral two times a day PRN anxiety  cyclobenzaprine 10 milliGRAM(s) Oral three times a day PRN Muscle Spasm  famotidine    Tablet 40 milliGRAM(s) Oral daily PRN indigestion  magnesium hydroxide Suspension 30 milliLiter(s) Oral every 12 hours PRN Constipation  oxyCODONE    IR 15 milliGRAM(s) Oral every 8 hours PRN Severe Pain (7 - 10)  oxyCODONE    IR 10 milliGRAM(s) Oral every 4 hours PRN Moderate Pain (4 - 6)  simethicone 80 milliGRAM(s) Chew two times a day PRN Indigestion  traMADol 50 milliGRAM(s) Oral every 6 hours PRN Breakthrough Pain    LAB:                         13.5   6.58  )-----------( 236      ( 29 Feb 2024 06:18 )             40.9     02-29    140  |  104  |  18  ----------------------------<  93  3.9   |  24  |  0.85    Ca    9.3      29 Feb 2024 06:18    TPro  6.2  /  Alb  3.1<L>  /  TBili  0.9  /  DBili  x   /  AST  28  /  ALT  62<H>  /  AlkPhos  98  02-29    LIVER FUNCTIONS - ( 29 Feb 2024 06:18 )  Alb: 3.1 g/dL / Pro: 6.2 g/dL / ALK PHOS: 98 U/L / ALT: 62 U/L / AST: 28 U/L / GGT: x             PHYSICAL EXAM  Vital Signs Last 24 Hrs  T(C): 36.3 (29 Feb 2024 07:52), Max: 36.5 (28 Feb 2024 20:57)  T(F): 97.4 (29 Feb 2024 07:52), Max: 97.7 (28 Feb 2024 20:57)  HR: 100 (29 Feb 2024 07:52) (100 - 106)  BP: 143/90 (29 Feb 2024 07:52) (143/90 - 145/89)  RR: 16 (29 Feb 2024 07:52) (15 - 16)  SpO2: 94% (29 Feb 2024 07:52) (94% - 95%)  Parameters below as of 29 Feb 2024 07:52  Patient On (Oxygen Delivery Method): room air    Gen - NAD, Comfortable  HEENT - NCAT, EOMI, PERRLA  Neck - Supple, limited neck ROM - in cervical collar. Incisions are healed, posterior incision with redness, left anterior with steri strip  Pulm - Respiration nonlabored  Cardiovascular - warm and well perfused, no cyanosis  Abdomen - Soft, NT, ND, (+) BS  Extremities - No peripheral edema, no calf tenderness, B/L intrinsic muscles atrophy   Neuro-     Cognitive - awake, alert, oriented x 4,  Able to follow command     Communication - Fluent, Comprehensible     Attention: Intact     Cranial Nerves - Decreased shoulder shrug, EOMI, face symmetric                     LEFT    UE - ShAB 4-/5, EF 4-/5, EE 4-/5,  2+/5                    RIGHT UE - ShAB 4-/5, EF 4-/5, EE 2+/5,   2-/5                    LEFT    LE - HF 4-/5, KE 3/5, KF 3/5, DF 2/5, PF 2/5                    RIGHT LE - HF 4-/5, KE 4-/5, DF 2-/5, PF2+/5        Sensory - Decreased to left knee, B/L last 2 fingers, now numbness in the left middle and ring fingers        Coordination - FTN impaired   MSK: limited R shoulder ROM. unable to flex >90*  Psychiatric - Mood stable, Affect WNL

## 2024-02-29 NOTE — PROGRESS NOTE ADULT - ASSESSMENT
ASSESSMENT/PLAN  This is a 63 year old male presents PMHx including ALS (diagnosed 2023 at [facility]),  ACDF C4-5 on 07/12/23 and C4-C6 ACDF 8/2016, decreased ROM of right shoulder, chronic pain (daily marijuana user) and right  weakness (unable to extend fingers of right hand). Patient underwent C3-C4 ACDF/C3-C7 Posterior fusion without complications on 2/8/24 with Dr. Eliud Quinones.   Patient now with gait Instability, ADL impairments and Functional impairments.    #Cervical stenosis with Myelopathy/ quadriparesis s/p cervical fusion  - C3-C4 ACDF/C3-C7 Posterior fusion without complications on 2/8/24  - Posterior incision with redness -> picture sent via teams to Dr. Quinones (2/22), looks fine   - Gauze to cushion skin and cervical brace  - Comprehensive Rehab Program: PT/OT, 3hours daily and 5 days weekly  - PT: Focused on improving strength, endurance, coordination, balance, functional mobility, and transfers  - OT: Focused on improving strength, fine motor skills, coordination, posture and ADLs.    - Completed Prednisone course: 20mg daily x 3 days ( 2/16-2/18) then 10mg daily x 3 days ( 2/19-2/21)  - pain management: Flexeril 10mg TID, Pamelor, gabapentin 800mg TID--> Added 400 mg po at bed time (02/27)   - Aspen collar at all times     #Tachycardiac  -Cardiology evaluation at [facility]. No organic cause identified  -Likely anxiety driven  -Monitor, HR (02/28) 100 - 128, (02/29) 100 - 106    #ALS  - Riluzole 50mg BID- non formulary; pharmacy will try to obtain    #Leukocytosis/ Resolved   - WBC level (02/20) 13.87 > 10.20 (2/22), 10/03 (02/26)   - likely secondary to steroid (completed 2/21)  - no overt sign of infection    #Pain management  - Tylenol PRN  - Oxycodone PRN  - Tramadol 50mg q 6 hours PRN     #DVT ppx  - SCD, hemalatha    #GI ppx  - Pepcid 20mg PRN    #Bowel Regimen  - Senna HS  - Miralax QD  - Lactulose 10g     #Bladder management  - BS on admission x 1, <100cc, dc monitoring. Done    - voiding without issues     #FEN   - Diet: Regular- high fiber    #Skin:  - Skin on admission: Anterior and posterior cervical spine incision covered with dry dressing, anterior incision with surrounding erythema    #Mood  #Anxiety  - Alprazolam 0.25mg BID PRN    #Precaution  - Fall, Aspiration, Spinal    #GOC  CODE STATUS: FULL CODE     Outpatient Follow-up (Specialty/Name of physician):    Eliud Quinones  Orthopaedic Surgery  [address], [address]  [place] [address]  Phone: [phone]  Fax: [phone]  Follow Up Time:    [facility]  ONCPAINMGT [address]  Scheduled Appointment: 02/21/2024

## 2024-03-01 ENCOUNTER — TRANSCRIPTION ENCOUNTER (OUTPATIENT)
Age: 64
End: 2024-03-01

## 2024-03-01 PROCEDURE — 99232 SBSQ HOSP IP/OBS MODERATE 35: CPT | Mod: GC

## 2024-03-01 PROCEDURE — 99232 SBSQ HOSP IP/OBS MODERATE 35: CPT

## 2024-03-01 RX ADMIN — FAMOTIDINE 40 MILLIGRAM(S): 10 INJECTION INTRAVENOUS at 11:01

## 2024-03-01 RX ADMIN — RILUZOLE 50 MILLIGRAM(S): 50 TABLET ORAL at 17:55

## 2024-03-01 RX ADMIN — RILUZOLE 50 MILLIGRAM(S): 50 TABLET ORAL at 05:44

## 2024-03-01 RX ADMIN — GABAPENTIN 800 MILLIGRAM(S): 400 CAPSULE ORAL at 17:55

## 2024-03-01 RX ADMIN — SIMETHICONE 80 MILLIGRAM(S): 80 TABLET, CHEWABLE ORAL at 11:00

## 2024-03-01 RX ADMIN — GABAPENTIN 400 MILLIGRAM(S): 400 CAPSULE ORAL at 21:33

## 2024-03-01 RX ADMIN — OXYCODONE HYDROCHLORIDE 15 MILLIGRAM(S): 5 TABLET ORAL at 19:42

## 2024-03-01 RX ADMIN — GABAPENTIN 800 MILLIGRAM(S): 400 CAPSULE ORAL at 05:45

## 2024-03-01 RX ADMIN — GABAPENTIN 800 MILLIGRAM(S): 400 CAPSULE ORAL at 11:35

## 2024-03-01 RX ADMIN — SIMETHICONE 80 MILLIGRAM(S): 80 TABLET, CHEWABLE ORAL at 21:32

## 2024-03-01 RX ADMIN — Medication 6 MILLIGRAM(S): at 21:33

## 2024-03-01 RX ADMIN — Medication 81 MILLIGRAM(S): at 11:35

## 2024-03-01 RX ADMIN — NORTRIPTYLINE HYDROCHLORIDE 25 MILLIGRAM(S): 10 CAPSULE ORAL at 21:33

## 2024-03-01 RX ADMIN — SENNA PLUS 2 TABLET(S): 8.6 TABLET ORAL at 21:33

## 2024-03-01 NOTE — DISCHARGE NOTE PROVIDER - CARE PROVIDERS DIRECT ADDRESSES
[email or web address]

## 2024-03-01 NOTE — DISCHARGE NOTE PROVIDER - HOSPITAL COURSE
This is a 63 year old male presents PMHx including ALS (diagnosed 2023 at [facility]),  ACDF C4-5 on 07/12/23 and C4-C6 ACDF 8/2016. decreased ROM of right shoulder, chronic pain (daily marijuana user) and right  weakness (unable to extend fingers of right hand). Patient underwent C3-C4 ACDF/C3-C7 Posterior fusion without complications on 2/8/24 with Dr. Eliud Quinones.     Patient was evaluated by PM&R and therapy for functional deficits, gait/ADL impairments and acute rehabilitation was recommended. Patient was medically optimized for discharge to [facility] IRU on 2/15/24. (15 Feb 2024 14:27)    Rehab course was well-tolerated and without complication of medical co-morbidities. Patient tolerated course of inpatient PT/OT/SLP rehab with significant improvements and met rehab goals prior to discharge. Discharge instructions were discussed with patient and family. All questions answered and all concerns addressed. Patient was medically cleared for discharge to home.     Functional Status:  -PT Note 3/6/24:  -OT Note 3/6/24:    Outpatient Follow-ups:  Eliud Quinones  Orthopaedic Surgery  [address] [place] [address], [address]  [place] [address]  Phone: [phone]  Fax: [phone]  Follow Up Time:   This is a 63 year old male presents PMHx including ALS (diagnosed 2023 at [facility]),  ACDF C4-5 on 07/12/23 and C4-C6 ACDF 8/2016. decreased ROM of right shoulder, chronic pain (daily marijuana user) and right  weakness (unable to extend fingers of right hand). Patient underwent C3-C4 ACDF/C3-C7 Posterior fusion without complications on 2/8/24 with Dr. Eliud Quinones.     Patient was evaluated by PM&R and therapy for functional deficits, gait/ADL impairments and acute rehabilitation was recommended. Patient was medically optimized for discharge to [facility] IRU on 2/15/24. (15 Feb 2024 14:27)    Rehab course was well-tolerated and without complication of medical co-morbidities. Patient tolerated course of inpatient PT/OT/SLP rehab with significant improvements and met rehab goals prior to discharge. Discharge instructions were discussed with patient and family. All questions answered and all concerns addressed. Patient was medically cleared for discharge to home.     Functional Status:  -PT Note 3/4/24: squat pivot transfers CS. RUE platform RW sit to stand CS. Bed mobility supervision  -OT Note 3/4/24: WC to toilet transfer supervision, tub/shower transfers supervision.     Outpatient Follow-ups:  Eliud Quinones  Orthopaedic Surgery  [address] [place] [address] [address]  [place] [address]  Phone: [phone]  Fax: [phone]  Follow Up Time:   This is a 63 year old male presents PMHx including ALS (diagnosed 2023 at [facility]),  ACDF C4-5 on 07/12/23 and C4-C6 ACDF 8/2016. decreased ROM of right shoulder, chronic pain (daily marijuana user) and right  weakness (unable to extend fingers of right hand). Patient underwent C3-C4 ACDF/C3-C7 Posterior fusion without complications on 2/8/24 with Dr. Eliud Quinones.     Patient was evaluated by PM&R and therapy for functional deficits, gait/ADL impairments and acute rehabilitation was recommended. Patient was medically optimized for discharge to [facility] IRU on 2/15/24.      Rehab course was well-tolerated and without complication of medical co-morbidities. Patient tolerated course of inpatient PT/OT/SLP rehab with significant improvements and met rehab goals prior to discharge. Discharge instructions were discussed with patient and family. All questions answered and all concerns addressed. Patient was medically cleared for discharge to home.     Functional Status:  -PT Note 3/4/24: squat pivot transfers CS. RUE platform RW sit to stand CS. Bed mobility supervision  -OT Note 3/4/24: WC to toilet transfer supervision, tub/shower transfers supervision.     Outpatient Follow-ups:  Eliud Quinones  Orthopaedic Surgery  [address] [place] [address], [address]  [place] [address]  Phone: [phone]  Fax: [phone]  Follow Up Time:

## 2024-03-01 NOTE — DISCHARGE NOTE PROVIDER - NSDCCPCAREPLAN_GEN_ALL_CORE_FT
PRINCIPAL DISCHARGE DIAGNOSIS  Diagnosis: S/P cervical spinal fusion  Assessment and Plan of Treatment: Underwent C3-4 ACDF + C3-7 Posterior fusion w/ Dr. Quinones 2/8/24  - Completed post-operative prednisone course  - Continue Aspen Collar at all times  - Pain management:  Follow up:   Eliud Quinones  Orthopaedic Surgery  [address] [facility], [address]  [place] [address]  Phone: [phone]  Fax: [phone]      SECONDARY DISCHARGE DIAGNOSES  Diagnosis: ALS (amyotrophic lateral sclerosis)  Assessment and Plan of Treatment: Continue Rilouzole 50 mg BID    Diagnosis: Frozen shoulder  Assessment and Plan of Treatment: Right adhesive capsulitis  Follow up with Dr. Hartmann  If remains symptomatic, can consider articular injection     PRINCIPAL DISCHARGE DIAGNOSIS  Diagnosis: Cervical spinal stenosis  Assessment and Plan of Treatment: Underwent C3-4 ACDF + C3-7 Posterior fusion w/ Dr. Quinones 2/8/24  - Completed post-operative prednisone course  - Continue Aspen Collar at all times  - Pain management:  Follow up:   Eliud Quinones  Orthopaedic Surgery  [address] [facility], [address]  [place] [address]  Phone: [phone]  Fax: [phone]      SECONDARY DISCHARGE DIAGNOSES  Diagnosis: ALS (amyotrophic lateral sclerosis)  Assessment and Plan of Treatment: Continue Rilouzole 50 mg BID    Diagnosis: Frozen shoulder  Assessment and Plan of Treatment: Right adhesive capsulitis  Follow up with Dr. Hartmann  If remains symptomatic, can consider articular injection     PRINCIPAL DISCHARGE DIAGNOSIS  Diagnosis: Myelopathy  Assessment and Plan of Treatment: Due to cervical stenosis, requiring surgical intervention.   Underwent C3-4 ACDF + C3-7 Posterior fusion w/ Dr. Quinones 2/8/24  - Completed post-operative prednisone course  - Continue Aspen Collar at all times  - Pain management:  Follow up:   Eliud Quinones  Orthopaedic Surgery  [address] [facility], [address]  [place] [address]  Phone: [phone]  Fax: [phone]      SECONDARY DISCHARGE DIAGNOSES  Diagnosis: Stenosis of cervical spine  Assessment and Plan of Treatment: Leading to myelopathy and requiring surgical intervention. See primary diagnosis.    Diagnosis: ALS (amyotrophic lateral sclerosis)  Assessment and Plan of Treatment: Continue Rilouzole 50 mg BID    Diagnosis: Frozen shoulder  Assessment and Plan of Treatment: Right adhesive capsulitis  Follow up with Dr. Hartmann  If remains symptomatic, can consider articular injection

## 2024-03-01 NOTE — DISCHARGE NOTE PROVIDER - CARE PROVIDER_API CALL
Eliud Qiunones  Orthopaedic Surgery  [address], [address]  [place] [address]  Phone: [phone]  Fax: [phone]  Established Patient  Follow Up Time:     Clementina Hartmann  Physical/Rehab Medicine  [address] [place] [address]  Phone: [phone]  Fax: [phone]  Established Patient  Follow Up Time:

## 2024-03-01 NOTE — PROGRESS NOTE ADULT - ASSESSMENT
ASSESSMENT/PLAN  This is a 63 year old male presents PMHx including ALS (diagnosed 2023 at [facility]),  ACDF C4-5 on 07/12/23 and C4-C6 ACDF 8/2016, decreased ROM of right shoulder, chronic pain (daily marijuana user) and right  weakness (unable to extend fingers of right hand). Patient underwent C3-C4 ACDF/C3-C7 Posterior fusion without complications on 2/8/24 with Dr. Eliud Quinones.   Patient now with gait Instability, ADL impairments and Functional impairments.    #Cervical stenosis with Myelopathy/ quadriparesis s/p cervical fusion  - C3-C4 ACDF/C3-C7 Posterior fusion without complications on 2/8/24  - Posterior incision with redness -> picture sent via teams to Dr. Quinones (2/22), looks fine   - Gauze to cushion skin and cervical brace  - Comprehensive Rehab Program: PT/OT, 3hours daily and 5 days weekly  - PT: Focused on improving strength, endurance, coordination, balance, functional mobility, and transfers  - OT: Focused on improving strength, fine motor skills, coordination, posture and ADLs.    - Completed Prednisone course: 20mg daily x 3 days ( 2/16-2/18) then 10mg daily x 3 days ( 2/19-2/21)  - PRN pain management: Flexeril 10 mg TID, Oxy IR 10-15 (4-6, 7-10), Tramadol breakthrough   - Standing pain management: Nortriptyline 25 mg qPM, gabapentin 800 mg TID, 400 mg at bedtime   - Aspen collar at all times     #Persistent Tachycardia  -Cardiology evaluation at [facility]. No organic cause identified  -Likely anxiety driven  -Monitor, HR (02/28) 100 - 128, (02/29) 100 - 106, (3/1) 89-94     #ALS  - Riluzole 50mg BID    #Leukocytosis/ Resolved   - WBC level (02/20) 13.87 > 10.20 (2/22), 10/03 (02/26)   - Likely secondary to steroid (completed 2/21)  - No overt sign of infection    #Pain management  - Tylenol PRN  - Oxycodone PRN  - Tramadol 50mg q 6 hours PRN     #DVT ppx  - SCD, hemalatha    #GI ppx  - Pepcid 20mg PRN    #Bowel Regimen  - Senna HS  - Miralax QD  - Lactulose 10g     #Bladder management  - Voiding without issues     #FEN   - Diet: Regular- high fiber    #Skin:  - Skin on admission: Anterior and posterior cervical spine incision covered with dry dressing, anterior incision with surrounding erythema    #Mood  #Anxiety  - Alprazolam 0.25mg BID PRN    #Precaution  - Fall, Aspiration, Spinal    #GOC  CODE STATUS: FULL CODE     Outpatient Follow-up (Specialty/Name of physician):    Eliud Quinones  Orthopaedic Surgery  [address] [facility], [address]  [place] [address]  Phone: [phone]  Fax: [phone]  Follow Up Time:    [facility]  ONCPAINT [address]  Scheduled Appointment: 02/21/2024

## 2024-03-01 NOTE — DISCHARGE NOTE PROVIDER - NSDCFUSCHEDAPPT_GEN_ALL_CORE_FT
Eliud Quinones  [facility] Physician [facility]  ORTHOSURG [address]  Scheduled Appointment: 03/11/2024    Vijay Bourne  [facility]  ONCPAINMGT [address]  Scheduled Appointment: 03/11/2024

## 2024-03-01 NOTE — PROGRESS NOTE ADULT - SUBJECTIVE AND OBJECTIVE BOX
CC:  neck pain and weakness     HPI:  This is a 63 year old male presents PMHx including ALS (diagnosed 2023 at [facility]),  ACDF C4-5 on 07/12/23 and C4-C6 ACDF 8/2016. decreased ROM of right shoulder, chronic pain (daily marijuana user) and right  weakness (unable to extend fingers of right hand). Patient underwent C3-C4 ACDF/C3-C7 Posterior fusion without complications on 2/8/24 with Dr. Eliud Quinones.   Patient was evaluated by PM&R and therapy for functional deficits, gait/ADL impairments and acute rehabilitation was recommended. Patient was medically optimized for discharge to [facility] IRU on 2/15/24.     Allergies:  Pt has allergy to mustard (Anaphylaxis)  No Known Drug Allergies    Subjective:  - Patient was seen and examined at bed side, no overnight events, continues to be engaged and productive with therapy  - R shoulder limited ROM continues to gradually improve  - Patient is pleased with the dexterity and ROM returning to his fingers  - Sleeping well, denies anxiety  - Pain is well managed  - Denies GI/ problems   - Tolerating therapy - noted with right DF weakness. Discussed AFO for safety.  Also educated on PRAFO for bed use      ROS:  - Denies CP, SOB, cough, fever, chills, HD, abdominal discomfort, N/V/D, dysuria, neck pain, (+) shoulder pain and decreased ROM (better this am), (+) palpitation     MEDICATIONS  (STANDING):  aspirin enteric coated 81 milliGRAM(s) Oral daily  gabapentin 400 milliGRAM(s) Oral at bedtime  gabapentin 800 milliGRAM(s) Oral <User Schedule>  lactulose Syrup 10 Gram(s) Oral daily  melatonin 6 milliGRAM(s) Oral at bedtime  nortriptyline 25 milliGRAM(s) Oral at bedtime  polyethylene glycol 3350 17 Gram(s) Oral daily  riluzole 50 milliGRAM(s) Oral two times a day  senna 2 Tablet(s) Oral at bedtime    MEDICATIONS  (PRN):  acetaminophen     Tablet .. 650 milliGRAM(s) Oral every 6 hours PRN Mild Pain (1 - 3)  ALPRAZolam 0.25 milliGRAM(s) Oral two times a day PRN anxiety  cyclobenzaprine 10 milliGRAM(s) Oral three times a day PRN Muscle Spasm  famotidine    Tablet 40 milliGRAM(s) Oral daily PRN indigestion  magnesium hydroxide Suspension 30 milliLiter(s) Oral every 12 hours PRN Constipation  oxyCODONE    IR 15 milliGRAM(s) Oral every 8 hours PRN Severe Pain (7 - 10)  oxyCODONE    IR 10 milliGRAM(s) Oral every 4 hours PRN Moderate Pain (4 - 6)  simethicone 80 milliGRAM(s) Chew two times a day PRN Indigestion  traMADol 50 milliGRAM(s) Oral every 6 hours PRN Breakthrough Pain    LAB:                         13.5   6.58  )-----------( 236      ( 29 Feb 2024 06:18 )             40.9     02-29    140  |  104  |  18  ----------------------------<  93  3.9   |  24  |  0.85    Ca    9.3      29 Feb 2024 06:18    TPro  6.2  /  Alb  3.1<L>  /  TBili  0.9  /  DBili  x   /  AST  28  /  ALT  62<H>  /  AlkPhos  98  02-29    LIVER FUNCTIONS - ( 29 Feb 2024 06:18 )  Alb: 3.1 g/dL / Pro: 6.2 g/dL / ALK PHOS: 98 U/L / ALT: 62 U/L / AST: 28 U/L / GGT: x             PHYSICAL EXAM  Vital Signs Last 24 Hrs  T(C): 36.3 (29 Feb 2024 07:52), Max: 36.5 (28 Feb 2024 20:57)  T(F): 97.4 (29 Feb 2024 07:52), Max: 97.7 (28 Feb 2024 20:57)  HR: 100 (29 Feb 2024 07:52) (100 - 106)  BP: 143/90 (29 Feb 2024 07:52) (143/90 - 145/89)  RR: 16 (29 Feb 2024 07:52) (15 - 16)  SpO2: 94% (29 Feb 2024 07:52) (94% - 95%)  Parameters below as of 29 Feb 2024 07:52  Patient On (Oxygen Delivery Method): room air    Gen - NAD, Comfortable  HEENT - NCAT, EOMI, PERRLA  Neck - Supple, limited neck ROM - in cervical collar. Incisions are healed, posterior incision with redness, left anterior with steri strip  Pulm - Respiration nonlabored  Cardiovascular - warm and well perfused, no cyanosis  Abdomen - Soft, NT, ND, (+) BS  Extremities - No peripheral edema, no calf tenderness, B/L intrinsic muscles atrophy   Neuro-     Cognitive - awake, alert, oriented x 4,  Able to follow command     Communication - Fluent, Comprehensible     Attention: Intact     Cranial Nerves - Decreased shoulder shrug, EOMI, face symmetric                     LEFT    UE - ShAB 4-/5, EF 4-/5, EE 4-/5,  2+/5                    RIGHT UE - ShAB 4-/5, EF 4-/5, EE 2+/5,   2-/5                    LEFT    LE - HF 4-/5, KE 3/5, KF 3/5, DF 2/5, PF 2/5                    RIGHT LE - HF 4-/5, KE 4-/5, DF 2-/5, PF2+/5        Sensory - Decreased to left knee, B/L last 2 fingers, now numbness in the left middle and ring fingers        Coordination - FTN impaired   MSK: limited passive and active R shoulder ROM. unable to abduct >90; gets to roughly 40 degrees   Psychiatric - Mood stable, Affect WNL             CC:  neck pain and weakness     HPI:  This is a 63 year old male presents PMHx including ALS (diagnosed 2023 at [facility]),  ACDF C4-5 on 07/12/23 and C4-C6 ACDF 8/2016. decreased ROM of right shoulder, chronic pain (daily marijuana user) and right  weakness (unable to extend fingers of right hand). Patient underwent C3-C4 ACDF/C3-C7 Posterior fusion without complications on 2/8/24 with Dr. Eliud Quinones.   Patient was evaluated by PM&R and therapy for functional deficits, gait/ADL impairments and acute rehabilitation was recommended. Patient was medically optimized for discharge to [facility] IRU on 2/15/24.     Allergies:  Pt has allergy to mustard (Anaphylaxis)  No Known Drug Allergies    Subjective:  - Patient was seen and examined at bed side, no overnight events, continues to be engaged and productive with therapy  - R shoulder limited ROM continues to gradually improve  - Patient is pleased with the dexterity and ROM returning to his fingers  - Sleeping well, denies anxiety  - Pain is well managed  - Denies GI/ problems   - Tolerating therapy - noted with right DF weakness. Discussed AFO for safety.  Also educated on PRAFO for bed use      ROS:  - Denies CP, palpitation, SOB, cough, fever, chills, HD, abdominal discomfort, N/V/D, dysuria, neck pain, joint pain    MEDICATIONS  (STANDING):  aspirin enteric coated 81 milliGRAM(s) Oral daily  gabapentin 400 milliGRAM(s) Oral at bedtime  gabapentin 800 milliGRAM(s) Oral <User Schedule>  lactulose Syrup 10 Gram(s) Oral daily  melatonin 6 milliGRAM(s) Oral at bedtime  nortriptyline 25 milliGRAM(s) Oral at bedtime  polyethylene glycol 3350 17 Gram(s) Oral daily  riluzole 50 milliGRAM(s) Oral two times a day  senna 2 Tablet(s) Oral at bedtime    MEDICATIONS  (PRN):  acetaminophen     Tablet .. 650 milliGRAM(s) Oral every 6 hours PRN Mild Pain (1 - 3)  ALPRAZolam 0.25 milliGRAM(s) Oral two times a day PRN anxiety  cyclobenzaprine 10 milliGRAM(s) Oral three times a day PRN Muscle Spasm  famotidine    Tablet 40 milliGRAM(s) Oral daily PRN indigestion  magnesium hydroxide Suspension 30 milliLiter(s) Oral every 12 hours PRN Constipation  oxyCODONE    IR 15 milliGRAM(s) Oral every 8 hours PRN Severe Pain (7 - 10)  oxyCODONE    IR 10 milliGRAM(s) Oral every 4 hours PRN Moderate Pain (4 - 6)  simethicone 80 milliGRAM(s) Chew two times a day PRN Indigestion  traMADol 50 milliGRAM(s) Oral every 6 hours PRN Breakthrough Pain      LAB:                         13.5   6.58  )-----------( 236      ( 29 Feb 2024 06:18 )             40.9     02-29    140  |  104  |  18  ----------------------------<  93  3.9   |  24  |  0.85    Ca    9.3      29 Feb 2024 06:18    TPro  6.2  /  Alb  3.1<L>  /  TBili  0.9  /  DBili  x   /  AST  28  /  ALT  62<H>  /  AlkPhos  98  02-29    LIVER FUNCTIONS - ( 29 Feb 2024 06:18 )  Alb: 3.1 g/dL / Pro: 6.2 g/dL / ALK PHOS: 98 U/L / ALT: 62 U/L / AST: 28 U/L / GGT: x             PHYSICAL EXAM  Vital Signs Last 24 Hrs  T(C): 36.3 (29 Feb 2024 07:52), Max: 36.5 (28 Feb 2024 20:57)  T(F): 97.4 (29 Feb 2024 07:52), Max: 97.7 (28 Feb 2024 20:57)  HR: 100 (29 Feb 2024 07:52) (100 - 106)  BP: 143/90 (29 Feb 2024 07:52) (143/90 - 145/89)  RR: 16 (29 Feb 2024 07:52) (15 - 16)  SpO2: 94% (29 Feb 2024 07:52) (94% - 95%)  Parameters below as of 29 Feb 2024 07:52  Patient On (Oxygen Delivery Method): room air    Gen - NAD, Comfortable  HEENT - NCAT, EOMI, PERRLA  Neck - Supple, limited neck ROM - in cervical collar. Incisions are healed, posterior incision with redness, left anterior with steri strip  Pulm - Respiration nonlabored  Cardiovascular - warm and well perfused, no cyanosis  Abdomen - Soft, NT, ND, (+) BS  Extremities - No peripheral edema, no calf tenderness, B/L intrinsic muscles atrophy   Neuro-     Cognitive - awake, alert, oriented x 4,  Able to follow command     Communication - Fluent, Comprehensible     Attention: Intact     Cranial Nerves - Decreased shoulder shrug, EOMI, face symmetric                     LEFT    UE - ShAB 4-/5, EF 4-/5, EE 4-/5,  2+/5                    RIGHT UE - ShAB 4-/5, EF 4-/5, EE 2+/5,   2-/5                    LEFT    LE - HF 4-/5, KE 3/5, KF 3/5, DF 2/5, PF 2/5                    RIGHT LE - HF 4-/5, KE 4-/5, DF 2-/5, PF2+/5        Sensory - Decreased to left knee, B/L last 2 fingers, now numbness in the left middle and ring fingers        Coordination - FTN impaired   MSK: limited passive and active R shoulder ROM. unable to abduct >90; gets to roughly 40 degrees   Psychiatric - Mood stable, Affect WNL

## 2024-03-01 NOTE — DISCHARGE NOTE PROVIDER - PROVIDER TOKENS
PROVIDER:[TOKEN:[[id number]],ESTABLISHEDPATIENT:[T]],PROVIDER:[TOKEN:[[id number]],ESTABLISHEDPATIENT:[T]]

## 2024-03-01 NOTE — DISCHARGE NOTE NURSING/CASE MANAGEMENT/SOCIAL WORK - NSSCTYPOFSERV_GEN_ALL_CORE
You will have an evaluation for [facility] services. Your home visiting nurse will call you after your hospital discharge to schedule your first appointment.

## 2024-03-01 NOTE — PROGRESS NOTE ADULT - ASSESSMENT
63 year old M PMH ALS (diagnosed 2023 at [facility]),  ACDF C4-5 on 07/12/23 and C4-C6 ACDF 8/2016, decreased ROM of right shoulder, chronic pain (daily marijuana user) and right  weakness (unable to extend fingers of right hand). Patient underwent C3-C4 ACDF/C3-C7 Posterior fusion without complications on 2/8/24 with Dr. Eliud Quinones. Patient now with gait Instability, ADL impairments and Functional impairments. Admitted to acute rehab.    #s/p cervical fusion  - C3-C4 ACDF/C3-C7 Posterior fusion without complications on 2/8/24  - s/p prednisone taper  - Aspen collar per rehab     #ALS  - c/w Riluzole 50mg BID- non formulary    #Tachycardia  -normal rates at night time  -Cardiology evaluation at [facility]. No organic cause identified  -Likely anxiety/pain driven    #DVT ppx  - SCD, TEDs

## 2024-03-01 NOTE — DISCHARGE NOTE NURSING/CASE MANAGEMENT/SOCIAL WORK - PATIENT PORTAL LINK FT
You can access the FollowMyHealth Patient Portal offered by [facility] by registering at the following website: [email or web address]. By joining [facility]’s FollowMyHealth portal, you will also be able to view your health information using other applications (apps) compatible with our system.

## 2024-03-01 NOTE — PROGRESS NOTE ADULT - SUBJECTIVE AND OBJECTIVE BOX
Patient is a 63y old  Male who presents with a chief complaint of Cervical stenosis with Myelopathy/ quadriparesis s/p cervical fusion (29 Feb 2024 12:42)    Patient seen and examined at bedside. No events overnight. Denies new neurological changes, chest pain, sob, abd pain, headache, changes in vision    ALLERGIES:  Pt has allergy to mustard (Anaphylaxis)  No Known Drug Allergies    MEDICATIONS  (STANDING):  aspirin enteric coated 81 milliGRAM(s) Oral daily  gabapentin 400 milliGRAM(s) Oral at bedtime  gabapentin 800 milliGRAM(s) Oral <User Schedule>  lactulose Syrup 10 Gram(s) Oral daily  melatonin 6 milliGRAM(s) Oral at bedtime  nortriptyline 25 milliGRAM(s) Oral at bedtime  polyethylene glycol 3350 17 Gram(s) Oral daily  riluzole 50 milliGRAM(s) Oral two times a day  senna 2 Tablet(s) Oral at bedtime    MEDICATIONS  (PRN):  acetaminophen     Tablet .. 650 milliGRAM(s) Oral every 6 hours PRN Mild Pain (1 - 3)  ALPRAZolam 0.25 milliGRAM(s) Oral two times a day PRN anxiety  cyclobenzaprine 10 milliGRAM(s) Oral three times a day PRN Muscle Spasm  famotidine    Tablet 40 milliGRAM(s) Oral daily PRN indigestion  magnesium hydroxide Suspension 30 milliLiter(s) Oral every 12 hours PRN Constipation  oxyCODONE    IR 10 milliGRAM(s) Oral every 4 hours PRN Moderate Pain (4 - 6)  oxyCODONE    IR 15 milliGRAM(s) Oral every 8 hours PRN Severe Pain (7 - 10)  simethicone 80 milliGRAM(s) Chew two times a day PRN Indigestion  traMADol 50 milliGRAM(s) Oral every 6 hours PRN Breakthrough Pain    Vital Signs Last 24 Hrs  T(F): 97.6 (01 Mar 2024 07:30), Max: 98 (29 Feb 2024 20:16)  HR: 94 (01 Mar 2024 07:30) (89 - 97)  BP: 124/82 (01 Mar 2024 07:30) (124/82 - 138/78)  RR: 16 (01 Mar 2024 07:30) (16 - 18)  SpO2: 94% (01 Mar 2024 07:30) (94% - 98%)  I&O's Summary    PHYSICAL EXAM:  GENERAL: NAD, laying in bed  HEAD:  Atraumatic, Normocephalic  EYES: PEERL, conjunctiva and sclera clear  ENMT: Moist mucous membranes, Supple, No JVD  CHEST/LUNG: Clear to auscultation bilaterally, good air entry, non-labored breathing  HEART: RRR; S1/S2, No murmur  ABDOMEN: Soft, Nontender, Nondistended; Bowel sounds present  EXTREMITIES: No calf tenderness, No cyanosis, No edema  SKIN: Warm, perfused  PSYCH: Normal mood, Normal affect  NERVOUS SYSTEM:  A/O x3, Good concentration    LABS:                        13.5   6.58  )-----------( 236      ( 29 Feb 2024 06:18 )             40.9     02-29    140  |  104  |  18  ----------------------------<  93  3.9   |  24  |  0.85    Ca    9.3      29 Feb 2024 06:18    TPro  6.2  /  Alb  3.1  /  TBili  0.9  /  DBili  x   /  AST  28  /  ALT  62  /  AlkPhos  98  02-29                                Urinalysis Basic - ( 29 Feb 2024 06:18 )    Color: x / Appearance: x / SG: x / pH: x  Gluc: 93 mg/dL / Ketone: x  / Bili: x / Urobili: x   Blood: x / Protein: x / Nitrite: x   Leuk Esterase: x / RBC: x / WBC x   Sq Epi: x / Non Sq Epi: x / Bacteria: x            RADIOLOGY & ADDITIONAL TESTS:    Care Discussed with Consultants/Other Providers:

## 2024-03-01 NOTE — DISCHARGE NOTE PROVIDER - NSDCMRMEDTOKEN_GEN_ALL_CORE_FT
acetaminophen 325 mg oral tablet: 2 tab(s) orally every 6 hours As needed Mild Pain (1 - 3)  ALPRAZolam 0.25 mg oral tablet: 1 tab(s) orally 2 times a day As needed anxiety  Aspen collar: RANDI: 99mths  aspirin 81 mg oral delayed release tablet: 1 tab(s) orally once a day  cyclobenzaprine 10 mg oral tablet: 1 tab(s) orally 3 times a day As needed Muscle Spasm  famotidine 40 mg oral tablet: 1 tab(s) orally once a day As needed indigestion  gabapentin 800 mg oral tablet: 1 tab(s) orally 3 times a day  ibuprofen 800 mg oral tablet: 1 tab(s) orally 3 times a day as needed for  moderate pain  nortriptyline 25 mg oral capsule: 1 cap(s) orally once a day (at bedtime)  omeprazole 20 mg oral delayed release capsule: 1 cap(s) orally once a day (at bedtime)  P+O: Right custom AFO for dorsiflexion weakness  Unable to actively dorsiflex to neutral position.  MMT: DF 2-/5. Fatigues easily  Medically necessary for safety/fall prevention.   Duration: &gt;6 months  ICD 10: G12.21 ALS  polyethylene glycol 3350 oral powder for reconstitution: 17 gram(s) orally every 24 hours  riluzole 50 mg oral tablet: 1 tab(s) orally 2 times a day  senna leaf extract oral tablet: 2 tab(s) orally once a day (at bedtime)   ALPRAZolam 0.25 mg oral tablet: 1 tab(s) orally 2 times a day As needed anxiety  Aspen collar: RANDI: 99mths  aspirin 81 mg oral delayed release tablet: 1 tab(s) orally once a day  cyclobenzaprine 10 mg oral tablet: 1 tab(s) orally 3 times a day As needed Muscle Spasm  gabapentin 800 mg oral tablet: 1 tab(s) orally 3 times a day  nortriptyline 25 mg oral capsule: 1 cap(s) orally once a day (at bedtime)  omeprazole 20 mg oral delayed release capsule: 1 cap(s) orally once a day (at bedtime)  P+O: Right custom AFO for dorsiflexion weakness  Unable to actively dorsiflex to neutral position.  MMT: DF 2-/5. Fatigues easily  Medically necessary for safety/fall prevention.   Duration: &gt;6 months  ICD 10: G12.21 ALS  riluzole 50 mg oral tablet: 1 tab(s) orally 2 times a day   ALPRAZolam 0.25 mg oral tablet: 1 tab(s) orally 2 times a day As needed anxiety  Aspen collar: RANDI: 99mths  aspirin 81 mg oral delayed release tablet: 1 tab(s) orally once a day  cyclobenzaprine 10 mg oral tablet: 1 tab(s) orally 3 times a day as needed for Muscle Spasm  DME Transfer Tub Bench: Member ID: [id number].  1960. Length of Need 99 months. ICD 10: M43.22  gabapentin 800 mg oral tablet: 1 tab(s) orally 3 times a day  Neurontin 400 mg oral capsule: 1 cap(s) orally once a day (at bedtime)  nortriptyline 25 mg oral capsule: 1 cap(s) orally once a day (at bedtime)  omeprazole 20 mg oral delayed release capsule: 1 cap(s) orally once a day (at bedtime)  P+O: Right custom AFO for dorsiflexion weakness  Unable to actively dorsiflex to neutral position.  MMT: DF 2-/5. Fatigues easily  Medically necessary for safety/fall prevention.   Duration: &gt;6 months  ICD 10: G12.21 ALS  riluzole 50 mg oral tablet: 1 tab(s) orally 2 times a day   ALPRAZolam 0.25 mg oral tablet: 1 tab(s) orally 2 times a day As needed anxiety  Aspen collar: RANDI: 99mths  aspirin 81 mg oral delayed release tablet: 1 tab(s) orally once a day  cyclobenzaprine 10 mg oral tablet: 1 tab(s) orally 3 times a day as needed for Muscle Spasm  DME Transfer Tub Bench: Member ID: [id number].  1960. Length of Need 99 months. ICD 10: M43.22  gabapentin 800 mg oral tablet: 1 tab(s) orally 3 times a day  Neurontin 400 mg oral capsule: 1 cap(s) orally once a day (at bedtime)  nortriptyline 25 mg oral capsule: 1 cap(s) orally once a day (at bedtime)  omeprazole 20 mg oral delayed release capsule: 1 cap(s) orally once a day (at bedtime)  P+O: Right custom AFO for dorsiflexion weakness  Unable to actively dorsiflex to neutral position.  MMT: DF 2-/5. Fatigues easily  Medically necessary for safety/fall prevention.   Duration: &gt;6 months  ICD 10: G12.21 ALS  riluzole 50 mg oral tablet: 1 tab(s) orally 2 times a day  riluzole 50 mg oral tablet: 1 tab(s) orally 2 times a day   Aspen collar: RANDI: 99mths  aspirin 81 mg oral delayed release tablet: 1 tab(s) orally once a day  cyclobenzaprine 10 mg oral tablet: 1 tab(s) orally 3 times a day as needed for Muscle Spasm  DME Transfer Tub Bench: Member ID: [id number].  1960. Length of Need 99 months. ICD 10: M43.22  gabapentin 800 mg oral tablet: 1 tab(s) orally 3 times a day  Neurontin 400 mg oral capsule: 1 cap(s) orally once a day (at bedtime)  nortriptyline 25 mg oral capsule: 1 cap(s) orally once a day (at bedtime)  omeprazole 20 mg oral delayed release capsule: 1 cap(s) orally once a day (at bedtime)  P+O: Right custom AFO for dorsiflexion weakness  Unable to actively dorsiflex to neutral position.  MMT: DF 2-/5. Fatigues easily  Medically necessary for safety/fall prevention.   Duration: &gt;6 months  ICD 10: G12.21 ALS  riluzole 50 mg oral tablet: 1 tab(s) orally 2 times a day  riluzole 50 mg oral tablet: 1 tab(s) orally 2 times a day

## 2024-03-02 PROCEDURE — 99232 SBSQ HOSP IP/OBS MODERATE 35: CPT

## 2024-03-02 RX ADMIN — RILUZOLE 50 MILLIGRAM(S): 50 TABLET ORAL at 17:58

## 2024-03-02 RX ADMIN — FAMOTIDINE 40 MILLIGRAM(S): 10 INJECTION INTRAVENOUS at 17:58

## 2024-03-02 RX ADMIN — GABAPENTIN 800 MILLIGRAM(S): 400 CAPSULE ORAL at 11:29

## 2024-03-02 RX ADMIN — GABAPENTIN 800 MILLIGRAM(S): 400 CAPSULE ORAL at 06:39

## 2024-03-02 RX ADMIN — SENNA PLUS 2 TABLET(S): 8.6 TABLET ORAL at 21:37

## 2024-03-02 RX ADMIN — RILUZOLE 50 MILLIGRAM(S): 50 TABLET ORAL at 06:39

## 2024-03-02 RX ADMIN — NORTRIPTYLINE HYDROCHLORIDE 25 MILLIGRAM(S): 10 CAPSULE ORAL at 21:37

## 2024-03-02 RX ADMIN — SIMETHICONE 80 MILLIGRAM(S): 80 TABLET, CHEWABLE ORAL at 21:38

## 2024-03-02 RX ADMIN — GABAPENTIN 400 MILLIGRAM(S): 400 CAPSULE ORAL at 21:36

## 2024-03-02 RX ADMIN — Medication 6 MILLIGRAM(S): at 21:36

## 2024-03-02 RX ADMIN — Medication 81 MILLIGRAM(S): at 11:29

## 2024-03-02 RX ADMIN — GABAPENTIN 800 MILLIGRAM(S): 400 CAPSULE ORAL at 17:58

## 2024-03-02 NOTE — PROGRESS NOTE ADULT - SUBJECTIVE AND OBJECTIVE BOX
Patient is a 63y old  Male who presents with a chief complaint of Cervical stenosis with Myelopathy/ quadriparesis s/p cervical fusion (01 Mar 2024 14:03)      Patient seen and examined at bedside.  - no events overnight, has a bowel movement 3/1/24. Says his shoulder pain is now 6/10 but this tolerable and better than before. states that his shoulder has more movement now than before. otherwise no nausea, vomiting, headaches, shortness of breath, cough.     ALLERGIES:  Pt has allergy to mustard (Anaphylaxis)  No Known Drug Allergies    MEDICATIONS  (STANDING):  aspirin enteric coated 81 milliGRAM(s) Oral daily  gabapentin 400 milliGRAM(s) Oral at bedtime  gabapentin 800 milliGRAM(s) Oral <User Schedule>  lactulose Syrup 10 Gram(s) Oral daily  melatonin 6 milliGRAM(s) Oral at bedtime  nortriptyline 25 milliGRAM(s) Oral at bedtime  polyethylene glycol 3350 17 Gram(s) Oral daily  riluzole 50 milliGRAM(s) Oral two times a day  senna 2 Tablet(s) Oral at bedtime    MEDICATIONS  (PRN):  acetaminophen     Tablet .. 650 milliGRAM(s) Oral every 6 hours PRN Mild Pain (1 - 3)  cyclobenzaprine 10 milliGRAM(s) Oral three times a day PRN Muscle Spasm  famotidine    Tablet 40 milliGRAM(s) Oral daily PRN indigestion  magnesium hydroxide Suspension 30 milliLiter(s) Oral every 12 hours PRN Constipation  simethicone 80 milliGRAM(s) Chew two times a day PRN Indigestion    Vital Signs Last 24 Hrs  T(F): 98 (01 Mar 2024 19:37), Max: 98 (01 Mar 2024 19:37)  HR: 113 (01 Mar 2024 19:37) (113 - 113)  BP: 132/87 (01 Mar 2024 19:37) (132/87 - 132/87)  RR: 17 (01 Mar 2024 19:37) (17 - 17)  SpO2: 96% (01 Mar 2024 19:37) (96% - 96%)  I&O's Summary        PHYSICAL EXAM:  GENERAL: NAD, laying in bed  ENMT: Moist mucous membranes,  CHEST/LUNG: Clear to auscultation bilaterally, good air entry, non-labored breathing  HEART: RRR; S1/S2, No murmur  ABDOMEN: Soft, Nontender, Nondistended; Bowel sounds present  EXTREMITIES: No calf tenderness, No cyanosis, No edema  PSYCH: Normal mood, Normal affect  NERVOUS SYSTEM:  A/O x3, Good concentration      LABS:                        13.5   6.58  )-----------( 236      ( 29 Feb 2024 06:18 )             40.9       02-29    140  |  104  |  18  ----------------------------<  93  3.9   |  24  |  0.85    Ca    9.3      29 Feb 2024 06:18    TPro  6.2  /  Alb  3.1  /  TBili  0.9  /  DBili  x   /  AST  28  /  ALT  62  /  AlkPhos  98  02-29                                  Urinalysis Basic - ( 29 Feb 2024 06:18 )    Color: x / Appearance: x / SG: x / pH: x  Gluc: 93 mg/dL / Ketone: x  / Bili: x / Urobili: x   Blood: x / Protein: x / Nitrite: x   Leuk Esterase: x / RBC: x / WBC x   Sq Epi: x / Non Sq Epi: x / Bacteria: x            RADIOLOGY & ADDITIONAL TESTS:    Care Discussed with Consultants/Other Providers: IDR team

## 2024-03-02 NOTE — PROGRESS NOTE ADULT - ASSESSMENT
ASSESSMENT/PLAN  This is a 63 year old male presents PMHx including ALS (diagnosed 2023 at [facility]),  ACDF C4-5 on 07/12/23 and C4-C6 ACDF 8/2016, decreased ROM of right shoulder, chronic pain (daily marijuana user) and right  weakness (unable to extend fingers of right hand). Patient underwent C3-C4 ACDF/C3-C7 Posterior fusion without complications on 2/8/24 with Dr. Eliud Quinones.   Patient now with gait Instability, ADL impairments and Functional impairments.    #Cervical stenosis with Myelopathy/ quadriparesis s/p cervical fusion  - C3-C4 ACDF/C3-C7 Posterior fusion without complications on 2/8/24  - Continue Comprehensive Rehab Program: PT/OT, 3hours daily and 5 days weekly  - PT: Focused on improving strength, endurance, coordination, balance, functional mobility, and transfers  - OT: Focused on improving strength, fine motor skills, coordination, posture and ADLs.    - Completed Prednisone course: 20mg daily x 3 days ( 2/16-2/18) then 10mg daily x 3 days ( 2/19-2/21)  - PRN pain management: Flexeril 10 mg TID, Oxy IR 10-15 (4-6, 7-10), Tramadol breakthrough   - Standing pain management: Nortriptyline 25 mg qPM, gabapentin 800 mg TID, 400 mg at bedtime   - Aspen collar at all times     #Persistent Tachycardia  -Cardiology evaluation at [facility]. No organic cause identified  -Likely anxiety driven  -Monitor, HR (02/28) 100 - 128, (02/29) 100 - 106, (3/1) 89-94     #ALS  - Riluzole 50mg BID    #Leukocytosis/ Resolved   - WBC level (02/20) 13.87 > 10.20 (2/22), 10/03 (02/26)   - Likely secondary to steroid (completed 2/21)  - No overt sign of infection  - CBC 3/4    #Pain management  - Tylenol PRN  - Oxycodone PRN  - Tramadol 50mg q 6 hours PRN     #DVT ppx  - SCD, hemalatha    #GI ppx  - Pepcid 20mg PRN    #Bowel Regimen  - Senna HS  - Miralax QD  - Lactulose 10g     #Bladder management  - Voiding without issues     #FEN   - Diet: Regular- high fiber    #Skin:  - Skin on admission: Anterior and posterior cervical spine incision covered with dry dressing, anterior incision with surrounding erythema    #Mood  #Anxiety  - Alprazolam 0.25mg BID PRN    Labs:  CBC, CMP 3/4      #GOC  CODE STATUS: FULL CODE     Outpatient Follow-up (Specialty/Name of physician):    Eliud Quinones  Orthopaedic Surgery  [address], [address]  [place] [address]  Phone: [phone]  Fax: [phone]  Follow Up Time:    [facility]  ONCPAINMGT [address]  Scheduled Appointment: 02/21/2024

## 2024-03-02 NOTE — PROGRESS NOTE ADULT - ASSESSMENT
63 year old M PMH ALS (diagnosed 2023 at [facility]),  ACDF C4-5 on 07/12/23 and C4-C6 ACDF 8/2016, decreased ROM of right shoulder, chronic pain (daily marijuana user) and right  weakness (unable to extend fingers of right hand). Patient underwent C3-C4 ACDF/C3-C7 Posterior fusion without complications on 2/8/24 with Dr. Eliud Quinones. Patient now with gait Instability, ADL impairments and Functional impairments. Admitted to acute rehab.    #s/p cervical fusion  - C3-C4 ACDF/C3-C7 Posterior fusion without complications on 2/8/24  - s/p prednisone taper  - Aspen collar per rehab   - overall mobility and pain is improving    #ALS  - c/w Riluzole 50mg BID- non formulary    #Tachycardia  -normal rates at night time  -Cardiology evaluation at [facility]. No organic cause identified  -Likely anxiety/pain driven    #DVT ppx  - SCD, TEDs

## 2024-03-02 NOTE — PROGRESS NOTE ADULT - SUBJECTIVE AND OBJECTIVE BOX
CC:  neck pain and weakness     HPI:  This is a 63 year old male presents PMHx including ALS (diagnosed 2023 at [facility]),  ACDF C4-5 on 07/12/23 and C4-C6 ACDF 8/2016. decreased ROM of right shoulder, chronic pain (daily marijuana user) and right  weakness (unable to extend fingers of right hand). Patient underwent C3-C4 ACDF/C3-C7 Posterior fusion without complications on 2/8/24 with Dr. Eliud Quinones.   Patient was evaluated by PM&R and therapy for functional deficits, gait/ADL impairments and acute rehabilitation was recommended. Patient was medically optimized for discharge to [facility] IRU on 2/15/24.     Allergies:  Pt has allergy to mustard (Anaphylaxis)  No Known Drug Allergies    Subjective: Patient was seen and examined at bedside this morning. Patient in bed in NAD, no SOB, no n/v, pain controlled. No overnight events.    ROS:  - Denies CP, palpitation, SOB, cough, fever, abdominal discomfort, N/V/D, dysuria    MEDICATIONS  (STANDING):  aspirin enteric coated 81 milliGRAM(s) Oral daily  gabapentin 400 milliGRAM(s) Oral at bedtime  gabapentin 800 milliGRAM(s) Oral <User Schedule>  lactulose Syrup 10 Gram(s) Oral daily  melatonin 6 milliGRAM(s) Oral at bedtime  nortriptyline 25 milliGRAM(s) Oral at bedtime  polyethylene glycol 3350 17 Gram(s) Oral daily  riluzole 50 milliGRAM(s) Oral two times a day  senna 2 Tablet(s) Oral at bedtime    MEDICATIONS  (PRN):  acetaminophen     Tablet .. 650 milliGRAM(s) Oral every 6 hours PRN Mild Pain (1 - 3)  ALPRAZolam 0.25 milliGRAM(s) Oral two times a day PRN anxiety  cyclobenzaprine 10 milliGRAM(s) Oral three times a day PRN Muscle Spasm  famotidine    Tablet 40 milliGRAM(s) Oral daily PRN indigestion  magnesium hydroxide Suspension 30 milliLiter(s) Oral every 12 hours PRN Constipation  oxyCODONE    IR 15 milliGRAM(s) Oral every 8 hours PRN Severe Pain (7 - 10)  oxyCODONE    IR 10 milliGRAM(s) Oral every 4 hours PRN Moderate Pain (4 - 6)  simethicone 80 milliGRAM(s) Chew two times a day PRN Indigestion  traMADol 50 milliGRAM(s) Oral every 6 hours PRN Breakthrough Pain                 PHYSICAL EXAM  Vital Signs Last 24 Hrs  T(C): 36.7 (01 Mar 2024 19:37), Max: 36.7 (01 Mar 2024 19:37)  T(F): 98 (01 Mar 2024 19:37), Max: 98 (01 Mar 2024 19:37)  HR: 113 (01 Mar 2024 19:37) (113 - 113)  BP: 132/87 (01 Mar 2024 19:37) (132/87 - 132/87)  BP(mean): --  RR: 17 (01 Mar 2024 19:37) (17 - 17)  SpO2: 96% (01 Mar 2024 19:37) (96% - 96%)    Parameters below as of 01 Mar 2024 19:37  Patient On (Oxygen Delivery Method): room air        Gen - NAD, Comfortable  HEENT - NCAT, MMM  Neck - CervicaliIncisions are well healed,  Pulm - Respiration nonlabored  Cardiovascular - warm and well perfused, no cyanosis  Abdomen - Soft, NT, ND, (+) BS  Extremities - No peripheral edema, no calf tenderness, B/L intrinsic muscles atrophy   Neuro-     Cognitive - awake, alert, oriented x 4,  Able to follow command     Communication - Fluent, Comprehensible                      LEFT    UE - ShAB <3/5, EF 4-/5, EE 4-/5,  2+/5                    RIGHT UE - ShAB <3/5, EF 4-/5, EE 2+/5,   2-/5                    LEFT    LE - HF 4-/5, KE 3/5, KF 3/5, DF 2/5, PF 2/5                    RIGHT LE - HF 4-/5, KE 4-/5, DF 2-/5, PF2+/5       Psychiatric - Mood stable, Affect WNL    LAB:   LABS:                     13.5   6.58  )-----------( 236      ( 29 Feb 2024 06:18 )             40.9     02-29    140  |  104  |  18  ----------------------------<  93  3.9   |  24  |  0.85    Ca    9.3      29 Feb 2024 06:18    TPro  6.2  /  Alb  3.1<L>  /  TBili  0.9  /  DBili  x   /  AST  28  /  ALT  62<H>  /  AlkPhos  98  02-29    LIVER FUNCTIONS - ( 29 Feb 2024 06:18 )  Alb: 3.1 g/dL / Pro: 6.2 g/dL / ALK PHOS: 98 U/L / ALT: 62 U/L / AST: 28 U/L / GGT: x

## 2024-03-03 DIAGNOSIS — G12.21 AMYOTROPHIC LATERAL SCLEROSIS: ICD-10-CM

## 2024-03-03 DIAGNOSIS — M48.02 SPINAL STENOSIS, CERVICAL REGION: ICD-10-CM

## 2024-03-03 DIAGNOSIS — R00.0 TACHYCARDIA, UNSPECIFIED: ICD-10-CM

## 2024-03-03 PROCEDURE — 99232 SBSQ HOSP IP/OBS MODERATE 35: CPT

## 2024-03-03 RX ADMIN — GABAPENTIN 800 MILLIGRAM(S): 400 CAPSULE ORAL at 06:21

## 2024-03-03 RX ADMIN — GABAPENTIN 400 MILLIGRAM(S): 400 CAPSULE ORAL at 22:07

## 2024-03-03 RX ADMIN — Medication 81 MILLIGRAM(S): at 11:17

## 2024-03-03 RX ADMIN — GABAPENTIN 800 MILLIGRAM(S): 400 CAPSULE ORAL at 18:10

## 2024-03-03 RX ADMIN — CYCLOBENZAPRINE HYDROCHLORIDE 10 MILLIGRAM(S): 10 TABLET, FILM COATED ORAL at 22:06

## 2024-03-03 RX ADMIN — GABAPENTIN 800 MILLIGRAM(S): 400 CAPSULE ORAL at 12:00

## 2024-03-03 RX ADMIN — NORTRIPTYLINE HYDROCHLORIDE 25 MILLIGRAM(S): 10 CAPSULE ORAL at 22:07

## 2024-03-03 RX ADMIN — RILUZOLE 50 MILLIGRAM(S): 50 TABLET ORAL at 18:10

## 2024-03-03 RX ADMIN — RILUZOLE 50 MILLIGRAM(S): 50 TABLET ORAL at 06:14

## 2024-03-03 RX ADMIN — SIMETHICONE 80 MILLIGRAM(S): 80 TABLET, CHEWABLE ORAL at 06:14

## 2024-03-03 RX ADMIN — Medication 6 MILLIGRAM(S): at 22:07

## 2024-03-03 RX ADMIN — FAMOTIDINE 40 MILLIGRAM(S): 10 INJECTION INTRAVENOUS at 22:07

## 2024-03-03 NOTE — PROGRESS NOTE ADULT - SUBJECTIVE AND OBJECTIVE BOX
Patient is a 63y old  Male who presents with a chief complaint of Cervical stenosis with Myelopathy/ quadriparesis s/p cervical fusion (02 Mar 2024 11:49)      History, interim events and clinically pertinent issues reviewed; patient interviewed and examined.   He is pleasantly without complaints this AM - slept well, has decent appetite, is moving his bowels, and getting stronger w/ therapies  REVIEW OF SYMPTOMS: patient denies HA's, CP, palpitations, shortness of breath or upper respiratory symptoms, nausea, vomiting, diarrhea, constipation, dysuria, bruising/bleeding and all other systems were reviewed as negative      ALLERGIES:  Pt has allergy to mustard (Anaphylaxis)  No Known Drug Allergies    MEDICATIONS  (STANDING):  aspirin enteric coated 81 milliGRAM(s) Oral daily  gabapentin 800 milliGRAM(s) Oral <User Schedule>  gabapentin 400 milliGRAM(s) Oral at bedtime  lactulose Syrup 10 Gram(s) Oral daily  melatonin 6 milliGRAM(s) Oral at bedtime  nortriptyline 25 milliGRAM(s) Oral at bedtime  polyethylene glycol 3350 17 Gram(s) Oral daily  riluzole 50 milliGRAM(s) Oral two times a day  senna 2 Tablet(s) Oral at bedtime    MEDICATIONS  (PRN):  acetaminophen     Tablet .. 650 milliGRAM(s) Oral every 6 hours PRN Mild Pain (1 - 3)  cyclobenzaprine 10 milliGRAM(s) Oral three times a day PRN Muscle Spasm  famotidine    Tablet 40 milliGRAM(s) Oral daily PRN indigestion  magnesium hydroxide Suspension 30 milliLiter(s) Oral every 12 hours PRN Constipation  simethicone 80 milliGRAM(s) Chew two times a day PRN Indigestion    Vital Signs Last 24 Hrs  T(F): 98 (02 Mar 2024 21:13), Max: 98 (02 Mar 2024 21:13)  HR: 91 (02 Mar 2024 21:13) (91 - 103)  BP: 123/77 (02 Mar 2024 21:13) (119/78 - 123/77)  RR: 17 (02 Mar 2024 21:13) (17 - 18)  SpO2: 94% (02 Mar 2024 21:13) (94% - 94%)  I&O's Summary              PHYSICAL EXAM:  General: NAD, A/O x 3  ENT: MMM  Neck: Supple, No JVD  Lungs: Clear to auscultation bilaterally  Cardio: RRR, S1/S2, No murmurs  Abdomen: Soft, Nontender, Nondistended; Bowel sounds present  Extremities: No calf tenderness, No pitting edema      LABS: There are no new laboratory or radiologic studies resulted at the time this progress note was authored

## 2024-03-03 NOTE — PROGRESS NOTE ADULT - ASSESSMENT
63 year old M PMH ALS (diagnosed 2023 at [facility] Clinic [facility]),  ACDF C4-5 on 07/12/23 and C4-C6 ACDF 8/2016, decreased ROM of right shoulder, chronic pain (daily marijuana user) and right  weakness (unable to extend fingers of right hand). Patient underwent C3-C4 ACDF/C3-C7 Posterior fusion without complications on 2/8/24 with Dr. Eliud Quinones. Patient now with gait Instability, ADL impairments and Functional impairments. Admitted to acute rehab.    #s/p cervical fusion  - C3-C4 ACDF/C3-C7 Posterior fusion without complications on 2/8/24  - s/p prednisone taper  - Aspen collar per rehab   - overall mobility and pain is improving    #ALS  - c/w Riluzole 50mg BID- non formulary    #Tachycardia  -seems resolved  -Cardiology evaluation at [facility]. No organic cause identified  -Likely anxiety/pain driven    #DVT ppx  - SCD, TEDs

## 2024-03-03 NOTE — PROGRESS NOTE ADULT - ASSESSMENT
ASSESSMENT/PLAN  This is a 63 year old male presents PMHx including ALS (diagnosed 2023 at [facility]),  ACDF C4-5 on 07/12/23 and C4-C6 ACDF 8/2016, decreased ROM of right shoulder, chronic pain (daily marijuana user) and right  weakness (unable to extend fingers of right hand). Patient underwent C3-C4 ACDF/C3-C7 Posterior fusion without complications on 2/8/24 with Dr. Eliud Quinones.   Patient now with gait Instability, ADL impairments and Functional impairments.    #Cervical stenosis with Myelopathy/ quadriparesis s/p cervical fusion  - C3-C4 ACDF/C3-C7 Posterior fusion without complications on 2/8/24  - Continue Comprehensive Rehab Program: PT/OT, 3hours daily and 5 days weekly  - PT: Focused on improving strength, endurance, coordination, balance, functional mobility, and transfers  - OT: Focused on improving strength, fine motor skills, coordination, posture and ADLs.    - Completed Prednisone course: 20mg daily x 3 days ( 2/16-2/18) then 10mg daily x 3 days ( 2/19-2/21)  - PRN pain management: Flexeril 10 mg TID, Oxy IR 10-15 (4-6, 7-10), Tramadol breakthrough   - Standing pain management: Nortriptyline 25 mg qPM, gabapentin 800 mg TID, 400 mg at bedtime   - Aspen collar at all times     #Persistent Tachycardia  -Cardiology evaluation at [facility]. No organic cause identified  -Likely anxiety driven  -Monitor, HR (02/28) 100 - 128, (02/29) 100 - 106, (3/1) 89-94     #ALS  - Riluzole 50mg BID    #Leukocytosis/ Resolved   - WBC level (02/20) 13.87 > 10.20 (2/22), 10/03 (02/26)   - Likely secondary to steroid (completed 2/21)  - No overt sign of infection  - CBC 3/4    #Pain management  - Tylenol PRN  - Oxycodone PRN  - Tramadol 50mg q 6 hours PRN     #DVT ppx  - SCD, hemalatha    #GI ppx  - Pepcid 20mg PRN    #Bowel Regimen  - Senna HS  - Miralax QD  - Lactulose 10g     #Bladder management  - Voiding without issues     #FEN   - Diet: Regular- high fiber    #Skin:  - Skin on admission: Anterior and posterior cervical spine incision covered with dry dressing, anterior incision with surrounding erythema    #Mood  #Anxiety  - Alprazolam 0.25mg BID PRN    Labs:  CBC, CMP 3/4      #GOC  CODE STATUS: FULL CODE     Outpatient Follow-up (Specialty/Name of physician):    Eliud Quinones  Orthopaedic Surgery  [address], [address]  [place] [address]  Phone: [phone]  Fax: [phone]  Follow Up Time:    [facility]  [id number] [address]  Scheduled Appointment: 02/21/2024

## 2024-03-03 NOTE — PROGRESS NOTE ADULT - SUBJECTIVE AND OBJECTIVE BOX
CC:  neck pain and weakness     HPI:  This is a 63 year old male presents PMHx including ALS (diagnosed 2023 at [facility]),  ACDF C4-5 on 07/12/23 and C4-C6 ACDF 8/2016. decreased ROM of right shoulder, chronic pain (daily marijuana user) and right  weakness (unable to extend fingers of right hand). Patient underwent C3-C4 ACDF/C3-C7 Posterior fusion without complications on 2/8/24 with Dr. Eliud Quinones.   Patient was evaluated by PM&R and therapy for functional deficits, gait/ADL impairments and acute rehabilitation was recommended. Patient was medically optimized for discharge to [facility] IRU on 2/15/24.     Allergies:  Pt has allergy to mustard (Anaphylaxis)  No Known Drug Allergies    Subjective: Patient was seen and examined at bedside this morning. Patient in bed in NAD, no SOB, no n/v, pain controlled. No overnight events.    ROS:  - Denies CP, cough, fever, abdominal discomfort, dysuria    MEDICATIONS  (STANDING):  aspirin enteric coated 81 milliGRAM(s) Oral daily  gabapentin 400 milliGRAM(s) Oral at bedtime  gabapentin 800 milliGRAM(s) Oral <User Schedule>  lactulose Syrup 10 Gram(s) Oral daily  melatonin 6 milliGRAM(s) Oral at bedtime  nortriptyline 25 milliGRAM(s) Oral at bedtime  polyethylene glycol 3350 17 Gram(s) Oral daily  riluzole 50 milliGRAM(s) Oral two times a day  senna 2 Tablet(s) Oral at bedtime    MEDICATIONS  (PRN):  acetaminophen     Tablet .. 650 milliGRAM(s) Oral every 6 hours PRN Mild Pain (1 - 3)  ALPRAZolam 0.25 milliGRAM(s) Oral two times a day PRN anxiety  cyclobenzaprine 10 milliGRAM(s) Oral three times a day PRN Muscle Spasm  famotidine    Tablet 40 milliGRAM(s) Oral daily PRN indigestion  magnesium hydroxide Suspension 30 milliLiter(s) Oral every 12 hours PRN Constipation  oxyCODONE    IR 15 milliGRAM(s) Oral every 8 hours PRN Severe Pain (7 - 10)  oxyCODONE    IR 10 milliGRAM(s) Oral every 4 hours PRN Moderate Pain (4 - 6)  simethicone 80 milliGRAM(s) Chew two times a day PRN Indigestion  traMADol 50 milliGRAM(s) Oral every 6 hours PRN Breakthrough Pain                 PHYSICAL EXAM  Vital Signs Last 24 Hrs  T(C): 36.7 (02 Mar 2024 21:13), Max: 36.7 (02 Mar 2024 21:13)  T(F): 98 (02 Mar 2024 21:13), Max: 98 (02 Mar 2024 21:13)  HR: 91 (02 Mar 2024 21:13) (91 - 91)  BP: 123/77 (02 Mar 2024 21:13) (123/77 - 123/77)  BP(mean): --  RR: 17 (02 Mar 2024 21:13) (17 - 17)  SpO2: 94% (02 Mar 2024 21:13) (94% - 94%)    Parameters below as of 02 Mar 2024 21:13  Patient On (Oxygen Delivery Method): room air        Gen - NAD, Comfortable  HEENT - NCAT, MMM  Neck - Cervical incisions are well healed,  Pulm - Respiration nonlabored  Cardiovascular - warm and well perfused, no cyanosis  Abdomen - Soft, NT, ND, (+) BS  Extremities - No peripheral edema, no calf tenderness, B/L intrinsic muscles atrophy   Neuro-     Cognitive - awake, alert, oriented x 4,  Able to follow command     Communication - Fluent, Comprehensible                      LEFT    UE - ShAB <3/5, EF 4-/5, EE 4-/5,  2+/5                    RIGHT UE - ShAB <3/5, EF 4-/5, EE 2+/5,   2-/5                    LEFT    LE - HF 4-/5, KE 3/5, KF 3/5, DF 2/5, PF 3/5                    RIGHT LE - HF 4-/5, KE 4-/5, DF 2-/5, PF2+/5       Psychiatric - Mood stable, Affect WNL    LAB:   LABS:  LABS:                       13.5   6.58  )-----------( 236      ( 29 Feb 2024 06:18 )             40.9     02-29    140  |  104  |  18  ----------------------------<  93  3.9   |  24  |  0.85    Ca    9.3      29 Feb 2024 06:18    TPro  6.2  /  Alb  3.1<L>  /  TBili  0.9  /  DBili  x   /  AST  28  /  ALT  62<H>  /  AlkPhos  98  02-29    LIVER FUNCTIONS - ( 29 Feb 2024 06:18 )  Alb: 3.1 g/dL / Pro: 6.2 g/dL / ALK PHOS: 98 U/L / ALT: 62 U/L / AST: 28 U/L / GGT: x

## 2024-03-04 PROCEDURE — 99232 SBSQ HOSP IP/OBS MODERATE 35: CPT

## 2024-03-04 PROCEDURE — 99232 SBSQ HOSP IP/OBS MODERATE 35: CPT | Mod: GC

## 2024-03-04 RX ORDER — IBUPROFEN 200 MG
1 TABLET ORAL
Qty: 0 | Refills: 0 | DISCHARGE

## 2024-03-04 RX ADMIN — Medication 81 MILLIGRAM(S): at 12:44

## 2024-03-04 RX ADMIN — GABAPENTIN 400 MILLIGRAM(S): 400 CAPSULE ORAL at 22:00

## 2024-03-04 RX ADMIN — Medication 6 MILLIGRAM(S): at 22:00

## 2024-03-04 RX ADMIN — FAMOTIDINE 40 MILLIGRAM(S): 10 INJECTION INTRAVENOUS at 12:44

## 2024-03-04 RX ADMIN — CYCLOBENZAPRINE HYDROCHLORIDE 10 MILLIGRAM(S): 10 TABLET, FILM COATED ORAL at 22:00

## 2024-03-04 RX ADMIN — RILUZOLE 50 MILLIGRAM(S): 50 TABLET ORAL at 18:37

## 2024-03-04 RX ADMIN — GABAPENTIN 800 MILLIGRAM(S): 400 CAPSULE ORAL at 18:36

## 2024-03-04 RX ADMIN — GABAPENTIN 800 MILLIGRAM(S): 400 CAPSULE ORAL at 12:46

## 2024-03-04 RX ADMIN — GABAPENTIN 800 MILLIGRAM(S): 400 CAPSULE ORAL at 05:27

## 2024-03-04 RX ADMIN — NORTRIPTYLINE HYDROCHLORIDE 25 MILLIGRAM(S): 10 CAPSULE ORAL at 22:00

## 2024-03-04 RX ADMIN — RILUZOLE 50 MILLIGRAM(S): 50 TABLET ORAL at 05:27

## 2024-03-04 NOTE — CHART NOTE - NSCHARTNOTEFT_GEN_A_CORE
Nutrition Follow Up Note  Hospital Course   (Per Electronic Medical Record)    Source:  Patient [X]  Medical Record [X]      Diet:   High Fiber Diet w/ Thin Liquids (IDDSI Level 0)  Tolerates Diet Consistency Well  No Chewing/Swallowing Difficulties  No Recent Nausea, Vomiting, Diarrhea or Constipation (as Per Patient)  Consumes % of Meals (as Per Documentation) - States Good PO Intake/Appetite \  on Ensure Max 11oz PO Daily (Provides 150kcal & 30grams of Protein)  Patient Takes Nutrition Supplement Well  Obtained Food Preferences from Patient    Enteral/Parenteral Nutrition: Not Applicable    Current Weight: 196.8lb on 2/24  Obtain New Weight to Confirm  Obtain Weights Weekly     Pertinent Medications: MEDICATIONS  (STANDING):  aspirin enteric coated 81 milliGRAM(s) Oral daily  gabapentin 400 milliGRAM(s) Oral at bedtime  gabapentin 800 milliGRAM(s) Oral <User Schedule>  lactulose Syrup 10 Gram(s) Oral daily  melatonin 6 milliGRAM(s) Oral at bedtime  nortriptyline 25 milliGRAM(s) Oral at bedtime  polyethylene glycol 3350 17 Gram(s) Oral daily  riluzole 50 milliGRAM(s) Oral two times a day  senna 2 Tablet(s) Oral at bedtime    MEDICATIONS  (PRN):  acetaminophen     Tablet .. 650 milliGRAM(s) Oral every 6 hours PRN Mild Pain (1 - 3)  cyclobenzaprine 10 milliGRAM(s) Oral three times a day PRN Muscle Spasm  famotidine    Tablet 40 milliGRAM(s) Oral daily PRN indigestion  magnesium hydroxide Suspension 30 milliLiter(s) Oral every 12 hours PRN Constipation  simethicone 80 milliGRAM(s) Chew two times a day PRN Indigestion    Pertinent Labs:  03-04 Na143 mmol/L Glu 85 mg/dL K+ 4.4 mmol/L Cr  1.03 mg/dL BUN 25 mg/dL<H> 03-04 Alb 3.1 g/dL<L>    Skin: No Pressure Ulcers  Multiple Surgical Incisions  (as Per Nursing Flow Sheet)     Edema: None Noted (as Per Documentation)     Last Bowel Movement: on 3/3    Estimated Needs:   [X] No Change Since Previous Assessment    Previous Nutrition Diagnosis:   Moderate Malnutrition       Nutrition Diagnosis is [X] Ongoing - Continues on Nutrition Supplement & Patient Takes Nutrition Supplement Well    New Nutrition Diagnosis: [X] Not Applicable    Interventions:   1. Recommend Continue Nutrition Plan of Care     Monitoring & Evaluation:   [X] Weights   [X] PO Intake   [X] Skin Integrity   [X] Follow Up (Per Protocol)  [X] Tolerance to Diet Prescription   [X] Other: Labs    Registered Dietitian/Nutritionist Remains Available.  Julito Trevizo, EMELIN, CDN    Phone# [phone]

## 2024-03-04 NOTE — PROGRESS NOTE ADULT - SUBJECTIVE AND OBJECTIVE BOX
CC:  neck pain and weakness     HPI:  This is a 63 year old male presents PMHx including ALS (diagnosed 2023 at [facility]),  ACDF C4-5 on 07/12/23 and C4-C6 ACDF 8/2016. decreased ROM of right shoulder, chronic pain (daily marijuana user) and right  weakness (unable to extend fingers of right hand). Patient underwent C3-C4 ACDF/C3-C7 Posterior fusion without complications on 2/8/24 with Dr. Eliud Quinones.   Patient was evaluated by PM&R and therapy for functional deficits, gait/ADL impairments and acute rehabilitation was recommended. Patient was medically optimized for discharge to [facility] IRU on 2/15/24.     Allergies:  Pt has allergy to mustard (Anaphylaxis)  No Known Drug Allergies    Subjective:  - Patient was seen and examined at bed side, no overnight events   - Slept well last night, no new complaints   - Pain is well managed with current regimen   - GI/, LBM (03/030    - Tolerating therapy - noted with right DF weakness. Discussed AFO for safety.  Also educated on PRAFO for bed use      ROS:  - Denies CP, palpitation, SOB, cough, fever, chills, HD, abdominal discomfort, N/V/D, dysuria, neck pain, joint pain    MEDICATIONS  (STANDING):  aspirin enteric coated 81 milliGRAM(s) Oral daily  gabapentin 800 milliGRAM(s) Oral <User Schedule>  gabapentin 400 milliGRAM(s) Oral at bedtime  lactulose Syrup 10 Gram(s) Oral daily  melatonin 6 milliGRAM(s) Oral at bedtime  nortriptyline 25 milliGRAM(s) Oral at bedtime  polyethylene glycol 3350 17 Gram(s) Oral daily  riluzole 50 milliGRAM(s) Oral two times a day  senna 2 Tablet(s) Oral at bedtime    MEDICATIONS  (PRN):  acetaminophen     Tablet .. 650 milliGRAM(s) Oral every 6 hours PRN Mild Pain (1 - 3)  cyclobenzaprine 10 milliGRAM(s) Oral three times a day PRN Muscle Spasm  famotidine    Tablet 40 milliGRAM(s) Oral daily PRN indigestion  magnesium hydroxide Suspension 30 milliLiter(s) Oral every 12 hours PRN Constipation  simethicone 80 milliGRAM(s) Chew two times a day PRN Indigestion      LAB:                         13.7   7.75  )-----------( 249      ( 04 Mar 2024 06:36 )             42.0     03-04    143  |  107  |  25<H>  ----------------------------<  85  4.4   |  26  |  1.03    Ca    9.3      04 Mar 2024 06:36    TPro  6.2  /  Alb  3.1<L>  /  TBili  0.7  /  DBili  x   /  AST  25  /  ALT  57<H>  /  AlkPhos  98  03-04    LIVER FUNCTIONS - ( 04 Mar 2024 06:36 )  Alb: 3.1 g/dL / Pro: 6.2 g/dL / ALK PHOS: 98 U/L / ALT: 57 U/L / AST: 25 U/L / GGT: x             PHYSICAL EXAM  Vital Signs Last 24 Hrs  T(C): 36.2 (04 Mar 2024 07:21), Max: 37.2 (03 Mar 2024 22:08)  T(F): 97.2 (04 Mar 2024 07:21), Max: 98.9 (03 Mar 2024 22:08)  HR: 94 (04 Mar 2024 07:21) (94 - 111)  BP: 115/72 (04 Mar 2024 07:21) (110/80 - 115/72)  RR: 14 (04 Mar 2024 07:21) (14 - 15)  SpO2: 95% (04 Mar 2024 07:21) (94% - 95%)  Parameters below as of 04 Mar 2024 07:21  Patient On (Oxygen Delivery Method): room air    Gen - NAD, Comfortable  HEENT - NCAT, EOMI, PERRLA  Neck - Supple, limited neck ROM - in cervical collar. Incisions are healed, posterior incision with redness, left anterior with steri strip  Pulm - Respiration nonlabored  Cardiovascular - warm and well perfused, no cyanosis  Abdomen - Soft, NT, ND, (+) BS  Extremities - No peripheral edema, no calf tenderness, B/L intrinsic muscles atrophy   Neuro-     Cognitive - awake, alert, oriented x 4,  Able to follow command     Communication - Fluent, Comprehensible     Attention: Intact     Cranial Nerves - Decreased shoulder shrug, EOMI, face symmetric                     LEFT    UE - ShAB 4-/5, EF 4-/5, EE 4-/5,  2+/5                    RIGHT UE - ShAB 4-/5, EF 4-/5, EE 2+/5,   2-/5                    LEFT    LE - HF 4-/5, KE 3/5, KF 3/5, DF 2/5, PF 2/5                    RIGHT LE - HF 4-/5, KE 4-/5, DF 2-/5, PF2+/5        Sensory - Decreased to left knee, B/L last 2 fingers, now numbness in the left middle and ring fingers        Coordination - FTN impaired   MSK: limited passive and active R shoulder ROM. unable to abduct >90; gets to roughly 40 degrees   Psychiatric - Mood stable, Affect WNL             CC:  neck pain and weakness     HPI:  This is a 63 year old male presents PMHx including ALS (diagnosed 2023 at [facility]),  ACDF C4-5 on 07/12/23 and C4-C6 ACDF 8/2016. decreased ROM of right shoulder, chronic pain (daily marijuana user) and right  weakness (unable to extend fingers of right hand). Patient underwent C3-C4 ACDF/C3-C7 Posterior fusion without complications on 2/8/24 with Dr. Eliud Quinones.   Patient was evaluated by PM&R and therapy for functional deficits, gait/ADL impairments and acute rehabilitation was recommended. Patient was medically optimized for discharge to [facility] IRU on 2/15/24.     Allergies:  Pt has allergy to mustard (Anaphylaxis)  No Known Drug Allergies    Subjective:  - Patient was seen and examined at bed side, no overnight events   - Slept well last night, no new complaints   - Pain is well managed with current regimen   - GI/, LBM (03/030    - Tolerating therapy - noted with right DF weakness. Discussed AFO for safety.  Also educated on PRAFO for bed use      ROS:  - Denies CP, palpitation, SOB, cough, fever, chills, HD, abdominal discomfort, N/V/D, dysuria, neck pain, joint pain    MEDICATIONS  (STANDING):  aspirin enteric coated 81 milliGRAM(s) Oral daily  gabapentin 800 milliGRAM(s) Oral <User Schedule>  gabapentin 400 milliGRAM(s) Oral at bedtime  lactulose Syrup 10 Gram(s) Oral daily  melatonin 6 milliGRAM(s) Oral at bedtime  nortriptyline 25 milliGRAM(s) Oral at bedtime  polyethylene glycol 3350 17 Gram(s) Oral daily  riluzole 50 milliGRAM(s) Oral two times a day  senna 2 Tablet(s) Oral at bedtime    MEDICATIONS  (PRN):  acetaminophen     Tablet .. 650 milliGRAM(s) Oral every 6 hours PRN Mild Pain (1 - 3)  cyclobenzaprine 10 milliGRAM(s) Oral three times a day PRN Muscle Spasm  famotidine    Tablet 40 milliGRAM(s) Oral daily PRN indigestion  magnesium hydroxide Suspension 30 milliLiter(s) Oral every 12 hours PRN Constipation  simethicone 80 milliGRAM(s) Chew two times a day PRN Indigestion      LAB:                         13.7   7.75  )-----------( 249      ( 04 Mar 2024 06:36 )             42.0     03-04    143  |  107  |  25<H>  ----------------------------<  85  4.4   |  26  |  1.03    Ca    9.3      04 Mar 2024 06:36    TPro  6.2  /  Alb  3.1<L>  /  TBili  0.7  /  DBili  x   /  AST  25  /  ALT  57<H>  /  AlkPhos  98  03-04    LIVER FUNCTIONS - ( 04 Mar 2024 06:36 )  Alb: 3.1 g/dL / Pro: 6.2 g/dL / ALK PHOS: 98 U/L / ALT: 57 U/L / AST: 25 U/L / GGT: x             PHYSICAL EXAM  Vital Signs Last 24 Hrs  T(C): 36.2 (04 Mar 2024 07:21), Max: 37.2 (03 Mar 2024 22:08)  T(F): 97.2 (04 Mar 2024 07:21), Max: 98.9 (03 Mar 2024 22:08)  HR: 94 (04 Mar 2024 07:21) (94 - 111)  BP: 115/72 (04 Mar 2024 07:21) (110/80 - 115/72)  RR: 14 (04 Mar 2024 07:21) (14 - 15)  SpO2: 95% (04 Mar 2024 07:21) (94% - 95%)  Parameters below as of 04 Mar 2024 07:21  Patient On (Oxygen Delivery Method): room air    Gen - NAD, Comfortable  HEENT - NCAT, EOMI, PERRLA  Neck - Supple, limited neck ROM - in cervical collar. Incisions are healed, posterior incision with redness, left anterior with steri strip  Pulm - Respiration nonlabored  Cardiovascular - warm and well perfused, no cyanosis  Abdomen - Soft, NT, ND, (+) BS  Extremities - No peripheral edema, no calf tenderness, B/L intrinsic muscles atrophy   Neuro-     Cognitive - awake, alert, oriented x 4,  Able to follow command     Communication - Fluent, Comprehensible     Attention: Intact     Cranial Nerves - Decreased shoulder shrug, EOMI, face symmetric                     LEFT    UE - ShAB 4-/5, EF 4-/5, EE 4-/5,  2+/5                    RIGHT UE - ShAB 4-/5, EF 4-/5, EE 2+/5,   2-/5                    LEFT    LE - HF 4-/5, KE 3/5, KF 3/5, DF 2/5, PF 2/5                    RIGHT LE - HF 4-/5, KE 4-/5, DF 2-/5, PF2+/5        Sensory - Decreased to left knee, B/L last 2 fingers, now numbness in the left middle and ring fingers        Coordination - FTN impaired   MSK: limited passive and active R shoulder ROM. unable to abduct >90; gets to roughly 40 degrees   Psychiatric - Mood stable, Affect WNL  : penis examined for suspected blood source (reported by wife): no signs of laceration, tears, excoriation, meatus unremarkable. No signs of dried or fresh blood. No ecchymosis or signs of injury. No erythema. Patient denies seeing blood this day as well. Skin intact, without rashes or lesions.              CC:  neck pain and weakness     HPI:  This is a 63 year old male presents PMHx including ALS (diagnosed 2023 at [facility]),  ACDF C4-5 on 07/12/23 and C4-C6 ACDF 8/2016. decreased ROM of right shoulder, chronic pain (daily marijuana user) and right  weakness (unable to extend fingers of right hand). Patient underwent C3-C4 ACDF/C3-C7 Posterior fusion without complications on 2/8/24 with Dr. Eliud Quinones.   Patient was evaluated by PM&R and therapy for functional deficits, gait/ADL impairments and acute rehabilitation was recommended. Patient was medically optimized for discharge to [facility] IRU on 2/15/24.     Allergies:  Pt has allergy to mustard (Anaphylaxis)  No Known Drug Allergies    Subjective:  - Patient was seen and examined at bed side, no overnight events   - Slept well last night, no new complaints   - Pain is well managed with current regimen   - GI/, LBM (03/030    - Tolerating therapy - noted with right DF weakness. Discussed AFO for safety.  Also educated on PRAFO for bed use      ROS:  - Denies CP, palpitation, SOB, cough, fever, chills, HD, abdominal discomfort, N/V/D, dysuria, neck pain, joint pain    MEDICATIONS  (STANDING):  aspirin enteric coated 81 milliGRAM(s) Oral daily  gabapentin 800 milliGRAM(s) Oral <User Schedule>  gabapentin 400 milliGRAM(s) Oral at bedtime  lactulose Syrup 10 Gram(s) Oral daily  melatonin 6 milliGRAM(s) Oral at bedtime  nortriptyline 25 milliGRAM(s) Oral at bedtime  polyethylene glycol 3350 17 Gram(s) Oral daily  riluzole 50 milliGRAM(s) Oral two times a day  senna 2 Tablet(s) Oral at bedtime    MEDICATIONS  (PRN):  acetaminophen     Tablet .. 650 milliGRAM(s) Oral every 6 hours PRN Mild Pain (1 - 3)  cyclobenzaprine 10 milliGRAM(s) Oral three times a day PRN Muscle Spasm  famotidine    Tablet 40 milliGRAM(s) Oral daily PRN indigestion  magnesium hydroxide Suspension 30 milliLiter(s) Oral every 12 hours PRN Constipation  simethicone 80 milliGRAM(s) Chew two times a day PRN Indigestion      LAB:                         13.7   7.75  )-----------( 249      ( 04 Mar 2024 06:36 )             42.0     03-04    143  |  107  |  25<H>  ----------------------------<  85  4.4   |  26  |  1.03    Ca    9.3      04 Mar 2024 06:36    TPro  6.2  /  Alb  3.1<L>  /  TBili  0.7  /  DBili  x   /  AST  25  /  ALT  57<H>  /  AlkPhos  98  03-04    LIVER FUNCTIONS - ( 04 Mar 2024 06:36 )  Alb: 3.1 g/dL / Pro: 6.2 g/dL / ALK PHOS: 98 U/L / ALT: 57 U/L / AST: 25 U/L / GGT: x             PHYSICAL EXAM  Vital Signs Last 24 Hrs  T(C): 36.2 (04 Mar 2024 07:21), Max: 37.2 (03 Mar 2024 22:08)  T(F): 97.2 (04 Mar 2024 07:21), Max: 98.9 (03 Mar 2024 22:08)  HR: 94 (04 Mar 2024 07:21) (94 - 111)  BP: 115/72 (04 Mar 2024 07:21) (110/80 - 115/72)  RR: 14 (04 Mar 2024 07:21) (14 - 15)  SpO2: 95% (04 Mar 2024 07:21) (94% - 95%)  Parameters below as of 04 Mar 2024 07:21  Patient On (Oxygen Delivery Method): room air    Gen - NAD, Comfortable  HEENT - NCAT, EOMI, PERRLA  Neck - Supple, limited neck ROM - in cervical collar. Incisions are healed, posterior incision with redness, left anterior with steri strip  Pulm - Respiration nonlabored  Cardiovascular - warm and well perfused, no cyanosis  Abdomen - Soft, NT, ND, (+) BS  Extremities - No peripheral edema, no calf tenderness, B/L intrinsic muscles atrophy   Neuro-     Cognitive - awake, alert, oriented x 4,  Able to follow command     Communication - Fluent, Comprehensible     Attention: Intact     Cranial Nerves - Decreased shoulder shrug, EOMI, face symmetric                     LEFT    UE - ShAB 4-/5, EF 4-/5, EE 4-/5,  2+/5                    RIGHT UE - ShAB 4-/5, EF 4-/5, EE 2+/5,   2-/5                    LEFT    LE - HF 4-/5, KE 3/5, KF 3/5, DF 2/5, PF 2/5                    RIGHT LE - HF 4-/5, KE 4-/5, DF 2-/5, PF2+/5        Sensory - Decreased to left knee, B/L last 2 fingers, now numbness in the left middle and ring fingers        Coordination - FTN impaired   MSK: limited passive and active R shoulder ROM. unable to abduct >90; gets to roughly 40 degrees   Psychiatric - Mood stable, Affect WNL

## 2024-03-04 NOTE — PROGRESS NOTE ADULT - ASSESSMENT
64 y/o M with PMH ALS (diagnosed 2023 at [facility]),  ACDF C4-5 on 07/12/23 and C4-C6 ACDF 8/2016, decreased ROM of right shoulder, chronic pain (daily marijuana user) and right  weakness (unable to extend fingers of right hand). Patient underwent C3-C4 ACDF/C3-C7 Posterior fusion without complications on 2/8/24 with Dr. Eliud Quinones. Patient now with gait Instability, ADL impairments and Functional impairments. Admitted to acute rehab.    #s/p cervical fusion  -C3-C4 ACDF/C3-C7 Posterior fusion without complications on 2/8/24  -s/p prednisone taper  -Aspen collar per rehab   -Continue comprehensive rehab program - PT/OT/SLP per rehab team  -Pain management, bowel regimen per rehab     #ALS  -c/w Riluzole 50mg BID- non formulary    #Tachycardia - improved  -Cardiology evaluation at [facility]. No organic cause identified  -Likely anxiety/pain driven    #DVT ppx  -SCD, TEDs

## 2024-03-04 NOTE — PROGRESS NOTE ADULT - SUBJECTIVE AND OBJECTIVE BOX
Patient is a 63y old  Male who presents with a chief complaint of Cervical stenosis with Myelopathy/ quadriparesis s/p cervical fusion (04 Mar 2024 07:42)    Patient seen and examined at bedside, stable, NAD. denies acute medical complaints.     ALLERGIES:  Pt has allergy to mustard (Anaphylaxis)  No Known Drug Allergies    MEDICATIONS  (STANDING):  aspirin enteric coated 81 milliGRAM(s) Oral daily  gabapentin 400 milliGRAM(s) Oral at bedtime  gabapentin 800 milliGRAM(s) Oral <User Schedule>  lactulose Syrup 10 Gram(s) Oral daily  melatonin 6 milliGRAM(s) Oral at bedtime  nortriptyline 25 milliGRAM(s) Oral at bedtime  polyethylene glycol 3350 17 Gram(s) Oral daily  riluzole 50 milliGRAM(s) Oral two times a day  senna 2 Tablet(s) Oral at bedtime    MEDICATIONS  (PRN):  acetaminophen     Tablet .. 650 milliGRAM(s) Oral every 6 hours PRN Mild Pain (1 - 3)  cyclobenzaprine 10 milliGRAM(s) Oral three times a day PRN Muscle Spasm  famotidine    Tablet 40 milliGRAM(s) Oral daily PRN indigestion  magnesium hydroxide Suspension 30 milliLiter(s) Oral every 12 hours PRN Constipation  simethicone 80 milliGRAM(s) Chew two times a day PRN Indigestion    Vital Signs Last 24 Hrs  T(F): 97.2 (04 Mar 2024 07:21), Max: 98.9 (03 Mar 2024 22:08)  HR: 94 (04 Mar 2024 07:21) (94 - 111)  BP: 115/72 (04 Mar 2024 07:21) (110/80 - 115/72)  RR: 14 (04 Mar 2024 07:21) (14 - 15)  SpO2: 95% (04 Mar 2024 07:21) (94% - 95%)  I&O's Summary        PHYSICAL EXAM:  General: NAD, A/O x 3  ENT: MMM, no scleral icterus  Neck: Supple, No JVD  Lungs: Clear to auscultation bilaterally, no wheezes, rales, rhonchi  Cardio: RRR, S1/S2  Abdomen: Soft, Nontender, Nondistended; Bowel sounds present  Extremities: No calf tenderness, No pitting edema    LABS:                        13.7   7.75  )-----------( 249      ( 04 Mar 2024 06:36 )             42.0                                                   RADIOLOGY & ADDITIONAL TESTS:    Care Discussed with Consultants/Other Providers: yes, rehab

## 2024-03-04 NOTE — PROGRESS NOTE ADULT - ASSESSMENT
ASSESSMENT/PLAN  This is a 63 year old male presents PMHx including ALS (diagnosed 2023 at [facility]),  ACDF C4-5 on 07/12/23 and C4-C6 ACDF 8/2016, decreased ROM of right shoulder, chronic pain (daily marijuana user) and right  weakness (unable to extend fingers of right hand). Patient underwent C3-C4 ACDF/C3-C7 Posterior fusion without complications on 2/8/24 with Dr. Eliud Quinones.   Patient now with gait Instability, ADL impairments and Functional impairments.    #Cervical stenosis with Myelopathy/ quadriparesis s/p cervical fusion  - C3-C4 ACDF/C3-C7 Posterior fusion without complications on 2/8/24  - Posterior incision with redness -> picture sent via teams to Dr. Quinones (2/22), looks fine   - Gauze to cushion skin and cervical brace  - Comprehensive Rehab Program: PT/OT, 3hours daily and 5 days weekly  - PT: Focused on improving strength, endurance, coordination, balance, functional mobility, and transfers  - OT: Focused on improving strength, fine motor skills, coordination, posture and ADLs.    - Completed Prednisone course: 20mg daily x 3 days ( 2/16-2/18) then 10mg daily x 3 days ( 2/19-2/21)  - PRN pain management: Flexeril 10 mg TID, Oxy IR 10-15 (4-6, 7-10), Tramadol breakthrough   - Standing pain management: Nortriptyline 25 mg qPM, gabapentin 800 mg TID, 400 mg at bedtime   - Aspen collar at all times     #Persistent Tachycardia  -Cardiology evaluation at [facility]. No organic cause identified  -Likely anxiety driven  -Monitor, HR (02/28) 100 - 128, (02/29) 100 - 106, (3/1) 89-94, (03/03) 94 - 111     #ALS  - Riluzole 50mg BID    #Leukocytosis/ Resolved   - WBC level (02/20) 13.87 > 10.20 (2/22), 10/03 (02/26)   - Likely secondary to steroid (completed 2/21)  - No overt sign of infection    #Pain management  - Tylenol PRN  - Oxycodone PRN  - Tramadol 50mg q 6 hours PRN     #DVT ppx  - SCD, hemalatha    #GI ppx  - Pepcid 20mg PRN    #Bowel Regimen  - Senna HS  - Miralax QD  - Lactulose 10g     #Bladder management  - Voiding without issues     #FEN   - Diet: Regular- high fiber    #Skin:  - Skin on admission: Anterior and posterior cervical spine incision covered with dry dressing, anterior incision with surrounding erythema    #Mood  #Anxiety  - Alprazolam 0.25mg BID PRN    #Precaution  - Fall, Aspiration, Spinal    #GOC  CODE STATUS: FULL CODE     Outpatient Follow-up (Specialty/Name of physician):    Eliud Quinones  Orthopaedic Surgery  [address], [address]  [place] [address]  Phone: [phone]  Fax: [phone]  Follow Up Time:    [facility]  [facility] [address]  Scheduled Appointment: 02/21/2024

## 2024-03-05 PROCEDURE — 99233 SBSQ HOSP IP/OBS HIGH 50: CPT | Mod: GC

## 2024-03-05 PROCEDURE — 99232 SBSQ HOSP IP/OBS MODERATE 35: CPT

## 2024-03-05 RX ORDER — OMEPRAZOLE 10 MG/1
1 CAPSULE, DELAYED RELEASE ORAL
Qty: 30 | Refills: 0
Start: 2024-03-05 | End: 2024-04-03

## 2024-03-05 RX ORDER — CYCLOBENZAPRINE HYDROCHLORIDE 10 MG/1
1 TABLET, FILM COATED ORAL
Qty: 90 | Refills: 0
Start: 2024-03-05 | End: 2024-04-03

## 2024-03-05 RX ORDER — NORTRIPTYLINE HYDROCHLORIDE 10 MG/1
1 CAPSULE ORAL
Qty: 30 | Refills: 0
Start: 2024-03-05 | End: 2024-04-03

## 2024-03-05 RX ORDER — OMEPRAZOLE 10 MG/1
1 CAPSULE, DELAYED RELEASE ORAL
Refills: 0 | DISCHARGE

## 2024-03-05 RX ORDER — RILUZOLE 50 MG/1
1 TABLET ORAL
Refills: 0 | DISCHARGE

## 2024-03-05 RX ORDER — GABAPENTIN 400 MG/1
1 CAPSULE ORAL
Qty: 90 | Refills: 0
Start: 2024-03-05 | End: 2024-04-03

## 2024-03-05 RX ORDER — RILUZOLE 50 MG/1
1 TABLET ORAL
Qty: 60 | Refills: 0
Start: 2024-03-05 | End: 2024-04-03

## 2024-03-05 RX ORDER — GABAPENTIN 400 MG/1
1 CAPSULE ORAL
Qty: 30 | Refills: 0
Start: 2024-03-05 | End: 2024-04-03

## 2024-03-05 RX ADMIN — Medication 81 MILLIGRAM(S): at 12:11

## 2024-03-05 RX ADMIN — Medication 650 MILLIGRAM(S): at 08:55

## 2024-03-05 RX ADMIN — Medication 6 MILLIGRAM(S): at 22:07

## 2024-03-05 RX ADMIN — CYCLOBENZAPRINE HYDROCHLORIDE 10 MILLIGRAM(S): 10 TABLET, FILM COATED ORAL at 22:06

## 2024-03-05 RX ADMIN — GABAPENTIN 800 MILLIGRAM(S): 400 CAPSULE ORAL at 05:38

## 2024-03-05 RX ADMIN — FAMOTIDINE 40 MILLIGRAM(S): 10 INJECTION INTRAVENOUS at 22:06

## 2024-03-05 RX ADMIN — GABAPENTIN 800 MILLIGRAM(S): 400 CAPSULE ORAL at 17:08

## 2024-03-05 RX ADMIN — GABAPENTIN 800 MILLIGRAM(S): 400 CAPSULE ORAL at 12:11

## 2024-03-05 RX ADMIN — Medication 650 MILLIGRAM(S): at 08:45

## 2024-03-05 RX ADMIN — RILUZOLE 50 MILLIGRAM(S): 50 TABLET ORAL at 05:38

## 2024-03-05 RX ADMIN — RILUZOLE 50 MILLIGRAM(S): 50 TABLET ORAL at 17:08

## 2024-03-05 RX ADMIN — GABAPENTIN 400 MILLIGRAM(S): 400 CAPSULE ORAL at 22:06

## 2024-03-05 RX ADMIN — NORTRIPTYLINE HYDROCHLORIDE 25 MILLIGRAM(S): 10 CAPSULE ORAL at 22:06

## 2024-03-05 NOTE — PROGRESS NOTE ADULT - ASSESSMENT
ASSESSMENT/PLAN  This is a 63 year old male presents PMHx including ALS (diagnosed 2023 at [facility]),  ACDF C4-5 on 07/12/23 and C4-C6 ACDF 8/2016, decreased ROM of right shoulder, chronic pain (daily marijuana user) and right  weakness (unable to extend fingers of right hand). Patient underwent C3-C4 ACDF/C3-C7 Posterior fusion without complications on 2/8/24 with Dr. Eliud Quinones.   Patient now with gait Instability, ADL impairments and Functional impairments.    #Cervical stenosis with Myelopathy/ quadriparesis s/p cervical fusion  - C3-C4 ACDF/C3-C7 Posterior fusion without complications on 2/8/24  - Posterior incision with redness -> picture sent via teams to Dr. Quinones (2/22), looks fine   - Gauze to cushion skin and cervical brace  - Comprehensive Rehab Program: PT/OT, 3hours daily and 5 days weekly  - PT: Focused on improving strength, endurance, coordination, balance, functional mobility, and transfers  - OT: Focused on improving strength, fine motor skills, coordination, posture and ADLs.    - Completed Prednisone course: 20mg daily x 3 days ( 2/16-2/18) then 10mg daily x 3 days ( 2/19-2/21)  - PRN pain management: Flexeril 10 mg TID, Oxy IR 10-15 (4-6, 7-10), Tramadol breakthrough   - Standing pain management: Nortriptyline 25 mg qPM, gabapentin 800 mg TID, 400 mg at bedtime   - Aspen collar at all times   - Patient will require a tub bench for shower transfers and bathing.     #Persistent Tachycardia  -Cardiology evaluation at [facility]. No organic cause identified  -Likely anxiety driven  -Monitor, HR (02/28) 100 - 128, (02/29) 100 - 106, (3/1) 89-94, (03/03) 94 - 111     #ALS  - Riluzole 50mg BID    #Leukocytosis/ Resolved   - WBC level (02/20) 13.87 > 10.20 (2/22), 10/03 (02/26)   - Likely secondary to steroid (completed 2/21)  - No overt sign of infection    #Pain management  - Tylenol PRN  - Oxycodone PRN  - Tramadol 50mg q 6 hours PRN     #DVT ppx  - SCD, hemalatha    #GI ppx  - Pepcid 20mg PRN    #Bowel Regimen  - Senna HS  - Miralax QD  - Lactulose 10g     #Bladder management  - Voiding without issues     #FEN   - Diet: Regular- high fiber    #Skin:  - Skin on admission: Anterior and posterior cervical spine incision covered with dry dressing, anterior incision with surrounding erythema    #Mood  #Anxiety  - Alprazolam 0.25mg BID PRN    #Precaution  - Fall, Aspiration, Spinal    #GOC  CODE STATUS: FULL CODE     Case discussed in IDT team meeting today for progress and discharge planning 3/5. D/C 3/7    Outpatient Follow-up (Specialty/Name of physician):    Eliud Quinones  Orthopaedic Surgery  [address], [address]  [place] [address]  Phone: [phone]  Fax: [phone]  Follow Up Time:    [facility]  [facility] [address]  Scheduled Appointment: 02/21/2024   ASSESSMENT/PLAN  This is a 63 year old male presents PMHx including ALS (diagnosed 2023 at [facility]),  ACDF C4-5 on 07/12/23 and C4-C6 ACDF 8/2016, decreased ROM of right shoulder, chronic pain (daily marijuana user) and right  weakness (unable to extend fingers of right hand). Patient underwent C3-C4 ACDF/C3-C7 Posterior fusion without complications on 2/8/24 with Dr. Eliud Quinones.   Patient now with gait Instability, ADL impairments and Functional impairments.    #Cervical stenosis with Myelopathy/ quadriparesis s/p cervical fusion  - C3-C4 ACDF/C3-C7 Posterior fusion without complications on 2/8/24  - Posterior incision with redness -> picture sent via teams to Dr. Quinones (2/22), looks fine   - Gauze to cushion skin and cervical brace  - Comprehensive Rehab Program: PT/OT, 3hours daily and 5 days weekly  - PT: Focused on improving strength, endurance, coordination, balance, functional mobility, and transfers  - OT: Focused on improving strength, fine motor skills, coordination, posture and ADLs.    - Completed Prednisone course: 20mg daily x 3 days ( 2/16-2/18) then 10mg daily x 3 days ( 2/19-2/21)  - PRN pain management: Flexeril 10 mg TID, Oxy IR 10-15 (4-6, 7-10), Tramadol breakthrough   - Standing pain management: Nortriptyline 25 mg qPM, gabapentin 800 mg TID, 400 mg at bedtime   - Aspen collar at all times   - Patient will require a tub bench for shower transfers and bathing.     #Persistent Tachycardia/ stable   -Cardiology evaluation at [facility]. No organic cause identified  -Likely anxiety driven  -Monitor, HR (02/28) 100 - 128, (02/29) 100 - 106, (3/1) 89-94, (03/03) 94 - 111     #ALS  - Riluzole 50mg BID    #Leukocytosis/ Resolved   - WBC level (02/20) 13.87 > 10.20 (2/22), 10.03 (02/26)   - Likely secondary to steroid (completed 2/21)  - No overt sign of infection    #Pain management  - Tylenol PRN  - Oxycodone PRN  - Tramadol 50mg q 6 hours PRN     #DVT ppx  - SCD, hemalatha    #GI ppx  - Pepcid 20mg PRN    #Bowel Regimen  - Senna HS  - Miralax QD  - Lactulose 10g     #Bladder management  - Voiding without issues     #FEN   - Diet: Regular- high fiber    #Skin:  - Skin on admission: Anterior and posterior cervical spine incision covered with dry dressing, anterior incision with surrounding erythema    #Mood  #Anxiety  - Alprazolam 0.25mg BID PRN    #Precaution  - Fall, Aspiration, Spinal    #GOC  CODE STATUS: FULL CODE     Case discussed in IDT team meeting today for progress and discharge planning 3/5. D/C 3/7    Outpatient Follow-up (Specialty/Name of physician):    Eliud Quinones  Orthopaedic Surgery  [address], [address]  [place] [address]  Phone: [phone]  Fax: [phone]  Follow Up Time:    [facility]  [facility] [address]  Scheduled Appointment: 02/21/2024

## 2024-03-05 NOTE — PROGRESS NOTE ADULT - SUBJECTIVE AND OBJECTIVE BOX
Patient is a 63y old  Male who presents with a chief complaint of Cervical stenosis with Myelopathy/ quadriparesis s/p cervical fusion (04 Mar 2024 12:01)      Patient seen and examined at bedside, stable, NAD.     ALLERGIES:  Pt has allergy to mustard (Anaphylaxis)  No Known Drug Allergies    MEDICATIONS  (STANDING):  aspirin enteric coated 81 milliGRAM(s) Oral daily  gabapentin 800 milliGRAM(s) Oral <User Schedule>  gabapentin 400 milliGRAM(s) Oral at bedtime  lactulose Syrup 10 Gram(s) Oral daily  melatonin 6 milliGRAM(s) Oral at bedtime  nortriptyline 25 milliGRAM(s) Oral at bedtime  polyethylene glycol 3350 17 Gram(s) Oral daily  riluzole 50 milliGRAM(s) Oral two times a day  senna 2 Tablet(s) Oral at bedtime    MEDICATIONS  (PRN):  acetaminophen     Tablet .. 650 milliGRAM(s) Oral every 6 hours PRN Mild Pain (1 - 3)  cyclobenzaprine 10 milliGRAM(s) Oral three times a day PRN Muscle Spasm  famotidine    Tablet 40 milliGRAM(s) Oral daily PRN indigestion  magnesium hydroxide Suspension 30 milliLiter(s) Oral every 12 hours PRN Constipation  simethicone 80 milliGRAM(s) Chew two times a day PRN Indigestion    Vital Signs Last 24 Hrs  T(F): 97.4 (05 Mar 2024 08:17), Max: 98 (04 Mar 2024 22:01)  HR: 87 (05 Mar 2024 08:17) (87 - 100)  BP: 125/79 (05 Mar 2024 08:17) (112/75 - 125/79)  RR: 16 (05 Mar 2024 08:17) (15 - 16)  SpO2: 94% (05 Mar 2024 08:17) (94% - 96%)  I&O's Summary      PHYSICAL EXAM:  General: NAD, A/O x 3  ENT: MMM, no scleral icterus  Neck: Supple, No JVD  Lungs: Clear to auscultation bilaterally, no wheezes, rales, rhonchi  Cardio: RRR, S1/S2  Abdomen: Soft, Nontender, Nondistended; Bowel sounds present  Extremities: No calf tenderness, No pitting edema    LABS:                        13.7   7.75  )-----------( 249      ( 04 Mar 2024 06:36 )             42.0       03-04    143  |  107  |  25  ----------------------------<  85  4.4   |  26  |  1.03    Ca    9.3      04 Mar 2024 06:36    TPro  6.2  /  Alb  3.1  /  TBili  0.7  /  DBili  x   /  AST  25  /  ALT  57  /  AlkPhos  98  03-04                                  Urinalysis Basic - ( 04 Mar 2024 06:36 )    Color: x / Appearance: x / SG: x / pH: x  Gluc: 85 mg/dL / Ketone: x  / Bili: x / Urobili: x   Blood: x / Protein: x / Nitrite: x   Leuk Esterase: x / RBC: x / WBC x   Sq Epi: x / Non Sq Epi: x / Bacteria: x            RADIOLOGY & ADDITIONAL TESTS:    Care Discussed with Consultants/Other Providers: yes, rehab

## 2024-03-05 NOTE — PROGRESS NOTE ADULT - ASSESSMENT
64 y/o M with PMH ALS (diagnosed 2023 at [facility]),  ACDF C4-5 on 07/12/23 and C4-C6 ACDF 8/2016, decreased ROM of right shoulder, chronic pain (daily marijuana user) and right  weakness (unable to extend fingers of right hand). Patient underwent C3-C4 ACDF/C3-C7 Posterior fusion without complications on 2/8/24 with Dr. Eliud Quinones. Patient now with gait Instability, ADL impairments and Functional impairments. Admitted to acute rehab.    #s/p cervical fusion  -C3-C4 ACDF/C3-C7 Posterior fusion without complications on 2/8/24  -s/p prednisone taper  -Aspen collar per rehab   -Continue comprehensive rehab program - PT/OT/SLP per rehab team  -Pain management, bowel regimen per rehab     #ALS  -c/w Riluzole 50mg BID- non formulary    #Tachycardia - improved  -Cardiology evaluation at [facility]. No organic cause identified  -Likely anxiety/pain driven    #DVT ppx  -SCD, TEDs

## 2024-03-05 NOTE — PROGRESS NOTE ADULT - SUBJECTIVE AND OBJECTIVE BOX
CC:  neck pain and weakness     HPI:  This is a 63 year old male presents PMHx including ALS (diagnosed 2023 at [facility]),  ACDF C4-5 on 07/12/23 and C4-C6 ACDF 8/2016. decreased ROM of right shoulder, chronic pain (daily marijuana user) and right  weakness (unable to extend fingers of right hand). Patient underwent C3-C4 ACDF/C3-C7 Posterior fusion without complications on 2/8/24 with Dr. Eliud Quinones.   Patient was evaluated by PM&R and therapy for functional deficits, gait/ADL impairments and acute rehabilitation was recommended. Patient was medically optimized for discharge to [facility] IRU on 2/15/24.     Allergies:  Pt has allergy to mustard (Anaphylaxis)  No Known Drug Allergies    Subjective:  - Patient was seen and examined at bed side, no overnight events   - Slept well last night, no new complaints   - Pain is well managed with current regimen   - GI/, LBM (03/04)    - Tolerating therapy - noted with right DF weakness.      ROS:  - Denies CP, palpitation, SOB, cough, fever, chills, HD, abdominal discomfort, N/V/D, dysuria, neck pain, joint pain    MEDICATIONS  (STANDING):  aspirin enteric coated 81 milliGRAM(s) Oral daily  gabapentin 800 milliGRAM(s) Oral <User Schedule>  gabapentin 400 milliGRAM(s) Oral at bedtime  lactulose Syrup 10 Gram(s) Oral daily  melatonin 6 milliGRAM(s) Oral at bedtime  nortriptyline 25 milliGRAM(s) Oral at bedtime  polyethylene glycol 3350 17 Gram(s) Oral daily  riluzole 50 milliGRAM(s) Oral two times a day  senna 2 Tablet(s) Oral at bedtime    MEDICATIONS  (PRN):  acetaminophen     Tablet .. 650 milliGRAM(s) Oral every 6 hours PRN Mild Pain (1 - 3)  cyclobenzaprine 10 milliGRAM(s) Oral three times a day PRN Muscle Spasm  famotidine    Tablet 40 milliGRAM(s) Oral daily PRN indigestion  magnesium hydroxide Suspension 30 milliLiter(s) Oral every 12 hours PRN Constipation  simethicone 80 milliGRAM(s) Chew two times a day PRN Indigestion      LAB:                         13.7   7.75  )-----------( 249      ( 04 Mar 2024 06:36 )             42.0     03-04    143  |  107  |  25<H>  ----------------------------<  85  4.4   |  26  |  1.03    Ca    9.3      04 Mar 2024 06:36    TPro  6.2  /  Alb  3.1<L>  /  TBili  0.7  /  DBili  x   /  AST  25  /  ALT  57<H>  /  AlkPhos  98  03-04    LIVER FUNCTIONS - ( 04 Mar 2024 06:36 )  Alb: 3.1 g/dL / Pro: 6.2 g/dL / ALK PHOS: 98 U/L / ALT: 57 U/L / AST: 25 U/L / GGT: x             PHYSICAL EXAM  Vital Signs Last 24 Hrs  T(C): 36.3 (05 Mar 2024 08:17), Max: 36.7 (04 Mar 2024 22:01)  T(F): 97.4 (05 Mar 2024 08:17), Max: 98 (04 Mar 2024 22:01)  HR: 87 (05 Mar 2024 08:17) (87 - 100)  BP: 125/79 (05 Mar 2024 08:17) (112/75 - 125/79)  RR: 16 (05 Mar 2024 08:17) (15 - 16)  SpO2: 94% (05 Mar 2024 08:17) (94% - 96%)  Parameters below as of 05 Mar 2024 08:17  Patient On (Oxygen Delivery Method): room air      Gen - NAD, Comfortable  HEENT - NCAT, EOMI, PERRLA  Neck - Supple, limited neck ROM - in cervical collar. Incisions are healed, posterior incision with redness, left anterior with steri strip  Pulm - Respiration nonlabored  Cardiovascular - warm and well perfused, no cyanosis  Abdomen - Soft, NT, ND, (+) BS  Extremities - No peripheral edema, no calf tenderness, B/L intrinsic muscles atrophy   Neuro-     Cognitive - awake, alert, oriented x 4,  Able to follow command     Communication - Fluent, Comprehensible     Attention: Intact     Cranial Nerves - Decreased shoulder shrug, EOMI, face symmetric                     LEFT    UE - ShAB 4-/5, EF 4-/5, EE 4-/5,  2+/5                    RIGHT UE - ShAB 4-/5, EF 4-/5, EE 2+/5,   2-/5                    LEFT    LE - HF 4-/5, KE 3/5, KF 3/5, DF 2/5, PF 2/5                    RIGHT LE - HF 4-/5, KE 4-/5, DF 2-/5, PF2+/5        Sensory - Decreased to left knee, B/L last 2 fingers, now numbness in the left middle and ring fingers        Coordination - FTN impaired   MSK: limited passive and active R shoulder ROM. unable to abduct >90; gets to roughly 40 degrees   Psychiatric - Mood stable, Affect WNL  : penis examined for suspected blood source (reported by wife): no signs of laceration, tears, excoriation, meatus unremarkable. No signs of dried or fresh blood. No ecchymosis or signs of injury. No erythema. Patient denies seeing blood this day as well. Skin intact, without rashes or lesions.              CC:  neck pain and weakness     HPI:  This is a 63 year old male presents PMHx including ALS (diagnosed 2023 at [facility]),  ACDF C4-5 on 07/12/23 and C4-C6 ACDF 8/2016. decreased ROM of right shoulder, chronic pain (daily marijuana user) and right  weakness (unable to extend fingers of right hand). Patient underwent C3-C4 ACDF/C3-C7 Posterior fusion without complications on 2/8/24 with Dr. Eliud Quinones.   Patient was evaluated by PM&R and therapy for functional deficits, gait/ADL impairments and acute rehabilitation was recommended. Patient was medically optimized for discharge to [facility] IRU on 2/15/24.     Allergies:  Pt has allergy to mustard (Anaphylaxis)  No Known Drug Allergies    Subjective:  - Patient was seen and examined at bed side, no overnight events   - Slept well last night, no new complaints   - Pain is well managed with current regimen   - GI/, LBM (03/04), voiding without issues    - Tolerating therapy - noted with right DF weakness.  - case discussed at IDT meeting today for progress and discharge plan.      ROS:  - Denies CP, palpitation, SOB, cough, fever, chills, HD, abdominal discomfort, N/V/D, dysuria, neck pain, joint pain    MEDICATIONS  (STANDING):  aspirin enteric coated 81 milliGRAM(s) Oral daily  gabapentin 800 milliGRAM(s) Oral <User Schedule>  gabapentin 400 milliGRAM(s) Oral at bedtime  lactulose Syrup 10 Gram(s) Oral daily  melatonin 6 milliGRAM(s) Oral at bedtime  nortriptyline 25 milliGRAM(s) Oral at bedtime  polyethylene glycol 3350 17 Gram(s) Oral daily  riluzole 50 milliGRAM(s) Oral two times a day  senna 2 Tablet(s) Oral at bedtime    MEDICATIONS  (PRN):  acetaminophen     Tablet .. 650 milliGRAM(s) Oral every 6 hours PRN Mild Pain (1 - 3)  cyclobenzaprine 10 milliGRAM(s) Oral three times a day PRN Muscle Spasm  famotidine    Tablet 40 milliGRAM(s) Oral daily PRN indigestion  magnesium hydroxide Suspension 30 milliLiter(s) Oral every 12 hours PRN Constipation  simethicone 80 milliGRAM(s) Chew two times a day PRN Indigestion    LAB:                         13.7   7.75  )-----------( 249      ( 04 Mar 2024 06:36 )             42.0     03-04    143  |  107  |  25<H>  ----------------------------<  85  4.4   |  26  |  1.03    Ca    9.3      04 Mar 2024 06:36    TPro  6.2  /  Alb  3.1<L>  /  TBili  0.7  /  DBili  x   /  AST  25  /  ALT  57<H>  /  AlkPhos  98  03-04    LIVER FUNCTIONS - ( 04 Mar 2024 06:36 )  Alb: 3.1 g/dL / Pro: 6.2 g/dL / ALK PHOS: 98 U/L / ALT: 57 U/L / AST: 25 U/L / GGT: x             PHYSICAL EXAM  Vital Signs Last 24 Hrs  T(C): 36.3 (05 Mar 2024 08:17), Max: 36.7 (04 Mar 2024 22:01)  T(F): 97.4 (05 Mar 2024 08:17), Max: 98 (04 Mar 2024 22:01)  HR: 87 (05 Mar 2024 08:17) (87 - 100)  BP: 125/79 (05 Mar 2024 08:17) (112/75 - 125/79)  RR: 16 (05 Mar 2024 08:17) (15 - 16)  SpO2: 94% (05 Mar 2024 08:17) (94% - 96%)  Parameters below as of 05 Mar 2024 08:17  Patient On (Oxygen Delivery Method): room air    Gen - NAD, Comfortable  HEENT - NCAT, EOMI, PERRLA  Neck - Supple, limited neck ROM - in cervical collar. Incisions are healed, posterior incision with redness, left anterior with steri strip  Pulm - Respiration nonlabored  Cardiovascular - warm and well perfused, no cyanosis  Abdomen - Soft, NT, ND, (+) BS  Extremities - No peripheral edema, no calf tenderness, B/L intrinsic muscles atrophy   Neuro-     Cognitive - awake, alert, oriented x 4,  Able to follow command     Communication - Fluent, Comprehensible     Attention: Intact     Cranial Nerves - Decreased shoulder shrug, EOMI, face symmetric                     LEFT    UE - ShAB 4-/5, EF 4-/5, EE 4-/5,  2+/5                    RIGHT UE - ShAB 4-/5, EF 4-/5, EE 2+/5,   2-/5                    LEFT    LE - HF 4-/5, KE 3/5, KF 3/5, DF 2/5, PF 2/5                    RIGHT LE - HF 4-/5, KE 4-/5, DF 2-/5, PF2+/5        Sensory - Decreased to left knee, B/L last 2 fingers, now numbness in the left middle and ring fingers        Coordination - FTN impaired   MSK: limited passive and active R shoulder ROM. unable to abduct >90; gets to roughly 40 degrees   Psychiatric - Mood stable, Affect WNL

## 2024-03-06 ENCOUNTER — NON-APPOINTMENT (OUTPATIENT)
Age: 64
End: 2024-03-06

## 2024-03-06 PROCEDURE — 99232 SBSQ HOSP IP/OBS MODERATE 35: CPT

## 2024-03-06 PROCEDURE — 99232 SBSQ HOSP IP/OBS MODERATE 35: CPT | Mod: GC

## 2024-03-06 RX ADMIN — GABAPENTIN 800 MILLIGRAM(S): 400 CAPSULE ORAL at 05:36

## 2024-03-06 RX ADMIN — FAMOTIDINE 40 MILLIGRAM(S): 10 INJECTION INTRAVENOUS at 21:11

## 2024-03-06 RX ADMIN — RILUZOLE 50 MILLIGRAM(S): 50 TABLET ORAL at 18:33

## 2024-03-06 RX ADMIN — RILUZOLE 50 MILLIGRAM(S): 50 TABLET ORAL at 05:37

## 2024-03-06 RX ADMIN — Medication 6 MILLIGRAM(S): at 21:11

## 2024-03-06 RX ADMIN — GABAPENTIN 400 MILLIGRAM(S): 400 CAPSULE ORAL at 21:11

## 2024-03-06 RX ADMIN — CYCLOBENZAPRINE HYDROCHLORIDE 10 MILLIGRAM(S): 10 TABLET, FILM COATED ORAL at 21:12

## 2024-03-06 RX ADMIN — GABAPENTIN 800 MILLIGRAM(S): 400 CAPSULE ORAL at 18:33

## 2024-03-06 RX ADMIN — NORTRIPTYLINE HYDROCHLORIDE 25 MILLIGRAM(S): 10 CAPSULE ORAL at 21:11

## 2024-03-06 RX ADMIN — Medication 81 MILLIGRAM(S): at 12:51

## 2024-03-06 RX ADMIN — GABAPENTIN 800 MILLIGRAM(S): 400 CAPSULE ORAL at 12:55

## 2024-03-06 NOTE — PROGRESS NOTE ADULT - ASSESSMENT
ASSESSMENT/PLAN  This is a 63 year old male presents PMHx including ALS (diagnosed 2023 at [facility]),  ACDF C4-5 on 07/12/23 and C4-C6 ACDF 8/2016, decreased ROM of right shoulder, chronic pain (daily marijuana user) and right  weakness (unable to extend fingers of right hand). Patient underwent C3-C4 ACDF/C3-C7 Posterior fusion without complications on 2/8/24 with Dr. Eliud Quinones.   Patient now with gait Instability, ADL impairments and Functional impairments.    #Cervical stenosis with Myelopathy/ quadriparesis s/p cervical fusion  - C3-C4 ACDF/C3-C7 Posterior fusion without complications on 2/8/24  - Posterior incision with redness -> picture sent via teams to Dr. Quinones (2/22), looks fine   - Gauze to cushion skin and cervical brace  - Comprehensive Rehab Program: PT/OT, 3hours daily and 5 days weekly  - PT: Focused on improving strength, endurance, coordination, balance, functional mobility, and transfers  - OT: Focused on improving strength, fine motor skills, coordination, posture and ADLs.    - Completed Prednisone course: 20mg daily x 3 days ( 2/16-2/18) then 10mg daily x 3 days ( 2/19-2/21)  - PRN pain management: Flexeril 10 mg TID, Oxy IR 10-15 (4-6, 7-10), Tramadol breakthrough   - Standing pain management: Nortriptyline 25 mg qPM, gabapentin 800 mg TID, 400 mg at bedtime   - Aspen collar at all times   - Patient will require a tub bench for shower transfers and bathing.     #Persistent Tachycardia/ stable   -Cardiology evaluation at [facility]. No organic cause identified  -Likely anxiety driven  -Monitor, HR (02/28) 100 - 128, (02/29) 100 - 106, (3/1) 89-94, (03/03) 94 - 111     #ALS  - Riluzole 50mg BID    #Leukocytosis/ Resolved   - WBC level (02/20) 13.87 > 10.20 (2/22), 10.03 (02/26)   - Likely secondary to steroid (completed 2/21)  - No overt sign of infection    #Pain management  - Tylenol PRN  - Oxycodone PRN  - Tramadol 50mg q 6 hours PRN     #DVT ppx  - SCD, hemalatha    #GI ppx  - Pepcid 20mg PRN    #Bowel Regimen  - Senna HS  - Miralax QD  - Lactulose 10g     #Bladder management  - Voiding without issues     #FEN   - Diet: Regular- high fiber    #Skin:  - Skin on admission: Anterior and posterior cervical spine incision covered with dry dressing, anterior incision with surrounding erythema    #Mood  #Anxiety  - Alprazolam 0.25mg BID PRN    #Precaution  - Fall, Aspiration, Spinal    #GOC  CODE STATUS: FULL CODE     Case discussed in IDT team meeting today for progress and discharge planning 3/5. D/C 3/7    Outpatient Follow-up (Specialty/Name of physician):    Eliud Quinones  Orthopaedic Surgery  [address], [address]  [place] [address]  Phone: [phone]  Fax: [phone]  Follow Up Time:    [facility]  [facility] [address]  Scheduled Appointment: 02/21/2024   ASSESSMENT/PLAN  This is a 63 year old male presents PMHx including ALS (diagnosed 2023 at [facility]),  ACDF C4-5 on 07/12/23 and C4-C6 ACDF 8/2016, decreased ROM of right shoulder, chronic pain (daily marijuana user) and right  weakness (unable to extend fingers of right hand). Patient underwent C3-C4 ACDF/C3-C7 Posterior fusion without complications on 2/8/24 with Dr. Eliud Quinones.   Patient now with gait Instability, ADL impairments and Functional impairments.    #Cervical stenosis with Myelopathy/ quadriparesis s/p cervical fusion  - C3-C4 ACDF/C3-C7 Posterior fusion without complications on 2/8/24  - Posterior incision with redness -> picture sent via teams to Dr. Quinones (2/22), looks fine   - Gauze to cushion skin and cervical brace  - Comprehensive Rehab Program: PT/OT, 3hours daily and 5 days weekly  - PT: Focused on improving strength, endurance, coordination, balance, functional mobility, and transfers  - OT: Focused on improving strength, fine motor skills, coordination, posture and ADLs.    - Completed Prednisone course: 20mg daily x 3 days ( 2/16-2/18) then 10mg daily x 3 days ( 2/19-2/21)  - PRN pain management: Flexeril 10 mg TID, Oxy IR 10-15 (4-6, 7-10), Tramadol breakthrough   - Standing pain management: Nortriptyline 25 mg qPM, gabapentin 800 mg TID, 400 mg at bedtime   - Aspen collar at all times   - Patient will require a tub bench for shower transfers and bathing.     #Persistent Tachycardia/ stable   -Cardiology evaluation at [facility]. No organic cause identified  -Likely anxiety driven  -Monitor, HR (02/28) 100 - 128, (02/29) 100 - 106, (3/1) 89-94, (03/03) 94 - 111     #ALS  - Riluzole 50mg BID    #Leukocytosis/ Resolved   - WBC level (02/20) 13.87 > 10.20 (2/22), 10.03 (02/26)   - Likely secondary to steroid (completed 2/21)  - No overt sign of infection    #Pain management  - Tylenol PRN  - Oxycodone PRN  - Tramadol 50mg q 6 hours PRN     #DVT ppx  - SCD, hemalatha    #GI ppx  - Pepcid 20mg PRN    #Bowel Regimen  - Senna HS  - Miralax QD  - Lactulose 10g     #Bladder management  - Voiding without issues     #FEN   - Diet: Regular- high fiber    #Skin:  - Skin on admission: Anterior and posterior cervical spine incision covered with dry dressing, anterior incision with surrounding erythema    #Mood  #Anxiety  - Alprazolam 0.25mg BID PRN    #Precaution  - Fall, Aspiration, Spinal    #GOC  CODE STATUS: FULL CODE     Case discussed in IDT team meeting today for progress and discharge planning 3/5. D/C 3/7    Attempted to reach wife for updates on progress 3/6/24.    Outpatient Follow-up (Specialty/Name of physician):    Eliud Quinones  Orthopaedic Surgery  [address], [address]  [place] [address]  Phone: [phone]  Fax: [phone]  Follow Up Time:    [facility]  [facility] [address]  Scheduled Appointment: 02/21/2024

## 2024-03-06 NOTE — PROGRESS NOTE ADULT - SUBJECTIVE AND OBJECTIVE BOX
CC:  neck pain and weakness     HPI:  This is a 63 year old male presents PMHx including ALS (diagnosed 2023 at [facility]),  ACDF C4-5 on 07/12/23 and C4-C6 ACDF 8/2016. decreased ROM of right shoulder, chronic pain (daily marijuana user) and right  weakness (unable to extend fingers of right hand). Patient underwent C3-C4 ACDF/C3-C7 Posterior fusion without complications on 2/8/24 with Dr. Eliud Quinones.   Patient was evaluated by PM&R and therapy for functional deficits, gait/ADL impairments and acute rehabilitation was recommended. Patient was medically optimized for discharge to [facility] IRU on 2/15/24.     Allergies:  Pt has allergy to mustard (Anaphylaxis)  No Known Drug Allergies    Subjective:  - Patient was seen and examined at bed side, no overnight events   - Slept well last night, no new complaints   - Pain is well managed with current regimen   - GI/, LBM (03/05), voiding without issues    - Tolerating therapy - noted with right DF weakness.     ROS:  - Denies CP, palpitation, SOB, cough, fever, chills, HD, abdominal discomfort, N/V/D, dysuria, neck pain, joint pain    MEDICATIONS  (STANDING):  aspirin enteric coated 81 milliGRAM(s) Oral daily  gabapentin 800 milliGRAM(s) Oral <User Schedule>  gabapentin 400 milliGRAM(s) Oral at bedtime  lactulose Syrup 10 Gram(s) Oral daily  melatonin 6 milliGRAM(s) Oral at bedtime  nortriptyline 25 milliGRAM(s) Oral at bedtime  polyethylene glycol 3350 17 Gram(s) Oral daily  riluzole 50 milliGRAM(s) Oral two times a day  senna 2 Tablet(s) Oral at bedtime    MEDICATIONS  (PRN):  acetaminophen     Tablet .. 650 milliGRAM(s) Oral every 6 hours PRN Mild Pain (1 - 3)  cyclobenzaprine 10 milliGRAM(s) Oral three times a day PRN Muscle Spasm  famotidine    Tablet 40 milliGRAM(s) Oral daily PRN indigestion  magnesium hydroxide Suspension 30 milliLiter(s) Oral every 12 hours PRN Constipation  simethicone 80 milliGRAM(s) Chew two times a day PRN Indigestion      LAB:                         13.7   7.75  )-----------( 249      ( 04 Mar 2024 06:36 )             42.0     03-04    143  |  107  |  25<H>  ----------------------------<  85  4.4   |  26  |  1.03    Ca    9.3      04 Mar 2024 06:36    TPro  6.2  /  Alb  3.1<L>  /  TBili  0.7  /  DBili  x   /  AST  25  /  ALT  57<H>  /  AlkPhos  98  03-04    LIVER FUNCTIONS - ( 04 Mar 2024 06:36 )  Alb: 3.1 g/dL / Pro: 6.2 g/dL / ALK PHOS: 98 U/L / ALT: 57 U/L / AST: 25 U/L / GGT: x             PHYSICAL EXAM  Vital Signs Last 24 Hrs  T(C): 36.6 (06 Mar 2024 07:24), Max: 37.2 (05 Mar 2024 22:08)  T(F): 97.9 (06 Mar 2024 07:24), Max: 98.9 (05 Mar 2024 22:08)  HR: 87 (06 Mar 2024 07:24) (87 - 106)  BP: 137/89 (06 Mar 2024 07:24) (123/75 - 137/89)  RR: 14 (06 Mar 2024 07:24) (14 - 15)  SpO2: 96% (06 Mar 2024 07:24) (95% - 96%)  Parameters below as of 06 Mar 2024 07:24  Patient On (Oxygen Delivery Method): room air      Gen - NAD, Comfortable  HEENT - NCAT, EOMI, PERRLA  Neck - Supple, limited neck ROM - in cervical collar. Incisions are healed, posterior incision with redness, left anterior with steri strip  Pulm - Respiration nonlabored  Cardiovascular - warm and well perfused, no cyanosis  Abdomen - Soft, NT, ND, (+) BS  Extremities - No peripheral edema, no calf tenderness, B/L intrinsic muscles atrophy   Neuro-     Cognitive - awake, alert, oriented x 4,  Able to follow command     Communication - Fluent, Comprehensible     Attention: Intact     Cranial Nerves - Decreased shoulder shrug, EOMI, face symmetric                     LEFT    UE - ShAB 4-/5, EF 4-/5, EE 4-/5,  2+/5                    RIGHT UE - ShAB 4-/5, EF 4-/5, EE 2+/5,   2-/5                    LEFT    LE - HF 4-/5, KE 3/5, KF 3/5, DF 2/5, PF 2/5                    RIGHT LE - HF 4-/5, KE 4-/5, DF 2-/5, PF2+/5        Sensory - Decreased to left knee, B/L last 2 fingers, now numbness in the left middle and ring fingers        Coordination - FTN impaired   MSK: limited passive and active R shoulder ROM. unable to abduct >90; gets to roughly 40 degrees   Psychiatric - Mood stable, Affect WNL

## 2024-03-06 NOTE — PROGRESS NOTE ADULT - ASSESSMENT
62 y/o M with PMH ALS (diagnosed 2023 at [facility]),  ACDF C4-5 on 07/12/23 and C4-C6 ACDF 8/2016, decreased ROM of right shoulder, chronic pain (daily marijuana user) and right  weakness (unable to extend fingers of right hand). Patient underwent C3-C4 ACDF/C3-C7 Posterior fusion without complications on 2/8/24 with Dr. Eliud Quinones. Patient now with gait Instability, ADL impairments and Functional impairments. Admitted to acute rehab.    #s/p cervical fusion  -C3-C4 ACDF/C3-C7 Posterior fusion without complications on 2/8/24  -s/p prednisone taper  -Aspen collar per rehab   -Continue comprehensive rehab program - PT/OT/SLP per rehab team  -Pain management, bowel regimen per rehab     #ALS  -c/w Riluzole 50mg BID- non formulary    #Tachycardia - improved  -Cardiology evaluation at [facility]. No organic cause identified  -Likely anxiety/pain driven    #DVT ppx  -SCD, TEDs

## 2024-03-06 NOTE — PROGRESS NOTE ADULT - SUBJECTIVE AND OBJECTIVE BOX
Patient is a 63y old  Male who presents with a chief complaint of Cervical stenosis with Myelopathy/ quadriparesis s/p cervical fusion (05 Mar 2024 13:15)    Patient seen and examined at bedside. No events overnight. Denies new neurological changes, chest pain, sob, abd pain, headache, changes in vision    ALLERGIES:  Pt has allergy to mustard (Anaphylaxis)  No Known Drug Allergies    MEDICATIONS  (STANDING):  aspirin enteric coated 81 milliGRAM(s) Oral daily  gabapentin 800 milliGRAM(s) Oral <User Schedule>  gabapentin 400 milliGRAM(s) Oral at bedtime  lactulose Syrup 10 Gram(s) Oral daily  melatonin 6 milliGRAM(s) Oral at bedtime  nortriptyline 25 milliGRAM(s) Oral at bedtime  polyethylene glycol 3350 17 Gram(s) Oral daily  riluzole 50 milliGRAM(s) Oral two times a day  senna 2 Tablet(s) Oral at bedtime    MEDICATIONS  (PRN):  acetaminophen     Tablet .. 650 milliGRAM(s) Oral every 6 hours PRN Mild Pain (1 - 3)  cyclobenzaprine 10 milliGRAM(s) Oral three times a day PRN Muscle Spasm  famotidine    Tablet 40 milliGRAM(s) Oral daily PRN indigestion  magnesium hydroxide Suspension 30 milliLiter(s) Oral every 12 hours PRN Constipation  simethicone 80 milliGRAM(s) Chew two times a day PRN Indigestion    Vital Signs Last 24 Hrs  T(F): 98.9 (05 Mar 2024 22:08), Max: 98.9 (05 Mar 2024 22:08)  HR: 106 (05 Mar 2024 22:08) (106 - 106)  BP: 123/75 (05 Mar 2024 22:08) (123/75 - 123/75)  RR: 15 (05 Mar 2024 22:08) (15 - 15)  SpO2: 95% (05 Mar 2024 22:08) (95% - 95%)  I&O's Summary    PHYSICAL EXAM:  GENERAL: NAD, sitting in chair  HEAD:  Atraumatic, Normocephalic  EYES: PEERL, conjunctiva and sclera clear  ENMT: Moist mucous membranes, Supple, c-collar in place  CHEST/LUNG: Clear to auscultation bilaterally, good air entry, non-labored breathing  HEART: RRR; S1/S2, No murmur  ABDOMEN: Soft, Nontender, Nondistended; Bowel sounds present  EXTREMITIES: No calf tenderness, No cyanosis, No edema  SKIN: Warm, perfused  PSYCH: Normal mood, Normal affect  NERVOUS SYSTEM:  A/O x3, Good concentration    LABS:                        13.7   7.75  )-----------( 249      ( 04 Mar 2024 06:36 )             42.0     03-04    143  |  107  |  25  ----------------------------<  85  4.4   |  26  |  1.03    Ca    9.3      04 Mar 2024 06:36    TPro  6.2  /  Alb  3.1  /  TBili  0.7  /  DBili  x   /  AST  25  /  ALT  57  /  AlkPhos  98  03-04                                Urinalysis Basic - ( 04 Mar 2024 06:36 )    Color: x / Appearance: x / SG: x / pH: x  Gluc: 85 mg/dL / Ketone: x  / Bili: x / Urobili: x   Blood: x / Protein: x / Nitrite: x   Leuk Esterase: x / RBC: x / WBC x   Sq Epi: x / Non Sq Epi: x / Bacteria: x            RADIOLOGY & ADDITIONAL TESTS:    Care Discussed with Consultants/Other Providers:

## 2024-03-07 VITALS
TEMPERATURE: 98 F | HEART RATE: 106 BPM | OXYGEN SATURATION: 94 % | DIASTOLIC BLOOD PRESSURE: 82 MMHG | SYSTOLIC BLOOD PRESSURE: 136 MMHG | RESPIRATION RATE: 14 BRPM

## 2024-03-07 LAB
ALBUMIN SERPL ELPH-MCNC: 3.6 G/DL — SIGNIFICANT CHANGE UP (ref 3.3–5)
ALP SERPL-CCNC: 115 U/L — SIGNIFICANT CHANGE UP (ref 40–120)
ALT FLD-CCNC: 70 U/L — HIGH (ref 10–45)
ANION GAP SERPL CALC-SCNC: 9 MMOL/L — SIGNIFICANT CHANGE UP (ref 5–17)
AST SERPL-CCNC: 39 U/L — SIGNIFICANT CHANGE UP (ref 10–40)
BILIRUB SERPL-MCNC: 1 MG/DL — SIGNIFICANT CHANGE UP (ref 0.2–1.2)
BUN SERPL-MCNC: 24 MG/DL — HIGH (ref 7–23)
CALCIUM SERPL-MCNC: 9.7 MG/DL — SIGNIFICANT CHANGE UP (ref 8.4–10.5)
CHLORIDE SERPL-SCNC: 104 MMOL/L — SIGNIFICANT CHANGE UP (ref 96–108)
CO2 SERPL-SCNC: 26 MMOL/L — SIGNIFICANT CHANGE UP (ref 22–31)
CREAT SERPL-MCNC: 0.81 MG/DL — SIGNIFICANT CHANGE UP (ref 0.5–1.3)
EGFR: 100 ML/MIN/1.73M2 — SIGNIFICANT CHANGE UP
GLUCOSE SERPL-MCNC: 98 MG/DL — SIGNIFICANT CHANGE UP (ref 70–99)
HCT VFR BLD CALC: 45.5 % — SIGNIFICANT CHANGE UP (ref 39–50)
HGB BLD-MCNC: 15.1 G/DL — SIGNIFICANT CHANGE UP (ref 13–17)
MCHC RBC-ENTMCNC: 28.8 PG — SIGNIFICANT CHANGE UP (ref 27–34)
MCHC RBC-ENTMCNC: 33.2 GM/DL — SIGNIFICANT CHANGE UP (ref 32–36)
MCV RBC AUTO: 86.8 FL — SIGNIFICANT CHANGE UP (ref 80–100)
NRBC # BLD: 0 /100 WBCS — SIGNIFICANT CHANGE UP (ref 0–0)
PLATELET # BLD AUTO: 258 K/UL — SIGNIFICANT CHANGE UP (ref 150–400)
POTASSIUM SERPL-MCNC: 4.3 MMOL/L — SIGNIFICANT CHANGE UP (ref 3.5–5.3)
POTASSIUM SERPL-SCNC: 4.3 MMOL/L — SIGNIFICANT CHANGE UP (ref 3.5–5.3)
PROT SERPL-MCNC: 7 G/DL — SIGNIFICANT CHANGE UP (ref 6–8.3)
RBC # BLD: 5.24 M/UL — SIGNIFICANT CHANGE UP (ref 4.2–5.8)
RBC # FLD: 13.6 % — SIGNIFICANT CHANGE UP (ref 10.3–14.5)
SODIUM SERPL-SCNC: 139 MMOL/L — SIGNIFICANT CHANGE UP (ref 135–145)
WBC # BLD: 8 K/UL — SIGNIFICANT CHANGE UP (ref 3.8–10.5)
WBC # FLD AUTO: 8 K/UL — SIGNIFICANT CHANGE UP (ref 3.8–10.5)

## 2024-03-07 PROCEDURE — 97533 SENSORY INTEGRATION: CPT | Mod: GO

## 2024-03-07 PROCEDURE — 97110 THERAPEUTIC EXERCISES: CPT | Mod: GP

## 2024-03-07 PROCEDURE — 97165 OT EVAL LOW COMPLEX 30 MIN: CPT | Mod: GO

## 2024-03-07 PROCEDURE — 97530 THERAPEUTIC ACTIVITIES: CPT | Mod: GP

## 2024-03-07 PROCEDURE — 99239 HOSP IP/OBS DSCHRG MGMT >30: CPT | Mod: GC

## 2024-03-07 PROCEDURE — 80053 COMPREHEN METABOLIC PANEL: CPT

## 2024-03-07 PROCEDURE — 85027 COMPLETE CBC AUTOMATED: CPT

## 2024-03-07 PROCEDURE — 97535 SELF CARE MNGMENT TRAINING: CPT | Mod: GO

## 2024-03-07 PROCEDURE — 36415 COLL VENOUS BLD VENIPUNCTURE: CPT

## 2024-03-07 PROCEDURE — 97750 PHYSICAL PERFORMANCE TEST: CPT | Mod: GO

## 2024-03-07 PROCEDURE — 87637 SARSCOV2&INF A&B&RSV AMP PRB: CPT

## 2024-03-07 PROCEDURE — 85025 COMPLETE CBC W/AUTO DIFF WBC: CPT

## 2024-03-07 PROCEDURE — 99232 SBSQ HOSP IP/OBS MODERATE 35: CPT

## 2024-03-07 PROCEDURE — 97542 WHEELCHAIR MNGMENT TRAINING: CPT | Mod: GO

## 2024-03-07 PROCEDURE — 97161 PT EVAL LOW COMPLEX 20 MIN: CPT | Mod: GP

## 2024-03-07 PROCEDURE — 97116 GAIT TRAINING THERAPY: CPT | Mod: GP

## 2024-03-07 PROCEDURE — 97112 NEUROMUSCULAR REEDUCATION: CPT | Mod: GP

## 2024-03-07 RX ADMIN — GABAPENTIN 800 MILLIGRAM(S): 400 CAPSULE ORAL at 05:31

## 2024-03-07 RX ADMIN — RILUZOLE 50 MILLIGRAM(S): 50 TABLET ORAL at 05:31

## 2024-03-07 NOTE — PROGRESS NOTE ADULT - TIME BILLING
medical complexity
- Ordering, reviewing, and interpreting labs, testing, and imaging.  - Independently obtaining a review of systems and performing a physical exam  - Reviewing consultant documentation/recommendations in addition to discussing plan of care with consultants.  - Counselling and educating patient and family regarding interpretation of aforementioned items and plan of care.
- Ordering, reviewing, and interpreting labs, testing, and imaging.  - Independently obtaining a review of systems and performing a physical exam  - Reviewing consultant documentation/recommendations in addition to discussing plan of care with consultants.  - Counselling and educating patient and family regarding interpretation of aforementioned items and plan of care.
Time spent includes direct patient care  (interview and examination of patient), discussion with other providers, support staff and/or patient's family members, review of medical records, ordering diagnostic tests and analyzing results, and documentation.
MEDICAL COMPLEXITY
Time spent includes direct patient care  (interview and examination of patient), discussion with other providers, support staff and/or patient's family members, review of medical records, ordering diagnostic tests and analyzing results, and documentation.
Time spent includes direct patient care  (interview and examination of patient), discussion with other providers, support staff and/or patient's family members, review of medical records, ordering diagnostic tests and analyzing results, and documentation.

## 2024-03-07 NOTE — PROGRESS NOTE ADULT - NSPROGADDITIONALINFOA_GEN_ALL_CORE
Spent 37 minutes on face-face visit, evaluate, examine and treat
Spent 50 minutes on face-face visit evaluating, examining, preparing discharge meds, discuss discharge meds and F/U visits.
I have personally interviewed and examined this patient, reviewed pertinent clinical information, and performed the evaluation and management services provided at today's visit for inpatient medical follow up    I am available to discuss any issues related to the medical care of this patient on the unit this morning, through [employer] Teams Chat or by phone at [phone]
Spent 35 minutes on face-face visit, evaluate, examine and treat
Spent 36 minutes on face-face visit, evaluate, examine and treat
Spent 1 hour on evaluating, examining and with team meeting where we discussed patient's progress and discharge plan.
Spent 1 hour on evaluating, examining and with team meeting where we discussed patient's progress and discharge plan.
Spent 35 minutes on face-face visit, evaluate, examine and treat
Spent 35 minutes on face-face visit, evaluate, examine and treat
Spent 36 minutes on face-face visit, evaluate, examine and treat
Spent 1 hour on evaluating, examining and with team meeting where we discussed patient's progress and discharge plan.  Excluding teaching time
Spent 37 minutes on face-face visit, evaluate, examine and treat, excluding teaching time
Spent 35 minutes on face-face visit, evaluate, examine and treat

## 2024-03-07 NOTE — PROGRESS NOTE ADULT - SUBJECTIVE AND OBJECTIVE BOX
Patient is a 63y old  Male who presents with a chief complaint of Cervical stenosis with Myelopathy/ quadriparesis s/p cervical fusion (06 Mar 2024 11:41)      Patient seen and examined at bedside.  - no nausea, vomiting, headaches, shortness of breath, cough. Strength is improving in the upper extremities and in the hands. no LE swelling, no LE pain. No other complaints offered, excited to go home.     ALLERGIES:  Pt has allergy to mustard (Anaphylaxis)  No Known Drug Allergies    MEDICATIONS  (STANDING):  aspirin enteric coated 81 milliGRAM(s) Oral daily  gabapentin 800 milliGRAM(s) Oral <User Schedule>  gabapentin 400 milliGRAM(s) Oral at bedtime  lactulose Syrup 10 Gram(s) Oral daily  melatonin 6 milliGRAM(s) Oral at bedtime  nortriptyline 25 milliGRAM(s) Oral at bedtime  polyethylene glycol 3350 17 Gram(s) Oral daily  riluzole 50 milliGRAM(s) Oral two times a day  senna 2 Tablet(s) Oral at bedtime    MEDICATIONS  (PRN):  acetaminophen     Tablet .. 650 milliGRAM(s) Oral every 6 hours PRN Mild Pain (1 - 3)  cyclobenzaprine 10 milliGRAM(s) Oral three times a day PRN Muscle Spasm  famotidine    Tablet 40 milliGRAM(s) Oral daily PRN indigestion  magnesium hydroxide Suspension 30 milliLiter(s) Oral every 12 hours PRN Constipation  simethicone 80 milliGRAM(s) Chew two times a day PRN Indigestion    Vital Signs Last 24 Hrs  T(F): 97.9 (07 Mar 2024 07:37), Max: 97.9 (07 Mar 2024 07:37)  HR: 106 (07 Mar 2024 07:37) (88 - 106)  BP: 136/82 (07 Mar 2024 07:37) (135/86 - 136/82)  RR: 14 (07 Mar 2024 07:37) (14 - 15)  SpO2: 94% (07 Mar 2024 07:37) (94% - 94%)  I&O's Summary        PHYSICAL EXAM:  GENERAL: NAD, sitting in chair  ENMT: Moist mucous membranes, Supple, c-collar in place  CHEST/LUNG: Clear to auscultation bilaterally, good air entry, non-labored breathing  HEART: RRR; S1/S2, No murmur  ABDOMEN: Soft, Nontender, Nondistended; Bowel sounds present  EXTREMITIES: No calf tenderness, No cyanosis, No edema  SKIN: Warm, perfused  PSYCH: Normal mood, Normal affect  NERVOUS SYSTEM:  A/O x3, Good concentration    LABS:                        15.1   8.00  )-----------( 258      ( 07 Mar 2024 07:09 )             45.5       03-07    139  |  104  |  24  ----------------------------<  98  4.3   |  26  |  0.81    Ca    9.7      07 Mar 2024 07:09    TPro  7.0  /  Alb  3.6  /  TBili  1.0  /  DBili  x   /  AST  39  /  ALT  70  /  AlkPhos  115  03-07                                  Urinalysis Basic - ( 07 Mar 2024 07:09 )    Color: x / Appearance: x / SG: x / pH: x  Gluc: 98 mg/dL / Ketone: x  / Bili: x / Urobili: x   Blood: x / Protein: x / Nitrite: x   Leuk Esterase: x / RBC: x / WBC x   Sq Epi: x / Non Sq Epi: x / Bacteria: x            RADIOLOGY & ADDITIONAL TESTS:    Care Discussed with Consultants/Other Providers:

## 2024-03-07 NOTE — PROGRESS NOTE ADULT - PROVIDER SPECIALTY LIST ADULT
Hospitalist
Internal Medicine
Physiatry
Physiatry
Rehab Medicine
Rehab Medicine
Hospitalist
Internal Medicine
Physiatry
Rehab Medicine
Physiatry
Physiatry
Hospitalist
Hospitalist
Physiatry
Hospitalist
Rehab Medicine
Hospitalist
Physiatry

## 2024-03-07 NOTE — PROGRESS NOTE ADULT - ASSESSMENT
ASSESSMENT/PLAN  This is a 63 year old male presents PMHx including ALS (diagnosed 2023 at [facility]),  ACDF C4-5 on 07/12/23 and C4-C6 ACDF 8/2016, decreased ROM of right shoulder, chronic pain (daily marijuana user) and right  weakness (unable to extend fingers of right hand). Patient underwent C3-C4 ACDF/C3-C7 Posterior fusion without complications on 2/8/24 with Dr. Eliud Quinones.   Patient now with gait Instability, ADL impairments and Functional impairments.    #Cervical stenosis with Myelopathy/ quadriparesis s/p cervical fusion  - C3-C4 ACDF/C3-C7 Posterior fusion without complications on 2/8/24  - Posterior incision with redness -> picture sent via teams to Dr. Quinones (2/22), looks fine   - Gauze to cushion skin and cervical brace  - Comprehensive Rehab Program: PT/OT, 3hours daily and 5 days weekly  - PT: Focused on improving strength, endurance, coordination, balance, functional mobility, and transfers  - OT: Focused on improving strength, fine motor skills, coordination, posture and ADLs.    - Completed Prednisone course: 20mg daily x 3 days ( 2/16-2/18) then 10mg daily x 3 days ( 2/19-2/21)  - PRN pain management: Flexeril 10 mg TID, Oxy IR 10-15 (4-6, 7-10), Tramadol breakthrough   - Standing pain management: Nortriptyline 25 mg qPM, gabapentin 800 mg TID, 400 mg at bedtime   - Aspen collar at all times   - Patient will require a tub bench for shower transfers and bathing.     #Persistent Tachycardia/ stable   -Cardiology evaluation at [facility]. No organic cause identified  -Likely anxiety driven  -Monitor, HR (02/28) 100 - 128, (02/29) 100 - 106, (3/1) 89-94, (03/03) 94 - 111     #ALS  - Riluzole 50mg BID    #Leukocytosis/ Resolved   - WBC level (02/20) 13.87 > 10.20 (2/22), 10.03 (02/26)   - Likely secondary to steroid (completed 2/21)  - No overt sign of infection    #Pain management  - Tylenol PRN  - Oxycodone PRN  - Tramadol 50mg q 6 hours PRN     #DVT ppx  - SCD, hemalatha    #GI ppx  - Pepcid 20mg PRN    #Bowel Regimen  - Senna HS  - Miralax QD  - Lactulose 10g     #Bladder management  - Voiding without issues     #FEN   - Diet: Regular- high fiber    #Skin:  - Skin on admission: Anterior and posterior cervical spine incision covered with dry dressing, anterior incision with surrounding erythema    #Mood  #Anxiety  - Alprazolam 0.25mg BID PRN--> D/C     #Precaution  - Fall, Aspiration, Spinal    #GOC  CODE STATUS: FULL CODE     Case discussed in IDT team meeting today for progress and discharge planning 3/5. D/C 3/7    Attempted to reach wife for updates on progress 3/6/24.    Outpatient Follow-up (Specialty/Name of physician):    Eliud Quinones  Orthopaedic Surgery  [address], [address]  [place] [address]  Phone: [phone]  Fax: [phone]  Follow Up Time:    [facility]  [facility] [address]  Scheduled Appointment: 02/21/2024

## 2024-03-07 NOTE — PROGRESS NOTE ADULT - ASSESSMENT
62 y/o M with PMH ALS (diagnosed 2023 at [facility]),  ACDF C4-5 on 07/12/23 and C4-C6 ACDF 8/2016, decreased ROM of right shoulder, chronic pain (daily marijuana user) and right  weakness (unable to extend fingers of right hand). Patient underwent C3-C4 ACDF/C3-C7 Posterior fusion without complications on 2/8/24 with Dr. Eliud Quinones. Patient now with gait Instability, ADL impairments and Functional impairments. Admitted to acute rehab.    #s/p cervical fusion  -C3-C4 ACDF/C3-C7 Posterior fusion without complications on 2/8/24  -s/p prednisone taper  -Aspen collar per rehab   -Continue comprehensive rehab program - PT/OT/SLP per rehab team  -Pain management, bowel regimen per rehab     #ALS  -c/w Riluzole 50mg BID- non formulary    #Tachycardia - improved to the 80s today  -Cardiology evaluation at [facility]. No organic cause identified  -Likely anxiety/pain driven    #DVT ppx  -SCD, TEDs

## 2024-03-07 NOTE — PROGRESS NOTE ADULT - SUBJECTIVE AND OBJECTIVE BOX
CC:  neck pain and weakness     HPI:  This is a 63 year old male presents PMHx including ALS (diagnosed 2023 at [facility]),  ACDF C4-5 on 07/12/23 and C4-C6 ACDF 8/2016. decreased ROM of right shoulder, chronic pain (daily marijuana user) and right  weakness (unable to extend fingers of right hand). Patient underwent C3-C4 ACDF/C3-C7 Posterior fusion without complications on 2/8/24 with Dr. Eliud Quinones.   Patient was evaluated by PM&R and therapy for functional deficits, gait/ADL impairments and acute rehabilitation was recommended. Patient was medically optimized for discharge to [facility] IRU on 2/15/24.     Allergies:  Pt has allergy to mustard (Anaphylaxis)  No Known Drug Allergies    Subjective:  - Patient was seen and examined at bed side, no overnight events   - Slept well last night, no new complaints   - Pain is well managed with current regimen   - GI/, LBM (03/06), voiding without issues    - Tolerating therapy -  made good gains   - Will be discharged home today, patient is happy.      ROS:  - Denies CP, palpitation, SOB, cough, fever, chills, HD, abdominal discomfort, N/V/D, dysuria, neck pain, joint pain    MEDICATIONS  (STANDING):  aspirin enteric coated 81 milliGRAM(s) Oral daily  gabapentin 800 milliGRAM(s) Oral <User Schedule>  gabapentin 400 milliGRAM(s) Oral at bedtime  lactulose Syrup 10 Gram(s) Oral daily  melatonin 6 milliGRAM(s) Oral at bedtime  nortriptyline 25 milliGRAM(s) Oral at bedtime  polyethylene glycol 3350 17 Gram(s) Oral daily  riluzole 50 milliGRAM(s) Oral two times a day  senna 2 Tablet(s) Oral at bedtime    MEDICATIONS  (PRN):  acetaminophen     Tablet .. 650 milliGRAM(s) Oral every 6 hours PRN Mild Pain (1 - 3)  cyclobenzaprine 10 milliGRAM(s) Oral three times a day PRN Muscle Spasm  famotidine    Tablet 40 milliGRAM(s) Oral daily PRN indigestion  magnesium hydroxide Suspension 30 milliLiter(s) Oral every 12 hours PRN Constipation  simethicone 80 milliGRAM(s) Chew two times a day PRN Indigestion      LAB:                         15.1   8.00  )-----------( 258      ( 07 Mar 2024 07:09 )             45.5     03-07    139  |  104  |  24<H>  ----------------------------<  98  4.3   |  26  |  0.81    Ca    9.7      07 Mar 2024 07:09    TPro  7.0  /  Alb  3.6  /  TBili  1.0  /  DBili  x   /  AST  39  /  ALT  70<H>  /  AlkPhos  115  03-07    LIVER FUNCTIONS - ( 07 Mar 2024 07:09 )  Alb: 3.6 g/dL / Pro: 7.0 g/dL / ALK PHOS: 115 U/L / ALT: 70 U/L / AST: 39 U/L / GGT: x               PHYSICAL EXAM  Vital Signs Last 24 Hrs  T(C): 36.6 (07 Mar 2024 07:37), Max: 36.6 (06 Mar 2024 21:02)  T(F): 97.9 (07 Mar 2024 07:37), Max: 97.9 (07 Mar 2024 07:37)  HR: 106 (07 Mar 2024 07:37) (88 - 106)  BP: 136/82 (07 Mar 2024 07:37) (135/86 - 136/82)  RR: 14 (07 Mar 2024 07:37) (14 - 15)  SpO2: 94% (07 Mar 2024 07:37) (94% - 94%)  Parameters below as of 07 Mar 2024 07:37  Patient On (Oxygen Delivery Method): room air    Gen - NAD, Comfortable  HEENT - NCAT, EOMI, PERRLA  Neck - Supple, limited neck ROM - in cervical collar. Incisions are healed, posterior incision with redness, left anterior with steri strip  Pulm - Respiration nonlabored  Cardiovascular - warm and well perfused, no cyanosis  Abdomen - Soft, NT, ND, (+) BS  Extremities - No peripheral edema, no calf tenderness, B/L intrinsic muscles atrophy   Neuro-     Cognitive - awake, alert, oriented x 4,  Able to follow command     Communication - Fluent, Comprehensible     Attention: Intact     Cranial Nerves - Decreased shoulder shrug, EOMI, face symmetric                     LEFT    UE - ShAB 4-/5, EF 4-/5, EE 4-/5,  2+/5                    RIGHT UE - ShAB 4-/5, EF 4-/5, EE 2+/5,   2-/5                    LEFT    LE - HF 4-/5, KE 3/5, KF 3/5, DF 2/5, PF 2/5                    RIGHT LE - HF 4-/5, KE 4-/5, DF 2-/5, PF2+/5        Sensory - Decreased to left knee, B/L last 2 fingers, now numbness in the left middle and ring fingers        Coordination - FTN impaired   MSK: limited passive and active R shoulder ROM. unable to abduct >90; gets to roughly 40 degrees   Psychiatric - Mood stable, Affect WNL

## 2024-03-07 NOTE — PROGRESS NOTE ADULT - NUTRITIONAL ASSESSMENT
This patient has been assessed with a concern for Malnutrition and has been determined to have a diagnosis/diagnoses of Moderate protein-calorie malnutrition.    This patient is being managed with:   Diet Regular-  High Fiber (HIFIBER)  Supplement Feeding Modality:  Oral  Ensure Max Cans or Servings Per Day:  1       Frequency:  Daily  Entered: Feb 16 2024 12:38PM  
This patient has been assessed with a concern for Malnutrition and has been determined to have a diagnosis/diagnoses of Moderate protein-calorie malnutrition.    This patient is being managed with:   Diet Regular-  High Fiber (HIFIBER)  Supplement Feeding Modality:  Oral  Ensure Max Cans or Servings Per Day:  1       Frequency:  Daily  Entered: Feb 16 2024 12:38PM    Diet Regular-  High Fiber (HIFIBER)  Entered: Feb 15 2024  6:48PM    The following pending diet order is being considered for treatment of Moderate protein-calorie malnutrition:null
This patient has been assessed with a concern for Malnutrition and has been determined to have a diagnosis/diagnoses of Moderate protein-calorie malnutrition.    This patient is being managed with:   Diet Regular-  High Fiber (HIFIBER)  Supplement Feeding Modality:  Oral  Ensure Max Cans or Servings Per Day:  1       Frequency:  Daily  Entered: Feb 16 2024 12:38PM    Diet Regular-  High Fiber (HIFIBER)  Entered: Feb 15 2024  6:48PM    The following pending diet order is being considered for treatment of Moderate protein-calorie malnutrition:null
This patient has been assessed with a concern for Malnutrition and has been determined to have a diagnosis/diagnoses of Moderate protein-calorie malnutrition.    This patient is being managed with:   Diet Regular-  High Fiber (HIFIBER)  Supplement Feeding Modality:  Oral  Ensure Max Cans or Servings Per Day:  1       Frequency:  Daily  Entered: Feb 16 2024 12:38PM  
This patient has been assessed with a concern for Malnutrition and has been determined to have a diagnosis/diagnoses of Moderate protein-calorie malnutrition.    This patient is being managed with:   Diet Regular-  High Fiber (HIFIBER)  Supplement Feeding Modality:  Oral  Ensure Max Cans or Servings Per Day:  1       Frequency:  Daily  Entered: Feb 16 2024 12:38PM    Diet Regular-  High Fiber (HIFIBER)  Entered: Feb 15 2024  6:48PM    The following pending diet order is being considered for treatment of Moderate protein-calorie malnutrition:null
This patient has been assessed with a concern for Malnutrition and has been determined to have a diagnosis/diagnoses of Moderate protein-calorie malnutrition.    This patient is being managed with:   Diet Regular-  High Fiber (HIFIBER)  Supplement Feeding Modality:  Oral  Ensure Max Cans or Servings Per Day:  1       Frequency:  Daily  Entered: Feb 16 2024 12:38PM  
This patient has been assessed with a concern for Malnutrition and has been determined to have a diagnosis/diagnoses of Moderate protein-calorie malnutrition.    This patient is being managed with:   Diet Regular-  High Fiber (HIFIBER)  Supplement Feeding Modality:  Oral  Ensure Max Cans or Servings Per Day:  1       Frequency:  Daily  Entered: Feb 16 2024 12:38PM    Diet Regular-  High Fiber (HIFIBER)  Entered: Feb 15 2024  6:48PM    The following pending diet order is being considered for treatment of Moderate protein-calorie malnutrition:null
This patient has been assessed with a concern for Malnutrition and has been determined to have a diagnosis/diagnoses of Moderate protein-calorie malnutrition.    This patient is being managed with:   Diet Regular-  High Fiber (HIFIBER)  Supplement Feeding Modality:  Oral  Ensure Max Cans or Servings Per Day:  1       Frequency:  Daily  Entered: Feb 16 2024 12:38PM    Diet Regular-  High Fiber (HIFIBER)  Entered: Feb 15 2024  6:48PM    The following pending diet order is being considered for treatment of Moderate protein-calorie malnutrition:null

## 2024-03-11 ENCOUNTER — APPOINTMENT (OUTPATIENT)
Dept: PAIN MANAGEMENT | Facility: CLINIC | Age: 64
End: 2024-03-11
Payer: MEDICAID

## 2024-03-11 ENCOUNTER — APPOINTMENT (OUTPATIENT)
Dept: ORTHOPEDIC SURGERY | Facility: CLINIC | Age: 64
End: 2024-03-11
Payer: MEDICAID

## 2024-03-11 VITALS — HEIGHT: 73 IN | WEIGHT: 220 LBS | BODY MASS INDEX: 29.16 KG/M2

## 2024-03-11 DIAGNOSIS — G12.20 MOTOR NEURON DISEASE, UNSPECIFIED: ICD-10-CM

## 2024-03-11 DIAGNOSIS — M96.0 PSEUDARTHROSIS AFTER FUSION OR ARTHRODESIS: ICD-10-CM

## 2024-03-11 DIAGNOSIS — M48.02 SPINAL STENOSIS, CERVICAL REGION: ICD-10-CM

## 2024-03-11 PROCEDURE — 99024 POSTOP FOLLOW-UP VISIT: CPT

## 2024-03-11 PROCEDURE — 99213 OFFICE O/P EST LOW 20 MIN: CPT | Mod: 95

## 2024-03-11 PROCEDURE — 72040 X-RAY EXAM NECK SPINE 2-3 VW: CPT

## 2024-03-11 NOTE — HISTORY OF PRESENT ILLNESS
[Neck] : neck [Left Arm] : left arm [9] : 9 [Burning] : burning [Sharp] : sharp [Shooting] : shooting [Constant] : constant [Household chores] : household chores [Sleep] : sleep [Rest] : rest [Sitting] : sitting [Home] : at home, [unfilled] , at the time of the visit. [Medical Office: ([facility])___] : at the medical office located in  [Verbal consent obtained from patient] : the patient, [unfilled] [FreeTextEntry1] : PT IS FU FOR MNTHLY PAIN MED  LAST UDS: 9/29/2023  PT WAS JUST DISCHARGE FROM HOSPITAL AND REHAB FROM SPINAL FUSION  [] : no [de-identified] : LIFTING  [de-identified] : 04/04/2023 CS

## 2024-03-15 ENCOUNTER — APPOINTMENT (OUTPATIENT)
Dept: ORTHOPEDIC SURGERY | Facility: CLINIC | Age: 64
End: 2024-03-15
Payer: MEDICAID

## 2024-03-15 DIAGNOSIS — M72.0 PALMAR FASCIAL FIBROMATOSIS [DUPUYTREN]: ICD-10-CM

## 2024-03-15 DIAGNOSIS — M24.541 CONTRACTURE, RIGHT HAND: ICD-10-CM

## 2024-03-15 PROCEDURE — 99213 OFFICE O/P EST LOW 20 MIN: CPT | Mod: 25

## 2024-04-08 ENCOUNTER — APPOINTMENT (OUTPATIENT)
Dept: PAIN MANAGEMENT | Facility: CLINIC | Age: 64
End: 2024-04-08

## 2024-04-12 ENCOUNTER — APPOINTMENT (OUTPATIENT)
Dept: PAIN MANAGEMENT | Facility: CLINIC | Age: 64
End: 2024-04-12
Payer: MEDICAID

## 2024-04-12 PROCEDURE — 99213 OFFICE O/P EST LOW 20 MIN: CPT

## 2024-04-12 NOTE — HISTORY OF PRESENT ILLNESS
[Neck] : neck [Left Arm] : left arm [9] : 9 [Burning] : burning [Sharp] : sharp [Shooting] : shooting [Constant] : constant [Household chores] : household chores [Sleep] : sleep [Rest] : rest [Sitting] : sitting [Home] : at home, [unfilled] , at the time of the visit. [Medical Office: ([facility])___] : at the medical office located in  [Verbal consent obtained from patient] : the patient, [unfilled] [] : Post Surgical Visit: no [de-identified] : LIFTING  [de-identified] : 04/04/2023 CS

## 2024-04-12 NOTE — ASSESSMENT
[FreeTextEntry1] : Interim history The patient is tolerating their medications without problems. There has no new pains, injuries, or complaints and no new issues. The use of medications appears appropriate and there are no aberrant behaviors noted. Side effects to current medications are denied. Average pain score for the month is 7 out of ten. The patient's current medications are documented to the best of their ability. The  was obtained and reviewed prior to the visit, and any discrepancies were discussed with the patient. Objective information Since the last visit there are no additional radiologic studies, labs, or pain complaints. There are no changes in the patient's physical status. Plan The patient was given refill of their medication at their current level and will return to the office as needed for follow-up. The patient is showing no aberrant behavior or evidence of diversion. Opioid contract and opioid risk assessment on chart.  reviewed and any discrepancy discussed with patent. Applicable urine toxicology reviewed and recorded in the patient's electronic record. Urine toxicology is ordered for patient per office protocol or patient's risk assessment.  Patient will follow-up in 1 month unless new issues arise the patient has returned earlier.  This note was generated by using Dragon medical dictation software.  A reasonable effort has been made for proofreading its contents, but typos may still remain.  If there are any questions or points of clarification needed, please notify my office.

## 2024-04-15 RX ORDER — GABAPENTIN 800 MG/1
800 TABLET, COATED ORAL 3 TIMES DAILY
Qty: 90 | Refills: 5 | Status: ACTIVE | COMMUNITY
Start: 2023-01-23 | End: 1900-01-01

## 2024-04-15 RX ORDER — NORTRIPTYLINE HYDROCHLORIDE 25 MG/1
25 CAPSULE ORAL
Qty: 30 | Refills: 5 | Status: ACTIVE | COMMUNITY
Start: 2023-02-01 | End: 1900-01-01

## 2024-04-18 NOTE — DIETITIAN INITIAL EVALUATION ADULT - CALCULATED TO (CAL/KG)
----- Message from Jenny Sapp NP sent at 4/16/2024  1:50 PM CDT -----  Trying to obtain EKG tracings.  They are currently being scanned.    [phone]

## 2024-04-26 ENCOUNTER — EMERGENCY (EMERGENCY)
Facility: HOSPITAL | Age: 64
LOS: 1 days | Discharge: HOME | End: 2024-04-26
Attending: EMERGENCY MEDICINE | Admitting: EMERGENCY MEDICINE
Payer: MEDICAID

## 2024-04-26 VITALS
DIASTOLIC BLOOD PRESSURE: 87 MMHG | HEART RATE: 98 BPM | SYSTOLIC BLOOD PRESSURE: 125 MMHG | RESPIRATION RATE: 16 BRPM | HEIGHT: 73 IN | TEMPERATURE: 98 F | WEIGHT: 205.03 LBS | OXYGEN SATURATION: 94 %

## 2024-04-26 DIAGNOSIS — Z98.890 OTHER SPECIFIED POSTPROCEDURAL STATES: Chronic | ICD-10-CM

## 2024-04-26 DIAGNOSIS — Z98.1 ARTHRODESIS STATUS: Chronic | ICD-10-CM

## 2024-04-26 DIAGNOSIS — Z98.89 OTHER SPECIFIED POSTPROCEDURAL STATES: Chronic | ICD-10-CM

## 2024-04-26 PROCEDURE — 70450 CT HEAD/BRAIN W/O DYE: CPT | Mod: MC

## 2024-04-26 PROCEDURE — 73562 X-RAY EXAM OF KNEE 3: CPT

## 2024-04-26 PROCEDURE — 70490 CT SOFT TISSUE NECK W/O DYE: CPT | Mod: 26,MC

## 2024-04-26 PROCEDURE — 70450 CT HEAD/BRAIN W/O DYE: CPT | Mod: 26,MC

## 2024-04-26 PROCEDURE — 99284 EMERGENCY DEPT VISIT MOD MDM: CPT

## 2024-04-26 PROCEDURE — 70486 CT MAXILLOFACIAL W/O DYE: CPT | Mod: 26,MC

## 2024-04-26 PROCEDURE — 99284 EMERGENCY DEPT VISIT MOD MDM: CPT | Mod: 25

## 2024-04-26 PROCEDURE — 70486 CT MAXILLOFACIAL W/O DYE: CPT | Mod: MC

## 2024-04-26 PROCEDURE — 73562 X-RAY EXAM OF KNEE 3: CPT | Mod: 26,RT

## 2024-04-26 PROCEDURE — 70490 CT SOFT TISSUE NECK W/O DYE: CPT | Mod: MC

## 2024-04-26 RX ORDER — IBUPROFEN 200 MG
600 TABLET ORAL ONCE
Refills: 0 | Status: COMPLETED | OUTPATIENT
Start: 2024-04-26 | End: 2024-04-26

## 2024-04-26 RX ORDER — ACETAMINOPHEN 500 MG
975 TABLET ORAL ONCE
Refills: 0 | Status: DISCONTINUED | OUTPATIENT
Start: 2024-04-26 | End: 2024-04-26

## 2024-04-26 RX ORDER — ACETAMINOPHEN 500 MG
650 TABLET ORAL ONCE
Refills: 0 | Status: DISCONTINUED | OUTPATIENT
Start: 2024-04-26 | End: 2024-04-26

## 2024-04-26 RX ADMIN — Medication 600 MILLIGRAM(S): at 15:30

## 2024-04-26 NOTE — ED PROVIDER NOTE - OBJECTIVE STATEMENT
64 M hx asl, arthritis, cervical stenosis, chronic pain syndrome, substance abuse, GERD, depression, cervical spine surgery BIBEMS due to throat/jaw/right kene pain s/p trip and fall in his house. Hit neck/jaw on counter. No LOC. No blood thinners. Denies cp, sob, n/v, acute numbness/weakness.

## 2024-04-26 NOTE — ED ADULT NURSE NOTE - HIV OFFER
Previously Declined (within the last year) Vermilion Border Text: The closure involved the vermilion border.

## 2024-04-26 NOTE — ED PROVIDER NOTE - NSFOLLOWUPINSTRUCTIONS_ED_ALL_ED_FT
Cognitive Concerns/ Orientation :Non-verbal. Hx TBI. Pt has legal guardian   BEHAVIOR & AGGRESSION TOOL COLOR: Green   ABNL VS/O2: VSS on room air sats 90-95%  MOBILITY: Total, T&R, A x2, lift   PAIN MANAGMENT: Absence of nonverbal pain indicators   DIET: NPO, TF running at goal rate of 55mL/hr with 160mL q4h flushes  BOWEL/BLADDER: Incontinent of B/B, condom cath replaced. No BM  this shift ABNL LAB/BG: UA pos, wbc 7  DRAIN/DEVICES: R arm PIV infusing NS @50mL/hr., GJ tube (G gravity drain green output, J tube infusing TF)  TELEMETRY RHYTHM: N/A  SKIN: Blanchable redness to coccyx and groin. GJ tube WNL ex slight drainage. WOC orders   TESTS/PROCEDURES: None scheduled   D/C DAY/GOALS/PLACE: Pending improvement   OTHER IMPORTANT INFO: Congested cough, now productive, frequent  loose congeted  cough, suctioned x 1 by RT.. Scheduled nebs. LS diminished. Contact precautions maintained for MRSA and VRE.   Follow up with your primary doctor.  Ice, tylenol, ibuprofen for pain.   Return for worsening or concerning symptoms.    Head Injury, Adult  Three rear views of the head showing how quick, sudden head movements injure the brain.  There are many types of head injuries. Head injuries can be as minor as a small bump, or they can be a serious medical issue. More severe head injuries include:  A jarring injury to the brain (concussion).  A bruise (contusion) of the brain. This means there is bleeding in the brain that can cause swelling.  A cracked skull (skull fracture).  Bleeding in the brain that collects, clots, and forms a bump (hematoma).  After a head injury, most problems occur within the first 24 hours, but side effects may occur up to 7–10 days after the injury. It is important to watch your condition for any changes. You may need to be observed in the emergency department or urgent care, or you may have to stay in the hospital.    What are the causes?  There are many causes of a head injury. Serious head injuries may be caused by car crashes, bicycle or motorcycle crashes, sports injuries, falls, or being struck by an object.    What are the symptoms?  Symptoms of a head injury include a contusion, bump, or bleeding at the site of the injury. Other physical symptoms may include:  Headache.  Nausea or vomiting.  Dizziness.  Blurred or double vision.  Sensitivity to bright lights or loud noises.  Feeling tired.  Trouble waking up.  Severe symptoms such as:  Weakness or numbness on one side of the body.  Slurred speech or swallowing problems.  Loss of consciousness.  Seizures.  Mental symptoms may include:  Irritability.  Confusion and memory problems.  Poor attention and concentration.  Changes in eating or sleeping habits.  Anxiety or depression.  How is this diagnosed?  This condition is diagnosed based on your symptoms and a physical exam. You may also have imaging tests done, such as a CT scan or an MRI.    How is this treated?  Treatment for this condition depends on the severity and type of injury you have. The main goal of treatment is to prevent complications and allow the brain time to heal.    Mild head injury    If you have a mild head injury, you may be sent home, and treatment may include:  Observation. A responsible adult should stay with you for 24 hours after your injury and check on you often.  Physical rest.  Brain rest.  Pain medicines.  Severe head injury    If you have a severe head injury, treatment may include:  Close observation. You may have to stay in the hospital and have:  Frequent physical exams.  Frequent checks of how your brain and nervous system are working.  Your blood pressure and oxygen levels checked.  Medicines to relieve pain, prevent seizures, and decrease brain swelling.  Airway protection and breathing support. This may include using a ventilator.  Monitoring and managing swelling inside the brain.  Brain surgery. Surgery may include:  Removing a collection of blood or blood clots.  Stopping the bleeding.  Removing a part of the skull to make room for the brain to swell.  Follow these instructions at home:  Activity    Rest. Avoid activities that are hard or tiring.  Make sure you get enough sleep.  Let your brain rest by limiting activities that take a lot of thought or attention, such as:  Watching TV.  Playing memory games and doing puzzles.  Job-related work or homework.  Working on the computer, using social media, and texting.  Avoid activities that could cause another head injury, such as playing sports, until your health care provider approves.  Ask your provider when it is safe for you to return to your regular activities, such as work or school.  Ask your provider when you can drive, ride a bicycle, or use machinery. Your ability to react may be slower after a brain injury. Do not do these activities if you are dizzy.  Lifestyle    A sign telling the reader not to drink beer, wine, or hard liquor.  Do not drink alcohol until your provider approves. Do not use drugs. Alcohol and certain drugs may slow your recovery and can put you at risk of further injury.  If it is hard to remember things, write them down.  If you are easily distracted, try to do one thing at a time.  Talk with family members or close friends when making important decisions.  Tell your friends, family, a trusted colleague, and  about your injury, symptoms, and restrictions. Ask them to watch for any problems that are new or get worse.  General instructions    Take over-the-counter and prescription medicines only as told by your provider.  Have a responsible adult stay with you for 24 hours after your head injury. They should watch you for any changes in your symptoms and be ready to get help right away.  Keep all follow-up visits to make sure your needs are being met and catch any new problems early.  How is this prevented?  Avoiding another brain injury is very important. In rare cases, another injury can lead to permanent brain damage, brain swelling, or death. The risk of this is greatest during the first 7–10 days after a head injury. To avoid injuries:  Improve your balance and strength to avoid falls.  Wear a seat belt when you are in a moving vehicle.  Wear a helmet when riding a bicycle, skiing, or doing any other sport that has a risk of injury.  Take safety measures in your home to prevent falls, such as:  Removing clutter and tripping hazards.  Using grab bars in bathrooms and handrails by stairs.  Placing non-slip mats on floors and in bathtubs.  Improving lighting in dim areas.  Where to find more information  Brain Injury Association: biausa.org  Contact a health care provider if:  You have headaches that do not go away.  You have dizziness that does not go away.  You have double vision or vision changes that do not go away.  You have difficulty sleeping.  You have changes in your mood.  You have new symptoms.  Get help right away if:  You have sudden:  Severe headache.  Severe vomiting.  Unequal pupil size. One is bigger than the other.  Vision problems.  Confusion or irritability.  You have a seizure.  Your symptoms get worse.  You have clear or bloody fluid coming from your nose or ears.  These symptoms may be an emergency. Get help right away. Call 911.  Do not wait to see if the symptoms will go away.  Do not drive yourself to the hospital.  This information is not intended to replace advice given to you by your health care provider. Make sure you discuss any questions you have with your health care provider.

## 2024-04-26 NOTE — ED ADULT NURSE NOTE - OBJECTIVE STATEMENT
Pt received in bed alert and oriented and resting in bed with the c/o trip and fall and hitting neck/throat on kitchen counter. Pt denies LOC or blood thinners. Pt stable and nursing care ongoing and safety maintained. No sign of active bleeding noted. Nursing care ongoing and safety maintained.

## 2024-04-26 NOTE — ED PROVIDER NOTE - PATIENT PORTAL LINK FT
You can access the FollowMyHealth Patient Portal offered by [facility] by registering at the following website: [email or web address]. By joining [employer]’s FollowMyHealth portal, you will also be able to view your health information using other applications (apps) compatible with our system.

## 2024-04-26 NOTE — ED ADULT TRIAGE NOTE - GLASGOW COMA SCALE: EYE OPENING, MLM
Allergy and Immunology  New Patient Clinic Note    Date: 3/15/2024  Chief Complaint   Patient presents with    Allergy Testing     Pt mom stated pt GI doctor referred her for allergy testing. Also mom stated pt has a rash on her back that appeared Wednesday.      Referred by: Lidia Felton DO  [id number] [facility]  [place]  [place] [id number]    History  Karma Hummel is a 6 y.o. female being seen as a New Patient today.    Rhinitis  Snoring   - Onset: 1-2 years of age   - Symptoms: Congestion, rhinorrhea, sneezing   - Suspected triggers include: unclear per mom   - Pattern: Perennial with Seasonal Exacerbations  - Medications: PRN liquid oral antihistamines - not using intranasal sprays  - Being referred to ENT by PCP for snoring per mother    Chronic or Inducible Urticaria  - No hx of chronic urticaria     Mild Intermittent Asthma   - Onset: 2-3 years old RAD with viral infections  - Symptoms: Wheezing, SOB   - Medications: Albuterol PRN   - Strong family hx of asthma - mother     CRSwNP  - No hx of CRSwNP     Eczema   - No hx of eczema    Eosinophilic Esophagitis  - No hx of eosinophilic esophagitis     Hx of Food Induced Proctocolitis (milk)   - Blood in stools around age 3 month   - Told to avoid milk proteins due to concern for etiology of above   - Resolution with elimination but no known introduction since age 3 mo     Drug Allergy  - No hx of drug allergy     Recurrent Infections  - No hx of recurrent infections     Venom Allergy  - No hx of venom allergy    Family History  - Mother: Asthma     Allergies, PMH, PSH, Social, and Family History were reviewed.    Review of patient's allergies indicates:   Allergen Reactions    Milk containing products (dairy) Diarrhea and Nausea And Vomiting      No past medical history on file.  Past Surgical History:   Procedure Laterality Date    COLONOSCOPY N/A 5/27/2022    Procedure: COLONOSCOPY;  Surgeon: Levon Mac MD;  Location: [facility];  Service: Pediatrics;   Laterality: N/A;    COLONOSCOPY N/A 5/27/2022    Procedure: COLONOSCOPY;  Surgeon: Levon Mac MD;  Location: [facility];  Service: Pediatrics;  Laterality: N/A;    ESOPHAGOGASTRODUODENOSCOPY N/A 5/27/2022    Procedure: EGD (ESOPHAGOGASTRODUODENOSCOPY);  Surgeon: Levon Mac MD;  Location: [facility] ENDO;  Service: Pediatrics;  Laterality: N/A;    ESOPHAGOGASTRODUODENOSCOPY N/A 5/27/2022    Procedure: EGD (ESOPHAGOGASTRODUODENOSCOPY);  Surgeon: Levon Mac MD;  Location: [facility];  Service: Pediatrics;  Laterality: N/A;     Social History     Social History Narrative    Not on file     S/he reports that she has never smoked. She has never used smokeless tobacco. She reports that she does not drink alcohol and does not use drugs.    Current Outpatient Medications on File Prior to Visit   Medication Sig Dispense Refill    polyethylene glycol (GLYCOLAX) 17 gram PwPk Take 17 g by mouth Daily.      cyproheptadine (,PERIACTIN,) 2 mg/5 mL syrup Take 5 mLs (2 mg total) by mouth 2 (two) times a day. (Patient not taking: Reported on 5/4/2023) 300 mL 12     No current facility-administered medications on file prior to visit.     Physical Examination  Vitals:    03/15/24 1318   BP: 100/68   Pulse: 89   Temp: 97.9 °F (36.6 °C)     GENERAL:  female in no apparent distress and well developed and well nourished  HEAD:  Normocephalic, without obvious abnormality, atraumatic  EYES: sclera anicteric, conjunctiva normochromic  EARS: normal TM's and external ear canals both ears  NOSE: pale and boggy, clear and copious discharge, turbinates pale, swollen    OROPHARYNX: moist mucous membranes without erythema, exudates or petechiae  LYMPH NODES: normal, supple, no lymphadenopathy  LUNGS: clear to auscultation, no wheezes, rales or rhonchi, symmetric air entry.  HEART: normal rate, regular rhythm, normal S1, S2, no murmurs, rubs, clicks or gallops.  ABDOMEN: soft, nontender, nondistended, no masses or  organomegaly.  MUSCULOSKELETAL: no gross joint deformity or swelling.  NEURO: alert, oriented, normal speech, no focal findings or movement disorder noted.  SKIN: normal coloration and turgor, no rashes, no suspicious skin lesions noted.     Assessment/Plan:   Problem List Items Addressed This Visit          ENT    Chronic rhinitis    Current Assessment & Plan     - Trial of Flonase and Zyrtec             Pulmonary    Mild intermittent asthma without complication    Current Assessment & Plan     - Triggered with viral infections  - Ordered Albuterol PRN   - Ordered Mask with Spacer   - Educated on proper use of inhalers including an in-person demonstration of proper technique  - Expressed understanding of demonstrated technique  - ED precautions discussed at length   - Will continue to monitor and reassess         Relevant Medications    inhalation spacing device       Other    Adverse food reaction - Primary    Current Assessment & Plan     - Skin testing invalid but hx consistent with proctocolitis   - Ordered labs but unable to obtain   - Avoidance of milk pending assessment           Other Visit Diagnoses       Constipation, unspecified constipation type              Follow up:  Follow up in about 3 weeks (around 4/5/2024).    Argenis Mcfadden MD   [facility]  Allergy and Immunology       (E4) spontaneous

## 2024-04-26 NOTE — ED PROVIDER NOTE - CARE PLAN
Principal Discharge DX:	Fall from slip, trip, or stumble  Secondary Diagnosis:	Throat pain  Secondary Diagnosis:	Right knee pain   1

## 2024-04-26 NOTE — ED ADULT NURSE NOTE - NSFALLRISKINTERV_ED_ALL_ED

## 2024-04-26 NOTE — ED PROVIDER NOTE - CHIEF COMPLAINT
Subjective


Progress Note Date: 10/19/20








Morenita Ramirez, is a 61-year-old female patient well-known to my services.  

Patient was recently discharged she was admitted to the hospital for DKA and 

pneumonia.  Patient has underlying history of diabetes mellitus type 1 which is 

a brittle diabetic.  Lantus was decreased upon discharge.  Additional medical 

history includes stroke and seizures.  She was discharged home with her sister. 

According to ER report patient was found to have a low blood sugar with altered 

mental status changes.  Blood sugar was corrected and alt mental status changes 

recovered.  There is questionable compliance with diet.  Consult placed for 

possible ECF placement on discharge.  Chest x-ray was completed showing no ac

tive cardiopulmonary disease.  There is almost complete clearing of the right 

side patchy pulmonary infiltrate compared to old exam.  We'll continue to 

monitor blood sugar closely and make adjustments to home medication .





On 10/17/2020 patient was seen and examined on the medical floor she is alert 

and oriented 3 in no distress there is no fever or chills no headache or 

dizziness no chest pain no shortness of breath no cough no nausea or vomiting no

abdominal pain no diarrhea no blood in the stools no burning with urination no 

frequency or urgency and no hematuria glucose levels are much better controlled 

today she is receiving Levemir 10 units at bedtime and sliding scale before 

meals








On 10/18/2020 patient was seen and examined on the medical floor she is alert 

and oriented 3 in no distress there is no fever or chills no headache or 

dizziness no chest pain no shortness of breath no cough no nausea or vomiting no

abdominal pain no diarrhea no blood in the stools no burning with urination no 

frequency or urgency and no hematuria.  Patient to glucose level is still 

fluctuating she was 88 before breakfast this morning no short-acting insulin was

given patient was given breakfast her glucose level was 464 before lunch she is 

being given 8 units of NovoLog before lunch will continue to monitor and adjust 

insulin, possible transfer to rehab tomorrow.





On 10/19/2020 patient was seen and examined on the medical floor she is alert 

and oriented in no distress, glucose levels are better controlled however this 

morning patient had another episode of hypoglycemia, at this time will decrease 

Levemir dose to 8 units daily and continue was NovoLog before meals, and give a 

bedtime snack, but no short-acting insulin at bedtime





Objective





- Vital Signs


Vital signs: 


                                   Vital Signs











Temp  97.8 F   10/19/20 12:36


 


Pulse  87   10/19/20 12:36


 


Resp  17   10/19/20 12:36


 


BP  116/70   10/19/20 12:36


 


Pulse Ox  99   10/19/20 12:36








                                 Intake & Output











 10/18/20 10/19/20 10/19/20





 18:59 06:59 18:59


 


Intake Total 400 380 


 


Balance 400 380 


 


Intake:   


 


  Oral 400 380 


 


Other:   


 


  Voiding Method Bedpan Bedpan Bedpan





 Diaper Diaper Diaper





 Incontinent Incontinent Incontinent


 


  # Voids 5 1 4


 


  # Bowel Movements 1  














- Exam








Head normocephalic and atraumatic


Neck supple no JVD no goiter


Lungs clear to auscultation bilaterally no wheezing or crackles


Heart regular rate and rhythm S1-S2, no rub or gallop


Abdomen is soft nontender nondistended positive bowel sounds no hep

atosplenomegaly


Extremities no edema no cyanosis or clubbing


Neuro alert and orientated to 3








- Labs


CBC & Chem 7: 


                                 10/19/20 04:47





                                 10/19/20 04:47


Labs: 


                  Abnormal Lab Results - Last 24 Hours (Table)











  10/18/20 10/19/20 10/19/20 Range/Units





  20:38 00:50 04:24 


 


WBC     (3.8-10.6)  k/uL


 


RBC     (3.80-5.40)  m/uL


 


Hgb     (11.4-16.0)  gm/dL


 


Hct     (34.0-46.0)  %


 


RDW     (11.5-15.5)  %


 


Neutrophils #     (1.3-7.7)  k/uL


 


Anion Gap     (4.00-12.00)  mmol/L


 


BUN     (9.0-27.0)  mg/dL


 


Est GFR (CKD-EPI)AfAm     (60.0-200.0)   


 


Est GFR (CKD-EPI)NonAf     (60.0-200.0)   


 


BUN/Creatinine Ratio     (12.00-20.00)  Ratio


 


Glucose     ()  mg/dL


 


POC Glucose (mg/dL)  432 H  134 H  60 L  (75-99)  mg/dL


 


Total Bilirubin     (0.3-1.2)  mg/dL


 


AST     (13-35)  U/L


 


ALT     (8-44)  U/L


 


Alkaline Phosphatase     ()  U/L


 


Total Protein     (6.2-8.2)  g/dL


 


Albumin     (3.80-4.90)  g/dL


 


Albumin/Globulin Ratio     (1.60-3.17)  g/dL














  10/19/20 10/19/20 10/19/20 Range/Units





  04:47 04:47 04:47 


 


WBC  2.9 L    (3.8-10.6)  k/uL


 


RBC  3.40 L    (3.80-5.40)  m/uL


 


Hgb  10.3 L    (11.4-16.0)  gm/dL


 


Hct  32.7 L    (34.0-46.0)  %


 


RDW  16.3 H    (11.5-15.5)  %


 


Neutrophils #  0.7 L    (1.3-7.7)  k/uL


 


Anion Gap   12.70 H   (4.00-12.00)  mmol/L


 


BUN   31.0 H   (9.0-27.0)  mg/dL


 


Est GFR (CKD-EPI)AfAm   47.5 L   (60.0-200.0)   


 


Est GFR (CKD-EPI)NonAf   41.0 L   (60.0-200.0)   


 


BUN/Creatinine Ratio   22.14 H   (12.00-20.00)  Ratio


 


Glucose   44 L*   ()  mg/dL


 


POC Glucose (mg/dL)    47 L  (75-99)  mg/dL


 


Total Bilirubin   0.2 L   (0.3-1.2)  mg/dL


 


AST   596 H   (13-35)  U/L


 


ALT   296 H   (8-44)  U/L


 


Alkaline Phosphatase   333 H   ()  U/L


 


Total Protein   6.1 L   (6.2-8.2)  g/dL


 


Albumin   3.20 L   (3.80-4.90)  g/dL


 


Albumin/Globulin Ratio   1.10 L   (1.60-3.17)  g/dL














  10/19/20 10/19/20 10/19/20 Range/Units





  04:59 07:03 11:28 


 


WBC     (3.8-10.6)  k/uL


 


RBC     (3.80-5.40)  m/uL


 


Hgb     (11.4-16.0)  gm/dL


 


Hct     (34.0-46.0)  %


 


RDW     (11.5-15.5)  %


 


Neutrophils #     (1.3-7.7)  k/uL


 


Anion Gap     (4.00-12.00)  mmol/L


 


BUN     (9.0-27.0)  mg/dL


 


Est GFR (CKD-EPI)AfAm     (60.0-200.0)   


 


Est GFR (CKD-EPI)NonAf     (60.0-200.0)   


 


BUN/Creatinine Ratio     (12.00-20.00)  Ratio


 


Glucose     ()  mg/dL


 


POC Glucose (mg/dL)  59 L  112 H  164 H  (75-99)  mg/dL


 


Total Bilirubin     (0.3-1.2)  mg/dL


 


AST     (13-35)  U/L


 


ALT     (8-44)  U/L


 


Alkaline Phosphatase     ()  U/L


 


Total Protein     (6.2-8.2)  g/dL


 


Albumin     (3.80-4.90)  g/dL


 


Albumin/Globulin Ratio     (1.60-3.17)  g/dL














  10/19/20 Range/Units





  17:24 


 


WBC   (3.8-10.6)  k/uL


 


RBC   (3.80-5.40)  m/uL


 


Hgb   (11.4-16.0)  gm/dL


 


Hct   (34.0-46.0)  %


 


RDW   (11.5-15.5)  %


 


Neutrophils #   (1.3-7.7)  k/uL


 


Anion Gap   (4.00-12.00)  mmol/L


 


BUN   (9.0-27.0)  mg/dL


 


Est GFR (CKD-EPI)AfAm   (60.0-200.0)   


 


Est GFR (CKD-EPI)NonAf   (60.0-200.0)   


 


BUN/Creatinine Ratio   (12.00-20.00)  Ratio


 


Glucose   ()  mg/dL


 


POC Glucose (mg/dL)  332 H  (75-99)  mg/dL


 


Total Bilirubin   (0.3-1.2)  mg/dL


 


AST   (13-35)  U/L


 


ALT   (8-44)  U/L


 


Alkaline Phosphatase   ()  U/L


 


Total Protein   (6.2-8.2)  g/dL


 


Albumin   (3.80-4.90)  g/dL


 


Albumin/Globulin Ratio   (1.60-3.17)  g/dL














Assessment and Plan


Plan: 








1.  Altered mental status is with hypoglycemia.  Hypoglycemia was corrected 

altered mental status changes have resolved





2.  Diabetes mellitus type 1.  Patient is known to be a brittle diabetic with 

fluctuating blood sugars





3.  Recent hospitalization for pneumonia.  Chest x-ray completed showing 

improvement





4.  History of seizures.  Patient was evaluated by neurology services and 

medications adjusted during previous day





5.  History of stroke





6.  Essential hypertension





7.  History of hyperlipidemia.  Patient obtained on statin





8.  History of COPD no exacerbation at this time





9.  History of coronary artery disease





10.  History of peripheral vascular disease with previous history of multiple 

toe amputations





11.  Iron deficiency anemia








DVT prophylaxis Lovenox.  GI prophylaxis Pepcid


PT OT consulted for possible ECF placement The patient is a 64y Male complaining of neck pain.

## 2024-04-26 NOTE — ED PROVIDER NOTE - ATTENDING APP SHARED VISIT CONTRIBUTION OF CARE
63yo mael bib ems sp fall forward and hit his neck tripping over the dog, no LOC, pt c/o neck swelling and pain no tingling or weakness  exam+left frontal neck swelling and erythema, no midline C spine tenderness, right knee swelling, FROM   plan: ct c spine, soft tissue neck, xr  agree with assessment and plan of PA

## 2024-04-26 NOTE — ED PROVIDER NOTE - ENMT, MLM
Airway patent, Mouth with normal mucosa. Throat has no vesicles, no oropharyngeal exudates and uvula is midline. Neck with mild erythema anteriorly, no obvious collection/hematoma noted.

## 2024-05-06 ENCOUNTER — APPOINTMENT (OUTPATIENT)
Dept: ORTHOPEDIC SURGERY | Facility: CLINIC | Age: 64
End: 2024-05-06
Payer: MEDICAID

## 2024-05-06 VITALS — HEIGHT: 73 IN | WEIGHT: 220 LBS | BODY MASS INDEX: 29.16 KG/M2

## 2024-05-06 DIAGNOSIS — M47.10 OTHER SPONDYLOSIS WITH MYELOPATHY, SITE UNSPECIFIED: ICD-10-CM

## 2024-05-06 DIAGNOSIS — M75.00 ADHESIVE CAPSULITIS OF UNSPECIFIED SHOULDER: ICD-10-CM

## 2024-05-06 PROCEDURE — 72040 X-RAY EXAM NECK SPINE 2-3 VW: CPT

## 2024-05-06 PROCEDURE — 99024 POSTOP FOLLOW-UP VISIT: CPT

## 2024-05-06 NOTE — HISTORY OF PRESENT ILLNESS
[___ Months Post Op] : [unfilled] months post op [Chills] : no chills [Fever] : no fever [Healed] : healed [Xray (Date:___)] : [unfilled] Xray -  [Hardware in Good Position] : hardware in good position [Doing Well] : is doing well [Excellent Pain Control] : has excellent pain control [No Sign of Infection] : is showing no signs of infection [No ADLs] : to avoid most activities of daily living [No Work] : not to work [No Housework] : not to do housework [de-identified] : s/p ACDF C3-4 and C3-C7 posterior cervical fusion on 02/08/24 [de-identified] : Tripped and fell, was seen in ER.  Walking is unsteady.  Pain improved.  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 Pt takes Oxycodone 10mg BID-TID,  gabapentin 800mg  Q6hrs, cyclobenzaprine 10mg BID, and nortriptyline daily. Pt uses walker for ambulation. Pt will start home PT on 03/12/24 and OT on 03/13/24. Symptoms greatly improved since sx  pt was d/c from rehab on 03/07/24.  [de-identified] : Weakness of BUE/BLE, baseline. Uses walker Frozen shoulder right side [de-identified] : He is stable and doing well. RTO 3 months.

## 2024-05-07 ENCOUNTER — APPOINTMENT (OUTPATIENT)
Dept: PAIN MANAGEMENT | Facility: CLINIC | Age: 64
End: 2024-05-07

## 2024-05-09 ENCOUNTER — NON-APPOINTMENT (OUTPATIENT)
Age: 64
End: 2024-05-09

## 2024-05-13 ENCOUNTER — APPOINTMENT (OUTPATIENT)
Dept: PAIN MANAGEMENT | Facility: CLINIC | Age: 64
End: 2024-05-13
Payer: MEDICAID

## 2024-05-13 DIAGNOSIS — G12.21 AMYOTROPHIC LATERAL SCLEROSIS: ICD-10-CM

## 2024-05-13 DIAGNOSIS — M54.12 RADICULOPATHY, CERVICAL REGION: ICD-10-CM

## 2024-05-13 PROCEDURE — 99214 OFFICE O/P EST MOD 30 MIN: CPT

## 2024-05-13 RX ORDER — OXYCODONE 10 MG/1
10 TABLET ORAL TWICE DAILY
Qty: 60 | Refills: 0 | Status: ACTIVE | COMMUNITY
Start: 2023-09-29 | End: 1900-01-01

## 2024-05-13 NOTE — DISCUSSION/SUMMARY
[Medication Risks Reviewed] : Medication risks reviewed [de-identified] : Prescriptions renewed. Opioid agreement/obtained on chart [place]  reviewed and appropriate. SOAPP-R completed on chart. The patient's medications are documented to the best of their ability. Quality of life and functional ability improved on medications. The patient is showing no aberrant behavior or evidence of diversion. The patient was advised not to use narcotic medication while operating an automobile or heavy machinery due to potential sedation or dizziness. The patient was educated to the risks associated with potential opioid dependence and addiction. Urine toxicology screens as per office protocol. Use of multimodal analgesia used prn. Follow up one month.

## 2024-05-13 NOTE — HISTORY OF PRESENT ILLNESS
[Neck] : neck [Left Arm] : left arm [9] : 9 [Burning] : burning [Sharp] : sharp [Shooting] : shooting [Constant] : constant [Household chores] : household chores [Sleep] : sleep [Rest] : rest [Sitting] : sitting [FreeTextEntry1] : Neck. pain stable. States his mobility becoming more limited but her pushes on. He states he had his doorknobs changed to handles so. he is able to open doors. Notes walking and stairs are becoming more difficult.  Pain meds effective prn.  [] : Post Surgical Visit: no [de-identified] : LIFTING  [de-identified] : 04/04/2023 CS

## 2024-05-13 NOTE — REASON FOR VISIT
[Follow-Up Visit] : a follow-up pain management visit [Home] : at home, [unfilled] , at the time of the visit. [Medical Office: ([facility])___] : at the medical office located in  [Patient] : the patient [Self] : self

## 2024-05-20 ENCOUNTER — APPOINTMENT (OUTPATIENT)
Dept: ORTHOPEDIC SURGERY | Facility: CLINIC | Age: 64
End: 2024-05-20
Payer: MEDICAID

## 2024-05-20 VITALS
HEART RATE: 118 BPM | DIASTOLIC BLOOD PRESSURE: 86 MMHG | SYSTOLIC BLOOD PRESSURE: 138 MMHG | BODY MASS INDEX: 27.83 KG/M2 | HEIGHT: 73 IN | WEIGHT: 210 LBS

## 2024-05-20 DIAGNOSIS — G56.91 UNSPECIFIED MONONEUROPATHY OF RIGHT UPPER LIMB: ICD-10-CM

## 2024-05-20 DIAGNOSIS — M75.01 ADHESIVE CAPSULITIS OF RIGHT SHOULDER: ICD-10-CM

## 2024-05-20 DIAGNOSIS — M67.911 UNSPECIFIED DISORDER OF SYNOVIUM AND TENDON, RIGHT SHOULDER: ICD-10-CM

## 2024-05-20 PROCEDURE — 99213 OFFICE O/P EST LOW 20 MIN: CPT

## 2024-05-20 NOTE — DISCUSSION/SUMMARY
[de-identified] : Patient has profound weakness and secondary stiffness of the right shoulder/upper extremity.  Symptoms are neurogenic.  Recommend home stretching exercise program for the right shoulder.  Reviewed home exercises with patient.  He can also work with physical therapy as tolerated to optimize shoulder/upper extremity function.  No surgical intervention for the right shoulder is necessary.

## 2024-05-20 NOTE — HISTORY OF PRESENT ILLNESS
[Worsening] : worsening [Constant] : ~He/She~ states the symptoms seem to be constant [Lifting] : worsened by lifting [de-identified] : 64-year-old male presents today accompanied by his wife for evaluation of chronic right shoulder weakness and stiffness.  Significant history notable for cervical spine fusion surgeries and ALS. [0] : a current pain level of 0/10

## 2024-05-20 NOTE — PHYSICAL EXAM
[UE] : Sensory: Intact in bilateral upper extremities [Rad] : radial 2+ and symmetric bilaterally [Normal RUE] : Right Upper Extremity: No scars, rashes, lesions, ulcers, skin intact [Normal LUE] : Left Upper Extremity: No scars, rashes, lesions, ulcers, skin intact [Normal] : Oriented to person, place, and time, insight and judgement were intact and the affect was normal [Shoulder Muscle Weakness Supraspinatus Left Only] : negative Drop Arm test [Pain Left Shoulder Active Forw Flexion Against Resistance] : negative Empty Can test [Shoulder Pain During Willett-Koby Impingement Test Left] : negative Willett test [Shoulder Motion On Internal Rotation] : negative Lift Off test [Active Abduction Left Shoulder Decreased] : negative Painful Arc [de-identified] : Ambulating with a walker.  Right shoulder: Skin intact. No bursa warmth or redness.  With assistance he can lift the right shoulder and arm in the forward plane 95 degrees but has difficulty keeping the upper extremity and forward elevated or abducted position.  Deltoid strength 2+/5.  Right elbow flexion strength 3+/5.  Elbow extension strength 3+ to 4/5.  Wrist extension strength 3+/5.  Intrinsic hand musculature wasting bilaterally.  Unable to actively extend his fingers.

## 2024-06-19 ENCOUNTER — APPOINTMENT (OUTPATIENT)
Dept: PAIN MANAGEMENT | Facility: CLINIC | Age: 64
End: 2024-06-19

## 2024-06-27 ENCOUNTER — APPOINTMENT (OUTPATIENT)
Dept: PHYSICAL MEDICINE AND REHAB | Facility: CLINIC | Age: 64
End: 2024-06-27

## 2024-07-09 ENCOUNTER — APPOINTMENT (OUTPATIENT)
Dept: PAIN MANAGEMENT | Facility: CLINIC | Age: 64
End: 2024-07-09
Payer: MEDICARE

## 2024-07-09 DIAGNOSIS — M54.12 RADICULOPATHY, CERVICAL REGION: ICD-10-CM

## 2024-07-09 PROCEDURE — 99203 OFFICE O/P NEW LOW 30 MIN: CPT

## 2024-07-11 ENCOUNTER — APPOINTMENT (OUTPATIENT)
Dept: PHYSICAL MEDICINE AND REHAB | Facility: CLINIC | Age: 64
End: 2024-07-11
Payer: COMMERCIAL

## 2024-07-11 VITALS
HEIGHT: 73 IN | SYSTOLIC BLOOD PRESSURE: 138 MMHG | DIASTOLIC BLOOD PRESSURE: 94 MMHG | HEART RATE: 71 BPM | TEMPERATURE: 97.1 F | OXYGEN SATURATION: 99 %

## 2024-07-11 DIAGNOSIS — G95.9 DISEASE OF SPINAL CORD, UNSPECIFIED: ICD-10-CM

## 2024-07-11 DIAGNOSIS — G62.9 POLYNEUROPATHY, UNSPECIFIED: ICD-10-CM

## 2024-07-11 DIAGNOSIS — G12.21 AMYOTROPHIC LATERAL SCLEROSIS: ICD-10-CM

## 2024-07-11 PROCEDURE — 99214 OFFICE O/P EST MOD 30 MIN: CPT

## 2024-07-12 PROBLEM — G95.9 CERVICAL MYELOPATHY: Status: ACTIVE | Noted: 2024-07-12

## 2024-07-30 ENCOUNTER — APPOINTMENT (OUTPATIENT)
Dept: PAIN MANAGEMENT | Facility: CLINIC | Age: 64
End: 2024-07-30
Payer: COMMERCIAL

## 2024-07-30 DIAGNOSIS — G12.21 AMYOTROPHIC LATERAL SCLEROSIS: ICD-10-CM

## 2024-07-30 PROCEDURE — 99213 OFFICE O/P EST LOW 20 MIN: CPT

## 2024-07-30 NOTE — HISTORY OF PRESENT ILLNESS
[Neck] : neck [Left Arm] : left arm [9] : 9 [Burning] : burning [Sharp] : sharp [Shooting] : shooting [Constant] : constant [Household chores] : household chores [Sleep] : sleep [Rest] : rest [Sitting] : sitting [Home] : at home, [unfilled] , at the time of the visit. [Medical Office: ([facility])___] : at the medical office located in  [Verbal consent obtained from patient] : the patient, [unfilled] [] : Post Surgical Visit: no [de-identified] : LIFTING  [de-identified] : 04/04/2023 CS

## 2024-08-05 NOTE — PROGRESS NOTE ADULT - SUBJECTIVE AND OBJECTIVE BOX
No protocol for requested medication.    Medication: Please see refill request, will eprescribe upon approval.    Last office visit date: 7/11/24  [id number] - [place], [place] - [phone] [facility] [address]    Order pended, routed to clinician for review.      Pt seen/examined. Doing well. Pain controlled. No acute overnight complaints or events. Endorses subjective improvement in right hand strength.     T(C): 36.7 (02-13-24 @ 04:33), Max: 36.7 (02-13-24 @ 04:33)  HR: 99 (02-13-24 @ 04:33) (98 - 120)  BP: 112/75 (02-13-24 @ 04:33) (112/75 - 147/95)  RR: 18 (02-13-24 @ 04:33) (18 - 18)  SpO2: 94% (02-13-24 @ 04:33) (93% - 96%)  Wt(kg): --  - Gen: NAD    Physical Exam:   General: NAD, patient laying in bed  Spine:  Anterior neck incision dressing c/d/i. Aspen collar in place.   Ranjana-incisional TTP; otherwise, NTTP throughout the rest of the extremity.   Radial, DP pulses palpable.  No calf tenderness bilaterally.  Compartments soft and compressible.     Motor:       C5   C6   C7   C8   T1  L   4+/5  4/5  4/5  2/5  2/5  R   4/5  3/5  3/5  1/5  1/5         L2   L3    L4   L5   S1  L   5/5  5/5  3/5  3/5  4/5  R   4/5  5/5  3/5  3/5  4/5    Sensory:       C5   C6   C7   C8   T1  L    2     2     2     2     2   R    2     2     2     2     2          L2   L3    L4   L5   S1  L    2     2     2     2     2   R    2     2     2     2     2       A/P :  63y Male s/p C3-C4 ACDF, C3-C7 posterior cervical fusion 2/8. Recovering appropriately    -    Pain control, PCA d/c'd  -    IS bedside  -    Taper  -    DVT ppx: Aspirin 81 mg QD, SCDs       -    GI ppx: Protonix 40 mg QD  -    Physical Therapy  -    Weight bearing status: WBAT, [place] collar to be on at all times   -    Dopp: performed post-op, negative for DVT, Caprini score 5    -    f/u AM labs   -    appreciate acute rehab recs

## 2024-08-12 ENCOUNTER — APPOINTMENT (OUTPATIENT)
Dept: ORTHOPEDIC SURGERY | Facility: CLINIC | Age: 64
End: 2024-08-12
Payer: MEDICARE

## 2024-08-12 VITALS — BODY MASS INDEX: 27.83 KG/M2 | HEIGHT: 73 IN | WEIGHT: 210 LBS

## 2024-08-12 DIAGNOSIS — G12.21 AMYOTROPHIC LATERAL SCLEROSIS: ICD-10-CM

## 2024-08-12 DIAGNOSIS — M50.30 OTHER CERVICAL DISC DEGENERATION, UNSPECIFIED CERVICAL REGION: ICD-10-CM

## 2024-08-12 PROCEDURE — 72040 X-RAY EXAM NECK SPINE 2-3 VW: CPT

## 2024-08-12 PROCEDURE — 99204 OFFICE O/P NEW MOD 45 MIN: CPT

## 2024-08-12 NOTE — DISCUSSION/SUMMARY
[de-identified] : 64 yo male with C3-C7 and/post fusion.  Since his last visit he is regressing. He is 100 % disabled, unable to work, or care for him self, [facility], recommend f/u physiatry.     Diagnosis, prognosis, natural history and treatment was discussed with patient. Patient was advised if the following symptoms develop: chills, fever,  loss of bladder control, bowel incontinence or urinary retention, numbness/tingling or weakness is present in upper or lower extremities, to go to the nearest emergency room. This may be a new clinical condition not present at the time of the patient visit  that may lead to paralysis and/or death, Patient advised if the above symptoms developed to also call the office immediately to inform us and to go to the nearest emergency room.

## 2024-08-12 NOTE — PHYSICAL EXAM
[Motor Strength Upper Extremities] : bilaterally weak [] : Sensory: [C6-RT] : C6 [C7-RT] : C7 [C8-RT] : C8 [ALL] : dorsalis pedis, posterior tibial, femoral, popliteal, and radial 2+ and symmetric bilaterally [Normal] : Oriented to person, place, and time, insight and judgement were intact and the affect was normal [Ataxic] : ataxic [Walker] : ambulates with walker [Aggarwal's Sign] : negative Aggarwal's sign [Pronator Drift] : negative pronator drift [SLR] : negative straight leg raise [de-identified] : Cervical ROM: Limited, painful TP C spine and b/l paracervicals. Skin intact C spine. No rashes, ulcers, blisters.  No lymphedema.  Cervical incision well healed [de-identified] : 2 views AP and Lat of Cervical Spine from occipital to C7/T1. Read and dictated by Dr. Eliud Quinones Board Certified Orthopaedic surgeon  8/12/2024 ACDF/PCF  CT/MRI C spine [facility] 11/03/23: Pseudarthrosis C45, C34 HNP with stenosis  2 views AP and Lat of Cervical Spine from occipital to C7/T1 11/06/23. Read and dictated by Dr. Eliud Quinones Board Certified orthopaedic surgeon  2 views AP and Lat of Cervical Spine from occipital to C7/T1 10/13/23. Read and dictated by Dr. Eliud Quinones Board Certified orthopedic surgeon ACD C4-C7  2 views AP and Lat of Cervical Spine from occipital to C7/T1 09/07/23 Read and dictated by Dr. Eliud Quinones Board Certified orthopaedic surgeon:  hardware in good position..  EMG  Dr Isaias mejia 02/27/23: moderate right median nerve neuropathy at the wrist: carpal tunnel syndrome. Also B/L unlar nerve neuropathy at the elbow: Carpal tunnel syndrome. Also chronic left C6 and B/L C7 radiculopathy. Pt had multiple nerve entrapment.

## 2024-08-12 NOTE — HISTORY OF PRESENT ILLNESS
[None] : No exacerbating factors are noted [Rest] : relieved by rest [de-identified] : Pt is s/p ACDF C4-C6 8/2016, ACDF C4-5 on 07/12/23, and ACDF C3-4 and C3-C7 posterior cervical fusion on 02/08/24, last seen 05/2024 s/p tripped and fall, was seen in ER. Patient reported improvement in pain, but endorses persistent difficulty ambulating and unsteady gait, resulting in multiple frequent falls. Patient's weakness likely from ALS, he endorses that he has decrease range of motion. Patient states that pain is worst with prolonged sitting and standing. Patient admits to taking Oxycodone 10mg BID-TID, gabapentin 800mg Q6hrs, cyclobenzaprine 10mg BID, and nortriptyline daily, which provides pain relief. Patient is currently participating in PT 2X/week and OT. Patient reports overall improvement in symptoms greatly improved since surgery. Patient uses walker for ambulation and requires assistance with ADL's, including showering and getting dressed. Patient denies fever, chills, weight changes, loss of bladder control, bowel incontinence or urinary retention or saddle anesthesia. [Ataxia] : no ataxia [Incontinence] : no incontinence [Loss of Dexterity] : good dexterity [Urinary Ret.] : no urinary retention

## 2024-08-18 NOTE — H&P ADULT. - ITE SK HX ROS MEA POS PC
Mark Birch PGY1  EKG obtained and is within normal limit with no ST changes and normal R wave progression. Improved with ibuprofen.  Anticipatory guidance was given regarding diagnosis(es), expected course, reasons to return for emergent re-evaluation, and home care. Caregiver questions were answered.  The patient was discharged in stable condition.  Da Maria MD rash

## 2024-08-28 ENCOUNTER — APPOINTMENT (OUTPATIENT)
Dept: PAIN MANAGEMENT | Facility: CLINIC | Age: 64
End: 2024-08-28
Payer: MEDICARE

## 2024-08-28 DIAGNOSIS — M54.12 RADICULOPATHY, CERVICAL REGION: ICD-10-CM

## 2024-08-28 PROCEDURE — 99213 OFFICE O/P EST LOW 20 MIN: CPT

## 2024-08-28 NOTE — HISTORY OF PRESENT ILLNESS
[Neck] : neck [Lower back] : lower back [7] : 7 [Burning] : burning [Dull/Aching] : dull/aching [Throbbing] : throbbing [Constant] : constant [Household chores] : household chores [Leisure] : leisure [Sleep] : sleep [Meds] : meds [] : yes [Home] : at home, [unfilled] , at the time of the visit. [Medical Office: ([facility])___] : at the medical office located in  [Verbal consent obtained from patient] : the patient, [unfilled] [de-identified] : CERVICAL FUSION 02.2024

## 2024-09-19 ENCOUNTER — APPOINTMENT (OUTPATIENT)
Dept: PHYSICAL MEDICINE AND REHAB | Facility: CLINIC | Age: 64
End: 2024-09-19
Payer: MEDICARE

## 2024-09-19 VITALS
TEMPERATURE: 98.2 F | HEIGHT: 73 IN | OXYGEN SATURATION: 94 % | DIASTOLIC BLOOD PRESSURE: 87 MMHG | SYSTOLIC BLOOD PRESSURE: 119 MMHG | HEART RATE: 105 BPM

## 2024-09-19 DIAGNOSIS — G95.9 DISEASE OF SPINAL CORD, UNSPECIFIED: ICD-10-CM

## 2024-09-19 DIAGNOSIS — G82.50 QUADRIPLEGIA, UNSPECIFIED: ICD-10-CM

## 2024-09-19 DIAGNOSIS — G12.21 AMYOTROPHIC LATERAL SCLEROSIS: ICD-10-CM

## 2024-09-19 DIAGNOSIS — G62.9 POLYNEUROPATHY, UNSPECIFIED: ICD-10-CM

## 2024-09-19 PROCEDURE — 99203 OFFICE O/P NEW LOW 30 MIN: CPT

## 2024-09-19 NOTE — PHYSICAL EXAM
[FreeTextEntry1] : Gen - NAD, Comfortable in the WC HEENT - NCAT, EOMI, VIELKA Neck - Supple, No limited ROM Extremities - No Cyanosis, no clubbing, no edema, no calf tenderness, B/L intrinsic muscles atrophy Neuro-  Cognitive - awake, alert, oriented to person, place, date, year, and situation. Able to follow command  Communication - Fluent, Comprehensible  Attention: Intact  Memory: Memory intact  Cranial Nerves - Decreased shoulder shrug, EOMI, face symmetric  LEFT UE - ShAB 4-/5, EF 4-/5, EE 4-/5,  2+/5  RIGHT UE - ShAB 3-/5, EF 3-/5, EE 2+/5,  2-/5  LEFT LE - HF 4-/5, KE 4-/5, DF 2+/5, PF 2+/5  RIGHT LE - HF 4-/5, KE 4-/5, DF 2-/5, PF2+/5  Sensory - Decreased to left knee, B/L last 2 fingers  Reflexes - DTR Hyporeflexia  Coordination - FTN impaired Psychiatric - Mood stable, Affect WNL.

## 2024-09-19 NOTE — REVIEW OF SYSTEMS
[Joint Pain] : joint pain [Muscle Pain] : muscle pain [Muscle Weakness] : muscle weakness [Difficulty Walking] : difficulty walking [Negative] : Heme/Lymph [Headache] : no headaches [Dizziness] : no dizziness [Fainting] : no fainting [Suicidal] : not suicidal [Insomnia] : no insomnia [Anxiety] : no anxiety [Depression] : no depression [de-identified] : Tingling on the sacrum and his neck

## 2024-09-19 NOTE — SOCIAL HISTORY
[Spouse/Partner] : spouse/partner [Private Home] : in a private home [] : wheelchair accessibility [Walker] : walker [Power Wheelchair] : power wheelchair [Stairs to enter?] : no stairs to enter

## 2024-09-19 NOTE — ASSESSMENT
[FreeTextEntry1] : A 64y old male with ALS, cervical spine myelopathy, peripheral polyneuropathy is in the office for a follow up visit 1. Continue regular 3 months visits to his ALS clinic 2. Continue outpatient therapy 3. A letter to his insurance supporting the need for HH aid  4. Psychology F/U  4. 2 months F/U

## 2024-09-19 NOTE — HISTORY OF PRESENT ILLNESS
[FreeTextEntry1] : This is a 63year old male presents PMHx including ALS (diagnosed 2023 at [facility]), ACDF C4-5 on 07/12/23 and C4-C6 ACDF 8/2016. decreased ROM of right shoulder, chronic pain (daily marijuana user) and right  weakness (unable to extend fingers of right hand). Patient underwent C3-C4 ACDF/C3-C7 Posterior fusion without complications on 2/8/24 with Dr. Eliud Quinones. Presented to the office for a follow up visit, doing well.  Patient goes to an ALS clinic at [place] every 3 months and twice a week for therapy, He stated that he is walking with a RW for short distances.  No new complaints except for his original one of difficulties with feeding himself 2/2 B/L UE weakness.  Patient stated that he sees a psychologist weekly  Regular rate and rhythm, Heart sounds S1 S2 present, no murmurs, rubs or gallops

## 2024-09-20 NOTE — ED ADULT NURSE NOTE - EENT ASSESSMENT, MLM
You can access the FollowMyHealth Patient Portal offered by [facility] by registering at the following website: [email or web address]. By joining [employer]’s FollowMyHealth portal, you will also be able to view your health information using other applications (apps) compatible with our system. - - -

## 2024-09-27 ENCOUNTER — APPOINTMENT (OUTPATIENT)
Dept: PAIN MANAGEMENT | Facility: CLINIC | Age: 64
End: 2024-09-27
Payer: MEDICARE

## 2024-09-27 DIAGNOSIS — M54.12 RADICULOPATHY, CERVICAL REGION: ICD-10-CM

## 2024-09-27 PROCEDURE — 99213 OFFICE O/P EST LOW 20 MIN: CPT

## 2024-09-27 NOTE — HISTORY OF PRESENT ILLNESS
[Neck] : neck [Left Arm] : left arm [9] : 9 [Burning] : burning [Sharp] : sharp [Shooting] : shooting [Constant] : constant [Household chores] : household chores [Sleep] : sleep [Rest] : rest [Sitting] : sitting [Home] : at home, [unfilled] , at the time of the visit. [Medical Office: ([facility])___] : at the medical office located in  [Verbal consent obtained from patient] : the patient, [unfilled] [FreeTextEntry1] : MARLON MADISON  IS FOLLOWING UP FOR PAIN MED REFILL, THERE HAS BEEN INCREASED CHANGES IN PAIN SINCE LAST VISIT.   LAST UDS:9/29/23 [] : Post Surgical Visit: no [de-identified] : LIFTING  [de-identified] : 04/04/2023 CS

## 2024-09-27 NOTE — HISTORY OF PRESENT ILLNESS
[Neck] : neck [Left Arm] : left arm [9] : 9 [Burning] : burning [Sharp] : sharp [Shooting] : shooting [Constant] : constant [Household chores] : household chores [Sleep] : sleep [Rest] : rest [Sitting] : sitting [Home] : at home, [unfilled] , at the time of the visit. [Medical Office: ([facility])___] : at the medical office located in  [Verbal consent obtained from patient] : the patient, [unfilled] [FreeTextEntry1] : MARLON MADISON  IS FOLLOWING UP FOR PAIN MED REFILL, THERE HAS BEEN INCREASED CHANGES IN PAIN SINCE LAST VISIT.   LAST UDS:9/29/23 [] : Post Surgical Visit: no [de-identified] : LIFTING  [de-identified] : 04/04/2023 CS

## 2024-10-29 ENCOUNTER — APPOINTMENT (OUTPATIENT)
Dept: PAIN MANAGEMENT | Facility: CLINIC | Age: 64
End: 2024-10-29

## 2024-11-06 ENCOUNTER — APPOINTMENT (OUTPATIENT)
Dept: PAIN MANAGEMENT | Facility: CLINIC | Age: 64
End: 2024-11-06
Payer: MEDICARE

## 2024-11-06 DIAGNOSIS — M54.12 RADICULOPATHY, CERVICAL REGION: ICD-10-CM

## 2024-11-06 PROCEDURE — 99213 OFFICE O/P EST LOW 20 MIN: CPT

## 2024-11-18 ENCOUNTER — APPOINTMENT (OUTPATIENT)
Dept: ORTHOPEDIC SURGERY | Facility: CLINIC | Age: 64
End: 2024-11-18

## 2024-11-21 ENCOUNTER — APPOINTMENT (OUTPATIENT)
Dept: PHYSICAL MEDICINE AND REHAB | Facility: CLINIC | Age: 64
End: 2024-11-21
Payer: MEDICARE

## 2024-11-21 VITALS
TEMPERATURE: 98.6 F | HEART RATE: 103 BPM | HEIGHT: 73 IN | BODY MASS INDEX: 24.52 KG/M2 | OXYGEN SATURATION: 93 % | SYSTOLIC BLOOD PRESSURE: 115 MMHG | WEIGHT: 185 LBS | DIASTOLIC BLOOD PRESSURE: 81 MMHG

## 2024-11-21 DIAGNOSIS — M62.838 OTHER MUSCLE SPASM: ICD-10-CM

## 2024-11-21 DIAGNOSIS — G95.9 DISEASE OF SPINAL CORD, UNSPECIFIED: ICD-10-CM

## 2024-11-21 DIAGNOSIS — R53.81 OTHER MALAISE: ICD-10-CM

## 2024-11-21 DIAGNOSIS — G12.21 AMYOTROPHIC LATERAL SCLEROSIS: ICD-10-CM

## 2024-11-21 PROCEDURE — 99213 OFFICE O/P EST LOW 20 MIN: CPT

## 2024-11-21 RX ORDER — ONABOTULINUMTOXINA 100 [USP'U]/1
100 INJECTION, POWDER, LYOPHILIZED, FOR SOLUTION INTRADERMAL; INTRAMUSCULAR
Qty: 3 | Refills: 0 | Status: ACTIVE | OUTPATIENT
Start: 2024-11-21

## 2024-12-03 ENCOUNTER — OUTPATIENT (OUTPATIENT)
Dept: OUTPATIENT SERVICES | Facility: HOSPITAL | Age: 64
LOS: 1 days | End: 2024-12-03
Payer: MEDICARE

## 2024-12-03 ENCOUNTER — APPOINTMENT (OUTPATIENT)
Dept: CT IMAGING | Facility: CLINIC | Age: 64
End: 2024-12-03
Payer: MEDICARE

## 2024-12-03 ENCOUNTER — APPOINTMENT (OUTPATIENT)
Dept: ULTRASOUND IMAGING | Facility: CLINIC | Age: 64
End: 2024-12-03
Payer: MEDICARE

## 2024-12-03 DIAGNOSIS — Z00.8 ENCOUNTER FOR OTHER GENERAL EXAMINATION: ICD-10-CM

## 2024-12-03 DIAGNOSIS — R60.0 LOCALIZED EDEMA: ICD-10-CM

## 2024-12-03 DIAGNOSIS — G12.21 AMYOTROPHIC LATERAL SCLEROSIS: ICD-10-CM

## 2024-12-03 DIAGNOSIS — Z98.890 OTHER SPECIFIED POSTPROCEDURAL STATES: Chronic | ICD-10-CM

## 2024-12-03 PROCEDURE — 70450 CT HEAD/BRAIN W/O DYE: CPT | Mod: 26,MH

## 2024-12-03 PROCEDURE — 93970 EXTREMITY STUDY: CPT | Mod: 26

## 2024-12-03 PROCEDURE — 70450 CT HEAD/BRAIN W/O DYE: CPT

## 2024-12-03 PROCEDURE — 93970 EXTREMITY STUDY: CPT

## 2024-12-04 ENCOUNTER — APPOINTMENT (OUTPATIENT)
Dept: PAIN MANAGEMENT | Facility: CLINIC | Age: 64
End: 2024-12-04
Payer: MEDICARE

## 2024-12-04 DIAGNOSIS — M54.12 RADICULOPATHY, CERVICAL REGION: ICD-10-CM

## 2024-12-04 PROCEDURE — 99213 OFFICE O/P EST LOW 20 MIN: CPT

## 2024-12-17 ENCOUNTER — APPOINTMENT (OUTPATIENT)
Dept: ORTHOPEDIC SURGERY | Facility: CLINIC | Age: 64
End: 2024-12-17
Payer: MEDICARE

## 2024-12-17 DIAGNOSIS — G12.21 AMYOTROPHIC LATERAL SCLEROSIS: ICD-10-CM

## 2024-12-17 DIAGNOSIS — M48.02 SPINAL STENOSIS, CERVICAL REGION: ICD-10-CM

## 2024-12-17 PROCEDURE — 99214 OFFICE O/P EST MOD 30 MIN: CPT

## 2025-01-07 ENCOUNTER — APPOINTMENT (OUTPATIENT)
Dept: CARDIOLOGY | Facility: CLINIC | Age: 65
End: 2025-01-07
Payer: MEDICARE

## 2025-01-07 PROCEDURE — 99204 OFFICE O/P NEW MOD 45 MIN: CPT

## 2025-01-09 ENCOUNTER — APPOINTMENT (OUTPATIENT)
Dept: PHYSICAL MEDICINE AND REHAB | Facility: CLINIC | Age: 65
End: 2025-01-09
Payer: MEDICARE

## 2025-01-09 VITALS
TEMPERATURE: 97.3 F | DIASTOLIC BLOOD PRESSURE: 90 MMHG | OXYGEN SATURATION: 98 % | WEIGHT: 175 LBS | SYSTOLIC BLOOD PRESSURE: 130 MMHG | HEIGHT: 73 IN | HEART RATE: 104 BPM | BODY MASS INDEX: 23.19 KG/M2

## 2025-01-09 DIAGNOSIS — G95.9 DISEASE OF SPINAL CORD, UNSPECIFIED: ICD-10-CM

## 2025-01-09 DIAGNOSIS — M62.838 OTHER MUSCLE SPASM: ICD-10-CM

## 2025-01-09 DIAGNOSIS — G82.50 QUADRIPLEGIA, UNSPECIFIED: ICD-10-CM

## 2025-01-09 PROCEDURE — 99213 OFFICE O/P EST LOW 20 MIN: CPT

## 2025-01-09 RX ORDER — BACLOFEN 10 MG/1
10 TABLET ORAL 3 TIMES DAILY
Qty: 90 | Refills: 0 | Status: ACTIVE | COMMUNITY
Start: 2025-01-09 | End: 1900-01-01

## 2025-01-13 ENCOUNTER — APPOINTMENT (OUTPATIENT)
Dept: PAIN MANAGEMENT | Facility: CLINIC | Age: 65
End: 2025-01-13
Payer: MEDICARE

## 2025-01-13 DIAGNOSIS — M54.12 RADICULOPATHY, CERVICAL REGION: ICD-10-CM

## 2025-01-13 DIAGNOSIS — G12.21 AMYOTROPHIC LATERAL SCLEROSIS: ICD-10-CM

## 2025-01-13 PROCEDURE — 99212 OFFICE O/P EST SF 10 MIN: CPT

## 2025-02-14 ENCOUNTER — APPOINTMENT (OUTPATIENT)
Dept: PAIN MANAGEMENT | Facility: CLINIC | Age: 65
End: 2025-02-14
Payer: MEDICARE

## 2025-02-14 DIAGNOSIS — G12.21 AMYOTROPHIC LATERAL SCLEROSIS: ICD-10-CM

## 2025-02-14 DIAGNOSIS — G95.9 DISEASE OF SPINAL CORD, UNSPECIFIED: ICD-10-CM

## 2025-02-14 DIAGNOSIS — M54.12 RADICULOPATHY, CERVICAL REGION: ICD-10-CM

## 2025-02-14 PROCEDURE — 99212 OFFICE O/P EST SF 10 MIN: CPT | Mod: 93

## 2025-02-14 RX ORDER — NALOXONE HYDROCHLORIDE NASAL 4 MG/.1ML
4 SPRAY NASAL
Qty: 1 | Refills: 0 | Status: ACTIVE | COMMUNITY
Start: 2025-02-14 | End: 1900-01-01

## 2025-04-09 ENCOUNTER — APPOINTMENT (OUTPATIENT)
Dept: PHYSICAL MEDICINE AND REHAB | Facility: CLINIC | Age: 65
End: 2025-04-09

## 2025-05-24 NOTE — DISCHARGE NOTE PROVIDER - NSCORESITESY/N_GEN_A_CORE_RD
No

## 2025-06-27 NOTE — H&P PST ADULT - NSANTHOSAYNRD_GEN_A_CORE
Department of Anesthesiology  Postprocedure Note    Patient: Chuy Miller  MRN: [id number]  YOB: 1961  Date of evaluation: 6/27/2025    Procedure Summary       Date: 06/27/25 Room / Location: [facility] ENDO 04 / [facility] ENDOSCOPY    Anesthesia Start: 0741 Anesthesia Stop: 0801    Procedure: COLONOSCOPY POLYPECTOMY SNARE Diagnosis:       Encounter for screening colonoscopy      (Encounter for screening colonoscopy [Z12.11])    Surgeons: Ivy Pelayo MD Responsible Provider: Bossman Molina DO    Anesthesia Type: TIVA ASA Status: 2            Anesthesia Type: TIVA    Michael Phase I: Michael Score: 10    Michael Phase II: Michael Score: 8    Anesthesia Post Evaluation    Patient location during evaluation: PACU  Level of consciousness: awake and alert  Airway patency: patent  Nausea & Vomiting: no nausea  Cardiovascular status: hemodynamically stable  Respiratory status: acceptable  Hydration status: euvolemic  Comments: Blood pressure 137/81, pulse 53, temperature 98.6 °F (37 °C), temperature source Skin, resp. rate 19, height 1.778 m (5' 10\"), weight 90.7 kg (200 lb), SpO2 98%.  Pain management: satisfactory to patient    No notable events documented.   No. SOHAIL screening performed.  STOP BANG Legend: 0-2 = LOW Risk; 3-4 = INTERMEDIATE Risk; 5-8 = HIGH Risk

## 2025-07-24 NOTE — DISCHARGE NOTE NURSING/CASE MANAGEMENT/SOCIAL WORK - NSDCPEFALRISK_GEN_ALL_CORE
For information on Fall & Injury Prevention, visit: [email or web address] OR  [email or web address] OR  https://www.cdc.gov/steadi/patient.html [TextEntry] : Continue ohtuvayre. Continue acapella valve; increase use. Continue breztri. Albuterol prn.  Singulair. O2 to be used with exertion and sleep.  No Eos so dupixent not indicated. Could not tolerate daliresp. Emphysema pattern does not appear amenable to EBV. GI eval. Flonase, antihistamine. Nasal saline. Cardiology f/u with Dr Richardson. LDCT 1/2026.  Complete pulm rehab then continue with at home exercise with O2. Exercise is essential. Must stop drinking; again advised AA. Annual flu vaccine.   Lung transplant f/u with [facility] Transplant Team.

## (undated) DEVICE — SUT POLYSORB 0 30" GS-21 UNDYED

## (undated) DEVICE — SUT SOFSILK 2-0 18" C-23

## (undated) DEVICE — TRAY EPIDURAL SINGLE DOSE

## (undated) DEVICE — TAPE SILK 3"

## (undated) DEVICE — CERVICAL COLLAR DJO MIAMI J W PAD MED 2.5"

## (undated) DEVICE — DRAPE 1/2 SHEET 40X57"

## (undated) DEVICE — SUT SILK 2-0 30" RB-1

## (undated) DEVICE — SPONGE PEANUT AUTO COUNT

## (undated) DEVICE — TUBING SUCTION 20FT

## (undated) DEVICE — ELCTR BIPOLAR CORD BAUSCH & LOMB 12FT DISP

## (undated) DEVICE — STYLET  ENDOTRACH 7.5MM X 10MM

## (undated) DEVICE — WARMING BLANKET LOWER ADULT

## (undated) DEVICE — SOL IRR POUR NS 0.9% 500ML

## (undated) DEVICE — DRAIN RESERVOIR FOR JACKSON PRATT 100CC CARDINAL

## (undated) DEVICE — MIDAS REX LEGEND MATCH HEAD FLUTED LG BORE 3.0MM X 14CM

## (undated) DEVICE — DRAIN JACKSON PRATT 3 SPRING RESERVOIR W 10FR PVC DRAIN

## (undated) DEVICE — DRSG DERMABOND PRINEO 60CM

## (undated) DEVICE — MIDAS REX LEGEND MATCH HEAD DIAMOND LG BORE 3.0MM X 14CM

## (undated) DEVICE — GLV 8.5 PROTEXIS (WHITE)

## (undated) DEVICE — LAP PAD 18 X 18"

## (undated) DEVICE — DRSG DERMABOND 0.7ML

## (undated) DEVICE — GLV 7.5 PROTEXIS (WHITE)

## (undated) DEVICE — SOL IRR BAG NS 0.9% 3000ML

## (undated) DEVICE — CATH IV SAFE BC 22G X 1" (BLUE)

## (undated) DEVICE — NDL HYPO SAFE 22G X 1.5" (BLACK)

## (undated) DEVICE — DRSG TEGADERM 6"X8"

## (undated) DEVICE — MIDAS REX LEGEND LUBRICANT DIFFUSER CARTRIDGE

## (undated) DEVICE — SUCTION YANKAUER NO CONTROL VENT

## (undated) DEVICE — STRYKER BONE MILL BLADE MEDIUM 5.0MM

## (undated) DEVICE — SUT BIOSYN 3-0 18" P-12

## (undated) DEVICE — DRAIN JACKSON PRATT 7MM FLAT FULL NO TROCAR

## (undated) DEVICE — ELCTR BOVIE TIP BLADE INSULATED 2.75" EDGE

## (undated) DEVICE — Device

## (undated) DEVICE — MEDICATION LABELS W MARKER

## (undated) DEVICE — GLV 6.5 PROTEXIS (WHITE)

## (undated) DEVICE — SPECIMEN CONTAINER 100ML

## (undated) DEVICE — BUR LINVATEC ROUND LONG 4MM

## (undated) DEVICE — DRILL BIT MEDTRONIC TRI FLAT 13MM

## (undated) DEVICE — VENODYNE/SCD SLEEVE CALF LARGE

## (undated) DEVICE — NDL HYPO SAFE 18G X 1.5" (PINK)

## (undated) DEVICE — NDL SPINAL 22G X 3.5" QUINCKE

## (undated) DEVICE — NDL SPINAL 18G X 3.5" (PINK)

## (undated) DEVICE — DRAPE MAYO STAND 30"

## (undated) DEVICE — MIDAS REX MR7 LUBRICANT DIFFUSER CARTRIDGE

## (undated) DEVICE — ENDOTRACHEAL TUBE ENT KIT 33FR 8MM ID - 11MM OD

## (undated) DEVICE — SUT VICRYL 3-0 18" CP-2 UNDYED (POP-OFF)

## (undated) DEVICE — STAPLER SKIN VISI-STAT 35 WIDE

## (undated) DEVICE — SUT GORETEX CV-6 (5-0) 36" TTC-13

## (undated) DEVICE — SUT POLYSORB 2-0 30" GS-21 UNDYED

## (undated) DEVICE — PACK IV START WITH CHG

## (undated) DEVICE — GLV 7 PROTEXIS (WHITE)

## (undated) DEVICE — GLV 8 PROTEXIS (WHITE)

## (undated) DEVICE — TUBING ALARIS PUMP MODULE NON-DEHP

## (undated) DEVICE — PREP CHLORAPREP CLEAR 10.5ML

## (undated) DEVICE — TUBING BRAT 2 SUCTION ASSEMBLY TWIST LOCK

## (undated) DEVICE — SOL IRR BAG NS 0.9% 1000ML

## (undated) DEVICE — POSITIONER CUSHION INSERT PRONE VIEW LG

## (undated) DEVICE — PACK LUMBAR LAMI

## (undated) DEVICE — MAZOR MIDAS REX MR8 3.0MM X 30MM X 31CM

## (undated) DEVICE — DRAPE SURGICAL #1010

## (undated) DEVICE — ELCTR BOVIE TIP CLEANER SCRATCH PAD

## (undated) DEVICE — MIDAS REX MR8 MATCH HEAD FLUTED LG BORE 3MM X 14CM

## (undated) DEVICE — SUT VICRYL 3-0 18" TIES UNDYED

## (undated) DEVICE — DRAPE C ARM UNIVERSAL

## (undated) DEVICE — POSITIONER FOAM HEADREST (PINK)

## (undated) DEVICE — SYR LUER LOK 10CC

## (undated) DEVICE — ELCTR BOVIE TIP BLADE INSULATED 6.5" EDGE

## (undated) DEVICE — SOL IRR POUR H2O 250ML

## (undated) DEVICE — SUT POLYSORB 3-0 27" GS-21 UNDYED

## (undated) DEVICE — DRAPE IOBAN 23" X 23"

## (undated) DEVICE — ELCTR BOVIE TIP BLADE INSULATED 2.75" EDGE WITH SAFETY

## (undated) DEVICE — SYR ASEPTO

## (undated) DEVICE — SUT SOFSILK 0 30" V-20

## (undated) DEVICE — DRSG TEGADERM 4X4.75"

## (undated) DEVICE — FOLEY TRAY 16FR 5CC LTX UMETER CLOSED

## (undated) DEVICE — DRAPE LIGHT HANDLE COVER (BLUE)

## (undated) DEVICE — SUT VICRYL 2-0 18" CP-2 UNDYED (POP-OFF)

## (undated) DEVICE — MARKING PEN W RULER

## (undated) DEVICE — DRSG TELFA 3 X 8

## (undated) DEVICE — DRAPE EQUIPMENT BANDED BAG 30 X 30" (SHOWER CAP)

## (undated) DEVICE — STRYKER INTERPULSE HANDPIECE W IRR SUCTION TUBE

## (undated) DEVICE — VISITEC 4X4

## (undated) DEVICE — SUT VICRYL 0 18" OS-6 (POP-OFF)

## (undated) DEVICE — GOWN TRIMAX LG

## (undated) DEVICE — CATH IV SAFE INSYTE 18G X 1.16" (GREEN)

## (undated) DEVICE — GLV 8 PROTEXIS (CREAM) NEU-THERA

## (undated) DEVICE — SUT POLYSORB 3-0 18" V-20 UNDYED

## (undated) DEVICE — MIDAS REX LEGEND BALL FLUTED MEDNEXT SM BORE 3.0MM X 10CM

## (undated) DEVICE — BLADE SCALPEL SAFETYLOCK #15

## (undated) DEVICE — MIDAS REX LEGEND BALL FLUTED MEDNEXT SM BORE 4.0MM X 10CM

## (undated) DEVICE — SUT MONOSOFT 2-0 18" C-15

## (undated) DEVICE — DRSG STERISTRIPS 0.5 X 4"

## (undated) DEVICE — POSITIONER FOAM EGG CRATE ULNAR 2PCS (PINK)

## (undated) DEVICE — NDL SPINAL 20G X 3.5" (YELLOW)

## (undated) DEVICE — DRAPE 3/4 SHEET W REINFORCEMENT 56X77"

## (undated) DEVICE — BITE BLOCK ORAL

## (undated) DEVICE — DRAPE MAGNETIC INSTRUMENT MEDIUM

## (undated) DEVICE — SYR IV FLUSH SALINE 10ML 30/TY

## (undated) DEVICE — SUT VICRYL 2-0 18" TIES UNDYED

## (undated) DEVICE — MAZOR X SPINE DISP KIT

## (undated) DEVICE — STRYKER BONE MILL BLADE FINE 3.2MM

## (undated) DEVICE — ELCTR BOVIE PENCIL HANDPIECE

## (undated) DEVICE — SUT BIOSYN 4-0 18" P-12

## (undated) DEVICE — SOL INJ LR 500ML

## (undated) DEVICE — BIPOLAR FORCEP SYMMETRY BAYONET 7" X 1.5MM SMOOTH (SILVER)

## (undated) DEVICE — DRAPE TOWEL BLUE 17" X 24"

## (undated) DEVICE — CATH IV SAFE BC 18G X 1.16" (GREEN)